# Patient Record
Sex: FEMALE | Race: WHITE | Employment: OTHER | URBAN - METROPOLITAN AREA
[De-identification: names, ages, dates, MRNs, and addresses within clinical notes are randomized per-mention and may not be internally consistent; named-entity substitution may affect disease eponyms.]

---

## 2017-02-01 ENCOUNTER — OFFICE VISIT (OUTPATIENT)
Dept: INTERNAL MEDICINE CLINIC | Age: 67
End: 2017-02-01

## 2017-02-01 VITALS
TEMPERATURE: 97.8 F | SYSTOLIC BLOOD PRESSURE: 128 MMHG | RESPIRATION RATE: 12 BRPM | HEART RATE: 82 BPM | HEIGHT: 63 IN | OXYGEN SATURATION: 98 % | DIASTOLIC BLOOD PRESSURE: 76 MMHG | BODY MASS INDEX: 26.58 KG/M2 | WEIGHT: 150 LBS

## 2017-02-01 DIAGNOSIS — M25.572 CHRONIC PAIN OF LEFT ANKLE: ICD-10-CM

## 2017-02-01 DIAGNOSIS — Z00.00 ROUTINE PHYSICAL EXAMINATION: Primary | ICD-10-CM

## 2017-02-01 DIAGNOSIS — Z11.59 NEED FOR HEPATITIS C SCREENING TEST: ICD-10-CM

## 2017-02-01 DIAGNOSIS — E66.3 OVERWEIGHT (BMI 25.0-29.9): ICD-10-CM

## 2017-02-01 DIAGNOSIS — Z23 ENCOUNTER FOR IMMUNIZATION: ICD-10-CM

## 2017-02-01 DIAGNOSIS — G89.29 CHRONIC PAIN OF LEFT ANKLE: ICD-10-CM

## 2017-02-01 DIAGNOSIS — Z12.11 COLON CANCER SCREENING: ICD-10-CM

## 2017-02-01 DIAGNOSIS — Z12.39 BREAST CANCER SCREENING: ICD-10-CM

## 2017-02-01 DIAGNOSIS — M85.80 OSTEOPENIA: ICD-10-CM

## 2017-02-01 RX ORDER — GABAPENTIN 300 MG/1
300 CAPSULE ORAL 3 TIMES DAILY
COMMUNITY
End: 2018-03-06 | Stop reason: SDUPTHER

## 2017-02-01 RX ORDER — MELOXICAM 15 MG/1
15 TABLET ORAL DAILY
COMMUNITY
End: 2018-03-29 | Stop reason: SDUPTHER

## 2017-02-01 RX ORDER — DULOXETIN HYDROCHLORIDE 20 MG/1
20 CAPSULE, DELAYED RELEASE ORAL
COMMUNITY
End: 2018-06-05 | Stop reason: SDUPTHER

## 2017-02-01 NOTE — MR AVS SNAPSHOT
Visit Information Date & Time Provider Department Dept. Phone Encounter #  
 2/1/2017  1:30 PM Richard Elizabeth, [address] [facility] [phone] [id number] Follow-up Instructions Return in about 1 year (around 2/1/2018) for FULL PHYSICAL - 30 minutes. Upcoming Health Maintenance Date Due Hepatitis C Screening 1950 BREAST CANCER SCRN MAMMOGRAM 12/5/1970 DTaP/Tdap/Td series (1 - Tdap) 12/5/1973 PAP AKA CERVICAL CYTOLOGY 12/5/1973 FOBT Q 1 YEAR AGE 50-75 12/5/2002 ZOSTER VACCINE AGE 60> 12/5/2012 Allergies as of 2/1/2017  Review Complete On: 2/1/2017 By: Richard Elizabeth MD  
 No Known Allergies Current Immunizations  Reviewed on 2/1/2017 Name Date Influenza Vaccine 10/25/2016 Tdap  Incomplete Reviewed by Luciana Garcia RN on 2/1/2017 at  1:41 PM  
You Were Diagnosed With   
  
 Codes Comments Routine physical examination    -  Primary ICD-10-CM: Z00.00 ICD-9-CM: V70.0 Need for hepatitis C screening test     ICD-10-CM: Z11.59 
ICD-9-CM: V73.89 Encounter for immunization     ICD-10-CM: R12 ICD-9-CM: V03.89 Overweight (BMI 25.0-29. 9)     ICD-10-CM: B43.9 ICD-9-CM: 278.02 Breast cancer screening     ICD-10-CM: Z12.39 
ICD-9-CM: V76.10 Colon cancer screening     ICD-10-CM: Z12.11 ICD-9-CM: V76.51 Osteopenia     ICD-10-CM: M85.80 ICD-9-CM: 733.90 Chronic pain of left ankle     ICD-10-CM: M25.572, G89.29 ICD-9-CM: 719.47, 338.29 Vitals BP Pulse Temp Resp Height(growth percentile) Weight(growth percentile) 128/76 82 97.8 °F (36.6 °C) (Oral) 12 5' 3.1\" (1.603 m) 150 lb (68 kg) SpO2 BMI OB Status Smoking Status 98% 26.49 kg/m2 Postmenopausal Former Smoker Vitals History BMI and BSA Data Body Mass Index Body Surface Area  
 26.49 kg/m 2 1.74 m 2 Preferred Pharmacy Pharmacy Name Phone [id number] [place], [address] [address] [phone] Your Updated Medication List  
  
   
This list is accurate as of: 2/1/17  2:24 PM.  Always use your most recent med list.  
  
  
  
  
 DULoxetine 20 mg capsule Commonly known as:  CYMBALTA Take 20 mg by mouth nightly.  
  
 gabapentin 300 mg capsule Commonly known as:  NEURONTIN Take 300 mg by mouth three (3) times daily. meloxicam 15 mg tablet Commonly known as:  MOBIC Take 15 mg by mouth daily. We Performed the Following CBC WITH AUTOMATED DIFF [38842 CPT(R)] HEPATITIS C AB [11507 CPT(R)] LIPID PANEL [18961 CPT(R)] METABOLIC PANEL, COMPREHENSIVE [23795 CPT(R)] IA IMMUNIZ ADMIN,1 SINGLE/COMB VAC/TOXOID Z188102 CPT(R)] REFERRAL TO GASTROENTEROLOGY [MNB97 Custom] Comments:  
 Colonoscopy screening. TETANUS, DIPHTHERIA TOXOIDS AND ACELLULAR PERTUSSIS VACCINE (TDAP), IN INDIVIDS. >=7, IM [id number] CPT(R)] Follow-up Instructions Return in about 1 year (around 2/1/2018) for FULL PHYSICAL - 30 minutes. To-Do List   
 02/01/2017 Imaging:  DEXA BONE DENSITY STUDY AXIAL   
  
 02/01/2017 Imaging:  AUDELIA MAMMO BI SCREENING INCL CAD Referral Information Referral ID Referred By Referred To  
  
 [id number] Ross MENDEZ [facility] [address], [address] Visits Status Start Date End Date 1 New Request 2/1/17 2/1/18 If your referral has a status of pending review or denied, additional information will be sent to support the outcome of this decision. Patient Instructions Well Visit, Women 48 to 72: Care Instructions Your Care Instructions Physical exams can help you stay healthy. Your doctor has checked your overall health and may have suggested ways to take good care of yourself. He or she also may have recommended tests. At home, you can help prevent illness with healthy eating, regular exercise, and other steps. Follow-up care is a key part of your treatment and safety. Be sure to make and go to all appointments, and call your doctor if you are having problems. It's also a good idea to know your test results and keep a list of the medicines you take. How can you care for yourself at home? · Reach and stay at a healthy weight. This will lower your risk for many problems, such as obesity, diabetes, heart disease, and high blood pressure. · Get at least 30 minutes of exercise on most days of the week. Walking is a good choice. You also may want to do other activities, such as running, swimming, cycling, or playing tennis or team sports. · Do not smoke. Smoking can make health problems worse. If you need help quitting, talk to your doctor about stop-smoking programs and medicines. These can increase your chances of quitting for good. · Protect your skin from too much sun. When you're outdoors from 10 a.m. to 4 p.m., stay in the shade or cover up with clothing and a hat with a wide brim. Wear sunglasses that block UV rays. Even when it's cloudy, put broad-spectrum sunscreen (SPF 30 or higher) on any exposed skin. · See a dentist one or two times a year for checkups and to have your teeth cleaned. · Wear a seat belt in the car. · Limit alcohol to 1 drink a day. Too much alcohol can cause health problems. Follow your doctor's advice about when to have certain tests. These tests can spot problems early. · Cholesterol. Your doctor will tell you how often to have this done based on your age, family history, or other things that can increase your risk for heart attack and stroke. · Blood pressure. Have your blood pressure checked during a routine doctor visit.  Your doctor will tell you how often to check your blood pressure based on your age, your blood pressure results, and other factors. · Mammogram. Ask your doctor how often you should have a mammogram, which is an X-ray of your breasts. A mammogram can spot breast cancer before it can be felt and when it is easiest to treat. · Pap test and pelvic exam. Ask your doctor how often you should have a Pap test. You may not need to have a Pap test as often as you used to. · Vision. Have your eyes checked every year or two or as often as your doctor suggests. Some experts recommend that you have yearly exams for glaucoma and other age-related eye problems starting at age 48. · Hearing. Tell your doctor if you notice any change in your hearing. You can have tests to find out how well you hear. · Diabetes. Ask your doctor whether you should have tests for diabetes. · Colon cancer. You should begin tests for colon cancer at age 48. You may have one of several tests. Your doctor will tell you how often to have tests based on your age and risk. Risks include whether you already had a precancerous polyp removed from your colon or whether your parents, sisters and brothers, or children have had colon cancer. · Thyroid disease. Talk to your doctor about whether to have your thyroid checked as part of a regular physical exam. Women have an increased chance of a thyroid problem. · Osteoporosis. You should begin tests for bone density at age 72. If you are younger than 72, ask your doctor whether you have factors that may increase your risk for this disease. You may want to have this test before age 72. · Heart attack and stroke risk. At least every 4 to 6 years, you should have your risk for heart attack and stroke assessed. Your doctor uses factors such as your age, blood pressure, cholesterol, and whether you smoke or have diabetes to show what your risk for a heart attack or stroke is over the next 10 years. When should you call for help? Watch closely for changes in your health, and be sure to contact your doctor if you have any problems or symptoms that concern you. Where can you learn more? Go to http://tiffanie-bessie.info/. Enter O243 in the search box to learn more about \"Well Visit, Women 50 to 72: Care Instructions. \" Current as of: July 19, 2016 Content Version: 11.1 © 5683-4137 [employer]. Care instructions adapted under license by [employer] (which disclaims liability or warranty for this information). If you have questions about a medical condition or this instruction, always ask your healthcare professional. [place] [address] any warranty or liability for your use of this information. Introducing [facility]! Ashwin Mendez introduces Product[employer] patient portal. Now you can access parts of your medical record, email your doctor's office, and request medication refills online. 1. In your internet browser, go to [email or web address] 2. Click on the First Time User? Click Here link in the Sign In box. You will see the New Member Sign Up page. 3. Enter your Product[employer] Access Code exactly as it appears below. You will not need to use this code after youve completed the sign-up process. If you do not sign up before the expiration date, you must request a new code. · Product[employer] Access Code: [email or web address] Expires: 5/2/2017  1:34 PM 
 
4. Enter the last four digits of your Social Security Number (xxxx) and Date of Birth (mm/dd/yyyy) as indicated and click Submit. You will be taken to the next sign-up page. 5. Create a Productify ID. This will be your Productify login ID and cannot be changed, so think of one that is secure and easy to remember. 6. Create a [employer] password. You can change your password at any time. 7. Enter your Password Reset Question and Answer. This can be used at a later time if you forget your password. 8. Enter your e-mail address. You will receive e-mail notification when new information is available in [address] 9. Click Sign Up. You can now view and download portions of your medical record. 10. Click the Download Summary menu link to download a portable copy of your medical information. If you have questions, please visit the Frequently Asked Questions section of the [employer] website. Remember, [employer] is NOT to be used for urgent needs. For medical emergencies, dial 911. Now available from your iPhone and Android! Please provide this summary of care documentation to your next provider. Your primary care clinician is listed as Julian Ureña. If you have any questions after today's visit, please call [phone].

## 2017-02-01 NOTE — PROGRESS NOTES
Sharri Poon is a 61 y.o. female who was seen in clinic today (2/1/2017) for a new patient appointment. Never had a PCP. Patient was seen with Dr Ken Coronado ([facility] at [facility]). Assessment & Plan:   Sharad Zimmer was seen today for establish care. Diagnoses and all orders for this visit:    Routine physical examination  -     HEPATITIS C AB  -     METABOLIC PANEL, COMPREHENSIVE  -     CBC WITH AUTOMATED DIFF  -     LIPID PANEL    Need for hepatitis C screening test    Encounter for immunization  -     TETANUS, DIPHTHERIA TOXOIDS AND ACELLULAR PERTUSSIS VACCINE (TDAP), IN INDIVIDS. >=7, IM  -     ID IMMUNIZ ADMIN,1 SINGLE/COMB VAC/TOXOID    Overweight (BMI 25.0-29. 9)    Breast cancer screening  -     AUDELIA MAMMO BI SCREENING INCL CAD; Future    Colon cancer screening  -     REFERRAL TO GASTROENTEROLOGY    Osteopenia  -     DEXA BONE DENSITY STUDY AXIAL; Future    Chronic pain of left ankle- this is a chronic problem but new to me, defer to specialist.          Follow-up Disposition:  Return in about 1 year (around 2/1/2018) for FULL PHYSICAL - 30 minutes. ------------------------------------------------------------------------------------------    Subjective:   Health Maintenance  Immunizations:    Influenza: up to date. Tetanus: not UTD- will do today. Shingles: unknown, record requested. Pneumonia: n/a. Cancer screening:    Cervical: reviewed guidelines, UTD, done by OBGYN, records requested. Breast: reviewed guidelines, order placed. Colon: referral placed to GI. Patient Care Team:  Richrd Paget, MD as PCP - General (Internal Medicine)  Smith Rowan MD as Physician (Physical Medicine and Rehab)      The following sections were reviewed & updated as appropriate: PMH, PSH, FH, and SH. Patient Active Problem List   Diagnosis Code    Chronic pain of left ankle M25.572, G89.29          Prior to Admission medications    Medication Sig Start Date End Date Taking?  Authorizing Provider DULoxetine (CYMBALTA) 20 mg capsule Take 20 mg by mouth nightly. Yes Historical Provider   meloxicam (MOBIC) 15 mg tablet Take 15 mg by mouth daily. Yes Historical Provider   gabapentin (NEURONTIN) 300 mg capsule Take 300 mg by mouth three (3) times daily. Yes Historical Provider          No Known Allergies         Review of Systems   Constitutional: Negative for chills and fever. Respiratory: Negative for cough and shortness of breath. Cardiovascular: Negative for chest pain and palpitations. Gastrointestinal: Negative for abdominal pain, blood in stool, constipation, diarrhea, heartburn, nausea and vomiting. Musculoskeletal: Positive for joint pain (L ankle, s/p fx in the past). Negative for myalgias. Neurological: Negative for tingling, focal weakness and headaches. Endo/Heme/Allergies: Does not bruise/bleed easily. Psychiatric/Behavioral: Negative for depression. The patient is not nervous/anxious and does not have insomnia. Objective:   Physical Exam   Constitutional: She appears well-developed. No distress. HENT:   Right Ear: Tympanic membrane, external ear and ear canal normal.   Left Ear: Tympanic membrane, external ear and ear canal normal.   Nose: Nose normal.   Mouth/Throat: Uvula is midline, oropharynx is clear and moist and mucous membranes are normal. No posterior oropharyngeal erythema. Eyes: Conjunctivae and lids are normal. No scleral icterus. Neck: Neck supple. Carotid bruit is not present. No thyromegaly present. Cardiovascular: Regular rhythm and normal heart sounds. No murmur heard. Pulses:       Dorsalis pedis pulses are 2+ on the right side, and 2+ on the left side. Posterior tibial pulses are 2+ on the right side, and 2+ on the left side. Pulmonary/Chest: Effort normal and breath sounds normal. She has no wheezes. She has no rales. Abdominal: Soft. Bowel sounds are normal. She exhibits no mass. There is no hepatosplenomegaly.  There is no tenderness. Musculoskeletal: Normal range of motion. She exhibits no edema. Cervical back: Normal.        Thoracic back: She exhibits no bony tenderness. Lumbar back: Normal.   Lymphadenopathy:     She has no cervical adenopathy. Neurological: She has normal strength. No cranial nerve deficit or sensory deficit. Skin: No rash noted. Psychiatric: She has a normal mood and affect. Her behavior is normal.          Visit Vitals    /76    Pulse 82    Temp 97.8 °F (36.6 °C) (Oral)    Resp 12    Ht 5' 3.1\" (1.603 m)    Wt 150 lb (68 kg)    SpO2 98%    BMI 26.49 kg/m2          Advised her to call back or return to office if symptoms worsen/change/persist.  Discussed expected course/resolution/complications of diagnosis in detail with patient. Medication risks/benefits/costs/interactions/alternatives discussed with patient. She was given an after visit summary which includes diagnoses, current medications, & vitals. She expressed understanding with the diagnosis and plan.         Raul Butcher MD

## 2017-02-01 NOTE — PATIENT INSTRUCTIONS
Well Visit, Women 48 to 72: Care Instructions  Your Care Instructions  Physical exams can help you stay healthy. Your doctor has checked your overall health and may have suggested ways to take good care of yourself. He or she also may have recommended tests. At home, you can help prevent illness with healthy eating, regular exercise, and other steps. Follow-up care is a key part of your treatment and safety. Be sure to make and go to all appointments, and call your doctor if you are having problems. It's also a good idea to know your test results and keep a list of the medicines you take. How can you care for yourself at home? · Reach and stay at a healthy weight. This will lower your risk for many problems, such as obesity, diabetes, heart disease, and high blood pressure. · Get at least 30 minutes of exercise on most days of the week. Walking is a good choice. You also may want to do other activities, such as running, swimming, cycling, or playing tennis or team sports. · Do not smoke. Smoking can make health problems worse. If you need help quitting, talk to your doctor about stop-smoking programs and medicines. These can increase your chances of quitting for good. · Protect your skin from too much sun. When you're outdoors from 10 a.m. to 4 p.m., stay in the shade or cover up with clothing and a hat with a wide brim. Wear sunglasses that block UV rays. Even when it's cloudy, put broad-spectrum sunscreen (SPF 30 or higher) on any exposed skin. · See a dentist one or two times a year for checkups and to have your teeth cleaned. · Wear a seat belt in the car. · Limit alcohol to 1 drink a day. Too much alcohol can cause health problems. Follow your doctor's advice about when to have certain tests. These tests can spot problems early. · Cholesterol.  Your doctor will tell you how often to have this done based on your age, family history, or other things that can increase your risk for heart attack and stroke. · Blood pressure. Have your blood pressure checked during a routine doctor visit. Your doctor will tell you how often to check your blood pressure based on your age, your blood pressure results, and other factors. · Mammogram. Ask your doctor how often you should have a mammogram, which is an X-ray of your breasts. A mammogram can spot breast cancer before it can be felt and when it is easiest to treat. · Pap test and pelvic exam. Ask your doctor how often you should have a Pap test. You may not need to have a Pap test as often as you used to. · Vision. Have your eyes checked every year or two or as often as your doctor suggests. Some experts recommend that you have yearly exams for glaucoma and other age-related eye problems starting at age 48. · Hearing. Tell your doctor if you notice any change in your hearing. You can have tests to find out how well you hear. · Diabetes. Ask your doctor whether you should have tests for diabetes. · Colon cancer. You should begin tests for colon cancer at age 48. You may have one of several tests. Your doctor will tell you how often to have tests based on your age and risk. Risks include whether you already had a precancerous polyp removed from your colon or whether your parents, sisters and brothers, or children have had colon cancer. · Thyroid disease. Talk to your doctor about whether to have your thyroid checked as part of a regular physical exam. Women have an increased chance of a thyroid problem. · Osteoporosis. You should begin tests for bone density at age 72. If you are younger than 72, ask your doctor whether you have factors that may increase your risk for this disease. You may want to have this test before age 72. · Heart attack and stroke risk. At least every 4 to 6 years, you should have your risk for heart attack and stroke assessed.  Your doctor uses factors such as your age, blood pressure, cholesterol, and whether you smoke or have diabetes to show what your risk for a heart attack or stroke is over the next 10 years. When should you call for help? Watch closely for changes in your health, and be sure to contact your doctor if you have any problems or symptoms that concern you. Where can you learn more? Go to http://tiffanie-bessie.info/. Enter L082 in the search box to learn more about \"Well Visit, Women 50 to 72: Care Instructions. \"  Current as of: July 19, 2016  Content Version: 11.1  © 6280-5066 [employer]. Care instructions adapted under license by [employer] (which disclaims liability or warranty for this information). If you have questions about a medical condition or this instruction, always ask your healthcare professional. [address] any warranty or liability for your use of this information.

## 2017-02-01 NOTE — PROGRESS NOTES
To establish care. No health issues reported except for left ankle pain from prior injury. Managed by Dr. Gerardo Ramos. Had shingles shot she thinks at [facility], will call. Last used [facility] as PCP if needed around 2015. Has never had colonoscopy. Shira Dominguez is a 61 y.o. female  who present for routine immunizations. she  denies any symptoms , reactions or allergies that would exclude them from being immunized today. Risks and adverse reactions were discussed and the VIS was given to them. All questions were addressed. she was observed for 5 min post injection. There were no reactions observed.     Rayne Salgado RN

## 2017-02-22 LAB
ALBUMIN SERPL-MCNC: 4.5 G/DL (ref 3.6–4.8)
ALBUMIN/GLOB SERPL: 2.5 {RATIO} (ref 1.1–2.5)
ALP SERPL-CCNC: 99 IU/L (ref 39–117)
ALT SERPL-CCNC: 28 IU/L (ref 0–32)
AST SERPL-CCNC: 23 IU/L (ref 0–40)
BASOPHILS # BLD AUTO: 0 X10E3/UL (ref 0–0.2)
BASOPHILS NFR BLD AUTO: 0 %
BILIRUB SERPL-MCNC: 0.6 MG/DL (ref 0–1.2)
BUN SERPL-MCNC: 17 MG/DL (ref 8–27)
BUN/CREAT SERPL: 33 (ref 11–26)
CALCIUM SERPL-MCNC: 10.1 MG/DL (ref 8.7–10.3)
CHLORIDE SERPL-SCNC: 98 MMOL/L (ref 96–106)
CHOLEST SERPL-MCNC: 277 MG/DL (ref 100–199)
CO2 SERPL-SCNC: 26 MMOL/L (ref 18–29)
CREAT SERPL-MCNC: 0.51 MG/DL (ref 0.57–1)
EOSINOPHIL # BLD AUTO: 0 X10E3/UL (ref 0–0.4)
EOSINOPHIL NFR BLD AUTO: 0 %
ERYTHROCYTE [DISTWIDTH] IN BLOOD BY AUTOMATED COUNT: 13.6 % (ref 12.3–15.4)
GLOBULIN SER CALC-MCNC: 1.8 G/DL (ref 1.5–4.5)
GLUCOSE SERPL-MCNC: 72 MG/DL (ref 65–99)
HCT VFR BLD AUTO: 39.7 % (ref 34–46.6)
HCV AB S/CO SERPL IA: <0.1 S/CO RATIO (ref 0–0.9)
HDLC SERPL-MCNC: 112 MG/DL
HGB BLD-MCNC: 13.7 G/DL (ref 11.1–15.9)
IMM GRANULOCYTES # BLD: 0 X10E3/UL (ref 0–0.1)
IMM GRANULOCYTES NFR BLD: 0 %
LDLC SERPL CALC-MCNC: 151 MG/DL (ref 0–99)
LYMPHOCYTES # BLD AUTO: 1.4 X10E3/UL (ref 0.7–3.1)
LYMPHOCYTES NFR BLD AUTO: 29 %
MCH RBC QN AUTO: 33.3 PG (ref 26.6–33)
MCHC RBC AUTO-ENTMCNC: 34.5 G/DL (ref 31.5–35.7)
MCV RBC AUTO: 96 FL (ref 79–97)
MONOCYTES # BLD AUTO: 0.3 X10E3/UL (ref 0.1–0.9)
MONOCYTES NFR BLD AUTO: 5 %
NEUTROPHILS # BLD AUTO: 3.2 X10E3/UL (ref 1.4–7)
NEUTROPHILS NFR BLD AUTO: 66 %
PLATELET # BLD AUTO: 190 X10E3/UL (ref 150–379)
POTASSIUM SERPL-SCNC: 4.6 MMOL/L (ref 3.5–5.2)
PROT SERPL-MCNC: 6.3 G/DL (ref 6–8.5)
RBC # BLD AUTO: 4.12 X10E6/UL (ref 3.77–5.28)
SODIUM SERPL-SCNC: 141 MMOL/L (ref 134–144)
TRIGL SERPL-MCNC: 72 MG/DL (ref 0–149)
VLDLC SERPL CALC-MCNC: 14 MG/DL (ref 5–40)
WBC # BLD AUTO: 4.9 X10E3/UL (ref 3.4–10.8)

## 2017-05-08 ENCOUNTER — HOSPITAL ENCOUNTER (OUTPATIENT)
Dept: BONE DENSITY | Age: 67
Discharge: HOME | End: 2017-05-08
Attending: INTERNAL MEDICINE
Payer: COMMERCIAL

## 2017-05-08 ENCOUNTER — HOSPITAL ENCOUNTER (OUTPATIENT)
Dept: MAMMOGRAPHY | Age: 67
Discharge: HOME | End: 2017-05-08
Attending: INTERNAL MEDICINE
Payer: COMMERCIAL

## 2017-05-08 DIAGNOSIS — Z12.39 BREAST CANCER SCREENING: ICD-10-CM

## 2017-05-08 DIAGNOSIS — M85.80 OSTEOPENIA: ICD-10-CM

## 2017-05-08 PROCEDURE — 77067 SCR MAMMO BI INCL CAD: CPT

## 2017-05-08 PROCEDURE — 77080 DXA BONE DENSITY AXIAL: CPT

## 2017-05-10 ENCOUNTER — TELEPHONE (OUTPATIENT)
Dept: INTERNAL MEDICINE CLINIC | Age: 67
End: 2017-05-10

## 2017-05-10 RX ORDER — ALENDRONATE SODIUM 70 MG/1
70 TABLET ORAL
Qty: 12 TAB | Refills: 1 | Status: SHIPPED | OUTPATIENT
Start: 2017-05-10 | End: 2017-10-27 | Stop reason: SDUPTHER

## 2017-05-10 NOTE — PROGRESS NOTES
Called pt and gave results. Pt was in cardiology class and asked if we would call in results to her home phone and leave message stating the results. Script for Fosamax sent to pt pharmacy.

## 2017-05-10 NOTE — PROGRESS NOTES
Please call patient. DEXA shows osteopenia, but abnormal FRAX. Would recommend Fosamax, 1 tab PO weekly, #12, RF 1.

## 2017-05-12 ENCOUNTER — TELEPHONE (OUTPATIENT)
Dept: INTERNAL MEDICINE CLINIC | Age: 67
End: 2017-05-12

## 2017-05-15 NOTE — TELEPHONE ENCOUNTER
Called pt home phone # at pt request and left message stating pt DEXA results and informed pt that Fosamax has been sent to pt pharmacy.

## 2017-05-17 ENCOUNTER — TELEPHONE (OUTPATIENT)
Dept: INTERNAL MEDICINE CLINIC | Age: 67
End: 2017-05-17

## 2017-05-18 NOTE — TELEPHONE ENCOUNTER
Called pt and gave her alternatives rather than taking Fosamax such as following up with a Rhuematologist, or waiting to discuss with Dr Troy Bloch at next follow up visit in 02/18. Pt stated thank you for the information and will call us back when she can discuss more.

## 2017-05-18 NOTE — TELEPHONE ENCOUNTER
Agreed. Does not sound like this is the right medication. Stop it. This is the only medication I would use to treat osteopenia (abnl FRAX). I would hold off on injectable medications. She can talk to a rheumatologist (Dr Doni Ly or Gallito Dunbar or Candy Hancock) if she wants to start alternative medications.   If not can f/u and discuss at her next visit (2/18)

## 2017-05-18 NOTE — TELEPHONE ENCOUNTER
Pt called stating she took her 1st dose of Fosamax which caused her to break out into a severe sweat all over. She states she then felt very nauseated. She state she was sitting up when she took the medication. She states it did not agree with her at all.

## 2017-07-20 ENCOUNTER — TELEPHONE (OUTPATIENT)
Dept: INTERNAL MEDICINE CLINIC | Age: 67
End: 2017-07-20

## 2017-07-21 NOTE — TELEPHONE ENCOUNTER
Pt called and stated that the fosamax was making her sick when taking it with other medications and she now is taking it  from her other medications and now can tolerate the medication. Pt still has one refill left for her Fosamax and would like to know will she have to take anything else after taking this medication ?

## 2017-07-21 NOTE — TELEPHONE ENCOUNTER
Called pt and informed pt that she should take this medication for 2 yrs and then would have dexa scan to recheck. And to notify us when she needs refills Pt verbalized understanding.

## 2017-07-21 NOTE — TELEPHONE ENCOUNTER
Should continue on this medication for 2 yrs and then we can repeat her DEXA scan to see how it is working. When she needs refills just notify us and will send in a refill. Notify her I normally give 6 months of meds at a time, but will keep refilling for her.

## 2017-10-27 RX ORDER — ALENDRONATE SODIUM 70 MG/1
TABLET ORAL
Qty: 12 TAB | Refills: 1 | Status: SHIPPED | OUTPATIENT
Start: 2017-10-27 | End: 2018-02-13 | Stop reason: SDUPTHER

## 2017-12-11 ENCOUNTER — TELEPHONE (OUTPATIENT)
Dept: INTERNAL MEDICINE CLINIC | Age: 67
End: 2017-12-11

## 2017-12-11 NOTE — TELEPHONE ENCOUNTER
I only do shoulders & knees. Ankles is to small of a joint for me. Needs to see podiatry or orthopedics.

## 2017-12-11 NOTE — TELEPHONE ENCOUNTER
[phone] pt says that the dr she use to see would give her cortisone shots in her ankles and she wonders if dr Moises Gleason would do that for her.

## 2017-12-12 NOTE — TELEPHONE ENCOUNTER
Called pt and left a message informing her that Dr Marlee Eldridge does not do cortisone injections for ankles they are too small for him, pt would have to see podiatry or an orthopedic.

## 2018-02-13 ENCOUNTER — OFFICE VISIT (OUTPATIENT)
Dept: INTERNAL MEDICINE CLINIC | Age: 68
End: 2018-02-13

## 2018-02-13 VITALS
RESPIRATION RATE: 14 BRPM | TEMPERATURE: 97.9 F | SYSTOLIC BLOOD PRESSURE: 124 MMHG | WEIGHT: 142 LBS | HEIGHT: 64 IN | HEART RATE: 72 BPM | DIASTOLIC BLOOD PRESSURE: 76 MMHG | OXYGEN SATURATION: 98 % | BODY MASS INDEX: 24.24 KG/M2

## 2018-02-13 DIAGNOSIS — Z23 ENCOUNTER FOR IMMUNIZATION: ICD-10-CM

## 2018-02-13 DIAGNOSIS — Z00.00 WELCOME TO MEDICARE PREVENTIVE VISIT: Primary | ICD-10-CM

## 2018-02-13 DIAGNOSIS — Z71.89 ACP (ADVANCE CARE PLANNING): ICD-10-CM

## 2018-02-13 DIAGNOSIS — M85.89 OSTEOPENIA OF MULTIPLE SITES: ICD-10-CM

## 2018-02-13 DIAGNOSIS — L30.9 ECZEMA, UNSPECIFIED TYPE: ICD-10-CM

## 2018-02-13 DIAGNOSIS — G89.29 CHRONIC PAIN OF LEFT ANKLE: ICD-10-CM

## 2018-02-13 DIAGNOSIS — Z51.81 MEDICATION MONITORING ENCOUNTER: ICD-10-CM

## 2018-02-13 DIAGNOSIS — Z13.39 SCREENING FOR ALCOHOLISM: ICD-10-CM

## 2018-02-13 DIAGNOSIS — M25.572 CHRONIC PAIN OF LEFT ANKLE: ICD-10-CM

## 2018-02-13 RX ORDER — ALENDRONATE SODIUM 70 MG/1
TABLET ORAL
Qty: 12 TAB | Refills: 1 | Status: SHIPPED | OUTPATIENT
Start: 2018-02-13 | End: 2018-10-04 | Stop reason: SDUPTHER

## 2018-02-13 RX ORDER — CLOBETASOL PROPIONATE 0.5 MG/G
CREAM TOPICAL
COMMUNITY
End: 2018-02-13 | Stop reason: SDUPTHER

## 2018-02-13 RX ORDER — LANOLIN ALCOHOL/MO/W.PET/CERES
40 CREAM (GRAM) TOPICAL DAILY
COMMUNITY
End: 2019-02-07

## 2018-02-13 RX ORDER — CLOBETASOL PROPIONATE 0.5 MG/G
CREAM TOPICAL
Qty: 15 G | Refills: 1 | Status: SHIPPED | OUTPATIENT
Start: 2018-02-13 | End: 2018-02-13

## 2018-02-13 RX ORDER — TRIAMCINOLONE ACETONIDE 1 MG/G
OINTMENT TOPICAL 2 TIMES DAILY
Qty: 30 G | Refills: 0 | Status: SHIPPED | OUTPATIENT
Start: 2018-02-13 | End: 2019-02-07

## 2018-02-13 NOTE — PROGRESS NOTES
Dorinda Granados is a 74 y.o. female who was seen in clinic today (2/13/2018) for a full physical.      Assessment & Plan:   Diagnoses and all orders for this visit:    1. Welcome to Medicare preventive visit  -     AMB POC EKG ROUTINE W/ 12 LEADS, SCREEN ()    2. Encounter for immunization  -     pneumococcal 13 cm conj dip (PREVNAR-13) 0.5 mL syrg injection; 0.5 mL by IntraMUSCular route once for 1 dose. 3. ACP (advance care planning)  -     FULL CODE    4. Screening for alcoholism  -     Annual  Alcohol Screen 15 min ()    5. Chronic pain of left ankle- this is a chronic problem, symptoms are: stable, she wants me to take over from [address]. Will cont w/ current medications. Reviewed using Gabapentin BID vs TID. Reviewed Lyrica as an option. Reviewed decreasing Mobic at some time. If needs injection will need to see ortho or sports medicine. She will notify me when she needs refills     6. Osteopenia of multiple sites- new dx at last visit, will cont on emds below, reviewed duration of treatment and alternative options  -     alendronate (FOSAMAX) 70 mg tablet; TAKE 1 TABLET BY MOUTH EVERY SEVEN (7) DAYS. 7. Medication monitoring encounter  -     METABOLIC PANEL, COMPREHENSIVE  -     CBC W/O DIFF    8. Eczema, unspecified type- this is a chronic problem but new to me, symptoms are: fluctuating, having dry skin on various areas that are not responsive to moisturizer cream, used Clobetasol prn w/ good relief. Sent in but is financially prohibitive so changed to Triamcinolone. -     clobetasol (TEMOVATE) 0.05 % topical cream; Apply  to affected area two (2) times daily as needed for Skin Irritation. Follow-up Disposition:  Return in about 1 year (around 2/13/2019) for FULL PHYSICAL - 30 minutes. ------------------------------------------------------------------------------------------    Subjective: Annual Wellness Visit- IPPE    End of Life Planning:  This was discussed with her today and she has NO advanced directive  - add't info provided. Reviewed DNR/DNI and patient is not interested. Depression Screen:  PHQ over the last two weeks 2/13/2018   Little interest or pleasure in doing things Not at all   Feeling down, depressed or hopeless Not at all   Total Score PHQ 2 0         Fall Risk:   Fall Risk Assessment, last 12 mths 2/13/2018   Able to walk? Yes   Fall in past 12 months? No       Abuse Screen:  Abuse Screening Questionnaire 2/13/2018   Do you ever feel afraid of your partner? N   Are you in a relationship with someone who physically or mentally threatens you? N   Is it safe for you to go home? Y         Alcohol Risk Factor Screening: On any occasion during the past 3 months, have you had more than 3 drinks containing alcohol? No  Do you average more than 7 drinks per week? No    Hearing Loss: Hearing is good. Cognition Screen:  Has your family/caregiver stated any concerns about your memory: no  appropriate for age attention/concentration and appropriate safety awareness    Activities of Daily Living:    Requires assistance with: no ADLs  Home contains: handrails and grab bars  Exercise: moderately active    Adult Nutrition Screen:  No risk factors noted. Weight loss was intentional.       Health Maintenance  Daily Aspirin: n/a  Bone Density: UTD - done in 5/8/17  Glaucoma Screening: No:  She will call to schedule f/u    Immunizations:    Influenza: up to date. Tetanus: up to date. Shingles: up to date - reviewed Shingrix vaccine & will readdress at next visit. Pneumonia: due for Prevnar & script provided. Cancer screening:    Cervical: reviewed guidelines, UTD. Breast: reviewed guidelines, UTD, will space out to every 2 yrs per her request.  Will plan on stopping at age 81. Colon: not UTD,  had several health issues since last year, reviewed pros/cons to screening, guidelines reviewed.          Patient Care Team:  Smith Chaney MD as PCP - General (Internal Medicine)  Jackie Donnelly MD as Physician (Physical Medicine and Rehab)  Bety Soto MD as Physician (Ophthalmology)  Renée Mehta MD as Physician (Obstetrics & Gynecology)         In addition to the above issues we also reviewed the following acute and/or chronic problems:    Endocrine Review  She is seen for osteopenia. Since last visit: had some issues initially w/ GI issues, but has resolved w/ taking meds separate from other pills. She is on the following treatment: fosamax 70 mg weekly. She reports compliance with all meds, and denies any med side effects. She denies any falls or joint pain. She presents do to pain of her left ankle that is chronic. Was followed by Dr Riana Brady but they no longer take her insurance. She hs not had any injections recently. Pain was bad but improved w/ Gabapentin taking it TID. However, some issues w/ taking meds TID. She describes the pain as aching. It is constant but fluctuating in intensity. She denies numbness, denies paresthesias. The following sections were reviewed & updated as appropriate: PMH, PSH, FH, and SH. Patient Active Problem List   Diagnosis Code    Chronic pain of left ankle M25.572, G89.29    Osteopenia M85.80          Prior to Admission medications    Medication Sig Start Date End Date Taking? Authorizing Provider   VITAMIN E ACETATE (VITAMIN E PO) Take 1,200 mg by mouth daily. Yes Historical Provider   thiamine (VITAMIN B-1) 100 mg tablet Take 40 mg by mouth daily. Yes Historical Provider   clobetasol (TEMOVATE) 0.05 % topical cream Apply  to affected area two (2) times daily as needed for Skin Irritation. Yes Historical Provider   alendronate (FOSAMAX) 70 mg tablet TAKE 1 TABLET BY MOUTH EVERY SEVEN (7) DAYS. 10/27/17  Yes Dylon Britton MD   DULoxetine (CYMBALTA) 20 mg capsule Take 20 mg by mouth nightly.    Yes Historical Provider   meloxicam (MOBIC) 15 mg tablet Take 15 mg by mouth daily. Yes Historical Provider   gabapentin (NEURONTIN) 300 mg capsule Take 300 mg by mouth three (3) times daily. Yes Historical Provider          No Known Allergies           Review of Systems   Constitutional: Negative for chills and fever. Respiratory: Negative for cough and shortness of breath. Cardiovascular: Negative for chest pain and palpitations. Gastrointestinal: Negative for abdominal pain, blood in stool, constipation, diarrhea, heartburn, nausea and vomiting. Musculoskeletal: Positive for joint pain (L ankle). Negative for myalgias. Neurological: Negative for tingling, focal weakness and headaches. Endo/Heme/Allergies: Does not bruise/bleed easily. Psychiatric/Behavioral: Negative for depression. The patient is not nervous/anxious and does not have insomnia. Objective:   Physical Exam   Constitutional: She appears well-developed. No distress. HENT:   Right Ear: Tympanic membrane, external ear and ear canal normal.   Left Ear: Tympanic membrane, external ear and ear canal normal.   Nose: Nose normal.   Mouth/Throat: Uvula is midline, oropharynx is clear and moist and mucous membranes are normal. No posterior oropharyngeal erythema. Eyes: Conjunctivae and lids are normal. No scleral icterus. Neck: Neck supple. Carotid bruit is not present. No thyromegaly present. Cardiovascular: Regular rhythm. Murmur (2/6 systolic) heard. Pulses:       Dorsalis pedis pulses are 2+ on the right side, and 2+ on the left side. Posterior tibial pulses are 2+ on the right side, and 2+ on the left side. Pulmonary/Chest: Effort normal and breath sounds normal. She has no wheezes. She has no rales. Abdominal: Soft. Bowel sounds are normal. She exhibits no mass. There is no hepatosplenomegaly. There is no tenderness. Musculoskeletal: Normal range of motion. She exhibits no edema. Left ankle: She exhibits deformity. She exhibits normal range of motion. Tenderness. Cervical back: Normal.        Thoracic back: She exhibits no bony tenderness. Lumbar back: Normal.   Lymphadenopathy:     She has no cervical adenopathy. Neurological: She has normal strength. No cranial nerve deficit or sensory deficit. Skin: No rash noted. Psychiatric: She has a normal mood and affect. Her behavior is normal.          Visit Vitals    /76    Pulse 72    Temp 97.9 °F (36.6 °C) (Oral)    Resp 14    Ht 5' 3.5\" (1.613 m)    Wt 142 lb (64.4 kg)    SpO2 98%    BMI 24.76 kg/m2          Advised her to call back or return to office if symptoms worsen/change/persist.  Discussed expected course/resolution/complications of diagnosis in detail with patient. Medication risks/benefits/costs/interactions/alternatives discussed with patient. She was given an after visit summary which includes diagnoses, current medications, & vitals. She expressed understanding with the diagnosis and plan. Aspects of this note may have been generated using voice recognition software. Despite editing, there may be some syntax errors.        Joce Khan MD

## 2018-02-13 NOTE — ACP (ADVANCE CARE PLANNING)
Advance Care Planning (ACP) Provider Note - Comprehensive     Date of ACP Conversation: 02/13/18  Persons included in Conversation:  patient  Length of ACP Conversation in minutes: <16 minutes (Non-Billable)    Authorized Decision Maker (if patient is incapable of making informed decisions): This person is: Other Legally Authorized Decision Maker (e.g. Next of Kin)          General ACP for ALL Patients with Decision Making Capacity:  Importance of advance care planning, including choosing a healthcare agent to communicate patient's healthcare decisions if patient lost the ability to make decisions, such as after a sudden illness or accident  Understanding of the healthcare agent role was assessed and information provided  Opportunity offered to explore how cultural, Scientologist, spiritual, or personal beliefs would affect decisions for future care  Exploration of values, goals, and preferences if recovery is not expected, even with continued medical treatment in the event of: Imminent death or severe, permanent brain injury    For Serious or Chronic Illness:  Understanding of CPR, goals and expected outcomes, benefits and burdens discussed. Understanding of medical condition  Information on CPR success rates provided (e.g. for CPR in hospital, survival to d/c at two weeks is 22%, for chronically ill or elderly/frail survival is less than 3%)    Interventions Provided:  Recommended communicating the plan and making copies for the healthcare agent, personal physician, and others as appropriate (e.g., health system)  Recommended review of completed ACP document annually or upon change in health status       She  has NO advanced directive  - add't info provided. Reviewed DNR/DNI and patient is not interested.

## 2018-02-13 NOTE — PATIENT INSTRUCTIONS
Medicare Wellness Visit, Female    The best way to live healthy is to have a healthy lifestyle by eating a well-balanced diet, exercising regularly, limiting alcohol and stopping smoking. Regular physical exams and screening tests are another way to keep healthy. Preventive exams provided by your health care provider can find health problems before they become diseases or illnesses. Preventive services including immunizations, screening tests, monitoring and exams can help you take care of your own health. All people over age 72 should have a pneumovax  and and a prevnar shot to prevent pneumonia. These are once in a lifetime unless you and your provider decide differently. All people over 65 should have a yearly flu shot and a tetanus vaccine every 10 years. A bone mass density to screen for osteoporosis or thinning of the bones should be done every 2 years after 65. Screening for diabetes mellitus with a blood sugar test should be done every year. Glaucoma is a disease of the eye due to increased ocular pressure that can lead to blindness and it should be done every year by an eye professional.    Cardiovascular screening tests that check for elevated lipids (fatty part of blood) which can lead to heart disease and strokes should be done every 5 years. Colorectal screening that evaluates for blood or polyps in your colon should be done yearly as a stool test or every five years as a flexible sigmoidoscope or every 10 years as a colonoscopy up to age 76. Breast cancer screening with a mammogram is recommended biennially  for women age 54-69. Screening for cervical cancer with a pap smear and pelvic exam is recommended for women after age 72 years every 2 years up to age 79 or when the provider and patient decide to stop. If there is a history of cervical abnormalities or other increased risk for cancer then the test is recommended yearly.     Hepatitis C screening is also recommended for anyone born between [birth year] through [address]. A shingles vaccine is also recommended once in a lifetime after age 61. Your Medicare Wellness Exam is recommended annually. Here is a list of your current Health Maintenance items with a due date:  Health Maintenance Due   Topic Date Due    Colonoscopy  12/05/1970    Glaucoma Screening   12/05/2017    Pneumococcal Vaccine (1 of 2 - PCV13) 12/05/2017    Annual Well Visit  12/05/2017            Learning About Living [place]  What is a living will? A living will is a legal form you use to write down the kind of care you want at the end of your life. It is used by the health professionals who will treat you if you aren't able to decide for yourself. If you put your wishes in writing, your loved ones and others will know what kind of care you want. They won't need to guess. This can ease your mind and be helpful to others. A living will is not the same as an estate or property will. An estate will explains what you want to happen with your money and property after you die. Is a living will a legal document? A living will is a legal document. Each state has its own laws about [place]. If you move to another state, make sure that your living will is legal in the state where you now live. Or you might use a universal form that has been approved by many states. This kind of form can sometimes be completed and stored online. Your electronic copy will then be available wherever you have a connection to the Internet. In most cases, doctors will respect your wishes even if you have a form from a different state. · You don't need an  to complete a living will. But legal advice can be helpful if your state's laws are unclear, your health history is complicated, or your family can't agree on what should be in your living will. · You can change your living will at any time.  Some people find that their wishes about end-of-life care change as their health changes. · In addition to making a living will, think about completing a medical power of  form. This form lets you name the person you want to make end-of-life treatment decisions for you (your \"health care agent\") if you're not able to. Many hospitals and nursing homes will give you the forms you need to complete a living will and a medical power of . · Your living will is used only if you can't make or communicate decisions for yourself anymore. If you become able to make decisions again, you can accept or refuse any treatment, no matter what you wrote in your living will. · Your state may offer an online registry. This is a place where you can store your living will online so the doctors and nurses who need to treat you can find it right away. What should you think about when creating a living will? Talk about your end-of-life wishes with your family members and your doctor. Let them know what you want. That way the people making decisions for you won't be surprised by your choices. Think about these questions as you make your living will:  · Do you know enough about life support methods that might be used? If not, talk to your doctor so you know what might be done if you can't breathe on your own, your heart stops, or you're unable to swallow. · What things would you still want to be able to do after you receive life-support methods? Would you want to be able to walk? To speak? To eat on your own? To live without the help of machines? · If you have a choice, where do you want to be cared for? In your home? At a hospital or nursing home? · Do you want certain Shinto practices performed if you become very ill? · If you have a choice at the end of your life, where would you prefer to die? At home? In a hospital or nursing home? Somewhere else? · Would you prefer to be buried or cremated? · Do you want your organs to be donated after you die?   What should you do with your living will?  · Make sure that your family members and your health care agent have copies of your living will. · Give your doctor a copy of your living will to keep in your medical record. If you have more than one doctor, make sure that each one has a copy. · You may want to put a copy of your living will where it can be easily found. Where can you learn more? Go to http://tiffanie-bessie.info/. Enter T215 in the search box to learn more about \"Learning About Living [place]. \"  Current as of: August 8, 2016  Content Version: 11.3  © 5776-5726 [employer]. Care instructions adapted under license by [employer] (which disclaims liability or warranty for this information). If you have questions about a medical condition or this instruction, always ask your healthcare professional. [address] any warranty or liability for your use of this information.

## 2018-02-14 LAB
ALBUMIN SERPL-MCNC: 4.7 G/DL (ref 3.6–4.8)
ALBUMIN/GLOB SERPL: 2.2 {RATIO} (ref 1.2–2.2)
ALP SERPL-CCNC: 77 IU/L (ref 39–117)
ALT SERPL-CCNC: 22 IU/L (ref 0–32)
AST SERPL-CCNC: 20 IU/L (ref 0–40)
BILIRUB SERPL-MCNC: 0.5 MG/DL (ref 0–1.2)
BUN SERPL-MCNC: 16 MG/DL (ref 8–27)
BUN/CREAT SERPL: 23 (ref 12–28)
CALCIUM SERPL-MCNC: 9.9 MG/DL (ref 8.7–10.3)
CHLORIDE SERPL-SCNC: 100 MMOL/L (ref 96–106)
CO2 SERPL-SCNC: 25 MMOL/L (ref 18–29)
CREAT SERPL-MCNC: 0.7 MG/DL (ref 0.57–1)
ERYTHROCYTE [DISTWIDTH] IN BLOOD BY AUTOMATED COUNT: 12.9 % (ref 12.3–15.4)
GFR SERPLBLD CREATININE-BSD FMLA CKD-EPI: 105 ML/MIN/1.73
GFR SERPLBLD CREATININE-BSD FMLA CKD-EPI: 91 ML/MIN/1.73
GLOBULIN SER CALC-MCNC: 2.1 G/DL (ref 1.5–4.5)
GLUCOSE SERPL-MCNC: 92 MG/DL (ref 65–99)
HCT VFR BLD AUTO: 41.3 % (ref 34–46.6)
HGB BLD-MCNC: 14.2 G/DL (ref 11.1–15.9)
MCH RBC QN AUTO: 32.3 PG (ref 26.6–33)
MCHC RBC AUTO-ENTMCNC: 34.4 G/DL (ref 31.5–35.7)
MCV RBC AUTO: 94 FL (ref 79–97)
PLATELET # BLD AUTO: 192 X10E3/UL (ref 150–379)
POTASSIUM SERPL-SCNC: 3.8 MMOL/L (ref 3.5–5.2)
PROT SERPL-MCNC: 6.8 G/DL (ref 6–8.5)
RBC # BLD AUTO: 4.4 X10E6/UL (ref 3.77–5.28)
SODIUM SERPL-SCNC: 143 MMOL/L (ref 134–144)
WBC # BLD AUTO: 4.4 X10E3/UL (ref 3.4–10.8)

## 2018-03-07 ENCOUNTER — TELEPHONE (OUTPATIENT)
Dept: INTERNAL MEDICINE CLINIC | Age: 68
End: 2018-03-07

## 2018-03-07 RX ORDER — GABAPENTIN 300 MG/1
300 CAPSULE ORAL 3 TIMES DAILY
Qty: 270 CAP | Refills: 1 | Status: SHIPPED | OUTPATIENT
Start: 2018-03-07 | End: 2018-05-15 | Stop reason: SDUPTHER

## 2018-03-07 NOTE — TELEPHONE ENCOUNTER
Called and spoke with pt  and gave names of orthopedist , Daniel Echevarria or Zi Garcia w/ [facility] or Rubin Armenta w/ [facility] .  Pt  verbalized understanding.                  Documentation

## 2018-03-07 NOTE — TELEPHONE ENCOUNTER
[phone] pt would like for dr Lauren Davis to recommend someone to give her cortizone shots in her ankles.

## 2018-03-07 NOTE — TELEPHONE ENCOUNTER
Layne Morales (Self) [phone]     Pt would like dr Alan Pinto to recommend someone her to see for shots in her ankles asap.

## 2018-03-30 RX ORDER — MELOXICAM 15 MG/1
15 TABLET ORAL DAILY
Qty: 30 TAB | Refills: 1 | Status: SHIPPED | OUTPATIENT
Start: 2018-03-30 | End: 2018-05-14 | Stop reason: SDUPTHER

## 2018-05-14 NOTE — TELEPHONE ENCOUNTER
Pt calling about RX:  Gabapentin 300 mg caps. Directions was to take 3 tablets 3times a day (3 pills 3 times a day or 9 pills a day)     Pt does not feel the same taking pills as Dr Renetta Bonilla wrote it.    Please advise - [phone]

## 2018-05-15 RX ORDER — GABAPENTIN 300 MG/1
900 CAPSULE ORAL 2 TIMES DAILY
Qty: 180 CAP | Refills: 5 | Status: SHIPPED | OUTPATIENT
Start: 2018-05-15 | End: 2018-11-01 | Stop reason: SDUPTHER

## 2018-05-15 NOTE — TELEPHONE ENCOUNTER
Informed the pt per their discussion she was having issues on the higher dose, so the plan was to decrease the dose. And that Dr Jesika Villatoro  can try decreasing from 9 tabs/day to 6 tabs/day (either 2 tabs TID or 3 tabs BID). Does she want to try this? And that Dr Jesika Villatoro did not mean to decrease the medication that much but it was documented in her chart as 1 tab 3 x day since he had first starting seeing her. Pt stated she will be happy to try the 3 tabs BID.

## 2018-05-15 NOTE — TELEPHONE ENCOUNTER
Chart reviewed. Has been listed on her med list as 1 tab TID in her chart since I met her, so that is why it was refilled. I did not mean to decrease her that significantly. I did not realize she was taking 9 tabs a day. PMR notes reviewed from '17, hand written, not very helpful.  reviewed, had been taking 6 tabs/day until November '17, and then increase to 9 tabs/day at that time for a few months. Per our discussion she was having issues on the higher dose, so the plan was to decrease the dose. We can try decreasing from 9 tabs/day to 6 tabs/day (either 2 tabs TID or 3 tabs BID). Does she want to try this?

## 2018-05-15 NOTE — TELEPHONE ENCOUNTER
Called pt and pt states her gabapentin which was previously prescribed by her former doctor Dr Em Gaxiola was sent in incorrectly. She states she takes 3 capsules 3 x day. She states that she was taking it the way Dr Hilario Liter prescribed and can tell the difference in the way she feels that she is hurting.

## 2018-06-05 RX ORDER — DULOXETIN HYDROCHLORIDE 20 MG/1
20 CAPSULE, DELAYED RELEASE ORAL
Qty: 90 CAP | Refills: 1 | Status: SHIPPED | OUTPATIENT
Start: 2018-06-05 | End: 2018-12-27 | Stop reason: SDUPTHER

## 2018-08-20 ENCOUNTER — TELEPHONE (OUTPATIENT)
Dept: INTERNAL MEDICINE CLINIC | Age: 68
End: 2018-08-20

## 2018-08-21 ENCOUNTER — OFFICE VISIT (OUTPATIENT)
Dept: INTERNAL MEDICINE CLINIC | Age: 68
End: 2018-08-21

## 2018-08-21 VITALS
WEIGHT: 142 LBS | SYSTOLIC BLOOD PRESSURE: 124 MMHG | HEIGHT: 64 IN | HEART RATE: 70 BPM | DIASTOLIC BLOOD PRESSURE: 78 MMHG | RESPIRATION RATE: 14 BRPM | OXYGEN SATURATION: 99 % | TEMPERATURE: 97.7 F | BODY MASS INDEX: 24.24 KG/M2

## 2018-08-21 DIAGNOSIS — M25.512 ACUTE PAIN OF LEFT SHOULDER: Primary | ICD-10-CM

## 2018-08-21 DIAGNOSIS — R03.0 ELEVATED BP WITHOUT DIAGNOSIS OF HYPERTENSION: ICD-10-CM

## 2018-08-21 NOTE — PROGRESS NOTES
Evaluation of high blood pressure and shoulder pain, up into neck and back. Had hit shoulder a few weeks ago.  Asking for referrals for colonoscopy and eye exam.

## 2018-08-21 NOTE — PATIENT INSTRUCTIONS
The phone number to [facility] is [phone].    ------------------------------------------    Heat: apply heating pad 10-15 minutes at a time 3-4 times per day    Medications:  Ibuprofen/Advil: 200mg (1-3 tabs every 4-6 hours, take with food)        OR  Naproxen/Aleve: 220mg (1-2 tabs every 12 hours, take with food)    *Can not take these medications together.   *You can take Tylenol 500mg (1 tabs every 6 hours, max dose of acetaminophen in 24 hrs is 3,000mg) with either Advil or Aleve    Stretching:  Limit the amount of lifting to less then 25 lbs for the next week

## 2018-08-21 NOTE — PROGRESS NOTES
Keanu Gary is a 74 y.o. female who was seen in clinic today (8/21/2018). Assessment & Plan:   Diagnoses and all orders for this visit:    1. Acute pain of left shoulder- this is a new problem, symptoms are: fluctuating but sounds like it is improving. I spent 15 minutes with the patient and >50% of the time was spent counseling on differential dx for her various pains, expectations for recovery, and treatment plan. Reviewed I am pretty sure this favor muscular. Will treat with: heat, NSAIDs, rest.  See AVS, Red flags were reviewed with the patient to RTC or notify me, expected time course for resolution reviewed. 2. Elevated BP without diagnosis of hypertension- resolved, reviewed reason why BP would be elevated, reviewed s/s for cardiac and pulmonary disease. Follow-up Disposition:  Return if symptoms worsen or fail to improve. Subjective:   Rito Chery was seen today for Shoulder Pain and Hypertension    Pain Review:  She presents do to pain of her left shoulder that is secondary to trauma- about 2-3 weeks ago she bumped into something hard. She had been doing okay and had improved slightly. Two days ago developed some left sided back/flank pain. Yesterday went to the gym and pain moved to involve the neck, back, flank, and shoulder. Normally does not check her BP but did check it yesterday and was getting SBP in the 170's. In the last day it is slightly better. She describes the pain as aching. It is constant but fluctuating in intensity. The pain radiates: no where currently, did have some tingling in her fingers in the past.  Exacerbating factors identifiable by patient are certain movements. She has tried the following: heat & APAP. These have been: temporarily effective. Previous workup: none.           Brief Labs:     Lab Results   Component Value Date/Time    Sodium 143 02/13/2018 03:55 PM    Potassium 3.8 02/13/2018 03:55 PM    Creatinine 0.70 02/13/2018 03:55 PM Prior to Admission medications    Medication Sig Start Date End Date Taking? Authorizing Provider   DULoxetine (CYMBALTA) 20 mg capsule Take 1 Cap by mouth nightly. 6/5/18  Yes Lorrie Alvarez MD   meloxicam (MOBIC) 15 mg tablet Take 1 Tab by mouth daily. 5/15/18  Yes Lorrie Alvarez MD   gabapentin (NEURONTIN) 300 mg capsule Take 3 Caps by mouth two (2) times a day. 5/15/18  Yes Lorrie Alvarez MD   VITAMIN E ACETATE (VITAMIN E PO) Take 1,200 mg by mouth daily. Yes Historical Provider   thiamine (VITAMIN B-1) 100 mg tablet Take 40 mg by mouth daily. Yes Historical Provider   alendronate (FOSAMAX) 70 mg tablet TAKE 1 TABLET BY MOUTH EVERY SEVEN (7) DAYS. 2/13/18  Yes Lorrie Alvarez MD   triamcinolone acetonide (KENALOG) 0.1 % ointment Apply  to affected area two (2) times a day. use thin layer 2/13/18   Lorrie Alvarez MD          No Known Allergies        Review of Systems   Constitutional: Negative for chills, fever, malaise/fatigue and weight loss. Respiratory: Negative for cough and shortness of breath. Cardiovascular: Positive for chest pain. Negative for palpitations. Gastrointestinal: Negative for abdominal pain, constipation, diarrhea, nausea and vomiting. Objective:   Physical Exam   Cardiovascular: Regular rhythm and normal heart sounds. No murmur heard. Pulmonary/Chest: Effort normal and breath sounds normal. She has no rales. She exhibits no tenderness and no bony tenderness. Musculoskeletal:        Left shoulder: She exhibits decreased range of motion (pain w/ abduction) and tenderness. She exhibits no deformity, no laceration, no spasm and normal strength. Cervical back: She exhibits normal range of motion, no tenderness and no bony tenderness. Left upper arm: She exhibits no tenderness and no bony tenderness.          Visit Vitals    /78    Pulse 70    Temp 97.7 °F (36.5 °C) (Oral)    Resp 14    Ht 5' 3.5\" (1.613 m)    Wt 142 lb (64.4 kg)    SpO2 99%    BMI 24.76 kg/m2         Disclaimer:  Advised her to call back or return to office if symptoms worsen/change/persist.  Discussed expected course/resolution/complications of diagnosis in detail with patient. Medication risks/benefits/costs/interactions/alternatives discussed with patient. She was given an after visit summary which includes diagnoses, current medications, & vitals. She expressed understanding with the diagnosis and plan. Aspects of this note may have been generated using voice recognition software. Despite editing, there may be some syntax errors.        Kristan Messer MD

## 2018-10-04 DIAGNOSIS — M85.89 OSTEOPENIA OF MULTIPLE SITES: ICD-10-CM

## 2018-10-04 RX ORDER — ALENDRONATE SODIUM 70 MG/1
TABLET ORAL
Qty: 12 TAB | Refills: 1 | Status: SHIPPED | OUTPATIENT
Start: 2018-10-04 | End: 2019-01-02 | Stop reason: SDUPTHER

## 2018-11-01 ENCOUNTER — TELEPHONE (OUTPATIENT)
Dept: INTERNAL MEDICINE CLINIC | Age: 68
End: 2018-11-01

## 2018-11-01 DIAGNOSIS — G89.29 CHRONIC PAIN OF LEFT ANKLE: Primary | ICD-10-CM

## 2018-11-01 DIAGNOSIS — M25.572 CHRONIC PAIN OF LEFT ANKLE: Primary | ICD-10-CM

## 2018-11-01 RX ORDER — GABAPENTIN 300 MG/1
900 CAPSULE ORAL 3 TIMES DAILY
Qty: 270 CAP | Refills: 5 | Status: SHIPPED | OUTPATIENT
Start: 2018-11-01 | End: 2019-08-12 | Stop reason: SDUPTHER

## 2018-11-01 NOTE — TELEPHONE ENCOUNTER
Pt - [phone]  Pt taken gabapentin for years and was trying to get off it. The 600mg a day is not making her comfortable. Pt wants to go back to the original dose of 900mg a day. That would be 300mg 3Xs a day ()  Advise and send new script to [facility].

## 2018-11-01 NOTE — TELEPHONE ENCOUNTER
Verified patient identity with two identifiers. Spoke with patient by phone who states that with lower dose of Gabapentin pain came back at L ankle and lower leg. Would like to increase back to 300mg tid. Forward to Dr. Sofi De Leon for approval and will let patient know.

## 2018-12-27 RX ORDER — DULOXETIN HYDROCHLORIDE 20 MG/1
20 CAPSULE, DELAYED RELEASE ORAL
Qty: 90 CAP | Refills: 1 | Status: SHIPPED | OUTPATIENT
Start: 2018-12-27 | End: 2019-01-02 | Stop reason: SDUPTHER

## 2019-01-02 ENCOUNTER — TELEPHONE (OUTPATIENT)
Dept: INTERNAL MEDICINE CLINIC | Age: 69
End: 2019-01-02

## 2019-01-02 DIAGNOSIS — M85.89 OSTEOPENIA OF MULTIPLE SITES: ICD-10-CM

## 2019-01-02 RX ORDER — ALENDRONATE SODIUM 70 MG/1
TABLET ORAL
Qty: 12 TAB | Refills: 1 | Status: SHIPPED | OUTPATIENT
Start: 2019-01-02 | End: 2019-06-24 | Stop reason: SDUPTHER

## 2019-01-02 RX ORDER — DULOXETIN HYDROCHLORIDE 20 MG/1
20 CAPSULE, DELAYED RELEASE ORAL
Qty: 90 CAP | Refills: 1 | Status: SHIPPED | OUTPATIENT
Start: 2019-01-02 | End: 2019-10-01 | Stop reason: SDUPTHER

## 2019-01-02 NOTE — TELEPHONE ENCOUNTER
Pt would like Dul[facility]etine script sent [facility] not [facility]. Script called to [facility]. Refill request sent to [facility] for Fosamax.

## 2019-02-07 RX ORDER — MELOXICAM 15 MG/1
TABLET ORAL
Qty: 30 TAB | Refills: 0 | OUTPATIENT
Start: 2019-02-07

## 2019-02-08 ENCOUNTER — HOSPITAL ENCOUNTER (OUTPATIENT)
Age: 69
Setting detail: OUTPATIENT SURGERY
Discharge: HOME | End: 2019-02-08
Attending: INTERNAL MEDICINE | Admitting: INTERNAL MEDICINE
Payer: MEDICARE

## 2019-02-08 ENCOUNTER — ANESTHESIA EVENT (OUTPATIENT)
Dept: ENDOSCOPY | Age: 69
End: 2019-02-08
Payer: MEDICARE

## 2019-02-08 ENCOUNTER — ANESTHESIA (OUTPATIENT)
Dept: ENDOSCOPY | Age: 69
End: 2019-02-08
Payer: MEDICARE

## 2019-02-08 VITALS
DIASTOLIC BLOOD PRESSURE: 79 MMHG | HEIGHT: 64 IN | OXYGEN SATURATION: 100 % | BODY MASS INDEX: 23.9 KG/M2 | WEIGHT: 140 LBS | RESPIRATION RATE: 10 BRPM | TEMPERATURE: 97.7 F | SYSTOLIC BLOOD PRESSURE: 113 MMHG | HEART RATE: 74 BPM

## 2019-02-08 PROCEDURE — 77030027957 HC TBNG IRR ENDOGTR BUSS -B: Performed by: INTERNAL MEDICINE

## 2019-02-08 PROCEDURE — 88305 TISSUE EXAM BY PATHOLOGIST: CPT

## 2019-02-08 PROCEDURE — 74011250636 HC RX REV CODE- 250/636

## 2019-02-08 PROCEDURE — 76060000031 HC ANESTHESIA FIRST 0.5 HR: Performed by: INTERNAL MEDICINE

## 2019-02-08 PROCEDURE — 77030013992 HC SNR POLYP ENDOSC BSC -B: Performed by: INTERNAL MEDICINE

## 2019-02-08 PROCEDURE — 76040000019: Performed by: INTERNAL MEDICINE

## 2019-02-08 RX ORDER — FENTANYL CITRATE 50 UG/ML
100 INJECTION, SOLUTION INTRAMUSCULAR; INTRAVENOUS
Status: DISCONTINUED | OUTPATIENT
Start: 2019-02-08 | End: 2019-02-08 | Stop reason: HOSPADM

## 2019-02-08 RX ORDER — FLUMAZENIL 0.1 MG/ML
0.2 INJECTION INTRAVENOUS
Status: DISCONTINUED | OUTPATIENT
Start: 2019-02-08 | End: 2019-02-08 | Stop reason: HOSPADM

## 2019-02-08 RX ORDER — SODIUM CHLORIDE 9 MG/ML
50 INJECTION, SOLUTION INTRAVENOUS CONTINUOUS
Status: DISCONTINUED | OUTPATIENT
Start: 2019-02-08 | End: 2019-02-08 | Stop reason: HOSPADM

## 2019-02-08 RX ORDER — SODIUM CHLORIDE 0.9 % (FLUSH) 0.9 %
5-40 SYRINGE (ML) INJECTION EVERY 8 HOURS
Status: DISCONTINUED | OUTPATIENT
Start: 2019-02-08 | End: 2019-02-08 | Stop reason: HOSPADM

## 2019-02-08 RX ORDER — PROPOFOL 10 MG/ML
INJECTION, EMULSION INTRAVENOUS AS NEEDED
Status: DISCONTINUED | OUTPATIENT
Start: 2019-02-08 | End: 2019-02-08 | Stop reason: HOSPADM

## 2019-02-08 RX ORDER — EPINEPHRINE 0.1 MG/ML
1 INJECTION INTRACARDIAC; INTRAVENOUS
Status: DISCONTINUED | OUTPATIENT
Start: 2019-02-08 | End: 2019-02-08 | Stop reason: HOSPADM

## 2019-02-08 RX ORDER — LIDOCAINE HYDROCHLORIDE 20 MG/ML
INJECTION, SOLUTION EPIDURAL; INFILTRATION; INTRACAUDAL; PERINEURAL AS NEEDED
Status: DISCONTINUED | OUTPATIENT
Start: 2019-02-08 | End: 2019-02-08 | Stop reason: HOSPADM

## 2019-02-08 RX ORDER — SODIUM CHLORIDE 0.9 % (FLUSH) 0.9 %
5-40 SYRINGE (ML) INJECTION AS NEEDED
Status: DISCONTINUED | OUTPATIENT
Start: 2019-02-08 | End: 2019-02-08 | Stop reason: HOSPADM

## 2019-02-08 RX ORDER — SODIUM CHLORIDE 9 MG/ML
INJECTION, SOLUTION INTRAVENOUS
Status: DISCONTINUED | OUTPATIENT
Start: 2019-02-08 | End: 2019-02-08 | Stop reason: HOSPADM

## 2019-02-08 RX ORDER — ATROPINE SULFATE 0.1 MG/ML
0.5 INJECTION INTRAVENOUS
Status: DISCONTINUED | OUTPATIENT
Start: 2019-02-08 | End: 2019-02-08 | Stop reason: HOSPADM

## 2019-02-08 RX ORDER — DEXTROMETHORPHAN/PSEUDOEPHED 2.5-7.5/.8
1.2 DROPS ORAL
Status: DISCONTINUED | OUTPATIENT
Start: 2019-02-08 | End: 2019-02-08 | Stop reason: HOSPADM

## 2019-02-08 RX ORDER — NALOXONE HYDROCHLORIDE 0.4 MG/ML
0.4 INJECTION, SOLUTION INTRAMUSCULAR; INTRAVENOUS; SUBCUTANEOUS
Status: DISCONTINUED | OUTPATIENT
Start: 2019-02-08 | End: 2019-02-08 | Stop reason: HOSPADM

## 2019-02-08 RX ORDER — MIDAZOLAM HYDROCHLORIDE 1 MG/ML
.25-1 INJECTION, SOLUTION INTRAMUSCULAR; INTRAVENOUS
Status: DISCONTINUED | OUTPATIENT
Start: 2019-02-08 | End: 2019-02-08 | Stop reason: HOSPADM

## 2019-02-08 RX ORDER — PHENYLEPHRINE HCL IN 0.9% NACL 0.4MG/10ML
SYRINGE (ML) INTRAVENOUS AS NEEDED
Status: DISCONTINUED | OUTPATIENT
Start: 2019-02-08 | End: 2019-02-08 | Stop reason: HOSPADM

## 2019-02-08 RX ADMIN — PROPOFOL 80 MG: 10 INJECTION, EMULSION INTRAVENOUS at 08:55

## 2019-02-08 RX ADMIN — PROPOFOL 50 MG: 10 INJECTION, EMULSION INTRAVENOUS at 09:09

## 2019-02-08 RX ADMIN — PROPOFOL 40 MG: 10 INJECTION, EMULSION INTRAVENOUS at 09:02

## 2019-02-08 RX ADMIN — PROPOFOL 50 MG: 10 INJECTION, EMULSION INTRAVENOUS at 08:58

## 2019-02-08 RX ADMIN — PROPOFOL 50 MG: 10 INJECTION, EMULSION INTRAVENOUS at 09:05

## 2019-02-08 RX ADMIN — PROPOFOL 30 MG: 10 INJECTION, EMULSION INTRAVENOUS at 08:56

## 2019-02-08 RX ADMIN — LIDOCAINE HYDROCHLORIDE 40 MG: 20 INJECTION, SOLUTION EPIDURAL; INFILTRATION; INTRACAUDAL; PERINEURAL at 08:55

## 2019-02-08 RX ADMIN — SODIUM CHLORIDE: 9 INJECTION, SOLUTION INTRAVENOUS at 08:15

## 2019-02-08 RX ADMIN — Medication 80 MCG: at 09:07

## 2019-02-08 RX ADMIN — PROPOFOL 50 MG: 10 INJECTION, EMULSION INTRAVENOUS at 09:13

## 2019-02-08 RX ADMIN — SODIUM CHLORIDE: 9 INJECTION, SOLUTION INTRAVENOUS at 09:17

## 2019-02-08 RX ADMIN — PROPOFOL 50 MG: 10 INJECTION, EMULSION INTRAVENOUS at 09:12

## 2019-02-08 NOTE — PROCEDURES
118 [facility].  217 [facility] [address], [address]  [phone]                              Colonoscopy Procedure Note      Indications:  Screening, average risk. First colonoscopy     :  Lester Farah MD    Referring Provider: Nu Smith MD    Sedation:  MAC anesthesia    Procedure Details:  After informed consent was obtained with all risks and benefits of procedure explained and preoperative exam completed, the patient was taken to the endoscopy suite and placed in the left lateral decubitus position. Upon sequential sedation as per above, a digital rectal exam was performed per below. The Olympus videocolonoscope was inserted in the rectum and carefully advanced to the terminal ileum. The quality of preparation was inadequate. [place] Bowel Prep Score : 1/3/3. The colonoscope was slowly withdrawn with careful evaluation between folds. Retroflexion in the rectum was performed. Findings:   Rectum: normal  Sigmoid: normal  Descending Colon: normal  Transverse Colon: normal  Ascending Colon: normal  Cecum: large amount of adherent, sticky semi-liquid green stool. Aggressively washed and lavaged for a total of 13 minutes, though still with inadequate views. One 8 mm sessile polyp, removed with cold snare  Terminal Ileum: normal    Interventions:  polypctomy    Specimen Removed:    ID Type Source Tests Collected by Time Destination   1 : Cecal Polyp Preservative   Dennis Corley MD 2/8/2019 0912 Pathology       Complications: None. EBL:  Minimal    Impression:    See Postoperative diagnosis above    Recommendations:   - Await pathology. You should receive a letter within 2 weeks. - Resume normal medications.  - Recommend repeat colonoscopy in 1 year with double prep. Discharge Disposition:  Home in the company of a  when able to ambulate.     Lester Farah MD  2/8/2019  9:27 AM

## 2019-02-08 NOTE — H&P
118 [facility].  217 [facility] 140 [facility], [address]  [phone]                                History and Physical     NAME: Mady Shah   :  1950   MRN:  [id number]     HPI:  The patient was seen and examined. Past Surgical History:   Procedure Laterality Date    HX ANKLE FRACTURE TX Left     HX DILATION AND CURETTAGE      HX TONSILLECTOMY       Past Medical History:   Diagnosis Date    Chronic kidney disease     kidney stone    Chronic pain of left ankle 2017    Seeing Dr Jeffrey Welsh    Fracture     Osteopenia 2017     Social History     Tobacco Use    Smoking status: Former Smoker     Last attempt to quit: 1980     Years since quittin.1    Smokeless tobacco: Never Used    Tobacco comment: was a causal smoker when smoker   Substance Use Topics    Alcohol use: Yes     Alcohol/week: 2.4 - 3.6 oz     Types: 4 - 6 Glasses of wine per week    Drug use: No     No Known Allergies  Family History   Problem Relation Age of Onset    Cancer Mother         Brain tumor    Other Father         ? lung problem    Cancer Sister         tumors of spine    Other Daughter         kidney stone     No current facility-administered medications for this encounter. Facility-Administered Medications Ordered in Other Encounters   Medication Dose Route Frequency    0.9% sodium chloride infusion   IntraVENous CONTINUOUS         PHYSICAL EXAM:  General: WD, WN. Alert, cooperative, no acute distress    HEENT: NC, Atraumatic. PERRLA, EOMI. Anicteric sclerae. Lungs:  CTA Bilaterally. No Wheezing/Rhonchi/Rales. Heart:  Regular  rhythm,  No murmur, No Rubs, No Gallops  Abdomen: Soft, Non distended, Non tender.  +Bowel sounds, no HSM  Extremities: No c/c/e  Neurologic:  CN 2-12 gi, Alert and oriented X 3. No acute neurological distress   Psych:   Good insight. Not anxious nor agitated.     The heart, lungs and mental status were satisfactory for the administration of MAC sedation and for the procedure.       Mallampati score: 2       Assessment:   · Screening, average risk    Plan:   · Endoscopic procedure :cscope  · MAC sedation

## 2019-02-08 NOTE — ROUTINE PROCESS
Tashi Randle  1950  [id number]    Situation:  Verbal report received from: RN Dwain Gottron. Procedure: Procedure(s):  COLONOSCOPY  ENDOSCOPIC POLYPECTOMY    Background:    Preoperative diagnosis: SCREENING  Postoperative diagnosis: Cecal Polyp    :  Dr. Aidee Koenig  Assistant(s): Endoscopy Technician-1: Madisyn GALLEGOS  Endoscopy RN-1: Chaim Massey RN    Specimens:   ID Type Source Tests Collected by Time Destination   1 : Cecal Polyp Preservative   Corazon Phillips MD 2/8/2019 0912 Pathology     H. Pylori  no    Assessment:  Intra-procedure medications       Anesthesia gave intra-procedure sedation and medications, see anesthesia flow sheet yes    Intravenous fluids:  400  NS @ KVO     Vital signs stable yes    Abdominal assessment: round and soft yes    Recommendation:  Discharge patient per MD order yes.   Return to floor no  Family or Friend    Permission to share finding with family or friend yes

## 2019-02-08 NOTE — PROGRESS NOTES
Received report from anesthesia staff on vital signs and status of patient.     Scope precleaned at bedside by [facility]

## 2019-02-08 NOTE — DISCHARGE INSTRUCTIONS
118 [facility].  [address] [place] [address], [address]  [address] [facility]  [id number]  1950    It was my pleasure seeing you for your procedure. You will also receive a summary report with the findings from this procedure and any further recommendations. If you had polyps removed or biopsies taken during your procedure, you will receive a separate letter from me within the next 2 weeks. If you don't receive this letter or if you have any questions, please call my office [phone]. Please take note of the post procedure instructions listed below. Tyler Archibald,    Dr. Sarbjit Arriaga    These instructions give you information on caring for yourself after your procedure. Call your doctor if you have any problems or questions after your procedure. HOME CARE  · Walk if you have belly cramping or gas. Walking will help get rid of the air and reduce the bloated feeling in your belly (abdomen). · Your IV site (where you received drugs) may be tender to touch. Place warm towels on the site; keep your arm up on two pillows if you have any swelling or soreness in the area. · You may shower. ACTIVITY:  · Take frequent rest periods and move at a slower pace for the next 24 hours. .  · You may resume your regular activity tomorrow if you are feeling back to normal.  · Do not drive or ride a bicycle for at least 24 hours (because of the medicine (anesthesia) used during the test). · Do not sign any important legal documents or use or operate any machinery for 24 hours  · Do not take sleeping medicines/nerve drugs for 24 hours unless the doctor tells you. · You can return to work/school tomorrow unless otherwise instructed. NUTRITION:  · Drink plenty of fluids to keep your pee (urine) clear or pale yellow  · Begin with a light meal and progress to your normal diet.  Heavy or fried foods are harder to digest and may make you feel sick to your stomach (nauseated). · Once you are feeling back to normal, you may resume your normal diet as instructed by your doctor. · Avoid alcoholic beverages for 24 hours or as instructed. IF YOU HAD BIOPSIES TAKEN OR POLYPS REMOVED DURING THE PROCEDURE:  · For the next 7 days, avoid all non-steroidal antiinflammatory medications such as Ibuprofen, Motrin, Advil, Alleve, Martha-seltzer, Goody's powder, BC powder. · If you do not have an heart condition that requires you to take a daily aspirin, you should avoid taking aspirin for 7 days. · Eat a soft diet for 24 hours. · Monitor your stools for any blood or dark black, tar-like, stools as this may be a sign of bleeding and if you see any blood, notify your doctor immediately. GET HELP RIGHT AWAY AND SEEK IMMEDIATE MEDICAL CARE IF:  · You have more than a spotting of blood in your stool. · You pass clumps of tissue (blood clots) or fill the toilet with blood. · Your belly is painfully swollen or puffy (abdominal distention). · You throw up (vomit). · You have a fever. · You have redness, pain or swelling at the IV site that last greater than two days. · You have abdominal pain or discomfort that is severe or gets worse throughout the day. Post-procedure diagnosis:  Cecal Polyp    Findings:   Rectum: normal  Sigmoid: normal  Descending Colon: normal  Transverse Colon: normal  Ascending Colon: normal  Cecum: large amount of adherent, sticky semi-liquid green stool. Aggressively washed and lavaged for a total of 13 minutes, though still with inadequate views. One 8 mm sessile polyp, removed with cold snare  Terminal Ileum: normal    Recommendations:   - Await pathology. You should receive a letter within 2 weeks. - Resume normal medications.  - Recommend repeat colonoscopy in 1 year with double prep.

## 2019-02-14 NOTE — ANESTHESIA POSTPROCEDURE EVALUATION
Procedure(s): 
COLONOSCOPY 
ENDOSCOPIC POLYPECTOMY. Anesthesia Post Evaluation Multimodal analgesia: multimodal analgesia not used between 6 hours prior to anesthesia start to PACU discharge Patient location during evaluation: PACU Patient participation: complete - patient participated Level of consciousness: awake Pain score: 0 Pain management: adequate Airway patency: patent Anesthetic complications: no 
Cardiovascular status: acceptable Respiratory status: acceptable Hydration status: acceptable Comments: I have evaluated the patient and meets criteria for discharge from PACU. Hamilton Johns MD 
 
 
 
Visit Vitals /79 Pulse 74 Temp 36.5 °C (97.7 °F) Resp 10 Ht 5' 4\" (1.626 m) Wt 63.5 kg (140 lb) SpO2 100% Breastfeeding? No  
BMI 24.03 kg/m²

## 2019-02-15 ENCOUNTER — OFFICE VISIT (OUTPATIENT)
Dept: INTERNAL MEDICINE CLINIC | Age: 69
End: 2019-02-15

## 2019-02-15 VITALS
WEIGHT: 143 LBS | SYSTOLIC BLOOD PRESSURE: 152 MMHG | RESPIRATION RATE: 16 BRPM | BODY MASS INDEX: 25.34 KG/M2 | HEART RATE: 70 BPM | DIASTOLIC BLOOD PRESSURE: 86 MMHG | TEMPERATURE: 97.2 F | HEIGHT: 63 IN | OXYGEN SATURATION: 97 %

## 2019-02-15 DIAGNOSIS — Z71.89 ACP (ADVANCE CARE PLANNING): ICD-10-CM

## 2019-02-15 DIAGNOSIS — Z00.00 MEDICARE ANNUAL WELLNESS VISIT, INITIAL: Primary | ICD-10-CM

## 2019-02-15 DIAGNOSIS — M89.9 DISORDER OF BONE AND CARTILAGE: ICD-10-CM

## 2019-02-15 DIAGNOSIS — M85.89 OSTEOPENIA OF MULTIPLE SITES: ICD-10-CM

## 2019-02-15 DIAGNOSIS — Z13.39 SCREENING FOR ALCOHOLISM: ICD-10-CM

## 2019-02-15 DIAGNOSIS — Z23 ENCOUNTER FOR IMMUNIZATION: ICD-10-CM

## 2019-02-15 DIAGNOSIS — G89.29 CHRONIC PAIN OF LEFT ANKLE: ICD-10-CM

## 2019-02-15 DIAGNOSIS — R03.0 ELEVATED BP WITHOUT DIAGNOSIS OF HYPERTENSION: ICD-10-CM

## 2019-02-15 DIAGNOSIS — Z12.31 ENCOUNTER FOR SCREENING MAMMOGRAM FOR MALIGNANT NEOPLASM OF BREAST: ICD-10-CM

## 2019-02-15 DIAGNOSIS — M25.572 CHRONIC PAIN OF LEFT ANKLE: ICD-10-CM

## 2019-02-15 DIAGNOSIS — M94.9 DISORDER OF BONE AND CARTILAGE: ICD-10-CM

## 2019-02-15 DIAGNOSIS — Z51.81 MEDICATION MONITORING ENCOUNTER: ICD-10-CM

## 2019-02-15 RX ORDER — MELOXICAM 15 MG/1
15 TABLET ORAL DAILY
Qty: 30 TAB | Refills: 1 | Status: SHIPPED | OUTPATIENT
Start: 2019-02-15 | End: 2019-04-15 | Stop reason: SDUPTHER

## 2019-02-15 NOTE — ACP (ADVANCE CARE PLANNING)
Advance Care Planning (ACP) Provider Note - Comprehensive     Date of ACP Conversation: 02/15/19  Persons included in Conversation:  patient  Length of ACP Conversation in minutes: <16 minutes (Non-Billable)    Authorized Decision Maker (if patient is incapable of making informed decisions): Other Legally Authorized Decision Maker (e.g. Next of Kin)      She has NO advanced directive  - add't info provided. Reviewed DNR/DNI and patient is not interested but has some questions. Will have her talk to NN. General ACP for ALL Patients with Decision Making Capacity:  Importance of advance care planning, including choosing a healthcare agent to communicate patient's healthcare decisions if patient lost the ability to make decisions, such as after a sudden illness or accident  Understanding of the healthcare agent role was assessed and information provided  Opportunity offered to explore how cultural, Hoahaoism, spiritual, or personal beliefs would affect decisions for future care  Exploration of values, goals, and preferences if recovery is not expected, even with continued medical treatment in the event of: Imminent death or severe, permanent brain injury    For Serious or Chronic Illness:  Understanding of CPR, goals and expected outcomes, benefits and burdens discussed.   Understanding of medical condition  Information on CPR success rates provided (e.g. for CPR in hospital, survival to d/c at two weeks is 22%, for chronically ill or elderly/frail survival is less than 3%)    Interventions Provided:  Recommended communicating the plan and making copies for the healthcare agent, personal physician, and others as appropriate (e.g., health system)  Recommended review of completed ACP document annually or upon change in health status

## 2019-02-15 NOTE — PROGRESS NOTES
Kvng Haas is a 79 y.o. female who was seen in clinic today (2/15/2019) for a full physical.     
 
Assessment & Plan:  
Diagnoses and all orders for this visit: 
 
1. Medicare annual wellness visit, initial 
 
2. ACP (advance care planning) -     FULL CODE 
 
3. Encounter for immunization -     pneumococcal 13 cm conj dip (PREVNAR-13) 0.5 mL syrg injection; 0.5 mL by IntraMUSCular route once for 1 dose. 4. Screening for alcoholism -     [place] ANNUAL ALCOHOL SCREEN 15 MIN 5. Medication monitoring encounter 6. Encounter for screening mammogram for malignant neoplasm of breast 
-     [facility] MAMMO BI SCREENING INCL CAD; Future 7. Disorder of bone and cartilage -     DEXA BONE DENSITY STUDY AXIAL; Future 8. Osteopenia of multiple sites- due for imaging to reassess benefits for meds, reviewed expectations and options (drug holiday vs changing meds vs continuing them) 9. Elevated BP without diagnosis of hypertension- has been flutcuating, normal last few checks, will start checking amb BP and update me if remains elevated 10. Chronic pain of left ankle- this is a chronic problem, symptoms are: fluctuating but overall stable. Will continue current meds. Reviewed using NSAIDs prn. Reviewed sticking w/ Neurontin TID instead of trying to wean down to BID if this works better for her.    
-     METABOLIC PANEL, COMPREHENSIVE 
-     CBC W/O DIFF 
-     meloxicam (MOBIC) 15 mg tablet; Take 1 Tab by mouth daily as needed. Follow-up Disposition: 
Return in about 1 year (around 2/15/2020), or if symptoms worsen or fail to improve, for FULL PHYSICAL - 30 minutes. ------------------------------------------------------------------------------------------ Subjective: Annual Wellness Visit- Initial VisitEnd of Life Planning: This was discussed with her today and she has NO advanced directive  - add't info provided.   Reviewed DNR/DNI and patient is not interested but has some questions. Will have her talk to NN. Depression Screen: 
3 most recent PHQ Screens 2/15/2019 Little interest or pleasure in doing things Not at all Feeling down, depressed, irritable, or hopeless Not at all Total Score PHQ 2 0 Fall Risk:  
Fall Risk Assessment, last 12 mths 2/15/2019 Able to walk? Yes Fall in past 12 months? Yes Fall with injury? Yes  
Number of falls in past 12 months 2 Fall Risk Score 3 Abuse Screen: 
Abuse Screening Questionnaire 2/15/2019 Do you ever feel afraid of your partner? Gonzella Blank Are you in a relationship with someone who physically or mentally threatens you? Gonzella Blank Is it safe for you to go home? Herve Primrose Alcohol Risk Factor Screening: On any occasion during the past 3 months, have you had more than 3 drinks containing alcohol? No 
Do you average more than 7 drinks per week? No 
 
Hearing Loss: Hearing is good. Cognition Screen: 
Has your family/caregiver stated any concerns about your memory: no 
Cognition: appears appropriate for age attention/concentration and appears appropriate safety awareness Activities of Daily Living:   
Requires assistance with: no ADLs Home contains: grab bars Exercise: very active Adult Nutrition Screen: No risk factors noted. Health Maintenance Daily Aspirin: N/A Bone Density: done on 5/8/17, will reorder for 5/19 Glaucoma Screening: UTD Immunizations:  
 Influenza: up to date. Tetanus: up to date. Shingles: not up to date - she is on waiting. Pneumonia: due for Prevnar & script provided. Cancer screening:  
 Cervical: reviewed guidelines, up to date. Breast: reviewed guidelines, order placed. Colon: reviewed guidelines, UTD, done recently- poor prep & 1 tubular adenoma so repeat next year. Patient Care Team: 
Lori Umana MD as PCP - General (Internal Medicine) Horacio Sahni MD as Physician (Physical Medicine and Rehab) Horacio Yeh MD as Physician (Ophthalmology) Eliud Woodward MD as Physician (Obstetrics & Gynecology) The following sections were reviewed & updated as appropriate: PMH, PSH, FH, and SH. Patient Active Problem List  
Diagnosis Code  Chronic pain of left ankle M25.572, G89.29  
 Osteopenia M85.80 Prior to Admission medications Medication Sig Start Date End Date Taking? Authorizing Provider  
cyanocobalamin, vitamin B-12, (VITAMIN B-12 SL) 3,000 mcg by SubLINGual route daily. Yes Provider, Historical  
alendronate (FOSAMAX) 70 mg tablet TAKE ONE TABLET BY MOUTH EVERY 7 DAYS 1/2/19  Yes Yvan Gomes MD  
DULoxetine (CYMBALTA) 20 mg capsule Take 1 Cap by mouth nightly. 1/2/19  Yes Yvan Gomes MD  
gabapentin (NEURONTIN) 300 mg capsule Take 3 Caps by mouth three (3) times daily. 11/1/18  Yes Yvan Gomes MD  
VITAMIN E ACETATE (VITAMIN E PO) Take 1,200 mg by mouth daily. Yes Provider, Historical  
meloxicam (MOBIC) 15 mg tablet Take 1 Tab by mouth daily. 5/15/18   Yvan Gomes MD  
  
 
 
No Known Allergies Review of Systems Constitutional: Negative for chills and fever. Respiratory: Negative for cough and shortness of breath. Cardiovascular: Negative for chest pain and palpitations. Gastrointestinal: Negative for abdominal pain, blood in stool, constipation, diarrhea, heartburn, nausea and vomiting. Musculoskeletal: Positive for joint pain (L ankle). Negative for myalgias. Neurological: Negative for tingling, focal weakness and headaches. Endo/Heme/Allergies: Does not bruise/bleed easily. Psychiatric/Behavioral: Negative for depression. The patient is not nervous/anxious and does not have insomnia. Objective:  
Physical Exam  
Constitutional: She appears well-developed. No distress. HENT:  
Mouth/Throat: Mucous membranes are normal.  
Eyes: Conjunctivae and lids are normal. No scleral icterus. Neck: Neck supple. No thyromegaly present. Cardiovascular: Regular rhythm and normal heart sounds. No murmur heard. Pulmonary/Chest: Effort normal and breath sounds normal. She has no wheezes. She has no rales. Abdominal: Soft. Bowel sounds are normal. She exhibits no mass. There is no hepatosplenomegaly. There is no tenderness. Musculoskeletal: She exhibits no edema. Lymphadenopathy:  
  She has no cervical adenopathy. Skin: No rash noted. Psychiatric: She has a normal mood and affect. Her behavior is normal.  
  
 
 
Visit Vitals /86 Pulse 70 Temp 97.2 °F (36.2 °C) (Oral) Resp 16 Ht 5' 3\" (1.6 m) Wt 143 lb (64.9 kg) SpO2 97% BMI 25.33 kg/m² Advised her to call back or return to office if symptoms worsen/change/persist. 
Discussed expected course/resolution/complications of diagnosis in detail with patient. Medication risks/benefits/costs/interactions/alternatives discussed with patient. She was given an after visit summary which includes diagnoses, current medications, & vitals. She expressed understanding with the diagnosis and plan. Aspects of this note may have been generated using voice recognition software. Despite editing, there may be some syntax errors.   
 
 
Lottie Bentley MD

## 2019-02-15 NOTE — PATIENT INSTRUCTIONS
Preventing Falls: Care Instructions Your Care Instructions Getting around your home safely can be a challenge if you have injuries or health problems that make it easy for you to fall. Loose rugs and furniture in walkways are among the dangers for many older people who have problems walking or who have poor eyesight. People who have conditions such as arthritis, osteoporosis, or dementia also have to be careful not to fall. You can make your home safer with a few simple measures. Follow-up care is a key part of your treatment and safety. Be sure to make and go to all appointments, and call your doctor if you are having problems. It's also a good idea to know your test results and keep a list of the medicines you take. How can you care for yourself at home? Taking care of yourself · You may get dizzy if you do not drink enough water. To prevent dehydration, drink plenty of fluids, enough so that your urine is light yellow or clear like water. Choose water and other caffeine-free clear liquids. If you have kidney, heart, or liver disease and have to limit fluids, talk with your doctor before you increase the amount of fluids you drink. · Exercise regularly to improve your strength, muscle tone, and balance. Walk if you can. Swimming may be a good choice if you cannot walk easily. · Have your vision and hearing checked each year or any time you notice a change. If you have trouble seeing and hearing, you might not be able to avoid objects and could lose your balance. · Know the side effects of the medicines you take. Ask your doctor or pharmacist whether the medicines you take can affect your balance. Sleeping pills or sedatives can affect your balance. · Limit the amount of alcohol you drink. Alcohol can impair your balance and other senses. · Ask your doctor whether calluses or corns on your feet need to be removed.  If you wear loose-fitting shoes because of calluses or corns, you can lose your balance and fall. · Talk to your doctor if you have numbness in your feet. Preventing falls at home · Remove raised doorway thresholds, throw rugs, and clutter. Repair loose carpet or raised areas in the floor. · Move furniture and electrical cords to keep them out of walking paths. · Use nonskid floor wax, and wipe up spills right away, especially on ceramic tile floors. · If you use a walker or cane, put rubber tips on it. If you use crutches, clean the bottoms of them regularly with an abrasive pad, such as steel wool. · Keep your house well lit, especially Nati Pat, and outside walkways. Use night-lights in areas such as hallways and bathrooms. Add extra light switches or use remote switches (such as switches that go on or off when you clap your hands) to make it easier to turn lights on if you have to get up during the night. · Install sturdy handrails on stairways. · Move items in your cabinets so that the things you use a lot are on the lower shelves (about waist level). · Keep a cordless phone and a flashlight with new batteries by your bed. If possible, put a phone in each of the main rooms of your house, or carry a cell phone in case you fall and cannot reach a phone. Or, you can wear a device around your neck or wrist. You push a button that sends a signal for help. · Wear low-heeled shoes that fit well and give your feet good support. Use footwear with nonskid soles. Check the heels and soles of your shoes for wear. Repair or replace worn heels or soles. · Do not wear socks without shoes on wood floors. · Walk on the grass when the sidewalks are slippery. If you live in an area that gets snow and ice in the winter, sprinkle salt on slippery steps and sidewalks. Preventing falls in the bath · Install grab bars and nonskid mats inside and outside your shower or tub and near the toilet and sinks. · Use shower chairs and bath benches. · Use a hand-held shower head that will allow you to sit while showering. · Get into a tub or shower by putting the weaker leg in first. Get out of a tub or shower with your strong side first. 
· Repair loose toilet seats and consider installing a raised toilet seat to make getting on and off the toilet easier. · Keep your bathroom door unlocked while you are in the shower. Where can you learn more? Go to http://tiffanie-bessie.info/. Enter [id number] in the search box to learn more about \"Preventing Falls: Care Instructions. \" Current as of: March 15, 2018 Content Version: 11.9 © 1975-5143 [employer]. Care instructions adapted under license by [employer] (which disclaims liability or warranty for this information). If you have questions about a medical condition or this instruction, always ask your healthcare professional. [address] any warranty or liability for your use of this information. Medicare Wellness Visit, Female The best way to live healthy is to have a lifestyle where you eat a well-balanced diet, exercise regularly, limit alcohol use, and quit all forms of tobacco/nicotine, if applicable. Regular preventive services are another way to keep healthy. Preventive services (vaccines, screening tests, monitoring & exams) can help personalize your care plan, which helps you manage your own care. Screening tests can find health problems at the earliest stages, when they are easiest to treat. [facility] follows the current, evidence-based guidelines published by the [facility] [place] (USPSTF) when recommending preventive services for our patients. Because we follow these guidelines, sometimes recommendations change over time as research supports it. (For example, mammograms used to be recommended annually.  Even though Medicare will still pay for an annual mammogram, the newer guidelines recommend a mammogram every two years for women of average risk.) Of course, you and your doctor may decide to screen more often for some diseases, based on your risk and your health status. Preventive services for you include: - Medicare offers their members a free annual wellness visit, which is time for you and your primary care provider to discuss and plan for your preventive service needs. Take advantage of this benefit every year! 
-All adults over the age of 72 should receive the recommended pneumonia vaccines. Current USPSTF guidelines recommend a series of two vaccines for the best pneumonia protection.  
-All adults should have a flu vaccine yearly and a tetanus vaccine every 10 years. All adults age 61 and older should receive a shingles vaccine once in their lifetime.   
-A bone mass density test is recommended when a woman turns 65 to screen for osteoporosis. This test is only recommended one time, as a screening. Some providers will use this same test as a disease monitoring tool if you already have osteoporosis. -All adults age 38-68 who are overweight should have a diabetes screening test once every three years.  
-Other screening tests and preventive services for persons with diabetes include: an eye exam to screen for diabetic retinopathy, a kidney function test, a foot exam, and stricter control over your cholesterol.  
-Cardiovascular screening for adults with routine risk involves an electrocardiogram (ECG) at intervals determined by your doctor.  
-Colorectal cancer screenings should be done for adults age 54-65 with no increased risk factors for colorectal cancer. There are a number of acceptable methods of screening for this type of cancer. Each test has its own benefits and drawbacks. Discuss with your doctor what is most appropriate for you during your annual wellness visit.  The different tests include: colonoscopy (considered the best screening method), a fecal occult blood test, a fecal DNA test, and sigmoidoscopy. -Breast cancer screenings are recommended every other year for women of normal risk, age 54-69. 
-Cervical cancer screenings for women over age 72 are only recommended with certain risk factors.  
-All adults born between [facility] should be screened once for Hepatitis C. Here is a list of your current Health Maintenance items (your personalized list of preventive services) with a due date: 
Health Maintenance Due Topic Date Due  Shingles Vaccine (1 of 2) 12/05/2002  Pneumococcal Vaccine (1 of 2 - PCV13) 12/05/2017 [facility] Annual Well Visit  02/14/2019  Breast Cancer Screening  05/08/2019 Tomás Hunt [address] What is a living will? A living will is a legal form you use to write down the kind of care you want at the end of your life. It is used by the health professionals who will treat you if you aren't able to decide for yourself. If you put your wishes in writing, your loved ones and others will know what kind of care you want. They won't need to guess. This can ease your mind and be helpful to others. A living will is not the same as an estate or property will. An estate will explains what you want to happen with your money and property after you die. Is a living will a legal document? A living will is a legal document. Each state has its own laws about living [place]. If you move to another state, make sure that your living will is legal in the state where you now live. Or you might use a universal form that has been approved by many states. This kind of form can sometimes be completed and stored online. Your electronic copy will then be available wherever you have a connection to the Internet. In most cases, doctors will respect your wishes even if you have a form from a different state. · You don't need an  to complete a living will.  But legal advice can be helpful if your state's laws are unclear, your health history is complicated, or your family can't agree on what should be in your living will. · You can change your living will at any time. Some people find that their wishes about end-of-life care change as their health changes. · In addition to making a living will, think about completing a medical power of  form. This form lets you name the person you want to make end-of-life treatment decisions for you (your \"health care agent\") if you're not able to. Many hospitals and nursing homes will give you the forms you need to complete a living will and a medical power of . · Your living will is used only if you can't make or communicate decisions for yourself anymore. If you become able to make decisions again, you can accept or refuse any treatment, no matter what you wrote in your living will. · Your state may offer an online registry. This is a place where you can store your living will online so the doctors and nurses who need to treat you can find it right away. What should you think about when creating a living will? Talk about your end-of-life wishes with your family members and your doctor. Let them know what you want. That way the people making decisions for you won't be surprised by your choices. Think about these questions as you make your living will: · Do you know enough about life support methods that might be used? If not, talk to your doctor so you know what might be done if you can't breathe on your own, your heart stops, or you're unable to swallow. · What things would you still want to be able to do after you receive life-support methods? Would you want to be able to walk? To speak? To eat on your own? To live without the help of machines? · If you have a choice, where do you want to be cared for? In your home? At a hospital or nursing home? · Do you want certain [employer]ist practices performed if you become very ill? · If you have a choice at the end of your life, where would you prefer to die? At home? In a hospital or nursing home? Somewhere else? · Would you prefer to be buried or cremated? · Do you want your organs to be donated after you die? What should you do with your living will? · Make sure that your family members and your health care agent have copies of your living will. · Give your doctor a copy of your living will to keep in your medical record. If you have more than one doctor, make sure that each one has a copy. · You may want to put a copy of your living will where it can be easily found. Where can you learn more? Go to http://tiffanie-bessie.info/. Enter A182 in the search box to learn more about \"Learning About Living Carrie Welsh. \" Current as of: August 8, 2016 Content Version: 11.3 © 9952-1072 [employer]. Care instructions adapted under license by [employer] (which disclaims liability or warranty for this information). If you have questions about a medical condition or this instruction, always ask your healthcare professional. [place] [address] any warranty or liability for your use of this information.

## 2019-02-16 LAB
ALBUMIN SERPL-MCNC: 4.6 G/DL (ref 3.6–4.8)
ALBUMIN/GLOB SERPL: 2.1 {RATIO} (ref 1.2–2.2)
ALP SERPL-CCNC: 78 IU/L (ref 39–117)
ALT SERPL-CCNC: 24 IU/L (ref 0–32)
AST SERPL-CCNC: 18 IU/L (ref 0–40)
BILIRUB SERPL-MCNC: 0.5 MG/DL (ref 0–1.2)
BUN SERPL-MCNC: 15 MG/DL (ref 8–27)
BUN/CREAT SERPL: 27 (ref 12–28)
CALCIUM SERPL-MCNC: 9.7 MG/DL (ref 8.7–10.3)
CHLORIDE SERPL-SCNC: 100 MMOL/L (ref 96–106)
CO2 SERPL-SCNC: 24 MMOL/L (ref 20–29)
CREAT SERPL-MCNC: 0.56 MG/DL (ref 0.57–1)
ERYTHROCYTE [DISTWIDTH] IN BLOOD BY AUTOMATED COUNT: 13.8 % (ref 12.3–15.4)
GLOBULIN SER CALC-MCNC: 2.2 G/DL (ref 1.5–4.5)
GLUCOSE SERPL-MCNC: 98 MG/DL (ref 65–99)
HCT VFR BLD AUTO: 41.3 % (ref 34–46.6)
HGB BLD-MCNC: 13.9 G/DL (ref 11.1–15.9)
MCH RBC QN AUTO: 33.1 PG (ref 26.6–33)
MCHC RBC AUTO-ENTMCNC: 33.7 G/DL (ref 31.5–35.7)
MCV RBC AUTO: 98 FL (ref 79–97)
PLATELET # BLD AUTO: 174 X10E3/UL (ref 150–379)
POTASSIUM SERPL-SCNC: 3.9 MMOL/L (ref 3.5–5.2)
PROT SERPL-MCNC: 6.8 G/DL (ref 6–8.5)
RBC # BLD AUTO: 4.2 X10E6/UL (ref 3.77–5.28)
SODIUM SERPL-SCNC: 142 MMOL/L (ref 134–144)
WBC # BLD AUTO: 4.1 X10E3/UL (ref 3.4–10.8)

## 2019-04-16 RX ORDER — MELOXICAM 15 MG/1
TABLET ORAL
Qty: 30 TAB | Refills: 2 | Status: SHIPPED | OUTPATIENT
Start: 2019-04-16 | End: 2019-07-23 | Stop reason: SDUPTHER

## 2019-05-16 ENCOUNTER — HOSPITAL ENCOUNTER (OUTPATIENT)
Dept: MAMMOGRAPHY | Age: 69
Discharge: HOME | End: 2019-05-16
Attending: INTERNAL MEDICINE
Payer: MEDICARE

## 2019-05-16 ENCOUNTER — HOSPITAL ENCOUNTER (OUTPATIENT)
Dept: BONE DENSITY | Age: 69
Discharge: HOME | End: 2019-05-16
Attending: INTERNAL MEDICINE
Payer: MEDICARE

## 2019-05-16 DIAGNOSIS — M94.9 DISORDER OF BONE AND CARTILAGE: ICD-10-CM

## 2019-05-16 DIAGNOSIS — Z12.31 ENCOUNTER FOR SCREENING MAMMOGRAM FOR MALIGNANT NEOPLASM OF BREAST: ICD-10-CM

## 2019-05-16 DIAGNOSIS — M89.9 DISORDER OF BONE AND CARTILAGE: ICD-10-CM

## 2019-05-16 PROCEDURE — 77080 DXA BONE DENSITY AXIAL: CPT

## 2019-05-16 PROCEDURE — 77067 SCR MAMMO BI INCL CAD: CPT

## 2019-05-17 NOTE — PROGRESS NOTES
Results reviewed. Results released via Like[employer]. DEXA (Bone Density) improved. FRAX improved. Osteopenia. Continue current medications.

## 2019-06-24 ENCOUNTER — TELEPHONE (OUTPATIENT)
Dept: INTERNAL MEDICINE CLINIC | Age: 69
End: 2019-06-24

## 2019-06-24 DIAGNOSIS — M85.89 OSTEOPENIA OF MULTIPLE SITES: ICD-10-CM

## 2019-06-24 RX ORDER — ALENDRONATE SODIUM 70 MG/1
TABLET ORAL
Qty: 12 TAB | Refills: 1 | Status: SHIPPED | OUTPATIENT
Start: 2019-06-24 | End: 2019-12-12 | Stop reason: SDUPTHER

## 2019-06-24 NOTE — TELEPHONE ENCOUNTER
Patient notified needs to continue taking fosamax as recommended, she verbalized understanding, refill sent to pharmacy per verbal order Dr. Ziyad Varela.

## 2019-06-24 NOTE — TELEPHONE ENCOUNTER
Attempted to reach patient via phone, unsucessful. Left message to return call. Need to know reason for request, is she having side effects? Concerns?

## 2019-06-24 NOTE — TELEPHONE ENCOUNTER
Normal recommendations is to be on medication for 5 years and then there is the possibility of coming off the medications for a drug holiday. She has been on medication since 2017 so I would recommend continuing until 2022 and then taking a drug holiday. The improved DEXA means the medication is working.

## 2019-06-24 NOTE — TELEPHONE ENCOUNTER
Patient states she's been taking fosamax for several years now and would like to stop the medication. Not having any side effects but states her recent bone density scan was good and feels taking less medications in better. Please advise.

## 2019-07-23 RX ORDER — MELOXICAM 15 MG/1
TABLET ORAL
Qty: 30 TAB | Refills: 2 | Status: SHIPPED | OUTPATIENT
Start: 2019-07-23 | End: 2019-09-25 | Stop reason: ALTCHOICE

## 2019-08-12 DIAGNOSIS — G89.29 CHRONIC PAIN OF LEFT ANKLE: ICD-10-CM

## 2019-08-12 DIAGNOSIS — M25.572 CHRONIC PAIN OF LEFT ANKLE: ICD-10-CM

## 2019-08-12 NOTE — TELEPHONE ENCOUNTER
Sightlyjacobt message sent to patient to see if she has enough gabapentin until he comes back on 8/19

## 2019-08-16 RX ORDER — GABAPENTIN 300 MG/1
CAPSULE ORAL
Qty: 270 CAP | Refills: 0 | OUTPATIENT
Start: 2019-08-16 | End: 2020-02-18

## 2019-08-16 NOTE — TELEPHONE ENCOUNTER
Per verbal order Dr. Chela Reid, 1 month supply sent to pharmacy for gabapentin, patient notified via [employer]

## 2019-09-25 ENCOUNTER — TELEPHONE (OUTPATIENT)
Dept: INTERNAL MEDICINE CLINIC | Age: 69
End: 2019-09-25

## 2019-09-25 RX ORDER — DICLOFENAC SODIUM 75 MG/1
75 TABLET, DELAYED RELEASE ORAL 2 TIMES DAILY
Qty: 60 TAB | Refills: 1 | Status: SHIPPED | OUTPATIENT
Start: 2019-09-25 | End: 2019-11-27 | Stop reason: SDUPTHER

## 2019-09-25 NOTE — TELEPHONE ENCOUNTER
Patient is currently taking Meloxican and would like to stop taking this, but she would like to start taking Voltaren. Her sister is taking Voltaren and this has helped her and feels so much better. If this can be done please [facility]. , [phone]

## 2019-09-25 NOTE — TELEPHONE ENCOUNTER
Diclofenac sent into pharmacy. 1 month provided. Needs to update me how it is doing and then will consider 3-month refill. Patient due for physical in February, she never set this up, booking up until March/April, needs to schedule appointment now.

## 2019-09-26 NOTE — TELEPHONE ENCOUNTER
Verified patient identity with two identifiers. Spoke with patient by phone. Voltaren sent to pharmacy please update us after a few weeks as to how this is doing for her. CPE set for March. Patient verbalized understanding.   Future Appointments   Date Time Provider Wendie Escobar   3/24/2020  3:30 PM Pippa Beebe MD [id number] [facility]

## 2019-10-01 RX ORDER — DULOXETIN HYDROCHLORIDE 20 MG/1
CAPSULE, DELAYED RELEASE ORAL
Qty: 90 CAP | Refills: 1 | Status: SHIPPED | OUTPATIENT
Start: 2019-10-01 | End: 2020-04-13

## 2019-10-15 ENCOUNTER — TELEPHONE (OUTPATIENT)
Dept: INTERNAL MEDICINE CLINIC | Age: 69
End: 2019-10-15

## 2019-10-15 NOTE — TELEPHONE ENCOUNTER
Patient called to update Dr Vivienne Sesay regarding her ankle pain, states the voltaren is working well for her, pain is 5/10 and that is good for her.  She will need a refill soon, will have the pharmacy send the request.

## 2019-10-15 NOTE — TELEPHONE ENCOUNTER
Sonny Ward (Self) [phone] (H)     Pt says that she was told to call back about how the med diclofenac was working for her.

## 2019-11-27 RX ORDER — DICLOFENAC SODIUM 75 MG/1
TABLET, DELAYED RELEASE ORAL
Qty: 60 TAB | Refills: 0 | Status: SHIPPED | OUTPATIENT
Start: 2019-11-27 | End: 2019-12-27

## 2019-12-12 DIAGNOSIS — M85.89 OSTEOPENIA OF MULTIPLE SITES: ICD-10-CM

## 2019-12-12 RX ORDER — ALENDRONATE SODIUM 70 MG/1
TABLET ORAL
Qty: 12 TAB | Refills: 1 | Status: SHIPPED | OUTPATIENT
Start: 2019-12-12 | End: 2020-06-02

## 2019-12-27 RX ORDER — DICLOFENAC SODIUM 75 MG/1
TABLET, DELAYED RELEASE ORAL
Qty: 60 TAB | Refills: 0 | Status: SHIPPED | OUTPATIENT
Start: 2019-12-27 | End: 2020-01-30

## 2020-01-30 RX ORDER — DICLOFENAC SODIUM 75 MG/1
TABLET, DELAYED RELEASE ORAL
Qty: 60 TAB | Refills: 1 | Status: SHIPPED | OUTPATIENT
Start: 2020-01-30 | End: 2020-02-18

## 2020-02-14 ENCOUNTER — HOSPITAL ENCOUNTER (EMERGENCY)
Age: 70
Discharge: HOME | DRG: 690 | End: 2020-02-14
Attending: EMERGENCY MEDICINE
Payer: MEDICARE

## 2020-02-14 ENCOUNTER — APPOINTMENT (OUTPATIENT)
Dept: CT IMAGING | Age: 70
DRG: 690 | End: 2020-02-14
Attending: EMERGENCY MEDICINE
Payer: MEDICARE

## 2020-02-14 VITALS
WEIGHT: 148.59 LBS | DIASTOLIC BLOOD PRESSURE: 81 MMHG | SYSTOLIC BLOOD PRESSURE: 124 MMHG | TEMPERATURE: 99.8 F | RESPIRATION RATE: 16 BRPM | OXYGEN SATURATION: 100 % | HEART RATE: 98 BPM | BODY MASS INDEX: 26.32 KG/M2

## 2020-02-14 DIAGNOSIS — R51.9 ACUTE NONINTRACTABLE HEADACHE, UNSPECIFIED HEADACHE TYPE: ICD-10-CM

## 2020-02-14 DIAGNOSIS — N12 PYELONEPHRITIS: Primary | ICD-10-CM

## 2020-02-14 LAB
ALBUMIN SERPL-MCNC: 3 G/DL (ref 3.5–5)
ALBUMIN/GLOB SERPL: 0.8 {RATIO} (ref 1.1–2.2)
ALP SERPL-CCNC: 83 U/L (ref 45–117)
ALT SERPL-CCNC: 28 U/L (ref 12–78)
ANION GAP SERPL CALC-SCNC: 11 MMOL/L (ref 5–15)
APPEARANCE UR: ABNORMAL
AST SERPL-CCNC: 14 U/L (ref 15–37)
BACTERIA URNS QL MICRO: ABNORMAL /HPF
BASOPHILS # BLD: 0 K/UL (ref 0–0.1)
BASOPHILS NFR BLD: 0 % (ref 0–1)
BILIRUB SERPL-MCNC: 0.5 MG/DL (ref 0.2–1)
BILIRUB UR QL: NEGATIVE
BUN SERPL-MCNC: 14 MG/DL (ref 6–20)
BUN/CREAT SERPL: 19 (ref 12–20)
CALCIUM SERPL-MCNC: 9.5 MG/DL (ref 8.5–10.1)
CHLORIDE SERPL-SCNC: 96 MMOL/L (ref 97–108)
CO2 SERPL-SCNC: 25 MMOL/L (ref 21–32)
COLOR UR: ABNORMAL
COMMENT, HOLDF: NORMAL
CREAT SERPL-MCNC: 0.74 MG/DL (ref 0.55–1.02)
DIFFERENTIAL METHOD BLD: ABNORMAL
EOSINOPHIL # BLD: 0 K/UL (ref 0–0.4)
EOSINOPHIL NFR BLD: 0 % (ref 0–7)
EPITH CASTS URNS QL MICRO: ABNORMAL /LPF
ERYTHROCYTE [DISTWIDTH] IN BLOOD BY AUTOMATED COUNT: 11.8 % (ref 11.5–14.5)
GLOBULIN SER CALC-MCNC: 3.8 G/DL (ref 2–4)
GLUCOSE SERPL-MCNC: 103 MG/DL (ref 65–100)
GLUCOSE UR STRIP.AUTO-MCNC: NEGATIVE MG/DL
HCT VFR BLD AUTO: 35.6 % (ref 35–47)
HGB BLD-MCNC: 12 G/DL (ref 11.5–16)
HGB UR QL STRIP: ABNORMAL
IMM GRANULOCYTES # BLD AUTO: 0 K/UL
IMM GRANULOCYTES NFR BLD AUTO: 0 %
KETONES UR QL STRIP.AUTO: 15 MG/DL
LACTATE SERPL-SCNC: 1 MMOL/L (ref 0.4–2)
LEUKOCYTE ESTERASE UR QL STRIP.AUTO: ABNORMAL
LYMPHOCYTES # BLD: 0.4 K/UL (ref 0.8–3.5)
LYMPHOCYTES NFR BLD: 8 % (ref 12–49)
MCH RBC QN AUTO: 33.5 PG (ref 26–34)
MCHC RBC AUTO-ENTMCNC: 33.7 G/DL (ref 30–36.5)
MCV RBC AUTO: 99.4 FL (ref 80–99)
MONOCYTES # BLD: 0.7 K/UL (ref 0–1)
MONOCYTES NFR BLD: 12 % (ref 5–13)
NEUTS BAND NFR BLD MANUAL: 11 % (ref 0–6)
NEUTS SEG # BLD: 4.4 K/UL (ref 1.8–8)
NEUTS SEG NFR BLD: 69 % (ref 32–75)
NITRITE UR QL STRIP.AUTO: NEGATIVE
NRBC # BLD: 0 K/UL (ref 0–0.01)
NRBC BLD-RTO: 0 PER 100 WBC
PH UR STRIP: 6 [PH] (ref 5–8)
PLATELET # BLD AUTO: 114 K/UL (ref 150–400)
PMV BLD AUTO: 10.6 FL (ref 8.9–12.9)
POTASSIUM SERPL-SCNC: 3.4 MMOL/L (ref 3.5–5.1)
PROT SERPL-MCNC: 6.8 G/DL (ref 6.4–8.2)
PROT UR STRIP-MCNC: ABNORMAL MG/DL
RBC # BLD AUTO: 3.58 M/UL (ref 3.8–5.2)
RBC #/AREA URNS HPF: ABNORMAL /HPF (ref 0–5)
RBC MORPH BLD: ABNORMAL
SAMPLES BEING HELD,HOLD: NORMAL
SODIUM SERPL-SCNC: 132 MMOL/L (ref 136–145)
SP GR UR REFRACTOMETRY: 1.01 (ref 1–1.03)
UA: UC IF INDICATED,UAUC: ABNORMAL
UROBILINOGEN UR QL STRIP.AUTO: 0.2 EU/DL (ref 0.2–1)
WBC # BLD AUTO: 5.5 K/UL (ref 3.6–11)
WBC MORPH BLD: ABNORMAL
WBC URNS QL MICRO: ABNORMAL /HPF (ref 0–4)

## 2020-02-14 PROCEDURE — 96365 THER/PROPH/DIAG IV INF INIT: CPT

## 2020-02-14 PROCEDURE — 96375 TX/PRO/DX INJ NEW DRUG ADDON: CPT

## 2020-02-14 PROCEDURE — 74011000258 HC RX REV CODE- 258: Performed by: EMERGENCY MEDICINE

## 2020-02-14 PROCEDURE — 74011000250 HC RX REV CODE- 250: Performed by: EMERGENCY MEDICINE

## 2020-02-14 PROCEDURE — 85025 COMPLETE CBC W/AUTO DIFF WBC: CPT

## 2020-02-14 PROCEDURE — 87186 SC STD MICRODIL/AGAR DIL: CPT

## 2020-02-14 PROCEDURE — 80053 COMPREHEN METABOLIC PANEL: CPT

## 2020-02-14 PROCEDURE — 87077 CULTURE AEROBIC IDENTIFY: CPT

## 2020-02-14 PROCEDURE — 99285 EMERGENCY DEPT VISIT HI MDM: CPT

## 2020-02-14 PROCEDURE — 81001 URINALYSIS AUTO W/SCOPE: CPT

## 2020-02-14 PROCEDURE — 83605 ASSAY OF LACTIC ACID: CPT

## 2020-02-14 PROCEDURE — 74011250636 HC RX REV CODE- 250/636: Performed by: EMERGENCY MEDICINE

## 2020-02-14 PROCEDURE — 70450 CT HEAD/BRAIN W/O DYE: CPT

## 2020-02-14 PROCEDURE — 87086 URINE CULTURE/COLONY COUNT: CPT

## 2020-02-14 PROCEDURE — 36415 COLL VENOUS BLD VENIPUNCTURE: CPT

## 2020-02-14 PROCEDURE — 87040 BLOOD CULTURE FOR BACTERIA: CPT

## 2020-02-14 RX ORDER — KETOROLAC TROMETHAMINE 30 MG/ML
30 INJECTION, SOLUTION INTRAMUSCULAR; INTRAVENOUS
Status: COMPLETED | OUTPATIENT
Start: 2020-02-14 | End: 2020-02-14

## 2020-02-14 RX ORDER — ACETAMINOPHEN 500 MG
500 TABLET ORAL
Qty: 30 TAB | Refills: 0 | Status: SHIPPED | OUTPATIENT
Start: 2020-02-14 | End: 2020-05-13

## 2020-02-14 RX ORDER — METOCLOPRAMIDE HYDROCHLORIDE 5 MG/ML
10 INJECTION INTRAMUSCULAR; INTRAVENOUS
Status: COMPLETED | OUTPATIENT
Start: 2020-02-14 | End: 2020-02-14

## 2020-02-14 RX ORDER — LIDOCAINE 4 G/100G
1 PATCH TOPICAL
Status: DISCONTINUED | OUTPATIENT
Start: 2020-02-14 | End: 2020-02-14 | Stop reason: HOSPADM

## 2020-02-14 RX ORDER — CEFDINIR 300 MG/1
300 CAPSULE ORAL 2 TIMES DAILY
Qty: 10 CAP | Refills: 0 | Status: SHIPPED | OUTPATIENT
Start: 2020-02-14 | End: 2020-02-18

## 2020-02-14 RX ORDER — DIPHENHYDRAMINE HYDROCHLORIDE 50 MG/ML
25 INJECTION, SOLUTION INTRAMUSCULAR; INTRAVENOUS
Status: COMPLETED | OUTPATIENT
Start: 2020-02-14 | End: 2020-02-14

## 2020-02-14 RX ADMIN — SODIUM CHLORIDE 1000 ML: 900 INJECTION, SOLUTION INTRAVENOUS at 17:04

## 2020-02-14 RX ADMIN — DIPHENHYDRAMINE HYDROCHLORIDE 25 MG: 50 INJECTION, SOLUTION INTRAMUSCULAR; INTRAVENOUS at 17:06

## 2020-02-14 RX ADMIN — CEFTRIAXONE 1 G: 1 INJECTION, POWDER, FOR SOLUTION INTRAMUSCULAR; INTRAVENOUS at 17:51

## 2020-02-14 RX ADMIN — METOCLOPRAMIDE 10 MG: 5 INJECTION, SOLUTION INTRAMUSCULAR; INTRAVENOUS at 17:10

## 2020-02-14 RX ADMIN — KETOROLAC TROMETHAMINE 30 MG: 30 INJECTION, SOLUTION INTRAMUSCULAR at 17:09

## 2020-02-14 NOTE — DISCHARGE INSTRUCTIONS
You were found to have a urinary tract infection today. You were given your first dose of antibiotics in the emergency department. Please take antibiotics for the next 10 days. Take Tylenol as needed for body aches and fevers. Return immediately if you begin having nausea, vomiting, worsening fevers, chills. See your primary care doctor next week for symptom recheck. Thank you.

## 2020-02-14 NOTE — ED TRIAGE NOTES
Triage Note: Patient arrives to ER complaining of headache and neck pain that started 2 days ago. Seen at [facility] today and referred here. +fever at [facility], max temp of 100.2. Took ibuprofen this afternoon.

## 2020-02-15 ENCOUNTER — HOSPITAL ENCOUNTER (INPATIENT)
Age: 70
LOS: 3 days | Discharge: HOME HEALTH | DRG: 690 | End: 2020-02-18
Attending: EMERGENCY MEDICINE | Admitting: FAMILY MEDICINE
Payer: MEDICARE

## 2020-02-15 DIAGNOSIS — R78.81 POSITIVE BLOOD CULTURE: ICD-10-CM

## 2020-02-15 DIAGNOSIS — A41.9 SEPSIS, DUE TO UNSPECIFIED ORGANISM, UNSPECIFIED WHETHER ACUTE ORGAN DYSFUNCTION PRESENT (HCC): Primary | ICD-10-CM

## 2020-02-15 DIAGNOSIS — N12 PYELONEPHRITIS: ICD-10-CM

## 2020-02-15 PROBLEM — N39.0 UTI (URINARY TRACT INFECTION): Status: ACTIVE | Noted: 2020-02-15

## 2020-02-15 LAB
ALBUMIN SERPL-MCNC: 2.9 G/DL (ref 3.5–5)
ALBUMIN/GLOB SERPL: 0.7 {RATIO} (ref 1.1–2.2)
ALP SERPL-CCNC: 152 U/L (ref 45–117)
ALT SERPL-CCNC: 59 U/L (ref 12–78)
ANION GAP SERPL CALC-SCNC: 15 MMOL/L (ref 5–15)
APPEARANCE UR: CLEAR
AST SERPL-CCNC: 41 U/L (ref 15–37)
BACTERIA URNS QL MICRO: ABNORMAL /HPF
BASOPHILS # BLD: 0 K/UL (ref 0–0.1)
BASOPHILS NFR BLD: 0 % (ref 0–1)
BILIRUB SERPL-MCNC: 0.6 MG/DL (ref 0.2–1)
BILIRUB UR QL CFM: NEGATIVE
BUN SERPL-MCNC: 11 MG/DL (ref 6–20)
BUN/CREAT SERPL: 15 (ref 12–20)
CALCIUM SERPL-MCNC: 9 MG/DL (ref 8.5–10.1)
CHLORIDE SERPL-SCNC: 98 MMOL/L (ref 97–108)
CO2 SERPL-SCNC: 22 MMOL/L (ref 21–32)
COLOR UR: ABNORMAL
CREAT SERPL-MCNC: 0.72 MG/DL (ref 0.55–1.02)
DIFFERENTIAL METHOD BLD: ABNORMAL
EOSINOPHIL # BLD: 0 K/UL (ref 0–0.4)
EOSINOPHIL NFR BLD: 0 % (ref 0–7)
EPITH CASTS URNS QL MICRO: ABNORMAL /LPF
ERYTHROCYTE [DISTWIDTH] IN BLOOD BY AUTOMATED COUNT: 11.9 % (ref 11.5–14.5)
GLOBULIN SER CALC-MCNC: 4.3 G/DL (ref 2–4)
GLUCOSE SERPL-MCNC: 77 MG/DL (ref 65–100)
GLUCOSE UR STRIP.AUTO-MCNC: NEGATIVE MG/DL
HCT VFR BLD AUTO: 39.1 % (ref 35–47)
HGB BLD-MCNC: 12.8 G/DL (ref 11.5–16)
HGB UR QL STRIP: ABNORMAL
IMM GRANULOCYTES # BLD AUTO: 0 K/UL
IMM GRANULOCYTES NFR BLD AUTO: 0 %
KETONES UR QL STRIP.AUTO: 15 MG/DL
LACTATE SERPL-SCNC: 1.5 MMOL/L (ref 0.4–2)
LACTATE SERPL-SCNC: 2.6 MMOL/L (ref 0.4–2)
LEUKOCYTE ESTERASE UR QL STRIP.AUTO: ABNORMAL
LYMPHOCYTES # BLD: 0.7 K/UL (ref 0.8–3.5)
LYMPHOCYTES NFR BLD: 11 % (ref 12–49)
MCH RBC QN AUTO: 33.2 PG (ref 26–34)
MCHC RBC AUTO-ENTMCNC: 32.7 G/DL (ref 30–36.5)
MCV RBC AUTO: 101.6 FL (ref 80–99)
MONOCYTES # BLD: 0.8 K/UL (ref 0–1)
MONOCYTES NFR BLD: 12 % (ref 5–13)
MYELOCYTES NFR BLD MANUAL: 1 %
NEUTS BAND NFR BLD MANUAL: 6 % (ref 0–6)
NEUTS SEG # BLD: 5.1 K/UL (ref 1.8–8)
NEUTS SEG NFR BLD: 70 % (ref 32–75)
NITRITE UR QL STRIP.AUTO: NEGATIVE
NRBC # BLD: 0 K/UL (ref 0–0.01)
NRBC BLD-RTO: 0 PER 100 WBC
PH UR STRIP: 6 [PH] (ref 5–8)
PLATELET # BLD AUTO: 116 K/UL (ref 150–400)
PMV BLD AUTO: 11.4 FL (ref 8.9–12.9)
POTASSIUM SERPL-SCNC: 4.6 MMOL/L (ref 3.5–5.1)
PROT SERPL-MCNC: 7.2 G/DL (ref 6.4–8.2)
PROT UR STRIP-MCNC: 100 MG/DL
RBC # BLD AUTO: 3.85 M/UL (ref 3.8–5.2)
RBC #/AREA URNS HPF: ABNORMAL /HPF (ref 0–5)
RBC MORPH BLD: ABNORMAL
SODIUM SERPL-SCNC: 135 MMOL/L (ref 136–145)
SP GR UR REFRACTOMETRY: 1.01 (ref 1–1.03)
UROBILINOGEN UR QL STRIP.AUTO: 0.2 EU/DL (ref 0.2–1)
WBC # BLD AUTO: 6.7 K/UL (ref 3.6–11)
WBC URNS QL MICRO: ABNORMAL /HPF (ref 0–4)

## 2020-02-15 PROCEDURE — 36415 COLL VENOUS BLD VENIPUNCTURE: CPT

## 2020-02-15 PROCEDURE — 81001 URINALYSIS AUTO W/SCOPE: CPT

## 2020-02-15 PROCEDURE — 87040 BLOOD CULTURE FOR BACTERIA: CPT

## 2020-02-15 PROCEDURE — 96365 THER/PROPH/DIAG IV INF INIT: CPT

## 2020-02-15 PROCEDURE — 99285 EMERGENCY DEPT VISIT HI MDM: CPT

## 2020-02-15 PROCEDURE — 80053 COMPREHEN METABOLIC PANEL: CPT

## 2020-02-15 PROCEDURE — 83605 ASSAY OF LACTIC ACID: CPT

## 2020-02-15 PROCEDURE — 74011000258 HC RX REV CODE- 258: Performed by: EMERGENCY MEDICINE

## 2020-02-15 PROCEDURE — 74011250636 HC RX REV CODE- 250/636: Performed by: INTERNAL MEDICINE

## 2020-02-15 PROCEDURE — 93005 ELECTROCARDIOGRAM TRACING: CPT

## 2020-02-15 PROCEDURE — 85025 COMPLETE CBC W/AUTO DIFF WBC: CPT

## 2020-02-15 PROCEDURE — 65660000000 HC RM CCU STEPDOWN

## 2020-02-15 PROCEDURE — 74011250637 HC RX REV CODE- 250/637: Performed by: INTERNAL MEDICINE

## 2020-02-15 PROCEDURE — 74011250636 HC RX REV CODE- 250/636: Performed by: EMERGENCY MEDICINE

## 2020-02-15 RX ORDER — ACETAMINOPHEN 325 MG/1
650 TABLET ORAL
Status: DISCONTINUED | OUTPATIENT
Start: 2020-02-15 | End: 2020-02-18 | Stop reason: HOSPADM

## 2020-02-15 RX ORDER — SODIUM CHLORIDE 9 MG/ML
125 INJECTION, SOLUTION INTRAVENOUS CONTINUOUS
Status: DISPENSED | OUTPATIENT
Start: 2020-02-15 | End: 2020-02-15

## 2020-02-15 RX ORDER — MELATONIN
1000 DAILY
Status: DISCONTINUED | OUTPATIENT
Start: 2020-02-16 | End: 2020-02-18 | Stop reason: HOSPADM

## 2020-02-15 RX ORDER — HYDROCODONE BITARTRATE AND ACETAMINOPHEN 5; 325 MG/1; MG/1
1 TABLET ORAL
Status: DISCONTINUED | OUTPATIENT
Start: 2020-02-15 | End: 2020-02-18 | Stop reason: HOSPADM

## 2020-02-15 RX ORDER — SODIUM CHLORIDE 0.9 % (FLUSH) 0.9 %
5-40 SYRINGE (ML) INJECTION EVERY 8 HOURS
Status: DISCONTINUED | OUTPATIENT
Start: 2020-02-15 | End: 2020-02-18 | Stop reason: HOSPADM

## 2020-02-15 RX ORDER — MORPHINE SULFATE 2 MG/ML
2 INJECTION, SOLUTION INTRAMUSCULAR; INTRAVENOUS
Status: DISCONTINUED | OUTPATIENT
Start: 2020-02-15 | End: 2020-02-18 | Stop reason: HOSPADM

## 2020-02-15 RX ORDER — SODIUM CHLORIDE 0.9 % (FLUSH) 0.9 %
5-10 SYRINGE (ML) INJECTION AS NEEDED
Status: DISCONTINUED | OUTPATIENT
Start: 2020-02-15 | End: 2020-02-18 | Stop reason: HOSPADM

## 2020-02-15 RX ORDER — SODIUM CHLORIDE 0.9 % (FLUSH) 0.9 %
5-40 SYRINGE (ML) INJECTION AS NEEDED
Status: DISCONTINUED | OUTPATIENT
Start: 2020-02-15 | End: 2020-02-18 | Stop reason: HOSPADM

## 2020-02-15 RX ORDER — GLUCOSAMINE SULFATE 1500 MG
1000 POWDER IN PACKET (EA) ORAL DAILY
COMMUNITY
End: 2021-07-22

## 2020-02-15 RX ORDER — DULOXETIN HYDROCHLORIDE 20 MG/1
20 CAPSULE, DELAYED RELEASE ORAL
Status: DISCONTINUED | OUTPATIENT
Start: 2020-02-15 | End: 2020-02-18 | Stop reason: HOSPADM

## 2020-02-15 RX ORDER — KETOROLAC TROMETHAMINE 30 MG/ML
15 INJECTION, SOLUTION INTRAMUSCULAR; INTRAVENOUS ONCE
Status: COMPLETED | OUTPATIENT
Start: 2020-02-15 | End: 2020-02-15

## 2020-02-15 RX ADMIN — Medication 10 ML: at 16:00

## 2020-02-15 RX ADMIN — SODIUM CHLORIDE 1000 ML: 900 INJECTION, SOLUTION INTRAVENOUS at 11:36

## 2020-02-15 RX ADMIN — SODIUM CHLORIDE 125 ML/HR: 900 INJECTION, SOLUTION INTRAVENOUS at 16:19

## 2020-02-15 RX ADMIN — KETOROLAC TROMETHAMINE 15 MG: 30 INJECTION, SOLUTION INTRAMUSCULAR; INTRAVENOUS at 13:30

## 2020-02-15 RX ADMIN — SODIUM CHLORIDE 1000 ML: 900 INJECTION, SOLUTION INTRAVENOUS at 10:39

## 2020-02-15 RX ADMIN — ACETAMINOPHEN 650 MG: 325 TABLET ORAL at 17:58

## 2020-02-15 RX ADMIN — CEFTRIAXONE 1 G: 1 INJECTION, POWDER, FOR SOLUTION INTRAMUSCULAR; INTRAVENOUS at 11:15

## 2020-02-15 RX ADMIN — Medication 10 ML: at 14:00

## 2020-02-15 RX ADMIN — DULOXETINE HYDROCHLORIDE 20 MG: 20 CAPSULE, DELAYED RELEASE ORAL at 21:00

## 2020-02-15 NOTE — PROGRESS NOTES
Bedside and Verbal shift change report given to Rekha (oncoming nurse) by Ivy Bowman (offgoing nurse). Report included the following information SBAR, Kardex, MAR, Recent Results and Cardiac Rhythm ST. Patient Vitals for the past 8 hrs:   Temp Pulse Resp BP SpO2   02/15/20 1440 99 °F (37.2 °C) 96 18 136/86 96 %   02/15/20 1335  97 19 (!) 158/96    02/15/20 1230 100 °F (37.8 °C) 99 20 139/82 95 %   02/15/20 1215  93 20 147/89 99 %   02/15/20 1145  93 22 141/88 99 %   02/15/20 1130    141/83 94 %   02/15/20 1100    (!) 138/98 99 %   02/15/20 1030    131/88 100 %   02/15/20 1020 98.4 °F (36.9 °C) (!) 104 16 135/89 96 %       Problem: Patient Education: Go to Patient Education Activity  Goal: Patient/Family Education  Outcome: Progressing Towards Goal     Problem: Falls - Risk of  Goal: *Absence of Falls  Description  Document Allan Fall Risk and appropriate interventions in the flowsheet.   Outcome: Progressing Towards Goal  Note: Fall Risk Interventions: gripper shoes when walking around room

## 2020-02-15 NOTE — ED NOTES
At 8:06 AM the patient then called back after reconsidering and agreed to return to the emergency department for further evaluation.

## 2020-02-15 NOTE — PROGRESS NOTES
Bedside and Verbal shift change report given to Adrian Stahl RN (oncoming nurse) by Vance Panchal RN (offgoing nurse). Report included the following information SBAR, Kardex, MAR, Recent Results and Cardiac Rhythm NSR.      Patient Vitals for the past 12 hrs:   Temp Pulse Resp BP SpO2   02/15/20 1640     99 %   02/15/20 1440 99 °F (37.2 °C) 96 18 136/86 96 %   02/15/20 1335  97 19 (!) 158/96    02/15/20 1230 100 °F (37.8 °C) 99 20 139/82 95 %   02/15/20 1215  93 20 147/89 99 %   02/15/20 1145  93 22 141/88 99 %   02/15/20 1130    141/83 94 %   02/15/20 1100    (!) 138/98 99 %   02/15/20 1030    131/88 100 %   02/15/20 1020 98.4 °F (36.9 °C) (!) 104 16 135/89 96 %

## 2020-02-15 NOTE — ED PROVIDER NOTES
Patient is a 82-year-old female who presents with 3-day history of myalgias, subjective fevers, frontal headache, neck pain. Patient also reports urinary symptoms of frequency that she has been experiencing. Denies any prior history of UTIs. Reports she has been taking NSAIDs intermittently for her symptoms with minimal relief. No recent travel, no recent antibiotics. Reports history of infrequent headaches, that are also frontal.  This episode similar just more intense and longer lasting. No associated nausea, vomiting, vision changes, lightheadedness. Patient seen at    Urgent care prior to arrival to emergency department. Reports she was noted to be 100.2 F. Sent to the emergency department to rule out meningitis. Patient reports that her headaches and neck pains have improved since symptom onset 3 days ago and she feels better than she did this morning. Reports a family member has a history of brain tumor and given recent headaches she is concerned about that.            Past Medical History:   Diagnosis Date    Chronic kidney disease     kidney stone    Chronic pain of left ankle 2/1/2017    Seeing Dr Cara Morales    Fracture     Osteopenia 2/1/2017       Past Surgical History:   Procedure Laterality Date    COLONOSCOPY N/A 2/8/2019    tubular adenoma, repeat 1 yr d/t poor prep - performed by Liban Agosto MD at [facility] ENDOSCOPY;     HX ANKLE FRACTURE TX Left 2005    HX DILATION AND CURETTAGE  2015    HX TONSILLECTOMY           Family History:   Problem Relation Age of Onset   [facility] Cancer Mother         Brain tumor    Other Father         ? lung problem    Cancer Sister         tumors of spine    Other Daughter         kidney stone       Social History     Socioeconomic History    Marital status:      Spouse name: Not on file    Number of children: Not on file    Years of education: Not on file    Highest education level: Not on file   Occupational History    Not on file   Social Needs    Financial resource strain: Not on file    Food insecurity:     Worry: Not on file     Inability: Not on file    Transportation needs:     Medical: Not on file     Non-medical: Not on file   Tobacco Use    Smoking status: Former Smoker     Last attempt to quit: 1980     Years since quittin.1    Smokeless tobacco: Never Used    Tobacco comment: was a causal smoker when smoker   Substance and Sexual Activity    Alcohol use: Yes     Alcohol/week: 5.0 standard drinks     Types: 5 Glasses of wine per week     Frequency: 4 or more times a week     Drinks per session: 1 or 2     Binge frequency: Never    Drug use: No    Sexual activity: Never     Partners: Male   Lifestyle    Physical activity:     Days per week: Not on file     Minutes per session: Not on file    Stress: Not on file   Relationships    Social connections:     Talks on phone: Not on file     Gets together: Not on file     Attends Shinto service: Not on file     Active member of club or organization: Not on file     Attends meetings of clubs or organizations: Not on file     Relationship status: Not on file    Intimate partner violence:     Fear of current or ex partner: Not on file     Emotionally abused: Not on file     Physically abused: Not on file     Forced sexual activity: Not on file   Other Topics Concern    Not on file   Social History Narrative    Not on file         ALLERGIES: Patient has no known allergies. Review of Systems   Constitutional: Positive for chills and fatigue. Negative for fever. HENT: Negative for drooling and nosebleeds. Eyes: Negative for photophobia, pain, itching and visual disturbance. Respiratory: Negative for cough, choking, shortness of breath and stridor. Cardiovascular: Negative for chest pain, palpitations and leg swelling. Gastrointestinal: Negative for abdominal distention and rectal pain. Endocrine: Negative for heat intolerance and polyphagia.    Genitourinary: Positive for flank pain, frequency and urgency. Negative for enuresis and genital sores. Musculoskeletal: Negative for arthralgias and joint swelling. Skin: Negative for color change. Allergic/Immunologic: Negative for immunocompromised state. Neurological: Positive for headaches. Negative for tremors and speech difficulty. Hematological: Negative for adenopathy. Psychiatric/Behavioral: Negative for behavioral problems, confusion, dysphoric mood and sleep disturbance. Vitals:    02/14/20 1730 02/14/20 1800 02/14/20 1830 02/14/20 1900   BP: 130/73 110/74 111/74 124/81   Pulse:       Resp:       Temp:       SpO2:  97% 96% 100%   Weight:                Physical Exam  Vitals signs and nursing note reviewed. Constitutional:       Appearance: Normal appearance. She is well-developed. She is not ill-appearing or toxic-appearing. HENT:      Head: Normocephalic and atraumatic. Nose: Nose normal. No congestion or rhinorrhea. Mouth/Throat:      Mouth: Mucous membranes are moist.      Pharynx: No oropharyngeal exudate or posterior oropharyngeal erythema. Eyes:      Extraocular Movements: Extraocular movements intact. Conjunctiva/sclera: Conjunctivae normal.   Neck:      Musculoskeletal: Normal range of motion and neck supple. No neck rigidity. Comments: Paraspinal tenderness to palpation along upper thoracic spine. No meningismus, no nuchal rigidity. Cardiovascular:      Rate and Rhythm: Normal rate and regular rhythm. Heart sounds: Normal heart sounds. Pulmonary:      Effort: Pulmonary effort is normal. No respiratory distress. Breath sounds: Normal breath sounds. Abdominal:      General: There is no distension. Palpations: Abdomen is soft. Tenderness: There is left CVA tenderness. There is no right CVA tenderness. Musculoskeletal: Normal range of motion. General: No swelling or deformity. Lymphadenopathy:      Cervical: No cervical adenopathy. Skin:     General: Skin is warm and dry. Coloration: Skin is not jaundiced. Neurological:      Mental Status: She is alert and oriented to person, place, and time. Cranial Nerves: No cranial nerve deficit. Coordination: Coordination normal.      Gait: Gait normal.   Psychiatric:         Mood and Affect: Mood normal.         Behavior: Behavior normal.         Thought Content: Thought content normal.          MDM  Number of Diagnoses or Management Options  Acute nonintractable headache, unspecified headache type:   Pyelonephritis:   Diagnosis management comments: Patient is nontoxic-appearing on arrival.  Seems fatigued, uncomfortable given headache, myalgias. Also reporting urinary symptoms and concern for pyelonephritis on my initial evaluation. Discussed in detail and LP, meningitis and at this time patient does not warrant invasive procedure is very low suspicion for meningitis. No focal neurological deficits on exam, and exam consistent with likely tension headache. On reevaluation, after headache cocktail, patient reports resolution of headache symptoms. Ceftriaxone for UTI noted today, likely pyelonephritis given left-sided flank pain. Patient prefers to go home, outpatient antibiotics prescribed.  at bedside, strict return to ER precautions discussed with both and they verbalized understanding. Stable for discharge at this time. Ambulating with steady gait at time of discharge. ED Course as of Feb 15 0855   Fri Feb 14, 2020   1906 Pt presenting with chills, body aches, headache with urinary symptoms. On exam low suspicion for meningitis, does not warrant LP at this time. On reeval, headache free, look improved. Abx for pyelonephritis. Strict RTER precautions provided. [AL]      ED Course User Index  [AL] Hali Winslow MD       Procedures    Patient's results have been reviewed with them.   Patient and/or family have verbally conveyed their understanding and agreement of the patient's signs, symptoms, diagnosis, treatment and prognosis and additionally agree to follow up as recommended or return to the Emergency Room should their condition change prior to follow-up. Discharge instructions have also been provided to the patient with some educational information regarding their diagnosis as well a list of reasons why they would want to return to the ER prior to their follow-up appointment should their condition change.

## 2020-02-15 NOTE — ED PROVIDER NOTES
72-year-old female returns to the emergency department after she was called by myself to notify her of the return of positive blood culture with gram-negative rods which was taken yesterday during her ED visit. During that visit she was given a dose of Rocephin and Rx'd outpatient antibiotics. She was advised to return to the emergency department to be admitted for further evaluation of her infection given concern for possibly developing sepsis from her urinary tract infection. States that last night her symptoms seem to slightly improve, \"although I am definitely not 100%. \"  She denied any measured fever but she did note night sweats last night. She notes some mild nausea but no vomiting, diarrhea, abdominal pain, dysuria, hematuria. She does note some urinary frequency and urgency but denies any flank pain.            Past Medical History:   Diagnosis Date    Chronic kidney disease     kidney stone    Chronic pain of left ankle 2/1/2017    Seeing Dr Collins Links    Fracture     Osteopenia 2/1/2017       Past Surgical History:   Procedure Laterality Date    COLONOSCOPY N/A 2/8/2019    tubular adenoma, repeat 1 yr d/t poor prep - performed by Murray Barron MD at [facility] ENDOSCOPY;     HX ANKLE FRACTURE TX Left 2005    HX DILATION AND CURETTAGE  2015    HX TONSILLECTOMY           Family History:   Problem Relation Age of Onset   [facility] Cancer Mother         Brain tumor    Other Father         ? lung problem    Cancer Sister         tumors of spine    Other Daughter         kidney stone       Social History     Socioeconomic History    Marital status:      Spouse name: Not on file    Number of children: Not on file    Years of education: Not on file    Highest education level: Not on file   Occupational History    Not on file   Social Needs    Financial resource strain: Not on file    Food insecurity:     Worry: Not on file     Inability: Not on file    Transportation needs:     Medical: Not on file     Non-medical: Not on file   Tobacco Use    Smoking status: Former Smoker     Last attempt to quit: 1980     Years since quittin.1    Smokeless tobacco: Never Used    Tobacco comment: was a causal smoker when smoker   Substance and Sexual Activity    Alcohol use: Yes     Alcohol/week: 5.0 standard drinks     Types: 5 Glasses of wine per week     Frequency: 4 or more times a week     Drinks per session: 1 or 2     Binge frequency: Never    Drug use: No    Sexual activity: Never     Partners: Male   Lifestyle    Physical activity:     Days per week: Not on file     Minutes per session: Not on file    Stress: Not on file   Relationships    Social connections:     Talks on phone: Not on file     Gets together: Not on file     Attends Episcopalian service: Not on file     Active member of club or organization: Not on file     Attends meetings of clubs or organizations: Not on file     Relationship status: Not on file    Intimate partner violence:     Fear of current or ex partner: Not on file     Emotionally abused: Not on file     Physically abused: Not on file     Forced sexual activity: Not on file   Other Topics Concern    Not on file   Social History Narrative    Not on file         ALLERGIES: Patient has no known allergies. Review of Systems   Constitutional: Positive for activity change, diaphoresis, fatigue and fever. Negative for appetite change and chills. HENT: Negative for congestion, rhinorrhea, sinus pressure, sneezing and sore throat. Eyes: Negative for photophobia and visual disturbance. Respiratory: Negative for cough and shortness of breath. Cardiovascular: Negative for chest pain. Gastrointestinal: Negative for abdominal pain, blood in stool, constipation, diarrhea, nausea and vomiting. Genitourinary: Positive for frequency and urgency. Negative for difficulty urinating, dysuria, flank pain, hematuria, menstrual problem, vaginal bleeding and vaginal discharge. Musculoskeletal: Negative for arthralgias, back pain, myalgias and neck pain. Skin: Negative for rash and wound. Neurological: Negative for syncope, weakness, numbness and headaches. Psychiatric/Behavioral: Negative for self-injury and suicidal ideas. All other systems reviewed and are negative. Vitals:    02/15/20 1020   BP: 135/89   Pulse: (!) 104   Resp: 16   Temp: 98.4 °F (36.9 °C)   SpO2: 96%   Weight: 68.3 kg (150 lb 9.2 oz)   Height: 5' 5\" (1.651 m)            Physical Exam  Vitals signs and nursing note reviewed. Constitutional:       General: She is not in acute distress. Appearance: Normal appearance. She is well-developed. She is not diaphoretic. HENT:      Head: Normocephalic and atraumatic. Nose: Nose normal.   Eyes:      Extraocular Movements: Extraocular movements intact. Conjunctiva/sclera: Conjunctivae normal.      Pupils: Pupils are equal, round, and reactive to light. Neck:      Musculoskeletal: Neck supple. Cardiovascular:      Rate and Rhythm: Regular rhythm. Tachycardia present. Heart sounds: Normal heart sounds. Pulmonary:      Effort: Pulmonary effort is normal.      Breath sounds: Normal breath sounds. Abdominal:      General: There is no distension. Palpations: Abdomen is soft. Tenderness: There is no abdominal tenderness. Musculoskeletal:         General: No tenderness. Skin:     General: Skin is warm and dry. Neurological:      General: No focal deficit present. Mental Status: She is alert and oriented to person, place, and time. Cranial Nerves: No cranial nerve deficit. Sensory: No sensory deficit. Motor: No weakness. Coordination: Coordination normal.          MDM   46-year-old female with positive blood cultures from yesterday. Patient is mildly tachycardic but otherwise hemodynamically stable. Reassuring exam, as above. Benign abdomen.     Labs returned showing no leukocytosis nor anemia, but elevated lactic acid at 2.6. IV fluids and IV antibiotics were given will admit to the hospital service for further evaluation. Procedures  Total critical care time spent exclusive of procedures:  40 minutes    Hospitalist Ada Serve for Admission  11:26 AM    ED Room Number: BEQ92/91  Patient Name and age:  Lisa Mendez 81 y.o.  female  Working Diagnosis:   1. Sepsis, due to unspecified organism, unspecified whether acute organ dysfunction present ([facility] [address]    2. Pyelonephritis    3. Positive blood culture      Readmission: no  Isolation Requirements:  no  Recommended Level of Care:  telemetry  Code Status:  Full Code  Department:[facility] - [phone]  Other: Seen here yesterday, diagnosed with pyelonephritis, blood culture returned positive this morning and patient was called back. Now has slightly elevated lactic, and getting second dose of Rocephin and IV fluids. Mildly tachycardic but otherwise hemodynamically stable, afebrile. 1130 EKG shows normal sinus rhythm with a rate of 88 bpm with no acute ST or T wave abnormalities suggestive of ischemia.

## 2020-02-15 NOTE — ED TRIAGE NOTES
Pt. Was seen here last night for headache and neckpain and dx with UTI and was treated.   Requested to come in b/c of infection

## 2020-02-15 NOTE — ED NOTES
Sepsis bundle initiated (2049ml of NS)  Per Dr. Jacey Lepe, the initial 1000 ml bolus started prior to sepsis called is part of total amount needed. He discontinued 1049 ml of NS of sepsis bundle.

## 2020-02-15 NOTE — ED NOTES
I was notified from the lab of positive blood culture growing gram-negative rods. Patient was diagnosed with pyelonephritis and concern for developing urosepsis. The patient was called and we discussed her concerning results and she was recommended to return to the emergency department for admission and further treatment. The patient states that she actually felt better last night and is uncertain if she wants to return to the hospital because she states that she wants to be with her . She was recommended at least to take her prescribed antibiotics and follow-up closely with her primary care provider if she chooses not to return to the emergency department and be admitted to the hospital today, but again she was strongly recommended to return and be admitted with concern for developing sepsis. This was discussed with the patient on the phone and she stated understanding of my recommendation, and expressed gratitude for the concern. Candance Huger

## 2020-02-16 ENCOUNTER — APPOINTMENT (OUTPATIENT)
Dept: ULTRASOUND IMAGING | Age: 70
DRG: 690 | End: 2020-02-16
Attending: INTERNAL MEDICINE
Payer: MEDICARE

## 2020-02-16 LAB
ALBUMIN SERPL-MCNC: 2.3 G/DL (ref 3.5–5)
ALBUMIN/GLOB SERPL: 0.6 {RATIO} (ref 1.1–2.2)
ALP SERPL-CCNC: 185 U/L (ref 45–117)
ALT SERPL-CCNC: 68 U/L (ref 12–78)
ANION GAP SERPL CALC-SCNC: 7 MMOL/L (ref 5–15)
AST SERPL-CCNC: 68 U/L (ref 15–37)
ATRIAL RATE: 88 BPM
BACTERIA SPEC CULT: ABNORMAL
BASOPHILS # BLD: 0 K/UL (ref 0–0.1)
BASOPHILS NFR BLD: 0 % (ref 0–1)
BILIRUB DIRECT SERPL-MCNC: 0.1 MG/DL (ref 0–0.2)
BILIRUB SERPL-MCNC: 0.3 MG/DL (ref 0.2–1)
BUN SERPL-MCNC: 7 MG/DL (ref 6–20)
BUN/CREAT SERPL: 14 (ref 12–20)
CALCIUM SERPL-MCNC: 8.5 MG/DL (ref 8.5–10.1)
CALCULATED P AXIS, ECG09: 44 DEGREES
CALCULATED R AXIS, ECG10: 28 DEGREES
CALCULATED T AXIS, ECG11: 36 DEGREES
CC UR VC: ABNORMAL
CHLORIDE SERPL-SCNC: 113 MMOL/L (ref 97–108)
CO2 SERPL-SCNC: 20 MMOL/L (ref 21–32)
CREAT SERPL-MCNC: 0.49 MG/DL (ref 0.55–1.02)
DIAGNOSIS, 93000: NORMAL
DIFFERENTIAL METHOD BLD: ABNORMAL
EOSINOPHIL # BLD: 0.1 K/UL (ref 0–0.4)
EOSINOPHIL NFR BLD: 1 % (ref 0–7)
ERYTHROCYTE [DISTWIDTH] IN BLOOD BY AUTOMATED COUNT: 11.8 % (ref 11.5–14.5)
GLOBULIN SER CALC-MCNC: 3.6 G/DL (ref 2–4)
GLUCOSE SERPL-MCNC: 87 MG/DL (ref 65–100)
HCT VFR BLD AUTO: 31.6 % (ref 35–47)
HGB BLD-MCNC: 10.2 G/DL (ref 11.5–16)
IMM GRANULOCYTES # BLD AUTO: 0 K/UL (ref 0–0.04)
IMM GRANULOCYTES NFR BLD AUTO: 0 % (ref 0–0.5)
LYMPHOCYTES # BLD: 0.5 K/UL (ref 0.8–3.5)
LYMPHOCYTES NFR BLD: 10 % (ref 12–49)
MCH RBC QN AUTO: 33.2 PG (ref 26–34)
MCHC RBC AUTO-ENTMCNC: 32.3 G/DL (ref 30–36.5)
MCV RBC AUTO: 102.9 FL (ref 80–99)
MONOCYTES # BLD: 0.7 K/UL (ref 0–1)
MONOCYTES NFR BLD: 14 % (ref 5–13)
NEUTS BAND NFR BLD MANUAL: 5 %
NEUTS SEG # BLD: 3.7 K/UL (ref 1.8–8)
NEUTS SEG NFR BLD: 70 % (ref 32–75)
NRBC # BLD: 0 K/UL (ref 0–0.01)
NRBC BLD-RTO: 0 PER 100 WBC
P-R INTERVAL, ECG05: 138 MS
PLATELET # BLD AUTO: 101 K/UL (ref 150–400)
PMV BLD AUTO: 10.8 FL (ref 8.9–12.9)
POTASSIUM SERPL-SCNC: 3.8 MMOL/L (ref 3.5–5.1)
PROT SERPL-MCNC: 5.9 G/DL (ref 6.4–8.2)
Q-T INTERVAL, ECG07: 366 MS
QRS DURATION, ECG06: 70 MS
QTC CALCULATION (BEZET), ECG08: 442 MS
RBC # BLD AUTO: 3.07 M/UL (ref 3.8–5.2)
RBC MORPH BLD: ABNORMAL
SERVICE CMNT-IMP: ABNORMAL
SODIUM SERPL-SCNC: 140 MMOL/L (ref 136–145)
VENTRICULAR RATE, ECG03: 88 BPM
WBC # BLD AUTO: 5 K/UL (ref 3.6–11)

## 2020-02-16 PROCEDURE — 36415 COLL VENOUS BLD VENIPUNCTURE: CPT

## 2020-02-16 PROCEDURE — 85025 COMPLETE CBC W/AUTO DIFF WBC: CPT

## 2020-02-16 PROCEDURE — 76705 ECHO EXAM OF ABDOMEN: CPT

## 2020-02-16 PROCEDURE — 80076 HEPATIC FUNCTION PANEL: CPT

## 2020-02-16 PROCEDURE — 80048 BASIC METABOLIC PNL TOTAL CA: CPT

## 2020-02-16 PROCEDURE — 74011250636 HC RX REV CODE- 250/636: Performed by: INTERNAL MEDICINE

## 2020-02-16 PROCEDURE — 74011000258 HC RX REV CODE- 258: Performed by: INTERNAL MEDICINE

## 2020-02-16 PROCEDURE — 94760 N-INVAS EAR/PLS OXIMETRY 1: CPT

## 2020-02-16 PROCEDURE — 76770 US EXAM ABDO BACK WALL COMP: CPT

## 2020-02-16 PROCEDURE — 74011250637 HC RX REV CODE- 250/637: Performed by: INTERNAL MEDICINE

## 2020-02-16 PROCEDURE — 65660000000 HC RM CCU STEPDOWN

## 2020-02-16 RX ADMIN — MELATONIN 1 TABLET: at 10:09

## 2020-02-16 RX ADMIN — Medication 10 ML: at 14:00

## 2020-02-16 RX ADMIN — Medication 10 ML: at 21:46

## 2020-02-16 RX ADMIN — CEFTRIAXONE 1 G: 1 INJECTION, POWDER, FOR SOLUTION INTRAMUSCULAR; INTRAVENOUS at 10:09

## 2020-02-16 RX ADMIN — DULOXETINE HYDROCHLORIDE 20 MG: 20 CAPSULE, DELAYED RELEASE ORAL at 20:12

## 2020-02-16 NOTE — H&P
[address]   HISTORY AND PHYSICAL    Name:  Naomi Vega  MR#:  [id number]  :  1950  ACCOUNT #:  [de-identified]  ADMIT DATE:  02/15/2020      PRIMARY CARE PHYSICIAN:  Nilda Smalls MD    CHIEF COMPLAINT:  Urinary infection and bacteremia. HISTORY OF PRESENT ILLNESS:  The patient is a 61-year-old lady who returned to the emergency department for further management of suspected UTI with positive blood cultures. She was seen in the emergency department yesterday and was called back to the ER today due to positive blood culture with gram-negative rods. Her symptoms started about 3 days ago with body aches, headache, chills, and sweats. She also noted some urinary frequency but no flank pain or abdominal pain. She also had mild vomiting 2 days ago. Yesterday, her headache was much worse and actually required CT scan of the head in the emergency department for further evaluation. Of note, head CT scan was unremarkable. She still has a mild-to-moderate frontal headache today but no neck pain. No vision changes. Denies chest pain, shortness of breath, abdominal pain, or vomiting. No blood in the urine or stool. She actually feels a little better than yesterday overall. PAST MEDICAL HISTORY:  Includes osteoporosis. left ankle fracture requiring surgery about 13 years ago, for which she still takes analgesics. She has no known diabetes, heart disease, kidney problems, or urologic issues. MEDICATIONS:  Medication list includes,  1. Fosamax 70 mg weekly. 2.  Vitamin D 1000 units daily. 3.  Vitamin B12 3000 mcg daily. 4.  Voltaren 75 mg daily. 5.  Cymbalta 20 mg daily. 6.  Potassium supplement. 7.  Vitamin E supplement. 8.  She states she does not take Neurontin anymore. ALLERGIES:  NONE. SOCIAL HISTORY:  The patient lives at home with her . FAMILY HISTORY:  Reviewed and noncontributory.     REVIEW OF SYSTEMS:  The patient denies weight changes, leg pains, edema, difficulty breathing, dizziness, or focal weakness. All other systems reviewed and unremarkable. PHYSICAL EXAMINATION:  GENERAL:  The patient is a late middle-aged lady in no acute distress. VITAL SIGNS:  Temperature 100, blood pressure 139/82, oxygen saturation 95% on room air, respirations 20. HEENT:  Head is atraumatic. Pupils reactive to light. Oral mucosa is moist.  NECK:  Supple. There is no JVD. LUNGS:  Clear to auscultation bilaterally. HEART:  Rhythm is regular without murmurs. ABDOMEN:  Soft and nontender without flank tenderness. EXTREMITIES:  Lower extremity examination shows no edema. No signs of DVT. NEUROLOGIC:  Nonfocal.  She is alert and oriented x3. LABORATORY DATA:  White blood cell count 6.7, hemoglobin 12.8, platelet count 982. Urinalysis shows clear urine with small amount of leukocyte esterase and 10-20 wbc's. This was overall improved from the urinalysis obtained yesterday. Sodium 135, potassium 4.6, chloride 98, CO2 22, glucose 77, BUN 11, creatinine 0.7, AST 41, lactic acid 2.6. Blood cultures obtained yesterday grew gram-negative rods in 1 out of 2 bottles. No kidney imaging is available. ASSESSMENT:  1.  Bacteremia, likely due to urinary tract infection. 2.  Urinary tract infection with lactic acidosis and bacteremia, possible sepsis. 3.  Headaches of unclear etiology, suspect due to the acute infection. PLAN:  The patient is admitted to the hospital for further management. We will monitor her hemodynamic status closely. Sepsis reassessment completed. Continue her on IV Rocephin, to which she seemed to be responding so far. Repeat blood cultures have been obtained already. We will continue to hydrate with IV fluids. We will also obtain an ultrasound for urinary tract. Per her report, this is her first urinary tract infection ever. DVT prophylaxis with SCDs. Code status is full code.   She is expected to return to home upon discharge.         MD ROBINSON [id number]  D:  02/15/2020 15:51  T:  02/15/2020 19:38  JOB #:  [id number]  CC:  Merlyn Edward MD

## 2020-02-16 NOTE — PROGRESS NOTES
Spiritual Care Partner Volunteer visited patient in [facility] on 2/16/20. Documented by:   Chaplain Carrillo MDiv, MACE  [phone] ([phone])

## 2020-02-16 NOTE — PROGRESS NOTES
Bedside shift change report given to ANGEL Da Silva (oncoming nurse) by Ty Palomo RN (offgoing nurse). Report included the following information SBAR, Kardex, Intake/Output, MAR, Med Rec Status and Cardiac Rhythm NSR.      Problem: Patient Education: Go to Patient Education Activity  Goal: Patient/Family Education  Outcome: Progressing Towards Goal     Problem: Sepsis: Day of Diagnosis  Goal: *Fluid resuscitation  Outcome: Progressing Towards Goal  Goal: *Lactic acid with first set of blood cultures  Outcome: Progressing Towards Goal  Goal: Consults, if ordered  Outcome: Progressing Towards Goal  Goal: Nutrition/Diet  Outcome: Progressing Towards Goal  Goal: Medications  Outcome: Progressing Towards Goal  Goal: Respiratory  Outcome: Progressing Towards Goal  Goal: Psychosocial  Outcome: Progressing Towards Goal

## 2020-02-16 NOTE — PROGRESS NOTES
Bedside and Verbal shift change report given to [facility] (oncoming nurse) by Mary Jane Sigala (offgoing nurse). Report included the following information SBAR, Kardex, MAR, Recent Results and Cardiac Rhythm NSR. Patient Vitals for the past 8 hrs:   Temp Pulse Resp BP SpO2   02/16/20 1205 98.1 °F (36.7 °C) 80 16 (!) 159/92 97 %   02/16/20 1200     98 %   02/16/20 1013 98 °F (36.7 °C) 99 17 (!) 158/99 98 %   02/16/20 0800     97 %         Problem: Sepsis: Day 2  Goal: *Tolerating diet  Outcome: Progressing Towards Goal  Goal: Activity/Safety  Outcome: Progressing Towards Goal  Goal: Nutrition/Diet  Outcome: Progressing Towards Goal     Problem: Falls - Risk of  Goal: *Absence of Falls  Description  Document Allan Fall Risk and appropriate interventions in the flowsheet.   Outcome: Progressing Towards Goal  Note: Fall Risk Interventions:     Medication Interventions: Assess postural VS orthostatic hypotension, Teach patient to arise slowly

## 2020-02-17 ENCOUNTER — APPOINTMENT (OUTPATIENT)
Dept: NON INVASIVE DIAGNOSTICS | Age: 70
DRG: 690 | End: 2020-02-17
Attending: INTERNAL MEDICINE
Payer: MEDICARE

## 2020-02-17 LAB
AV PEAK GRADIENT: 52.48 MMHG
AV VELOCITY RATIO: 0.55
ECHO AO ROOT DIAM: 2.82 CM
ECHO AV AREA PEAK VELOCITY: 1.6 CM2
ECHO AV PEAK GRADIENT: 11.1 MMHG
ECHO AV PEAK VELOCITY: 166.46 CM/S
ECHO AV REGURGITANT PHT: 344.4 CM
ECHO LA AREA 4C: 14.3 CM2
ECHO LA MAJOR AXIS: 3.18 CM
ECHO LA TO AORTIC ROOT RATIO: 1.13
ECHO LA VOL 2C: 49.01 ML (ref 22–52)
ECHO LA VOL 4C: 30.04 ML (ref 22–52)
ECHO LA VOL BP: 45.43 ML (ref 22–52)
ECHO LA VOL/BSA BIPLANE: 26.16 ML/M2 (ref 16–28)
ECHO LA VOLUME INDEX A2C: 28.22 ML/M2 (ref 16–28)
ECHO LA VOLUME INDEX A4C: 17.3 ML/M2 (ref 16–28)
ECHO LV E' LATERAL VELOCITY: 8.33 CM/S
ECHO LV E' SEPTAL VELOCITY: 7.98 CM/S
ECHO LV INTERNAL DIMENSION DIASTOLIC: 3.59 CM (ref 3.9–5.3)
ECHO LV INTERNAL DIMENSION SYSTOLIC: 2.74 CM
ECHO LV IVSD: 0.99 CM (ref 0.6–0.9)
ECHO LV MASS 2D: 134.4 G (ref 67–162)
ECHO LV MASS INDEX 2D: 77.4 G/M2 (ref 43–95)
ECHO LV POSTERIOR WALL DIASTOLIC: 1.15 CM (ref 0.6–0.9)
ECHO LVOT DIAM: 1.93 CM
ECHO LVOT PEAK GRADIENT: 3.3 MMHG
ECHO LVOT PEAK VELOCITY: 90.97 CM/S
ECHO MV A VELOCITY: 97.53 CM/S
ECHO MV AREA PHT: 3.5 CM2
ECHO MV E DECELERATION TIME (DT): 216.5 MS
ECHO MV E VELOCITY: 73.8 CM/S
ECHO MV E/A RATIO: 0.76
ECHO MV E/E' LATERAL: 8.86
ECHO MV E/E' RATIO (AVERAGED): 9.05
ECHO MV E/E' SEPTAL: 9.25
ECHO MV PRESSURE HALF TIME (PHT): 62.8 MS
ECHO MV REGURGITANT PEAK GRADIENT: 130.7 MMHG
ECHO MV REGURGITANT PEAK VELOCITY: 571.63 CM/S
ECHO PV MAX VELOCITY: 108.66 CM/S
ECHO PV PEAK GRADIENT: 4.7 MMHG
ECHO RV INTERNAL DIMENSION: 3.97 CM
ECHO RV TAPSE: 2.34 CM (ref 1.5–2)
ECHO TV REGURGITANT MAX VELOCITY: 295.66 CM/S
ECHO TV REGURGITANT PEAK GRADIENT: 35 MMHG
HAV IGM SER QL: NONREACTIVE
HBV CORE IGM SER QL: NONREACTIVE
HBV SURFACE AG SER QL: <0.1 INDEX
HBV SURFACE AG SER QL: NEGATIVE
HCV AB SERPL QL IA: NONREACTIVE
HCV COMMENT,HCGAC: NORMAL
LVFS 2D: 23.86 %
MV DEC SLOPE: 3.41
PISA AR MAX VEL: 362.23 CM/S
SP1: NORMAL
SP2: NORMAL
SP3: NORMAL

## 2020-02-17 PROCEDURE — 93306 TTE W/DOPPLER COMPLETE: CPT

## 2020-02-17 PROCEDURE — 74011250636 HC RX REV CODE- 250/636: Performed by: INTERNAL MEDICINE

## 2020-02-17 PROCEDURE — 80074 ACUTE HEPATITIS PANEL: CPT

## 2020-02-17 PROCEDURE — 74011250637 HC RX REV CODE- 250/637: Performed by: INTERNAL MEDICINE

## 2020-02-17 PROCEDURE — 74011000258 HC RX REV CODE- 258: Performed by: INTERNAL MEDICINE

## 2020-02-17 PROCEDURE — 36415 COLL VENOUS BLD VENIPUNCTURE: CPT

## 2020-02-17 PROCEDURE — 65270000032 HC RM SEMIPRIVATE

## 2020-02-17 RX ADMIN — MELATONIN 1 TABLET: at 09:58

## 2020-02-17 RX ADMIN — Medication 10 ML: at 21:44

## 2020-02-17 RX ADMIN — ACETAMINOPHEN 650 MG: 325 TABLET ORAL at 17:07

## 2020-02-17 RX ADMIN — DULOXETINE HYDROCHLORIDE 20 MG: 20 CAPSULE, DELAYED RELEASE ORAL at 21:44

## 2020-02-17 RX ADMIN — Medication 10 ML: at 06:33

## 2020-02-17 RX ADMIN — Medication 10 ML: at 14:00

## 2020-02-17 RX ADMIN — CEFEPIME HYDROCHLORIDE 2 G: 2 INJECTION, POWDER, FOR SOLUTION INTRAVENOUS at 15:04

## 2020-02-17 RX ADMIN — CEFTRIAXONE 1 G: 1 INJECTION, POWDER, FOR SOLUTION INTRAMUSCULAR; INTRAVENOUS at 10:00

## 2020-02-17 RX ADMIN — ACETAMINOPHEN 650 MG: 325 TABLET ORAL at 04:18

## 2020-02-17 NOTE — PROGRESS NOTES
TRANSFER - IN REPORT:    Verbal report received from [facility] RN(name) on Kasandra Stauffer  being received from [facility]) for routine progression of care      Report consisted of patients Situation, Background, Assessment and   Recommendations(SBAR). Information from the following report(s) SBAR, Kardex and MAR was reviewed with the receiving nurse. Opportunity for questions and clarification was provided. Assessment completed upon patients arrival to unit and care assumed.

## 2020-02-17 NOTE — CONSULTS
Infectious Disease Consult Note    Reason for Consult: Bacteremia   Date of Consultation: February 17, 2020  Date of Admission: 2/15/2020  Referring Physician: Dr Indu Aponte       HPI:      Ms Lizette Tinsley is a 81year old lady wt hx of ankle fracture/ surgery with hardware who reports urinary frequency and urgency last week. She says she has chronic back pain and did not think much of her back pain recently. Denied any fevers, chills, nausea, denies any urological procedures recently and her Paiz catheters. Denies any vaginal discharge or lesions. She says she feels a lot better now. Reports going to a patient [facility] last week and was told to go to the emergency room at that time. She had headache and neck pain at that time and there was concern for possible meningitis per her. She went to the [address] [place] [address] emergency room on [facility] and was discharged. The next day she received a phone call indicating her blood cultures were positive and told to come to the hospital.  She reports being tested for the flu at the outside facility and was told results were negative    She is currently on intravenous antibiotics with ceftriaxone. She says urinary urgency and frequency have resolved she.  Denied any headache neck pain at this time as well          Past Medical History:  Past Medical History:   Diagnosis Date    Chronic kidney disease     kidney stone    Chronic pain of left ankle 2/1/2017    Seeing Dr Adithya Bermudez    Fracture     Osteopenia 2/1/2017         Surgical History:  Past Surgical History:   Procedure Laterality Date    COLONOSCOPY N/A 2/8/2019    tubular adenoma, repeat 1 yr d/t poor prep - performed by Melissa Welsh MD at [facility];     HX ANKLE FRACTURE TX Left 2005    HX DILATION AND CURETTAGE  2015    HX TONSILLECTOMY           Family History:   Family History   Problem Relation Age of Onset    Cancer Mother         Brain tumor    Other Father         ? lung problem    Cancer Sister tumors of spine    Other Daughter         kidney stone         Social History:     · Living Situation: At home with her    · Tobacco: Denied  · Alcohol: Denied  · Illicit Drugs: Denied  · Sexual History:   · Travel History: denied   · Exposures:   · Outdoor/Hiking: denied  · Animal/Pet: cat at home   · Construction: denied  · Hot Tub: denied   · Brackish/stagnant exposure: denied  · TB exposure: denied  · Sick Contacts: denied     Allergies:  No Known Allergies      Review of Systems:    10 point review of systems obtained  Pertinent positives per HPI   all others negative at this time      Medications:  No current facility-administered medications on file prior to encounter. Current Outpatient Medications on File Prior to Encounter   Medication Sig Dispense Refill    cholecalciferol (VITAMIN D3) 25 mcg (1,000 unit) cap Take 1,000 Units by mouth daily.  acetaminophen (TYLENOL EXTRA STRENGTH) 500 mg tablet Take 1 Tab by mouth every six (6) hours as needed for Pain or Fever. 30 Tab 0    cefdinir (OMNICEF) 300 mg capsule Take 1 Cap by mouth two (2) times a day. 10 Cap 0    diclofenac EC (VOLTAREN) 75 mg EC tablet TAKE ONE TABLET BY MOUTH TWICE A DAY 60 Tab 1    alendronate (FOSAMAX) 70 mg tablet TAKE ONE TABLET BY MOUTH ONCE EVERY 7 DAYS 12 Tab 1    DULoxetine (CYMBALTA) 20 mg capsule TAKE ONE CAPSULE BY MOUTH NIGHTLY 90 Cap 1    cyanocobalamin, vitamin B-12, (VITAMIN B-12 SL) 3,000 mcg by SubLINGual route daily.  VITAMIN E ACETATE (VITAMIN E PO) Take 1,200 mg by mouth daily.       gabapentin (NEURONTIN) 300 mg capsule TAKE THREE CAPSULES BY MOUTH THREE TIMES A  Cap 0           Current Facility-Administered Medications:     sodium chloride (NS) flush 5-10 mL, 5-10 mL, IntraVENous, PRN, Elnoriaraceli Tao S, DO    sodium chloride (NS) flush 5-40 mL, 5-40 mL, IntraVENous, Q8H, Andree Abbasi MD, 10 mL at 02/17/20 6627    sodium chloride (NS) flush 5-40 mL, 5-40 mL, IntraVENous, PRN, Faustino Mcgraw MD    sodium chloride (NS) flush 5-40 mL, 5-40 mL, IntraVENous, Q8H, Dhruv Gonzalez MD, 10 mL at 02/17/20 6324    sodium chloride (NS) flush 5-40 mL, 5-40 mL, IntraVENous, PRN, Brendan Garnett MD    HYDROcodone-acetaminophen (NORCO) 5-325 mg per tablet 1 Tab, 1 Tab, Oral, Q4H PRN, Brendan Garnett MD    acetaminophen (TYLENOL) tablet 650 mg, 650 mg, Oral, Q4H PRN, Brendan Garnett MD, 650 mg at 02/17/20 0418    morphine injection 2 mg, 2 mg, IntraVENous, Q4H PRN, Brendan Garnett MD    cefTRIAXone (ROCEPHIN) 1 g in 0.9% sodium chloride (MBP/ADV) 50 mL, 1 g, IntraVENous, Q24H, Brendan Garnett MD, Last Rate: 100 mL/hr at 02/17/20 1000, 1 g at 02/17/20 1000    cholecalciferol (VITAMIN D3) (1000 Units /25 mcg) tablet 1 Tab, 1,000 Units, Oral, DAILY, Brendan Garnett MD, 1 Tab at 02/17/20 0958    DULoxetine (CYMBALTA) capsule 20 mg, 20 mg, Oral, QHS, Brendan Garnett MD, 20 mg at 02/16/20 2012    Physical Exam:    Vitals:   Patient Vitals for the past 24 hrs:   Temp Pulse Resp BP SpO2   02/17/20 0809 98 °F (36.7 °C)  16 149/89 97 %   02/17/20 0414 97.9 °F (36.6 °C) 94 16 152/90 98 %   02/17/20 0003 98.4 °F (36.9 °C) (!) 106 16 (!) 159/97 98 %   02/16/20 2006 98.4 °F (36.9 °C) 89 16 (!) 157/95 97 %   02/16/20 1523 98 °F (36.7 °C) 80 16 145/88 98 %   02/16/20 1205 98.1 °F (36.7 °C) 80 16 (!) 159/92 97 %   02/16/20 1200     98 %   ·   · GEN: NAD  · HEENT:  no scleral icterus,  no cervical lymphadenopathy, no sinus tenderness, no thrush  · CV: S1, S2 heard regularly,  · Lungs: Clear to auscultation bilaterally  · Abdomen: soft, non distended, non tender,  no CVA tenderness   · Genitourinary no Paiz   · Extremities: no edema  · Neuro: Alert, oriented to time, place and situation, moves all extremities to commands, verbal   · Skin: no rash  · Psych: good affect, good eye contact,  Lines: Peripheral    Labs:   Recent Results (from the past 24 hour(s))   HEPATITIS PANEL, ACUTE Collection Time: 02/17/20  4:24 AM   Result Value Ref Range    Hepatitis A, IgM NONREACTIVE NR      __          Hepatitis B surface Ag <0.10 Index    Hep B surface Ag Interp. NEGATIVE  NEG      __          Hepatitis B core, IgM NONREACTIVE NR      __          Hep C  virus Ab Interp. NONREACTIVE NR      Hep C  virus Ab comment Method used is [facility]         Microbiology Data:       Blood:    2/14/20  Component Value Ref Range & Units Status   Special Requests: NO SPECIAL REQUESTS    Preliminary   Culture result: Abnormal     Preliminary   ENTEROBACTER AEROGENES GROWING IN 1 OF 2 BOTTLES DRAWN (SITE NOT INDICATED)    Culture result: Abnormal     Preliminary   PRELIMINARY REPORT OF APPARENT GRAM NEGATIVE RODS GROWING IN 1 OF 2 BOTTLES DRAWN CALLED TO AND READ BACK BY Win Jauregui ON 2/15/20. RC   Culture result:    Preliminary   REMAINING BOTTLE(S) HAS/HAVE NO GROWTH SO FAR    Susceptibility      Enterobacter aerogenes     KELSEY (Preliminary)    Amikacin ($) <=16 ug/mL S    Aztreonam ($$$$) <=4 ug/mL S    Cefepime ($$) <=4 ug/mL S    Cefotaxime <=2 ug/mL S    Ceftazidime ($) <=1 ug/mL S    Ceftriaxone ($) <=1 ug/mL S    Cefuroxime ($) <=4 ug/mL S    Ciprofloxacin ($) <=1 ug/mL S    Gentamicin ($) <=4 ug/mL S    Imipenem <=1 ug/mL S    Levofloxacin ($) <=2 ug/mL S    Meropenem ($$) <=1 ug/mL S    Piperacillin/Tazobac ($) <=16 ug/mL S    Tobramycin ($) <=4 ug/mL S    Trimeth/Sulfa <=2/38 ug/mL S            Blood 2/15/20  Component Value Ref Range & Units Status   Special Requests: NO SPECIAL REQUESTS    Preliminary   Culture result: NO GROWTH 2 DAYS    Preliminary   Result History               Urine: 2/14/20  Specimen Information: Urine        Component Value Ref Range & Units Status   Special Requests: NO SPECIAL REQUESTS   Reflexed from [id number]    Final   [place] Count >100,000   COLONIES/mL    Final   Culture result: ENTEROBACTER AEROGENESAbnormal     Final   Susceptibility      Enterobacter aerogenes KELSEY    Amikacin ($) <=16 ug/mL S    Aztreonam ($$$$) <=4 ug/mL S    Cefepime ($$) <=4 ug/mL S    Cefotaxime <=2 ug/mL S    Ceftazidime ($) <=1 ug/mL S    Ceftriaxone ($) <=1 ug/mL S    Cefuroxime ($) <=4 ug/mL S    Ciprofloxacin ($) <=1 ug/mL S    Gentamicin ($) <=4 ug/mL S    Imipenem <=1 ug/mL S    Levofloxacin ($) <=2 ug/mL S    Meropenem ($$) <=1 ug/mL S    Nitrofurantoin <=32 ug/mL S    Piperacillin/Tazobac ($) <=16 ug/mL S    Tobramycin ($) <=4 ug/mL S    Trimeth/Sulfa <=2/38 ug/mL S            Imaging:   CT head   FINDINGS:  Generalized prominence of cerebral sulci and ventricles is increased but upper  normal for age. Periventricular white matter low-density is mild and diffuse,  extending into the basal ganglia regions bilaterally, and may be the central  reuben. . There is no intracranial hemorrhage, extra-axial collection, mass, mass  effect or midline shift. The basilar cisterns are open. No acute infarct is  identified. The bone windows demonstrate no abnormalities. The visualized  portions of the paranasal sinuses and mastoid air cells are clear.     IMPRESSION  IMPRESSION:   1. No acute intracranial abnormality. 2. Mild microvascular ischemic and age-related change. US abd   LIVER: Hepatic echotexture is increased, consistent with moderate hepatic  steatosis. The deep portions of the liver are not well visualized as a result. No intrahepatic biliary ductal dilation or focal mass is shown in the  superficial aspects of the liver. MAIN PORTAL VEIN: Patent. Appropriate hepatopetal flow. GALLBLADDER: No dilation, wall thickening, or pericholecystic fluid. No  gallstones. COMMON DUCT: 0.3 cm in diameter. The duct is normal caliber. PANCREAS: The visualized portions of the pancreas are normal.   SPLEEN: Normal size and homogenous echogenicity. RIGHT KIDNEY: 12.3 cm in length. No hydronephrosis, shadowing calculus, or  contour-deforming renal mass. LEFT KIDNEY: 11.6 cm in length.  Mild to moderate hydronephrosis. AORTA: Normal caliber in its visualized portions. INFERIOR VENA CAVA: Normal caliber in its visualized portions.        IMPRESSION  IMPRESSION:   1. Mild to moderate left hydronephrosis. 2. Moderate hepatic steatosis. US retroperitoneal      LIVER: Hepatic echotexture is increased, consistent with moderate hepatic  steatosis. The deep portions of the liver are not well visualized as a result. No intrahepatic biliary ductal dilation or focal mass is shown in the  superficial aspects of the liver. MAIN PORTAL VEIN: Patent. Appropriate hepatopetal flow. GALLBLADDER: No dilation, wall thickening, or pericholecystic fluid. No  gallstones. COMMON DUCT: 0.3 cm in diameter. The duct is normal caliber. PANCREAS: The visualized portions of the pancreas are normal.   SPLEEN: Normal size and homogenous echogenicity. RIGHT KIDNEY: 12.3 cm in length. No hydronephrosis, shadowing calculus, or  contour-deforming renal mass. LEFT KIDNEY: 11.6 cm in length. Mild to moderate hydronephrosis. AORTA: Normal caliber in its visualized portions. INFERIOR VENA CAVA: Normal caliber in its visualized portions.        IMPRESSION  IMPRESSION:   1. Mild to moderate left hydronephrosis. 2. Moderate hepatic steatosis. Assessment / Plan:     Ms Melodie El is a 81year old lady wt hx of ankle fracture/ surgery with hardware with Enterobacter UTI and bacteremia    1) Enterobacter aerogenes bacteremia and UTI   Blood cx 2/15/20  PICC line per primary team   IV Ceftriaxone 2gm Q 24 hours   Prolonged Ceftriaxone use can induce resistance and antibiotics switched to Cefepime instead with plan for 2 week course if TTE negative   Check weekly CBC wt diff, CMP while on IV therapy .   Fax results to Dr Sharif West  [phone]  Remove picc once IV antibiotics completed   Probiotics /yogurt   Antibiotic side effects including CDI risk , GI disturbances, and other issues discussed     2) ankle fracture post surgery and hardware per patient   At risk for seeding,  Currently asymptomatic     3) Anemia per primary team     4) DVT Px           Thank for the opportunity to participate in the care of this patient. Please contact with questions or concerns.            Gabrielle Brito,   2:11 PM

## 2020-02-17 NOTE — PROGRESS NOTES
Problem: Patient Education: Go to Patient Education Activity  Goal: Patient/Family Education  Outcome: Progressing Towards Goal     Problem: Sepsis: Day of Diagnosis  Goal: Off Pathway (Use only if patient is Off Pathway)  Outcome: Progressing Towards Goal  Goal: *Fluid resuscitation  Outcome: Progressing Towards Goal  Goal: *Paired blood cultures prior to first dose of antibiotic  Outcome: Progressing Towards Goal  Goal: *First dose of  appropriate antibiotic within 3 hours of arrival to ED, within 1 hour of arrival to ICU  Outcome: Progressing Towards Goal  Goal: *Lactic acid with first set of blood cultures  Outcome: Progressing Towards Goal  Goal: *Pneumococcal immunization (if eligible)  Outcome: Progressing Towards Goal  Goal: *Influenza immunization (if eligible)  Outcome: Progressing Towards Goal  Goal: Activity/Safety  Outcome: Progressing Towards Goal  Goal: Consults, if ordered  Outcome: Progressing Towards Goal  Goal: Diagnostic Test/Procedures  Outcome: Progressing Towards Goal  Goal: Nutrition/Diet  Outcome: Progressing Towards Goal  Goal: Discharge Planning  Outcome: Progressing Towards Goal  Goal: Medications  Outcome: Progressing Towards Goal  Goal: Respiratory  Outcome: Progressing Towards Goal  Goal: Treatments/Interventions/Procedures  Outcome: Progressing Towards Goal  Goal: Psychosocial  Outcome: Progressing Towards Goal     Problem: Sepsis: Day 2  Goal: Off Pathway (Use only if patient is Off Pathway)  Outcome: Progressing Towards Goal  Goal: *Central Venous Pressure maintained at 8-12 mm Hg  Outcome: Progressing Towards Goal  Goal: *Hemodynamically stable  Outcome: Progressing Towards Goal  Goal: *Tolerating diet  Outcome: Progressing Towards Goal  Goal: Activity/Safety  Outcome: Progressing Towards Goal  Goal: Consults, if ordered  Outcome: Progressing Towards Goal  Goal: Diagnostic Test/Procedures  Outcome: Progressing Towards Goal  Goal: Nutrition/Diet  Outcome: Progressing Towards Goal  Goal: Discharge Planning  Outcome: Progressing Towards Goal  Goal: Medications  Outcome: Progressing Towards Goal  Goal: Respiratory  Outcome: Progressing Towards Goal  Goal: Treatments/Interventions/Procedures  Outcome: Progressing Towards Goal  Goal: Psychosocial  Outcome: Progressing Towards Goal     Problem: Sepsis: Day 3  Goal: Off Pathway (Use only if patient is Off Pathway)  Outcome: Progressing Towards Goal  Goal: *Central Venous Pressure maintained at 8-12 mm Hg  Outcome: Progressing Towards Goal  Goal: *Oxygen saturation within defined limits  Outcome: Progressing Towards Goal  Goal: *Vital sign stability  Outcome: Progressing Towards Goal  Goal: *Tolerating diet  Outcome: Progressing Towards Goal  Goal: *Demonstrates progressive activity  Outcome: Progressing Towards Goal  Goal: Activity/Safety  Outcome: Progressing Towards Goal  Goal: Consults, if ordered  Outcome: Progressing Towards Goal  Goal: Diagnostic Test/Procedures  Outcome: Progressing Towards Goal  Goal: Nutrition/Diet  Outcome: Progressing Towards Goal  Goal: Discharge Planning  Outcome: Progressing Towards Goal  Goal: Medications  Outcome: Progressing Towards Goal  Goal: Respiratory  Outcome: Progressing Towards Goal  Goal: Treatments/Interventions/Procedures  Outcome: Progressing Towards Goal  Goal: Psychosocial  Outcome: Progressing Towards Goal     Problem: Sepsis: Day 4  Goal: Off Pathway (Use only if patient is Off Pathway)  Outcome: Progressing Towards Goal  Goal: Activity/Safety  Outcome: Progressing Towards Goal  Goal: Consults, if ordered  Outcome: Progressing Towards Goal  Goal: Diagnostic Test/Procedures  Outcome: Progressing Towards Goal  Goal: Nutrition/Diet  Outcome: Progressing Towards Goal  Goal: Discharge Planning  Outcome: Progressing Towards Goal  Goal: Medications  Outcome: Progressing Towards Goal  Goal: Respiratory  Outcome: Progressing Towards Goal  Goal: Treatments/Interventions/Procedures  Outcome: Progressing Towards Goal  Goal: Psychosocial  Outcome: Progressing Towards Goal  Goal: *Oxygen saturation within defined limits  Outcome: Progressing Towards Goal  Goal: *Hemodynamically stable  Outcome: Progressing Towards Goal  Goal: *Vital signs within defined limits  Outcome: Progressing Towards Goal  Goal: *Tolerating diet  Outcome: Progressing Towards Goal  Goal: *Demonstrates progressive activity  Outcome: Progressing Towards Goal  Goal: *Fluid volume maintenance  Outcome: Progressing Towards Goal     Problem: Sepsis: Day 5  Goal: Off Pathway (Use only if patient is Off Pathway)  Outcome: Progressing Towards Goal  Goal: *Oxygen saturation within defined limits  Outcome: Progressing Towards Goal  Goal: *Vital signs within defined limits  Outcome: Progressing Towards Goal  Goal: *Tolerating diet  Outcome: Progressing Towards Goal  Goal: *Demonstrates progressive activity  Outcome: Progressing Towards Goal  Goal: *Discharge plan identified  Outcome: Progressing Towards Goal  Goal: Activity/Safety  Outcome: Progressing Towards Goal  Goal: Consults, if ordered  Outcome: Progressing Towards Goal  Goal: Diagnostic Test/Procedures  Outcome: Progressing Towards Goal  Goal: Nutrition/Diet  Outcome: Progressing Towards Goal  Goal: Discharge Planning  Outcome: Progressing Towards Goal  Goal: Medications  Outcome: Progressing Towards Goal  Goal: Respiratory  Outcome: Progressing Towards Goal  Goal: Treatments/Interventions/Procedures  Outcome: Progressing Towards Goal  Goal: Psychosocial  Outcome: Progressing Towards Goal     Problem: Sepsis: Day 6  Goal: Off Pathway (Use only if patient is Off Pathway)  Outcome: Progressing Towards Goal  Goal: *Oxygen saturation within defined limits  Outcome: Progressing Towards Goal  Goal: *Vital signs within defined limits  Outcome: Progressing Towards Goal  Goal: *Tolerating diet  Outcome: Progressing Towards Goal  Goal: *Demonstrates progressive activity  Outcome: Progressing Towards Goal  Goal: Influenza immunization  Outcome: Progressing Towards Goal  Goal: *Pneumococcal immunization  Outcome: Progressing Towards Goal  Goal: Activity/Safety  Outcome: Progressing Towards Goal  Goal: Diagnostic Test/Procedures  Outcome: Progressing Towards Goal  Goal: Nutrition/Diet  Outcome: Progressing Towards Goal  Goal: Discharge Planning  Outcome: Progressing Towards Goal  Goal: Medications  Outcome: Progressing Towards Goal  Goal: Respiratory  Outcome: Progressing Towards Goal  Goal: Treatments/Interventions/Procedures  Outcome: Progressing Towards Goal  Goal: Psychosocial  Outcome: Progressing Towards Goal     Problem: Sepsis: Discharge Outcomes  Goal: *Vital signs within defined limits  Outcome: Progressing Towards Goal  Goal: *Tolerating diet  Outcome: Progressing Towards Goal  Goal: *Verbalizes understanding and describes prescribed diet  Outcome: Progressing Towards Goal  Goal: *Demonstrates progressive activity  Outcome: Progressing Towards Goal  Goal: *Describes follow-up/return visits to physicians  Outcome: Progressing Towards Goal  Goal: *Verbalizes name, dosage, time, side effects, and number of days to continue medications  Outcome: Progressing Towards Goal  Goal: *Influenza immunization (Oct-Mar only)  Outcome: Progressing Towards Goal  Goal: *Pneumococcal immunization  Outcome: Progressing Towards Goal  Goal: *Lungs clear or at baseline  Outcome: Progressing Towards Goal  Goal: *Oxygen saturation returns to baseline or 90% or better on room air  Outcome: Progressing Towards Goal  Goal: *Glycemic control  Outcome: Progressing Towards Goal  Goal: *Absence of deep venous thrombosis signs and symptoms(Stroke Metric)  Outcome: Progressing Towards Goal  Goal: *Describes available resources and support systems  Outcome: Progressing Towards Goal  Goal: *Optimal pain control at patient's stated goal  Outcome: Progressing Towards Goal

## 2020-02-17 NOTE — PROGRESS NOTES
Bedside shift change report given to ANGEL Da Silva (oncoming nurse) by Shahriar Crews RN (offgoing nurse). Report included the following information SBAR, Kardex, MAR and Cardiac Rhythm NSR.

## 2020-02-17 NOTE — PROGRESS NOTES
Hospitalist Progress Note  Bert Kasper MD  Answering service: [phone] from in house phone      Date of Service:  2020  NAME:  Murray Hutton  :  1950  MRN:  [id number]      Admission Summary:   The patient is a 81-year-old lady who returned to the emergency department for further management of suspected UTI with positive blood cultures. Interval history / Subjective:      Patient seen and examined this morning. Patient is feeling better for the first time after a week. She said she slept better. She was able to control her urine. Pain less today     Assessment & Plan:     # Bacteremia, likely source urinary tract infection  Sepsis was ruled out   - blood culture and urine culture from  grew enterobacter aerogenes   - repeat blood culture NGTD   - ct with rocephin 1/15---present   - ct with hydrate and IV fluid  - ID consult     # UTI due to enterobacter aerogenes with left hydronephrosis   - urine culture - enterobacter aerogenes   - ct with ceftriaxone   - IV fluid hydration   - renal US showing left hydronephrosis     # Mild transaminitis  likely from the bacteremia   - liver US showing Moderate hepatic steatosis  - hepatitis panel sent     # Headaches of unclear etiology, suspect due to the acute infection   # Anemia     DVT prop: SCDs  Code: Full     Dispo: [address] Problems  Date Reviewed: 2/15/2019          Codes Class Noted POA    UTI (urinary tract infection) ICD-10-CM: N39.0  ICD-9-CM: 599.0  2/15/2020 Unknown        Sepsis ([facility] [address] ICD-10-CM: A41.9  ICD-9-CM: 038.9, 995.91  2/15/2020 Unknown                Review of Systems:   Pertinent positive mentioned in interval hx/HPI. Other systems reviewed and negative. Vital Signs:    Last 24hrs VS reviewed since prior progress note.  Most recent are:  Visit Vitals  /89 (BP 1 Location: Left arm, BP Patient Position: At rest)   Pulse 94   Temp 98 °F (36.7 °C)   Resp 16   Ht 5' 5\" (1.651 m)   Wt 66.8 kg (147 lb 4.3 oz)   SpO2 97%   BMI 24.51 kg/m²       No intake or output data in the 24 hours ending 02/17/20 0959     Physical Examination:             Constitutional:  No acute distress, cooperative, pleasant    ENT:  Oral mucous moist, oropharynx benign. Neck supple,    Resp:  CTA bilaterally. No wheezing/rhonchi/rales. No accessory muscle use   CV:  Regular rhythm, normal rate, no murmurs, gallops, rubs    GI:  Soft, non distended, non tender. normoactive bowel sounds, no hepatosplenomegaly     Musculoskeletal:  No edema, warm, 2+ pulses throughout    Neurologic:  Moves all extremities. AAOx3, CN II-XII reviewed            Data Review:   I personally reviewed labs and imaging     Labs:     Recent Labs     02/16/20  0418 02/15/20  1030   WBC 5.0 6.7   HGB 10.2* 12.8   HCT 31.6* 39.1   * 116*     Recent Labs     02/16/20  0418 02/15/20  1030 02/14/20  1558    135* 132*   K 3.8 4.6 3.4*   * 98 96*   CO2 20* 22 25   BUN 7 11 14   CREA 0.49* 0.72 0.74   GLU 87 77 103*   CA 8.5 9.0 9.5     Recent Labs     02/16/20  0418 02/15/20  1030 02/14/20  1558   SGOT 68* 41* 14*   ALT 68 59 28   * 152* 83   TBILI 0.3 0.6 0.5   TP 5.9* 7.2 6.8   ALB 2.3* 2.9* 3.0*   GLOB 3.6 4.3* 3.8     No results for input(s): INR, PTP, APTT, INREXT, INREXT in the last 72 hours. No results for input(s): FE, TIBC, PSAT, FERR in the last 72 hours. No results found for: FOL, RBCF   No results for input(s): PH, PCO2, PO2 in the last 72 hours. No results for input(s): CPK, CKNDX, TROIQ in the last 72 hours.     No lab exists for component: CPKMB  Lab Results   Component Value Date/Time    Cholesterol, total 277 (H) 02/21/2017 03:07 PM    HDL Cholesterol 112 02/21/2017 03:07 PM    LDL, calculated 151 (H) 02/21/2017 03:07 PM    Triglyceride 72 02/21/2017 03:07 PM     No results found for: [facility]  Lab Results   Component Value Date/Time    Color YELLOW/STRAW 02/15/2020 10:30 AM    Appearance CLEAR 02/15/2020 10:30 AM    Specific gravity 1.010 02/15/2020 10:30 AM    Specific gravity 1.006 02/14/2020 03:58 PM    pH (UA) 6.0 02/15/2020 10:30 AM    Protein 100 (A) 02/15/2020 10:30 AM    Glucose NEGATIVE  02/15/2020 10:30 AM    Ketone 15 (A) 02/15/2020 10:30 AM    Bilirubin NEGATIVE  02/14/2020 03:58 PM    Urobilinogen 0.2 02/15/2020 10:30 AM    Nitrites NEGATIVE  02/15/2020 10:30 AM    Leukocyte Esterase SMALL (A) 02/15/2020 10:30 AM    Epithelial cells FEW 02/15/2020 10:30 AM    Bacteria 1+ (A) 02/15/2020 10:30 AM    WBC 10-20 02/15/2020 10:30 AM    RBC 20-50 02/15/2020 10:30 AM         Medications Reviewed:     Current Facility-Administered Medications   Medication Dose Route Frequency    sodium chloride (NS) flush 5-10 mL  5-10 mL IntraVENous PRN    sodium chloride (NS) flush 5-40 mL  5-40 mL IntraVENous Q8H    sodium chloride (NS) flush 5-40 mL  5-40 mL IntraVENous PRN    sodium chloride (NS) flush 5-40 mL  5-40 mL IntraVENous Q8H    sodium chloride (NS) flush 5-40 mL  5-40 mL IntraVENous PRN    HYDROcodone-acetaminophen (NORCO) 5-325 mg per tablet 1 Tab  1 Tab Oral Q4H PRN    acetaminophen (TYLENOL) tablet 650 mg  650 mg Oral Q4H PRN    morphine injection 2 mg  2 mg IntraVENous Q4H PRN    cefTRIAXone (ROCEPHIN) 1 g in 0.9% sodium chloride (MBP/ADV) 50 mL  1 g IntraVENous Q24H    cholecalciferol (VITAMIN D3) (1000 Units /25 mcg) tablet 1 Tab  1,000 Units Oral DAILY    DULoxetine (CYMBALTA) capsule 20 mg  20 mg Oral QHS     ______________________________________________________________________  EXPECTED LENGTH OF STAY: - - -  ACTUAL LENGTH OF STAY:          2                 Samantha Thurman MD   Patient has given Verbal permission to discuss medical care with   persons present in the room and and also with contact as listed on face sheet.

## 2020-02-17 NOTE — PROGRESS NOTES
TRANSFER - OUT REPORT:    Verbal report given to Juana Munguia RN (name) on Tess Snyder  being transferred to [address] [place] [address] (unit) for routine progression of care       Report consisted of patients Situation, Background, Assessment and   Recommendations(SBAR). Information from the following report(s) SBAR, Kardex, MAR, Recent Results and Cardiac Rhythm NSR was reviewed with the receiving nurse. Lines:   Peripheral IV 02/15/20 Right Antecubital (Active)   Site Assessment Clean, dry, & intact 2/17/2020 12:00 PM   Phlebitis Assessment 0 2/17/2020 12:00 PM   Infiltration Assessment 0 2/17/2020 12:00 PM   Dressing Status Clean, dry, & intact 2/17/2020 12:00 PM   Dressing Type Tape;Transparent 2/17/2020 12:00 PM   Hub Color/Line Status Pink;Capped 2/17/2020 12:00 PM   Action Taken Open ports on tubing capped 2/17/2020 12:00 PM   Alcohol Cap Used Yes 2/17/2020 12:00 PM        Opportunity for questions and clarification was provided. Patient transported with:   Registered Nurse         Patient Vitals for the past 8 hrs:   Temp Pulse Resp BP SpO2   02/17/20 1200     98 %   02/17/20 1151 97.5 °F (36.4 °C) 82 16 139/87 97 %   02/17/20 0809 98 °F (36.7 °C)  16 149/89 97 %   02/17/20 0800     97 %         Problem: Sepsis: Day 3  Goal: *Oxygen saturation within defined limits  Outcome: Progressing Towards Goal  Goal: *Vital sign stability  Outcome: Progressing Towards Goal  Goal: *Tolerating diet  Outcome: Progressing Towards Goal  Goal: Activity/Safety  Outcome: Progressing Towards Goal     Problem: Falls - Risk of  Goal: *Absence of Falls  Description  Document Allan Fall Risk and appropriate interventions in the flowsheet.   Outcome: Progressing Towards Goal  Note: Fall Risk Interventions:     Medication Interventions: Assess postural VS orthostatic hypotension, Evaluate medications/consider consulting pharmacy, Patient to call before getting OOB

## 2020-02-17 NOTE — PROGRESS NOTES
Hospitalist Progress Note  Suman Agosto MD  Answering service: [phone] from in house phone      Date of Service:  2020  NAME:  Refugio Mera  :  1950  MRN:  [id number]      Admission Summary:   The patient is a 68-year-old lady who returned to the emergency department for further management of suspected UTI with positive blood cultures. Interval history / Subjective:      Patient seen and examined this afternoon. She is very tearful that she is staying in the hospital today. She has no complaints but feels some pressure on the left flank side. Assessment & Plan:     # Bacteremia, likely due to urinary tract infection  Sepsis was ruled out   - blood culture- gram negative az[place]  - repeat blood culture negative   - ct with rocephin  - ct with hydrate and IV fluid  - ID consult     # UTI   - urine culture - enterobacter aerogenes   - ct with ceftriaxone   - IV fluid hydration   - renal US showing left hydronephrosis     # Headaches of unclear etiology, suspect due to the acute infection     DVT prop: SCDs  Code: Full     Dispo: Home      Hospital Problems  Date Reviewed: 2/15/2019          Codes Class Noted POA    UTI (urinary tract infection) ICD-10-CM: N39.0  ICD-9-CM: 599.0  2/15/2020 Unknown        Sepsis ([address] ICD-10-CM: A41.9  ICD-9-CM: 038.9, 995.91  2/15/2020 Unknown                Review of Systems:   Pertinent positive mentioned in interval hx/HPI. Other systems reviewed and negative. Vital Signs:    Last 24hrs VS reviewed since prior progress note.  Most recent are:  Visit Vitals  /88 (BP 1 Location: Left arm, BP Patient Position: At rest)   Pulse 80   Temp 98 °F (36.7 °C)   Resp 16   Ht 5' 5\" (1.651 m)   Wt 69.2 kg (152 lb 8.9 oz)   SpO2 98%   BMI 25.39 kg/m²         Intake/Output Summary (Last 24 hours) at 2020  Last data filed at 2/15/2020 2306  Gross per 24 hour   Intake 720 ml   Output  Net 720 ml        Physical Examination:             Constitutional:  No acute distress, cooperative, pleasant    ENT:  Oral mucous moist, oropharynx benign. Neck supple,    Resp:  CTA bilaterally. No wheezing/rhonchi/rales. No accessory muscle use   CV:  Regular rhythm, normal rate, no murmurs, gallops, rubs    GI:  Soft, non distended, non tender. normoactive bowel sounds, no hepatosplenomegaly     Musculoskeletal:  No edema, warm, 2+ pulses throughout    Neurologic:  Moves all extremities. AAOx3, CN II-XII reviewed            Data Review:   I personally reviewed labs and imaging     Labs:     Recent Labs     02/16/20  0418 02/15/20  1030   WBC 5.0 6.7   HGB 10.2* 12.8   HCT 31.6* 39.1   * 116*     Recent Labs     02/16/20  0418 02/15/20  1030 02/14/20  1558    135* 132*   K 3.8 4.6 3.4*   * 98 96*   CO2 20* 22 25   BUN 7 11 14   CREA 0.49* 0.72 0.74   GLU 87 77 103*   CA 8.5 9.0 9.5     Recent Labs     02/16/20  0418 02/15/20  1030 02/14/20  1558   SGOT 68* 41* 14*   ALT 68 59 28   * 152* 83   TBILI 0.3 0.6 0.5   TP 5.9* 7.2 6.8   ALB 2.3* 2.9* 3.0*   GLOB 3.6 4.3* 3.8     No results for input(s): INR, PTP, APTT, INREXT in the last 72 hours. No results for input(s): FE, TIBC, PSAT, FERR in the last 72 hours. No results found for: FOL, RBCF   No results for input(s): PH, PCO2, PO2 in the last 72 hours. No results for input(s): CPK, CKNDX, TROIQ in the last 72 hours.     No lab exists for component: CPKMB  Lab Results   Component Value Date/Time    Cholesterol, total 277 (H) 02/21/2017 03:07 PM    HDL Cholesterol 112 02/21/2017 03:07 PM    LDL, calculated 151 (H) 02/21/2017 03:07 PM    Triglyceride 72 02/21/2017 03:07 PM     No results found for: GLUCPOC  Lab Results   Component Value Date/Time    Color YELLOW/STRAW 02/15/2020 10:30 AM    Appearance CLEAR 02/15/2020 10:30 AM    Specific gravity 1.010 02/15/2020 10:30 AM    Specific gravity 1.006 02/14/2020 03:58 PM    pH (UA) 6.0 02/15/2020 10:30 AM    Protein 100 (A) 02/15/2020 10:30 AM    Glucose NEGATIVE  02/15/2020 10:30 AM    Ketone 15 (A) 02/15/2020 10:30 AM    Bilirubin NEGATIVE  02/14/2020 03:58 PM    Urobilinogen 0.2 02/15/2020 10:30 AM    Nitrites NEGATIVE  02/15/2020 10:30 AM    Leukocyte Esterase SMALL (A) 02/15/2020 10:30 AM    Epithelial cells FEW 02/15/2020 10:30 AM    Bacteria 1+ (A) 02/15/2020 10:30 AM    WBC 10-20 02/15/2020 10:30 AM    RBC 20-50 02/15/2020 10:30 AM         Medications Reviewed:     Current Facility-Administered Medications   Medication Dose Route Frequency    sodium chloride (NS) flush 5-10 mL  5-10 mL IntraVENous PRN    sodium chloride (NS) flush 5-40 mL  5-40 mL IntraVENous Q8H    sodium chloride (NS) flush 5-40 mL  5-40 mL IntraVENous PRN    sodium chloride (NS) flush 5-40 mL  5-40 mL IntraVENous Q8H    sodium chloride (NS) flush 5-40 mL  5-40 mL IntraVENous PRN    HYDROcodone-acetaminophen (NORCO) 5-325 mg per tablet 1 Tab  1 Tab Oral Q4H PRN    acetaminophen (TYLENOL) tablet 650 mg  650 mg Oral Q4H PRN    morphine injection 2 mg  2 mg IntraVENous Q4H PRN    cefTRIAXone (ROCEPHIN) 1 g in 0.9% sodium chloride (MBP/ADV) 50 mL  1 g IntraVENous Q24H    cholecalciferol (VITAMIN D3) (1000 Units /25 mcg) tablet 1 Tab  1,000 Units Oral DAILY    DULoxetine (CYMBALTA) capsule 20 mg  20 mg Oral QHS     ______________________________________________________________________  EXPECTED LENGTH OF STAY: - - -  ACTUAL LENGTH OF STAY:          1                 Samantha Thurman MD   Patient has given Verbal permission to discuss medical care with   persons present in the room and and also with contact as listed on face sheet.

## 2020-02-17 NOTE — PROGRESS NOTES
Bedside shift change report given to Florian Queen RN by Vidal Dean and Millie Diaz RN. Report included the following information SBAR, Kardex, ED Summary, Intake/Output, MAR, Recent Results and Cardiac Rhythm NSR/ST.

## 2020-02-18 VITALS
OXYGEN SATURATION: 98 % | SYSTOLIC BLOOD PRESSURE: 137 MMHG | HEART RATE: 93 BPM | TEMPERATURE: 97.7 F | RESPIRATION RATE: 16 BRPM | WEIGHT: 148.6 LBS | HEIGHT: 65 IN | BODY MASS INDEX: 24.76 KG/M2 | DIASTOLIC BLOOD PRESSURE: 84 MMHG

## 2020-02-18 PROCEDURE — 74011000258 HC RX REV CODE- 258: Performed by: INTERNAL MEDICINE

## 2020-02-18 PROCEDURE — 74011250636 HC RX REV CODE- 250/636: Performed by: INTERNAL MEDICINE

## 2020-02-18 PROCEDURE — 02HV33Z INSERTION OF INFUSION DEVICE INTO SUPERIOR VENA CAVA, PERCUTANEOUS APPROACH: ICD-10-PCS | Performed by: INTERNAL MEDICINE

## 2020-02-18 PROCEDURE — 76937 US GUIDE VASCULAR ACCESS: CPT

## 2020-02-18 PROCEDURE — C1751 CATH, INF, PER/CENT/MIDLINE: HCPCS

## 2020-02-18 PROCEDURE — 36573 INSJ PICC RS&I 5 YR+: CPT | Performed by: INTERNAL MEDICINE

## 2020-02-18 PROCEDURE — 74011250637 HC RX REV CODE- 250/637: Performed by: INTERNAL MEDICINE

## 2020-02-18 PROCEDURE — 77030020365 HC SOL INJ SOD CL 0.9% 50ML

## 2020-02-18 RX ADMIN — CEFEPIME HYDROCHLORIDE 2 G: 2 INJECTION, POWDER, FOR SOLUTION INTRAVENOUS at 14:13

## 2020-02-18 RX ADMIN — Medication 10 ML: at 14:14

## 2020-02-18 RX ADMIN — CEFEPIME HYDROCHLORIDE 2 G: 2 INJECTION, POWDER, FOR SOLUTION INTRAVENOUS at 02:28

## 2020-02-18 RX ADMIN — ACETAMINOPHEN 650 MG: 325 TABLET ORAL at 08:41

## 2020-02-18 RX ADMIN — ACETAMINOPHEN 650 MG: 325 TABLET ORAL at 02:26

## 2020-02-18 RX ADMIN — Medication 10 ML: at 06:03

## 2020-02-18 RX ADMIN — MELATONIN 1 TABLET: at 08:31

## 2020-02-18 NOTE — PROGRESS NOTES
Bedside shift change report given to Maryam Martinez 694 Student Nurse (oncoming nurse) by Mundo Diaz RN (offgoing nurse). Report included the following information SBAR, Kardex and MAR.

## 2020-02-18 NOTE — PROGRESS NOTES
Problem: Patient Education: Go to Patient Education Activity  Goal: Patient/Family Education  Outcome: Resolved/Met     Problem: Sepsis: Day of Diagnosis  Goal: Off Pathway (Use only if patient is Off Pathway)  Outcome: Resolved/Met  Goal: *Fluid resuscitation  Outcome: Resolved/Met  Goal: *Paired blood cultures prior to first dose of antibiotic  Outcome: Resolved/Met  Goal: *First dose of  appropriate antibiotic within 3 hours of arrival to ED, within 1 hour of arrival to ICU  Outcome: Resolved/Met  Goal: *Lactic acid with first set of blood cultures  Outcome: Resolved/Met  Goal: *Pneumococcal immunization (if eligible)  Outcome: Resolved/Met  Goal: *Influenza immunization (if eligible)  Outcome: Resolved/Met  Goal: Activity/Safety  Outcome: Resolved/Met  Goal: Consults, if ordered  Outcome: Resolved/Met  Goal: Diagnostic Test/Procedures  Outcome: Resolved/Met  Goal: Nutrition/Diet  Outcome: Resolved/Met  Goal: Discharge Planning  Outcome: Resolved/Met  Goal: Medications  Outcome: Resolved/Met  Goal: Respiratory  Outcome: Resolved/Met  Goal: Treatments/Interventions/Procedures  Outcome: Resolved/Met  Goal: Psychosocial  Outcome: Resolved/Met     Problem: Sepsis: Day 2  Goal: Off Pathway (Use only if patient is Off Pathway)  Outcome: Resolved/Met  Goal: *Central Venous Pressure maintained at 8-12 mm Hg  Outcome: Resolved/Met  Goal: *Hemodynamically stable  Outcome: Resolved/Met  Goal: *Tolerating diet  Outcome: Resolved/Met  Goal: Activity/Safety  Outcome: Resolved/Met  Goal: Consults, if ordered  Outcome: Resolved/Met  Goal: Diagnostic Test/Procedures  Outcome: Resolved/Met  Goal: Nutrition/Diet  Outcome: Resolved/Met  Goal: Discharge Planning  Outcome: Resolved/Met  Goal: Medications  Outcome: Resolved/Met  Goal: Respiratory  Outcome: Resolved/Met  Goal: Treatments/Interventions/Procedures  Outcome: Resolved/Met  Goal: Psychosocial  Outcome: Resolved/Met     Problem: Sepsis: Day 3  Goal: Off Pathway (Use only if patient is Off Pathway)  Outcome: Resolved/Met  Goal: *Central Venous Pressure maintained at 8-12 mm Hg  Outcome: Resolved/Met  Goal: *Oxygen saturation within defined limits  Outcome: Resolved/Met  Goal: *Vital sign stability  Outcome: Resolved/Met  Goal: *Tolerating diet  Outcome: Resolved/Met  Goal: *Demonstrates progressive activity  Outcome: Resolved/Met  Goal: Activity/Safety  Outcome: Resolved/Met  Goal: Consults, if ordered  Outcome: Resolved/Met  Goal: Diagnostic Test/Procedures  Outcome: Resolved/Met  Goal: Nutrition/Diet  Outcome: Resolved/Met  Goal: Discharge Planning  Outcome: Resolved/Met  Goal: Medications  Outcome: Resolved/Met  Goal: Respiratory  Outcome: Resolved/Met  Goal: Treatments/Interventions/Procedures  Outcome: Resolved/Met  Goal: Psychosocial  Outcome: Resolved/Met     Problem: Sepsis: Day 4  Goal: Off Pathway (Use only if patient is Off Pathway)  Outcome: Resolved/Met  Goal: Activity/Safety  Outcome: Resolved/Met  Goal: Consults, if ordered  Outcome: Resolved/Met  Goal: Diagnostic Test/Procedures  Outcome: Resolved/Met  Goal: Nutrition/Diet  Outcome: Resolved/Met  Goal: Discharge Planning  Outcome: Resolved/Met  Goal: Medications  Outcome: Resolved/Met  Goal: Respiratory  Outcome: Resolved/Met  Goal: Treatments/Interventions/Procedures  Outcome: Resolved/Met  Goal: Psychosocial  Outcome: Resolved/Met  Goal: *Oxygen saturation within defined limits  Outcome: Resolved/Met  Goal: *Hemodynamically stable  Outcome: Resolved/Met  Goal: *Vital signs within defined limits  Outcome: Resolved/Met  Goal: *Tolerating diet  Outcome: Resolved/Met  Goal: *Demonstrates progressive activity  Outcome: Resolved/Met  Goal: *Fluid volume maintenance  Outcome: Resolved/Met     Problem: Sepsis: Day 5  Goal: Off Pathway (Use only if patient is Off Pathway)  Outcome: Resolved/Met  Goal: *Oxygen saturation within defined limits  Outcome: Resolved/Met  Goal: *Vital signs within defined limits  Outcome: Resolved/Met  Goal: *Tolerating diet  Outcome: Resolved/Met  Goal: *Demonstrates progressive activity  Outcome: Resolved/Met  Goal: *Discharge plan identified  Outcome: Resolved/Met  Goal: Activity/Safety  Outcome: Resolved/Met  Goal: Consults, if ordered  Outcome: Resolved/Met  Goal: Diagnostic Test/Procedures  Outcome: Resolved/Met  Goal: Nutrition/Diet  Outcome: Resolved/Met  Goal: Discharge Planning  Outcome: Resolved/Met  Goal: Medications  Outcome: Resolved/Met  Goal: Respiratory  Outcome: Resolved/Met  Goal: Treatments/Interventions/Procedures  Outcome: Resolved/Met  Goal: Psychosocial  Outcome: Resolved/Met     Problem: Sepsis: Day 6  Goal: Off Pathway (Use only if patient is Off Pathway)  Outcome: Resolved/Met  Goal: *Oxygen saturation within defined limits  Outcome: Resolved/Met  Goal: *Vital signs within defined limits  Outcome: Resolved/Met  Goal: *Tolerating diet  Outcome: Resolved/Met  Goal: *Demonstrates progressive activity  Outcome: Resolved/Met  Goal: Influenza immunization  Outcome: Resolved/Met  Goal: *Pneumococcal immunization  Outcome: Resolved/Met  Goal: Activity/Safety  Outcome: Resolved/Met  Goal: Diagnostic Test/Procedures  Outcome: Resolved/Met  Goal: Nutrition/Diet  Outcome: Resolved/Met  Goal: Discharge Planning  Outcome: Resolved/Met  Goal: Medications  Outcome: Resolved/Met  Goal: Respiratory  Outcome: Resolved/Met  Goal: Treatments/Interventions/Procedures  Outcome: Resolved/Met  Goal: Psychosocial  Outcome: Resolved/Met     Problem: Sepsis: Discharge Outcomes  Goal: *Vital signs within defined limits  Outcome: Resolved/Met  Goal: *Tolerating diet  Outcome: Resolved/Met  Goal: *Verbalizes understanding and describes prescribed diet  Outcome: Resolved/Met  Goal: *Demonstrates progressive activity  Outcome: Resolved/Met  Goal: *Describes follow-up/return visits to physicians  Outcome: Resolved/Met  Goal: *Verbalizes name, dosage, time, side effects, and number of days to continue medications  Outcome: Resolved/Met  Goal: *Influenza immunization (Oct-Mar only)  Outcome: Resolved/Met  Goal: *Pneumococcal immunization  Outcome: Resolved/Met  Goal: *Lungs clear or at baseline  Outcome: Resolved/Met  Goal: *Oxygen saturation returns to baseline or 90% or better on room air  Outcome: Resolved/Met  Goal: *Glycemic control  Outcome: Resolved/Met  Goal: *Absence of deep venous thrombosis signs and symptoms(Stroke Metric)  Outcome: Resolved/Met  Goal: *Describes available resources and support systems  Outcome: Resolved/Met  Goal: *Optimal pain control at patient's stated goal  Outcome: Resolved/Met

## 2020-02-18 NOTE — PROGRESS NOTES
Day #2 of Cefepime  Indication:  Enterobacter aerogenes bacteremia and UTI   Current regimen:  2g Q12h    Recent Labs     20  0418   WBC 5.0   CREA 0.49*   BUN 7     Est CrCl: 70.2 ml/min  Temp (24hrs), Av.9 °F (36.6 °C), Min:97.3 °F (36.3 °C), Max:98.7 °F (37.1 °C)    Cultures:    blood - Enterobacter   blood -NGTD   urine - Enterobacter  2/15 blood - NGTD    Plan: Change to 2g Q8h for CrCl >60ml/min.

## 2020-02-18 NOTE — PROGRESS NOTES
ANTOINETTE:    -RUR 7%    - IV ABX for home      CM noted consult for IV ABX and per MD patient is medically ready for discharge today - CM attempted to visit patient and she is currently getting a piccline placed. Will attempt later as able. Also per nursing patient was told that IV ABX is only once a day and orders state that regimen is twice a day and nursing has reached out to ID to clarify. LATOSHA Hernandez CM met with  while patient is getting a piccline - per nursing IV ABX is twice a day - Obtained choice for [address] and [address] [facility] with [employer] to come meet with patient/ to start teaching and check on co-pays  - Awaiting to see if [facility] care can accept patient for SN services at home. LATOSHA Hernandez CM spoke with Fabienne with [facility] and they can not provide a SOC for over 2 days - CM sent referrals to over 45 agencies trying to secure a home care agency - awaiting an accepting agency at this time. LATOSHA Hernandez        [facility]. Can discharge patient today.  LATOSHA Hernandez

## 2020-02-18 NOTE — PROCEDURES
PICC Placement Note    PRE-PROCEDURE VERIFICATION  Correct Procedure: yes  Correct Site:  yes  Temperature: Temp: 97.3 °F (36.3 °C), Temperature Source: Temp Source: Oral  Recent Labs     02/16/20  0418   BUN 7   CREA 0.49*   *   WBC 5.0     Allergies: Patient has no known allergies. Education materials, including PICC Booklet, for PICC Care given to patient: yes. See Patient Education activity for further details. PROCEDURE DETAIL  A single lumen PICC line was started for Home IV Therapy. The following documentation is in addition to the PICC properties in the lines/airways flowsheet :  Lot #: [id number]  Was xylocaine 1% used intradermally:  yes  Catheter Length: 37 (cm)  Vein Selection for PICC:right basilic  Central Line Bundle followed yes  Complication Related to Insertion: none    The placement was verified by ECG/Sapiens technology: The  tip location is on the right side and the tip is in the  superior vena cava. See ECG results for PICC tip placement. Report given to nurse Megha Staff. Line is okay to use.     Stacy Mera RN

## 2020-02-18 NOTE — DISCHARGE INSTRUCTIONS
Dear Ruben Pearson,    Thank you for choosing [address] [place] [address] for your healthcare needs. We strive to provide EXCELLENT care to you and your family. In an effort to explain clearly why you were here in the hospital, I've also written a very brief summary below. Other details including formal diagnosis, medication changes, and follow up appointment recommendations can also be found in this packet. You were admitted for Bacteremia and urinary tract infection. For this you were put on intravenous antibiotic. You also received care from specialist physicians in the following specialties:  Darryl TO INFECTIOUS DISEASES      Remember that it is important for you to take your medications exactly as they are prescribed. It is helpful to keep a list of your medication with the names, dosages, and times to be taken in your wallet. Additionally,   - Diet: Healthy diet   - Please take yogurt or over the counter probiotic while on antibiotic   - Please drink enough water    Make sure to also see your primary care doctor for follow-up. Bring these papers with you and be sure to review your medication list with your doctor. I cannot stress the importance of follow up enough. I've included the information for your follow-up appointments below: Follow-up Information     Follow up With Specialties Details Why Contact Info    Mira Mayfield MD Internal Medicine Schedule an appointment as soon as possible for a visit in 1 week Follow up  [address] [facility] [address] [facility]  [phone]      Ilya Zhang DO Infectious Diseases In 2 weeks Follow up  [address] [facility]  [phone]            At this time, the following test results are still pending: None   Again, please follow-up these results with your primary care provider.     Should you have any fever over 101 degrees for 24 hours, chest pain, shortness of breath, fever, chills, nausea, vomiting, diarrhea, change in mentation, falling, weakness, bleeding, or worsening pain, please seek medical attention immediately. Finally, as your discharging physician, you may be receiving a survey regarding my care. I would greatly value and appreciate your input in the survey as we strive for excellence. If you have any questions, I can be reached at [phone]. Thank you so much again for allowing me to care for you at [place] [address].    Respectfully yours,  April Tillman MD       Patient Education        Urinary Tract Infection in Women: Care Instructions  Your Care Instructions    A urinary tract infection, or UTI, is a general term for an infection anywhere between the kidneys and the urethra (where urine comes out). Most UTIs are bladder infections. They often cause pain or burning when you urinate. UTIs are caused by bacteria and can be cured with antibiotics. Be sure to complete your treatment so that the infection goes away. Follow-up care is a key part of your treatment and safety. Be sure to make and go to all appointments, and call your doctor if you are having problems. It's also a good idea to know your test results and keep a list of the medicines you take. How can you care for yourself at home? · Take your antibiotics as directed. Do not stop taking them just because you feel better. You need to take the full course of antibiotics. · Drink extra water and other fluids for the next day or two. This may help wash out the bacteria that are causing the infection. (If you have kidney, heart, or liver disease and have to limit fluids, talk with your doctor before you increase your fluid intake.)  · Avoid drinks that are carbonated or have caffeine. They can irritate the bladder. · Urinate often. Try to empty your bladder each time. · To relieve pain, take a hot bath or lay a heating pad set on low over your lower belly or genital area. Never go to sleep with a heating pad in place. To prevent UTIs  · Drink plenty of water each day. This helps you urinate often, which clears bacteria from your system. (If you have kidney, heart, or liver disease and have to limit fluids, talk with your doctor before you increase your fluid intake.)  · Urinate when you need to. · Urinate right after you have sex. · Change sanitary pads often. · Avoid douches, bubble baths, feminine hygiene sprays, and other feminine hygiene products that have deodorants. · After going to the bathroom, wipe from front to back. When should you call for help? Call your doctor now or seek immediate medical care if:    · Symptoms such as fever, chills, nausea, or vomiting get worse or appear for the first time.     · You have new pain in your back just below your rib cage. This is called flank pain.     · There is new blood or pus in your urine.     · You have any problems with your antibiotic medicine.    Watch closely for changes in your health, and be sure to contact your doctor if:    · You are not getting better after taking an antibiotic for 2 days.     · Your symptoms go away but then come back. Where can you learn more? Go to http://tiffanie-bessie.info/. Enter E805 in the search box to learn more about \"Urinary Tract Infection in Women: Care Instructions. \"  Current as of: December 19, 2018  Content Version: 12.2  © 6181-4430 [email or web address]. Care instructions adapted under license by [employer] (which disclaims liability or warranty for this information). If you have questions about a medical condition or this instruction, always ask your healthcare professional. [address] any warranty or liability for your use of this information.

## 2020-02-18 NOTE — PROGRESS NOTES
Infectious Disease progress note        HPI:      Ms Henri Oden seen earlier today  Tolerating antibiotics well  Says she feels a lot better  Denies fevers chills cough dysuria back pain nausea vomiting diarrhea  She is received a PICC line to her right upper extremity        Current Facility-Administered Medications:     cefepime (MAXIPIME) 2 g in 0.9% sodium chloride (MBP/ADV) 100 mL, 2 g, IntraVENous, Q12H, Samantha Thurman MD, Last Rate: 200 mL/hr at 02/18/20 1413, 2 g at 02/18/20 1413    sodium chloride (NS) flush 5-10 mL, 5-10 mL, IntraVENous, PRN, Karie Griggss S, DO    sodium chloride (NS) flush 5-40 mL, 5-40 mL, IntraVENous, Q8H, Sharon Faustin MD, 10 mL at 02/18/20 1414    sodium chloride (NS) flush 5-40 mL, 5-40 mL, IntraVENous, PRN, Sharon Faustin MD    sodium chloride (NS) flush 5-40 mL, 5-40 mL, IntraVENous, Q8H, Dhruv Gonzalez MD, 10 mL at 02/18/20 1414    sodium chloride (NS) flush 5-40 mL, 5-40 mL, IntraVENous, PRN, Laurence Webber MD    HYDROcodone-acetaminophen (NORCO) 5-325 mg per tablet 1 Tab, 1 Tab, Oral, Q4H PRN, Laurence Webber MD    acetaminophen (TYLENOL) tablet 650 mg, 650 mg, Oral, Q4H PRN, Laurence Webber MD, 650 mg at 02/18/20 0841    morphine injection 2 mg, 2 mg, IntraVENous, Q4H PRN, Laurence Webber MD    cholecalciferol (VITAMIN D3) (1000 Units /25 mcg) tablet 1 Tab, 1,000 Units, Oral, DAILY, Laurence Webber MD, 1 Tab at 02/18/20 0831    DULoxetine (CYMBALTA) capsule 20 mg, 20 mg, Oral, QHS, Laurence Webber MD, 20 mg at 02/17/20 2144    Physical Exam:    Vitals:   Patient Vitals for the past 24 hrs:   Temp Pulse Resp BP SpO2   02/18/20 1439 97.7 °F (36.5 °C) 93 16 137/84 98 %   02/18/20 0820 97.3 °F (36.3 °C) 89 18 (!) 161/96 96 %   02/18/20 0500 98.7 °F (37.1 °C) 80 18 140/79 97 %   02/17/20 2000 98.2 °F (36.8 °C) 84 16 144/90 97 %     · GEN: NAD  · HEENT:  no scleral icterus,  No thrush   · CV: S1, S2 heard regularly,  · Lungs: Clear to auscultation bilaterally  · Abdomen: soft, non distended, non tender,  no CVA tenderness   · Genitourinary no Paiz   Skin no rash  Labs:   No results found for this or any previous visit (from the past 24 hour(s)). Microbiology Data:       Blood:    2/14/20  Component Value Ref Range & Units Status   Special Requests: NO SPECIAL REQUESTS    Preliminary   Culture result: Abnormal     Preliminary   ENTEROBACTER AEROGENES GROWING IN 1 OF 2 BOTTLES DRAWN (SITE NOT INDICATED)    Culture result: Abnormal     Preliminary   PRELIMINARY REPORT OF APPARENT GRAM NEGATIVE RODS GROWING IN 1 OF 2 BOTTLES DRAWN CALLED TO AND READ BACK BY Tammy Jauregui ON 2/15/20. RC   Culture result:    Preliminary   REMAINING BOTTLE(S) HAS/HAVE NO GROWTH SO FAR    Susceptibility      Enterobacter aerogenes     KELSEY (Preliminary)    Amikacin ($) <=16 ug/mL S    Aztreonam ($$$$) <=4 ug/mL S    Cefepime ($$) <=4 ug/mL S    Cefotaxime <=2 ug/mL S    Ceftazidime ($) <=1 ug/mL S    Ceftriaxone ($) <=1 ug/mL S    Cefuroxime ($) <=4 ug/mL S    Ciprofloxacin ($) <=1 ug/mL S    Gentamicin ($) <=4 ug/mL S    Imipenem <=1 ug/mL S    Levofloxacin ($) <=2 ug/mL S    Meropenem ($$) <=1 ug/mL S    Piperacillin/Tazobac ($) <=16 ug/mL S    Tobramycin ($) <=4 ug/mL S    Trimeth/Sulfa <=2/38 ug/mL S            Blood 2/15/20  Component Value Ref Range & Units Status   Special Requests: NO SPECIAL REQUESTS    Preliminary   Culture result: NO GROWTH 2 DAYS    Preliminary   Result History               Urine: 2/14/20  Specimen Information: Urine        Component Value Ref Range & Units Status   Special Requests: NO SPECIAL REQUESTS   Reflexed from [id number]    Final   Buckley Count >100,000   COLONIES/mL    Final   Culture result: ENTEROBACTER AEROGENESAbnormal     Final   Susceptibility      Enterobacter aerogenes     KELSEY    Amikacin ($) <=16 ug/mL S    Aztreonam ($$$$) <=4 ug/mL S    Cefepime ($$) <=4 ug/mL S    Cefotaxime <=2 ug/mL S    Ceftazidime ($) <=1 ug/mL S Ceftriaxone ($) <=1 ug/mL S    Cefuroxime ($) <=4 ug/mL S    Ciprofloxacin ($) <=1 ug/mL S    Gentamicin ($) <=4 ug/mL S    Imipenem <=1 ug/mL S    Levofloxacin ($) <=2 ug/mL S    Meropenem ($$) <=1 ug/mL S    Nitrofurantoin <=32 ug/mL S    Piperacillin/Tazobac ($) <=16 ug/mL S    Tobramycin ($) <=4 ug/mL S    Trimeth/Sulfa <=2/38 ug/mL S            Imaging:   CT head   FINDINGS:  Generalized prominence of cerebral sulci and ventricles is increased but upper  normal for age. Periventricular white matter low-density is mild and diffuse,  extending into the basal ganglia regions bilaterally, and may be the central  reuben. . There is no intracranial hemorrhage, extra-axial collection, mass, mass  effect or midline shift. The basilar cisterns are open. No acute infarct is  identified. The bone windows demonstrate no abnormalities. The visualized  portions of the paranasal sinuses and mastoid air cells are clear.     IMPRESSION  IMPRESSION:   1. No acute intracranial abnormality. 2. Mild microvascular ischemic and age-related change. US abd   LIVER: Hepatic echotexture is increased, consistent with moderate hepatic  steatosis. The deep portions of the liver are not well visualized as a result. No intrahepatic biliary ductal dilation or focal mass is shown in the  superficial aspects of the liver. MAIN PORTAL VEIN: Patent. Appropriate hepatopetal flow. GALLBLADDER: No dilation, wall thickening, or pericholecystic fluid. No  gallstones. COMMON DUCT: 0.3 cm in diameter. The duct is normal caliber. PANCREAS: The visualized portions of the pancreas are normal.   SPLEEN: Normal size and homogenous echogenicity. RIGHT KIDNEY: 12.3 cm in length. No hydronephrosis, shadowing calculus, or  contour-deforming renal mass. LEFT KIDNEY: 11.6 cm in length. Mild to moderate hydronephrosis. AORTA: Normal caliber in its visualized portions.   INFERIOR VENA CAVA: Normal caliber in its visualized portions.        IMPRESSION  IMPRESSION:   1. Mild to moderate left hydronephrosis. 2. Moderate hepatic steatosis. US retroperitoneal      LIVER: Hepatic echotexture is increased, consistent with moderate hepatic  steatosis. The deep portions of the liver are not well visualized as a result. No intrahepatic biliary ductal dilation or focal mass is shown in the  superficial aspects of the liver. MAIN PORTAL VEIN: Patent. Appropriate hepatopetal flow. GALLBLADDER: No dilation, wall thickening, or pericholecystic fluid. No  gallstones. COMMON DUCT: 0.3 cm in diameter. The duct is normal caliber. PANCREAS: The visualized portions of the pancreas are normal.   SPLEEN: Normal size and homogenous echogenicity. RIGHT KIDNEY: 12.3 cm in length. No hydronephrosis, shadowing calculus, or  contour-deforming renal mass. LEFT KIDNEY: 11.6 cm in length. Mild to moderate hydronephrosis. AORTA: Normal caliber in its visualized portions. INFERIOR VENA CAVA: Normal caliber in its visualized portions.        IMPRESSION  IMPRESSION:   1. Mild to moderate left hydronephrosis. 2. Moderate hepatic steatosis. TTE  Left Ventricle Normal cavity size, wall thickness, systolic function (ejection fraction normal) and diastolic function. The estimated ejection fraction is 55 - 60%. Visually measured ejection fraction. Left Atrium Mildly dilated left atrium. Right Ventricle Normal cavity size and global systolic function. Right Atrium Normal cavity size. Interatrial Septum Interatrial septum not well visualized   Aortic Valve No stenosis and no regurgitation. Probably trileaflet aortic valve. Mitral Valve Normal valve structure and no stenosis. Trace regurgitation. Tricuspid Valve Tricuspid valve not well visualized. No stenosis. Trace regurgitation. There is no evidence of pulmonary hypertension. Pulmonic Valve Pulmonic valve not well visualized. Aorta Normal aortic root.    Pericardium No evidence of pericardial effusion. Assessment / Plan:     Ms Mya Oro is a 81year old lady wt hx of ankle fracture/ surgery with hardware with Enterobacter UTI and bacteremia    1) Enterobacter aerogenes bacteremia and UTI   Blood cx 2/15/20  PICC line per primary team   TTE noted   Cefepime renally dosed for 2 weeks till 2/29/20  Check weekly CBC wt diff, CMP while on IV therapy . Fax results to Dr Levi Santana  [phone]  Remove picc once IV antibiotics completed   Probiotics /yogurt   Antibiotic side effects including CDI risk , GI disturbances, and other issues discussed     2) ankle fracture post surgery and hardware per patient   At risk for seeding,  Currently asymptomatic     3) Anemia per primary team     4) DVT Px           Thank for the opportunity to participate in the care of this patient. Please contact with questions or concerns.            Demi Damico DO  5:45 PM

## 2020-02-18 NOTE — PROGRESS NOTES
I have reviewed discharge instructions with the patient. The patient verbalized understanding. Discharge medications reviewed with patient and appropriate educational materials and side effects teaching were provided. Current Discharge Medication List      START taking these medications    Details   cefepime 2 gram 2 g, ADDaptor 1 Device IVPB 2 g by IntraVENous route every twelve (12) hours every twelve (12) hours. For two weeks. Home antibiotic order placed by ID  Qty: 1 Dose, Refills: 0         CONTINUE these medications which have NOT CHANGED    Details   cholecalciferol (VITAMIN D3) 25 mcg (1,000 unit) cap Take 1,000 Units by mouth daily. acetaminophen (TYLENOL EXTRA STRENGTH) 500 mg tablet Take 1 Tab by mouth every six (6) hours as needed for Pain or Fever. Qty: 30 Tab, Refills: 0      alendronate (FOSAMAX) 70 mg tablet TAKE ONE TABLET BY MOUTH ONCE EVERY 7 DAYS  Qty: 12 Tab, Refills: 1    Associated Diagnoses: Osteopenia of multiple sites      DULoxetine (CYMBALTA) 20 mg capsule TAKE ONE CAPSULE BY MOUTH NIGHTLY  Qty: 90 Cap, Refills: 1      cyanocobalamin, vitamin B-12, (VITAMIN B-12 SL) 3,000 mcg by SubLINGual route daily. VITAMIN E ACETATE (VITAMIN E PO) Take 1,200 mg by mouth daily.          STOP taking these medications       cefdinir (OMNICEF) 300 mg capsule Comments:   Reason for Stopping:         diclofenac EC (VOLTAREN) 75 mg EC tablet Comments:   Reason for Stopping:         gabapentin (NEURONTIN) 300 mg capsule Comments:   Reason for Stopping:

## 2020-02-18 NOTE — PROGRESS NOTES
Problem: Sepsis: Discharge Outcomes  Goal: *Demonstrates progressive activity  Outcome: Progressing Towards Goal     Problem: Sepsis: Discharge Outcomes  Goal: *Oxygen saturation returns to baseline or 90% or better on room air  Outcome: Progressing Towards Goal     Problem: Sepsis: Discharge Outcomes  Goal: *Optimal pain control at patient's stated goal  Outcome: Progressing Towards Goal

## 2020-02-18 NOTE — PROGRESS NOTES
Hospital follow-up PCP transitional care appointment has been scheduled with JAZMYNE Christensen for Monday, 2/24/20 at 2:00 p.m. Pending patient discharge.   Bryan Kothari, Care Management Specialist.

## 2020-02-18 NOTE — DISCHARGE SUMMARY
Discharge Summary       PATIENT ID: Laura Orozco  MRN: [id number]   YOB: 1950    DATE OF ADMISSION: 2/15/2020 10:15 AM    DATE OF DISCHARGE: 02/18/20  PRIMARY CARE PROVIDER: Quinton Calvert MD       DISCHARGING PROVIDER: Steve Lomas MD    To contact this individual call [phone] and ask the  to page. If unavailable ask to be transferred the Adult Hospitalist Department. CONSULTATIONS: IP CONSULT TO HOSPITALIST  IP CONSULT TO INFECTIOUS DISEASES      PROCEDURES/SURGERIES: * No surgery found *    IMAGING  [employer]    Result Date: 2/16/2020  IMPRESSION: 1. Mild to moderate left hydronephrosis. 2. Moderate hepatic steatosis. Us Retroperitoneum Comp    Result Date: 2/16/2020  IMPRESSION: 1. Mild to moderate left hydronephrosis. 2. Moderate hepatic steatosis.           [id number] [facility] COURSE:   # Bacteremia, likely source urinary tract infection  Sepsis was ruled out   - blood culture and urine culture from 2/14 grew enterobacter aerogenes   - repeat blood culture NGTD   - Echo no vegetation   - on rocephin 1/15---1/17, which was changed to Cefepime due to Prolonged Ceftriaxone use can induce resistance in enterobacter aerogenes   - s/p hydration   - ID input appt   - patient will be discharged with PICC line to complete two weeks IV cefepime      # UTI due to enterobacter aerogenes with left hydronephrosis- improved   - urine culture - enterobacter aerogenes   - ct with ceftriaxone   - IV fluid hydration   - renal US showing left hydronephrosis      # Mild transaminitis  likely from the bacteremia   - liver US showing Moderate hepatic steatosis  - hepatitis A/B/C negative      # Headaches of unclear etiology, suspect due to the acute infection   # Anemia        DISCHARGE DIAGNOSES / PLAN:    # Enterobacter aerogenes Bacteremia, likely source urinary tract infection  Sepsis was ruled out   # UTI due to enterobacter aerogenes with left hydronephrosis- improved   # Mild transaminitis likely from the bacteremia   # Headaches of unclear etiology, suspect due to the acute infection   # Anemia    Patient discharged home with home IV antibiotics. discharged with PICC line to complete two weeks IV cefepime  (  Prolonged Ceftriaxone use can induce resistance in enterobacter aerogenes )      Patient Active Problem List   Diagnosis Code    Chronic pain of left ankle M25.572, G89.29    Osteopenia M85.80    UTI (urinary tract infection) N39.0    Sepsis ([address] A41.9               PENDING TEST RESULTS:   At the time of discharge the following test results are still pending: None     FOLLOW UP APPOINTMENTS:    Follow-up Information     Follow up With Specialties Details Why Contact Info    Evaristo Holguin MD Internal Medicine Schedule an appointment as soon as possible for a visit in 1 week Follow up  [place]   [phone]      Ingrid Bryson, DO Infectious Diseases In 2 weeks Follow up  [address]:   · It is important that you take the medication exactly as they are prescribed. · Keep your medication in the bottles provided by the pharmacist and keep a list of the medication names, dosages, and times to be taken in your wallet. · Do not take other medications without consulting your doctor. · No drinking alcohol or driving car or operating machinery if you are on narcotic pain medications. Donot take sedating mediations if you are sleepy or confused. · Fall Precautions  · Keep Well Hydrated  · Report to your medical provider if you feel you have  developed allergies to medications  · Follow up with your PCP or Consultant for medication adjustments and refills  · Monitor for signs of fevers,chills,bleeding,chest pain and seek medical attention if you do so. DIET: Healthy heart diet     ACTIVITY: None     WOUND CARE: None     EQUIPMENT needed: None       DISCHARGE MEDICATIONS:  Current Discharge Medication List      START taking these medications    Details   cefepime 2 gram 2 g, ADDaptor 1 Device IVPB 2 g by IntraVENous route every twelve (12) hours every twelve (12) hours. For two weeks. Home antibiotic order placed by ID  Qty: 1 Dose, Refills: 0         CONTINUE these medications which have NOT CHANGED    Details   cholecalciferol (VITAMIN D3) 25 mcg (1,000 unit) cap Take 1,000 Units by mouth daily. acetaminophen (TYLENOL EXTRA STRENGTH) 500 mg tablet Take 1 Tab by mouth every six (6) hours as needed for Pain or Fever. Qty: 30 Tab, Refills: 0      alendronate (FOSAMAX) 70 mg tablet TAKE ONE TABLET BY MOUTH ONCE EVERY 7 DAYS  Qty: 12 Tab, Refills: 1    Associated Diagnoses: Osteopenia of multiple sites      DULoxetine (CYMBALTA) 20 mg capsule TAKE ONE CAPSULE BY MOUTH NIGHTLY  Qty: 90 Cap, Refills: 1      cyanocobalamin, vitamin B-12, (VITAMIN B-12 SL) 3,000 mcg by SubLINGual route daily. VITAMIN E ACETATE (VITAMIN E PO) Take 1,200 mg by mouth daily.          STOP taking these medications       cefdinir (OMNICEF) 300 mg capsule Comments:   Reason for Stopping:         diclofenac EC (VOLTAREN) 75 mg EC tablet Comments:   Reason for Stopping:         gabapentin (NEURONTIN) 300 mg capsule Comments:   Reason for Stopping:               All Micro Results     Procedure Component Value Units Date/Time    CULTURE, BLOOD, PAIRED [[id number]] Collected:  02/15/20 1041    Order Status:  Completed Specimen:  Blood Updated:  02/18/20 0724     Special Requests: NO SPECIAL REQUESTS        Culture result: NO GROWTH 3 DAYS             Recent Results (from the past 24 hour(s))   ECHO ADULT COMPLETE    Collection Time: 02/17/20  4:20 PM   Result Value Ref Range    LA Volume 45.43 22 - 52 mL    LV E' Lateral Velocity 8.33 cm/s    LV E' Septal Velocity 7.98 cm/s    Tapse 2.34 (A) 1.5 - 2.0 cm    Ao Root D 2.82 cm    Aortic Valve Systolic Peak Velocity 029.57 cm/s    Aortic Valve Area by Continuity of Peak Velocity 1.6 cm2    AoV PG 11.1 mmHg    LVIDd 3.59 (A) 3.9 - 5.3 cm    LVPWd 1.15 (A) 0.6 - 0.9 cm    LVIDs 2.74 cm    IVSd 0.99 (A) 0.6 - 0.9 cm    LVOT d 1.93 cm    LVOT Peak Velocity 90.97 cm/s    LVOT Peak Gradient 3.3 mmHg    MVA (PHT) 3.5 cm2    MV A Praneeth 97.53 cm/s    MV E Praneeth 73.80 cm/s    MV E/A 0.76     Left Atrium to Aortic Root Ratio 1.13     RVIDd 3.97 cm    LA Vol 4C 30.04 22 - 52 mL    LA Vol 2C 49.01 22 - 52 mL    LA Area 4C 14.3 cm2    LV Mass .4 67 - 162 g    LV Mass AL Index 77.4 43 - 95 g/m2    E/E' lateral 8.86     E/E' septal 9.25     E/E' ratio (averaged) 9.05     Mitral Regurgitant Peak Velocity 571.63 cm/s    Mitral Valve E Wave Deceleration Time 216.5 ms    Mitral Valve Pressure Half-time 62.8 ms    Left Atrium Major Axis 3.18 cm    Triscuspid Valve Regurgitation Peak Gradient 35.0 mmHg    Aortic Regurgitant Pressure Half-time 344.4 cm    Pulmonic Valve Max Velocity 108.66 cm/s    TR Max Velocity 295.66 cm/s    LA Vol Index 26.16 16 - 28 ml/m2    LA Vol Index 28.22 16 - 28 ml/m2    LA Vol Index 17.30 16 - 28 ml/m2    MR Peak Gradient 130.7 mmHg    AR Max Praneeth 362.23 cm/s    Left Ventricular Fractional Shortening by 2D 41.132126722 %    Mitral Valve Deceleration Erath 5.5299324681685     AV Velocity Ratio 0.55     AV peak gradient 52.962155253 mmHg    PV peak gradient 4.7 mmHg           NOTIFY YOUR PHYSICIAN FOR ANY OF THE FOLLOWING:   Fever over 101 degrees for 24 hours. Chest pain, shortness of breath, fever, chills, nausea, vomiting, diarrhea, change in mentation, falling, weakness, bleeding. Severe pain or pain not relieved by medications. Or, any other signs or symptoms that you may have questions about.     DISPOSITION:  x  Home With:   OT  PT  JOLEEN  RN       Long term SNF/Inpatient Rehab    Independent/assisted living    Hospice    Other: PATIENT CONDITION AT DISCHARGE:     Functional status    Poor     Deconditioned    x Independent      Cognition     Lucid     Forgetful     Dementia      Catheters/lines (plus indication)    Paiz    x PICC     PEG     None      Code status   x  Full code     DNR      PHYSICAL EXAMINATION AT DISCHARGE:  Gen:  No distress, alert  HEENT:  Normal cephalic atraumatic, extra-occular movements are intact. Neck:  Supple, No JVD  Lungs:  Clear bilaterally, no wheeze, no rales, normal effort  Heart:  Regular Rate and Rhythm, normal S1 and S2, no edema  Abdomen:  Soft, non tender, normal bowel sounds, no guarding. Extremities:  Well perfused, no cyanosis or edema  Neurological:  Awake and alert, CN's are intact, normal strength throughout extremities  Skin:  No rashes or moles  Mental Status:  Normal thought process, does not appear anxious      CHRONIC MEDICAL DIAGNOSES:  Problem List as of 2/18/2020 Date Reviewed: 2/15/2019          Codes Class Noted - Resolved    UTI (urinary tract infection) ICD-10-CM: N39.0  ICD-9-CM: 599.0  2/15/2020 - Present        Sepsis ([address] ICD-10-CM: A41.9  ICD-9-CM: 038.9, 995.91  2/15/2020 - Present        Chronic pain of left ankle ICD-10-CM: M25.572, G89.29  ICD-9-CM: 719.47, 338.29  2/1/2017 - Present    Overview Addendum 2/13/2017  8:02 AM by Shannon Medrano MD     Osteoarthritis and ? CPS  Seeing Dr Carla Munoz             Osteopenia ICD-10-CM: M85.80  ICD-9-CM: 733.90  2/1/2017 - Present    Overview Signed 5/9/2017  9:06 PM by Shannon Medrano MD     2017- abnormal FRAX                     Discussed with patient and family. Explained the importance of following up, Compliance with medications and recommendations on discharge,Side effect profile of medications were explained. Safety precautions at home and while taking pain medications also explained. All questions answered to the satisfaction of the patient/family.  Discussed with consultant(s) who are agreeable to the discharge. Verbal and written instructions on discharge given. Explained about Discharge medications and side effect profile. Advised patient/family to followup with their pcp for medication refills and preauthorization of medications, Home health orders. checkups,screenign programs as appropriate for age. Thank you Ramon Lao MD for taking care of your patient, Please call with any questions.       Greater than 35 minutes were spent with the patient on counseling and coordination of care    Signed:   Saida Marin MD  2/18/2020  3:31 PM

## 2020-02-18 NOTE — PROGRESS NOTES
Bedside shift change report given to Joycelyn Brito (oncoming nurse) by Mitul Cameron (offgoing nurse). Report included the following information SBAR, Kardex and MAR.

## 2020-02-19 ENCOUNTER — TELEPHONE (OUTPATIENT)
Dept: FAMILY MEDICINE CLINIC | Age: 70
End: 2020-02-19

## 2020-02-19 ENCOUNTER — PATIENT OUTREACH (OUTPATIENT)
Dept: FAMILY MEDICINE CLINIC | Age: 70
End: 2020-02-19

## 2020-02-19 LAB
BACTERIA SPEC CULT: NORMAL
SERVICE CMNT-IMP: NORMAL

## 2020-02-19 NOTE — TELEPHONE ENCOUNTER
calling for patient     States she was seen in hospital and needs to set up a follow up visit         Best number to reach him [phone]

## 2020-02-19 NOTE — PROGRESS NOTES
Hospital Discharge Follow-Up      Date/Time:  2020 9:08 AM  Jeannette Zelaya4 Ambulatory Care Coordination Team  Phone [phone]    Patient was admitted to [facility] on 2/15 and discharged on  for UTI and bacteremia. The physician discharge summary was available at the time of outreach. Patient was contacted within 1 business days of discharge. Top Challenges reviewed with the provider   [facility] 2/15- UTI/bacteremia. Blood culture pos for gram neg rods. IV Cefepime 2 g q 12 hours x 2 weeks. Advance Care Planning:   Does patient have an Advance Directive:  reviewed and current        Method of communication with provider :chart routing    Was this a readmission? no  Patient stated reason for the readmission:     Care Transition Nurse (CTN) contacted the family by telephone to perform post hospital discharge assessment. Verified name and  with family as identifiers. Provided introduction to self, and explanation of the CTN role.  reports his wife is doing well. Up eating breakfast at this time. Said he gave the first IV dose of Cefepime and went well. Says she does not have any fever and in good spirits. Has not heard from [facility], suggested he call their office by noon if no call from them about starting service. Family received hospital discharge instructions. CTN reviewed discharge instructions and red flags with family who verbalized understanding. Family given an opportunity to ask questions and does not have any further questions or concerns at this time. The family agrees to contact the PCP office for questions related to their healthcare. CTN provided contact information for future reference. Disease Specific:   N/A    Patients top risk factors for readmission:  None noted at this time.     Home Health orders at discharge: [address]: [facility]  Date of initial visit: TBD-advised  to call them today by noon if no word. Durable Medical Equipment ordered at discharge: IV med and supplies  [address]: [facility]  Durable Medical Equipment received: 2/18/20    Medication(s):   New Medications at Discharge: Cefepime 2 g q 12 hours IV  Changed Medications at Discharge: none  Discontinued Medications at Discharge: Kraig Blankenship    Medication reconciliation was performed with family, who verbalizes understanding of administration of home medications. There were no barriers to obtaining medications identified at this time. Referral to Pharm D needed: no     Current Outpatient Medications   Medication Sig    cefepime 2 gram 2 g, ADDaptor 1 Device IVPB 2 g by IntraVENous route every twelve (12) hours every twelve (12) hours. For two weeks. Home antibiotic order placed by ID    cholecalciferol (VITAMIN D3) 25 mcg (1,000 unit) cap Take 1,000 Units by mouth daily.  acetaminophen (TYLENOL EXTRA STRENGTH) 500 mg tablet Take 1 Tab by mouth every six (6) hours as needed for Pain or Fever.  DULoxetine (CYMBALTA) 20 mg capsule TAKE ONE CAPSULE BY MOUTH NIGHTLY    cyanocobalamin, vitamin B-12, (VITAMIN B-12 SL) 3,000 mcg by SubLINGual route daily.  VITAMIN E ACETATE (VITAMIN E PO) Take 1,200 mg by mouth daily.  alendronate (FOSAMAX) 70 mg tablet TAKE ONE TABLET BY MOUTH ONCE EVERY 7 DAYS     No current facility-administered medications for this visit. [facility] follow up appointment(s):   Future Appointments   Date Time Provider Wendie Escobar   2/24/2020  2:00 PM Daisy Galicia NP [id number] [facility]   3/24/2020  3:30 PM Ramon Lao MD [id number] [facility]      Non-[facility] follow up appointment(s): (ID)-reminded  to call and schedule 2 week f/u appt. [facility]:  information provided as a resource       Goals      Prevent complications post hospitalization.       2/19/20 [facility] 2/15-2/18 UTI/bacteremia  · REviewed discharge instructions with   · Reviewed meds with -education on new med, IV Cefepime. He gave first dose this am and went well. · Reviewed red flags: fever,nausea,vomiting,diarrhea,sob,chest pain,weakness,change in mentation. · [facility]Christ[facility] Jamie Carr[facility] at 2pm  · Reminded to call ID, , to schedule 2 week f/u  · Advised to call [facility] if does not get call by noon today to check on start of service. · Given info on  [facility] as resource. · Given CTN contact info if any questions/concerns.   · Advised CTN to check back in about a week for update,sooner prn.stevie

## 2020-02-20 ENCOUNTER — TELEPHONE (OUTPATIENT)
Dept: INTERNAL MEDICINE CLINIC | Age: 70
End: 2020-02-20

## 2020-02-20 ENCOUNTER — TELEPHONE (OUTPATIENT)
Dept: FAMILY MEDICINE CLINIC | Age: 70
End: 2020-02-20

## 2020-02-20 LAB
BACTERIA SPEC CULT: ABNORMAL
BACTERIA SPEC CULT: NORMAL
SERVICE CMNT-IMP: ABNORMAL
SERVICE CMNT-IMP: NORMAL

## 2020-02-20 NOTE — TELEPHONE ENCOUNTER
----- Message from Marc Salmons sent at 2/20/2020  2:41 PM EST -----  Regarding: Dr. Yoel Branham: [phone]  Caller's first and last name: Nurse [facility])  Reason for call: Andreina Mayo needs an order for the pt so that she can fax in pt's labs for the pt's np appt  Callback required yes/no and why: Yes  Best contact number(s): [phone]  Details to clarify the request: n/a

## 2020-02-20 NOTE — TELEPHONE ENCOUNTER
Unable to reach [facility] numbers left aren't working. Pt's orders are from Dr Ramakrishna Dubois ID not from Dr Erin Collazo. I contacted Karson Hines Rn NN working with Pt. For assistance, she spoke with Jazmine Reinoso [phone] and notified them to get orders from ID. Jarett Carbajal

## 2020-02-20 NOTE — TELEPHONE ENCOUNTER
ana with [facility] [phone]     Requesting a call back regarding pick line and home health orders.

## 2020-02-20 NOTE — TELEPHONE ENCOUNTER
----- Message from Carlos Grissom sent at 2/20/2020  9:36 AM EST -----  Regarding: Dr. Armaan Jasso telephone  General Message/Vendor Calls    Caller's first and last name:  Lewis Goodrich from [facility]     Reason for call:    Needs instructions on pt pick line pt was released from hospital. Needs orders   Callback required yes/no and why:      Best contact number(s):  [phone]    Details to clarify the request:      Carlos Grissom

## 2020-02-21 ENCOUNTER — TELEPHONE (OUTPATIENT)
Dept: FAMILY MEDICINE CLINIC | Age: 70
End: 2020-02-21

## 2020-02-21 NOTE — TELEPHONE ENCOUNTER
Verified patient with two types of identifiers. Wanted to check to see if ok to draw blood from PICC line as she has had some ID MD's not want this in the past.  She also needs d/c date of antibiotics.   Will check with MD.

## 2020-02-24 ENCOUNTER — OFFICE VISIT (OUTPATIENT)
Dept: INTERNAL MEDICINE CLINIC | Age: 70
End: 2020-02-24

## 2020-02-24 VITALS
HEART RATE: 79 BPM | HEIGHT: 65 IN | OXYGEN SATURATION: 96 % | SYSTOLIC BLOOD PRESSURE: 116 MMHG | BODY MASS INDEX: 23.49 KG/M2 | DIASTOLIC BLOOD PRESSURE: 80 MMHG | RESPIRATION RATE: 16 BRPM | TEMPERATURE: 97.8 F | WEIGHT: 141 LBS

## 2020-02-24 DIAGNOSIS — N13.30 HYDRONEPHROSIS, LEFT: ICD-10-CM

## 2020-02-24 DIAGNOSIS — R78.81 BACTEREMIA: ICD-10-CM

## 2020-02-24 DIAGNOSIS — N12 PYELONEPHRITIS: Primary | ICD-10-CM

## 2020-02-24 DIAGNOSIS — M85.89 OSTEOPENIA OF MULTIPLE SITES: ICD-10-CM

## 2020-02-24 NOTE — PROGRESS NOTES
Adam Salas is a 81 y.o. female who was seen in clinic today (2/24/2020) for a Transitional Care Appointment. She RTC with her . Assessment & Plan:     Diagnoses and all orders for this visit:    1. Pyelonephritis- new dx, improving, continue abx per ID, will defer to specialist, reviewed s/s to monitor for future reference and how UTI symptoms may not be classic    2. Bacteremia- new dx, improving, cultures have cleared, continue medications per specialist    3. Hydronephrosis, left- new dx, not sure if due to above or baseline for her, will consider repeat imaging at f/u in 1 month    4. Osteopenia of multiple sites- chronic issue, well controlled, recommended to continue Fosamax x 3 more months, will repeat DEXA in May and will likely be okay to stop. Reviewed Prolia vs Fosamax. Due to side effects will plan on stopping regardless      Follow-up and Dispositions    · Return in about 1 month (around 3/24/2020) for FULL PHYSICAL - 30 minutes. Subjective:   Magdalena Lee was seen today for Hospital Follow Up      Transition Care Management:    Admission: 2/15  Discharge: 2/18  [facility]  Diagnosis: pyelonephritis & bacterial   Nurse Navigator note reviewed: Yes    We reviewed the records. She presented with headache and neck pain. She was diagnosed with pyelonephritis and started and an antibiotic. Cultures grew out Enterobacter. TTE negative for vegetation. She is taking her antibiotics via PICC line BID.  is giving it to her. She is taking  her medications as directed & without side effects. These symptoms have resolved. No redness around the bandage. She had an episode of swelling and redness in both feet and elbows. She developed hives and welts on the chest.  Stopped cranberry pills & probiotics and improved in 24 hrs. L ankle pain and is seeing podiatry tomorrow. She would like to stop Fosamax.   She reports does not feel good for the whole day. Family member was on it for a long time and is worried it can make her bones worse. Brief Labs:     Lab Results   Component Value Date/Time    Sodium 140 02/16/2020 04:18 AM    Potassium 3.8 02/16/2020 04:18 AM    Creatinine 0.49 02/16/2020 04:18 AM          Prior to Admission medications    Medication Sig Start Date End Date Taking? Authorizing Provider   cefepime 2 gram 2 g, ADDaptor 1 Device IVPB 2 g by IntraVENous route every twelve (12) hours every twelve (12) hours. For two weeks. Home antibiotic order placed by ID 2/19/20  Yes OlmanVamshi franco MD   cholecalciferol (VITAMIN D3) 25 mcg (1,000 unit) cap Take 1,000 Units by mouth daily. Yes Karin Kennedy MD   acetaminophen (TYLENOL EXTRA STRENGTH) 500 mg tablet Take 1 Tab by mouth every six (6) hours as needed for Pain or Fever. 2/14/20  Yes Daniel Ceron MD   alendronate (FOSAMAX) 70 mg tablet TAKE ONE TABLET BY MOUTH ONCE EVERY 7 DAYS 12/12/19  Yes Jerry Dotson MD   DULoxetine (CYMBALTA) 20 mg capsule TAKE ONE CAPSULE BY MOUTH NIGHTLY 10/1/19  Yes Jerry Dotson MD   cyanocobalamin, vitamin B-12, (VITAMIN B-12 SL) 3,000 mcg by SubLINGual route daily. Yes Provider, Historical   VITAMIN E ACETATE (VITAMIN E PO) Take 1,200 mg by mouth daily. Yes Provider, Historical          Allergies   Allergen Reactions    Other Medication Hives     Cranberry or Probiotic, had severe itching and hives, not sure to which           Review of Systems   Constitutional: Negative for malaise/fatigue and weight loss. Respiratory: Negative for cough and shortness of breath. Cardiovascular: Negative for chest pain, palpitations and leg swelling. Gastrointestinal: Negative for abdominal pain, constipation, diarrhea, heartburn, nausea and vomiting. Musculoskeletal: Positive for joint pain (L ankle). Negative for myalgias. Skin: Negative for rash. Neurological: Negative for dizziness and headaches.    Psychiatric/Behavioral: Negative for depression. The patient is not nervous/anxious and does not have insomnia. Objective:   Physical Exam  Constitutional:       General: She is not in acute distress. Appearance: Normal appearance. Eyes:      Conjunctiva/sclera: Conjunctivae normal.   Cardiovascular:      Rate and Rhythm: Regular rhythm. Heart sounds: No murmur. Pulmonary:      Effort: Pulmonary effort is normal.      Breath sounds: Normal breath sounds. No decreased breath sounds or wheezing. Abdominal:      General: Bowel sounds are normal.      Palpations: Abdomen is soft. Tenderness: There is no abdominal tenderness. Musculoskeletal:      Right lower leg: No edema. Left lower leg: No edema. Comments: PICC line in R arm, bandage is c/d/i   Psychiatric:         Mood and Affect: Mood and affect normal.           Visit Vitals  /80   Pulse 79   Temp 97.8 °F (36.6 °C) (Oral)   Resp 16   Ht 5' 5\" (1.651 m)   Wt 141 lb (64 kg)   SpO2 96%   BMI 23.46 kg/m²         Disclaimer:  Advised her to call back or return to office if symptoms worsen/change/persist.  Discussed expected course/resolution/complications of diagnosis in detail with patient. Medication risks/benefits/costs/interactions/alternatives discussed with patient. She was given an after visit summary which includes diagnoses, current medications, & vitals. She expressed understanding with the diagnosis and plan. Aspects of this note may have been generated using voice recognition software. Despite editing, there may be some syntax errors.        Orville Bosworth, MD

## 2020-02-24 NOTE — PROGRESS NOTES
Hospital follow up. FTF for home health. Had itching reaction to either cranberry or probiotic. Scheduled for colonoscopy 03/30/20.

## 2020-02-24 NOTE — PATIENT INSTRUCTIONS
Pneumococcal Polysaccharide Vaccine: What You Need to Know  Why get vaccinated? Vaccination can protect older adults (and some children and younger adults) from pneumococcal disease. Pneumococcal disease is caused by bacteria that can spread from person to person through close contact. It can cause ear infections, and it can also lead to more serious infections of the:  · Lungs (pneumonia),  · Blood (bacteremia), and  · Covering of the brain and spinal cord (meningitis). Meningitis can cause deafness and brain damage, and it can be fatal.  Anyone can get pneumococcal disease, but children under 3years of age, people with certain medical conditions, adults over 72years of age, and cigarette smokers are at the highest risk. About 18,000 older adults die each year from pneumococcal disease in the [place]. Treatment of pneumococcal infections with penicillin and other drugs used to be more effective. But some strains of the disease have become resistant to these drugs. This makes prevention of the disease, through vaccination, even more important. Pneumococcal polysaccharide vaccine (PPSV23)  Pneumococcal polysaccharide vaccine (PPSV23) protects against 23 types of pneumococcal bacteria. It will not prevent all pneumococcal disease. PPSV23 is recommended for:  · All adults 72years of age and older,  · Anyone 2 through 59years of age with certain long-term health problems,  · Anyone 2 through 59years of age with a weakened immune system,  · Adults 23 through 59years of age who smoke cigarettes or have asthma. Most people need only one dose of PPSV. A second dose is recommended for certain high-risk groups. People 72 and older should get a dose even if they have gotten one or more doses of the vaccine before they turned 65. Your healthcare provider can give you more information about these recommendations. Most healthy adults develop protection within 2 to 3 weeks of getting the shot.   Some people should not get this vaccine  · Anyone who has had a life-threatening allergic reaction to PPSV should not get another dose. · Anyone who has a severe allergy to any component of PPSV should not receive it. Tell your provider if you have any severe allergies. · Anyone who is moderately or severely ill when the shot is scheduled may be asked to wait until they recover before getting the vaccine. Someone with a mild illness can usually be vaccinated. · Children less than 3years of age should not receive this vaccine. · There is no evidence that PPSV is harmful to either a pregnant woman or to her fetus. However, as a precaution, women who need the vaccine should be vaccinated before becoming pregnant, if possible. Risks of a vaccine reaction  With any medicine, including vaccines, there is a chance of side effects. These are usually mild and go away on their own, but serious reactions are also possible. About half of people who get PPSV have mild side effects, such as redness or pain where the shot is given, which go away within about two days. Less than 1 out of 100 people develop a fever, muscle aches, or more severe local reactions. Problems that could happen after any vaccine:  · People sometimes faint after a medical procedure, including vaccination. Sitting or lying down for about 15 minutes can help prevent fainting, and injuries caused by a fall. Tell your doctor if you feel dizzy, or have vision changes or ringing in the ears. · Some people get severe pain in the shoulder and have difficulty moving the arm where a shot was given. This happens very rarely. · Any medication can cause a severe allergic reaction. Such reactions from a vaccine are very rare, estimated at about 1 in a million doses, and would happen within a few minutes to a few hours after the vaccination. As with any medicine, there is a very remote chance of a vaccine causing a serious injury or death.   The safety of vaccines is always being monitored. For more information, visit: www.cdc.gov/vaccinesafety/  What if there is a serious reaction? What should I look for? Look for anything that concerns you, such as signs of a severe allergic reaction, very high fever, or unusual behavior. Signs of a severe allergic reaction can include hives, swelling of the face and throat, difficulty breathing, a fast heartbeat, dizziness, and weakness. These would usually start a few minutes to a few hours after the vaccination. What should I do? If you think it is a severe allergic reaction or other emergency that can't wait, call 9-1-1 or get to the nearest hospital. Otherwise, call your doctor. Afterward, the reaction should be reported to the Vaccine Adverse Event Reporting System (VAERS). Your doctor might file this report, or you can do it yourself through the VAERS web site at www.vaers. Needl.gov, or by calling 7-884.128.2114. [employer] does not give medical advice. How can I learn more? · Ask your doctor. He or she can give you the vaccine package insert or suggest other sources of information. · Call your local or state health department. · Contact the Centers for Disease Control and Prevention (CDC):  ? Call 8-534.471.7796 (1-800-CDC-INFO) or  ? Visit CDC's website at www.cdc.gov/vaccines  Vaccine Information Statement  PPSV Vaccine  (04/24/2015)  Department of Health and Human Services  Centers for Disease Control and Prevention  Many Vaccine Information Statements are available in [place] and other languages. See www.immunize.org/vis. Hojas de información Sobre Vacunas están disponibles en español y en muchos otros idiomas. Visite Jami.si. Care instructions adapted under license by [employer] (which disclaims liability or warranty for this information).  If you have questions about a medical condition or this instruction, always ask your healthcare professional. Feliz Gurrola any warranty or liability for your use of this information.

## 2020-03-02 ENCOUNTER — TELEPHONE (OUTPATIENT)
Dept: FAMILY MEDICINE CLINIC | Age: 70
End: 2020-03-02

## 2020-03-02 NOTE — TELEPHONE ENCOUNTER
----- Message from Jason Benjamin sent at 3/2/2020  3:12 PM EST -----  Regarding: Dr. Melita Loaiza first and last name: Amaya acevedo [facility]     Reason for call: removal of picc line     Callback required yes/no and why: n/a    Best contact number(s): [phone]    Details to clarify the request: Amaya acevedo [facility] received  orders from the hospital tp remove picc line due to pt is done with antibiotics       Jason Benjamin

## 2020-03-05 NOTE — TELEPHONE ENCOUNTER
LVM  Waiting on a return call to give the message that there is no need for I&D follow up. As plan 2 weeks antibiotics and removal of line. Call if any issues arise to se up appt.

## 2020-03-06 ENCOUNTER — TELEPHONE (OUTPATIENT)
Dept: FAMILY MEDICINE CLINIC | Age: 70
End: 2020-03-06

## 2020-03-06 NOTE — TELEPHONE ENCOUNTER
----- Message from Jake Hong sent at 3/6/2020  9:13 AM EST -----  Regarding: Dr. Corinne Revel: [phone]  Caller's first and last name: n/a  Reason for call: Pt calling to say she is doing well and greatly appreciates the doctor taking care of her health needs very well. Whose call is being returned: Tala Szymanski and someone else  Callback required yes/no and why: yes,   Best contact number(s): [phone]  Details to clarify the request: Pt says she very much appreciate the staff calling to see how she's doing.

## 2020-03-25 ENCOUNTER — TELEPHONE (OUTPATIENT)
Dept: INTERNAL MEDICINE CLINIC | Age: 70
End: 2020-03-25

## 2020-03-25 NOTE — TELEPHONE ENCOUNTER
What is HH for? She was seen in Feb for hospital f/u, pyelonephritis. If it is related to this then no F2F needed. If this is something new will need video visit.

## 2020-03-25 NOTE — TELEPHONE ENCOUNTER
Bob Gilmore from [facility] wants to know if dr Emily Peres will follow pt [address] if so can you fax  Office notes and   Face to face encounter  To [phone] you can contact solitario at [phone]

## 2020-03-26 ENCOUNTER — TELEPHONE (OUTPATIENT)
Dept: INTERNAL MEDICINE CLINIC | Age: 70
End: 2020-03-26

## 2020-03-26 NOTE — TELEPHONE ENCOUNTER
Socorro Bosch with Erick Perez [phone]     Pt was referred by [facility], diagnosis was septic

## 2020-03-26 NOTE — TELEPHONE ENCOUNTER
Per Eulalio Meng pt has Skilled nursing referred by [facility] for sepsis. They need F2F faxed for billing.

## 2020-04-13 RX ORDER — DULOXETIN HYDROCHLORIDE 20 MG/1
CAPSULE, DELAYED RELEASE ORAL
Qty: 90 CAP | Refills: 0 | Status: SHIPPED | OUTPATIENT
Start: 2020-04-13 | End: 2020-06-29

## 2020-05-13 RX ORDER — DICLOFENAC SODIUM 75 MG/1
75 TABLET, DELAYED RELEASE ORAL 2 TIMES DAILY
COMMUNITY
End: 2020-09-29

## 2020-05-14 ENCOUNTER — HOSPITAL ENCOUNTER (OUTPATIENT)
Age: 70
Setting detail: OUTPATIENT SURGERY
Discharge: HOME | End: 2020-05-14
Attending: INTERNAL MEDICINE | Admitting: INTERNAL MEDICINE
Payer: MEDICARE

## 2020-05-14 ENCOUNTER — ANESTHESIA EVENT (OUTPATIENT)
Dept: ENDOSCOPY | Age: 70
End: 2020-05-14
Payer: MEDICARE

## 2020-05-14 ENCOUNTER — ANESTHESIA (OUTPATIENT)
Dept: ENDOSCOPY | Age: 70
End: 2020-05-14
Payer: MEDICARE

## 2020-05-14 VITALS
WEIGHT: 140.1 LBS | DIASTOLIC BLOOD PRESSURE: 81 MMHG | TEMPERATURE: 97.7 F | SYSTOLIC BLOOD PRESSURE: 133 MMHG | BODY MASS INDEX: 23.31 KG/M2 | HEART RATE: 78 BPM | RESPIRATION RATE: 20 BRPM | OXYGEN SATURATION: 100 %

## 2020-05-14 LAB — COLONOSCOPY, EXTERNAL: NORMAL

## 2020-05-14 PROCEDURE — 74011250636 HC RX REV CODE- 250/636: Performed by: NURSE ANESTHETIST, CERTIFIED REGISTERED

## 2020-05-14 PROCEDURE — 76040000019: Performed by: INTERNAL MEDICINE

## 2020-05-14 PROCEDURE — 74011250637 HC RX REV CODE- 250/637: Performed by: INTERNAL MEDICINE

## 2020-05-14 PROCEDURE — 74011000250 HC RX REV CODE- 250: Performed by: NURSE ANESTHETIST, CERTIFIED REGISTERED

## 2020-05-14 PROCEDURE — 76060000031 HC ANESTHESIA FIRST 0.5 HR: Performed by: INTERNAL MEDICINE

## 2020-05-14 RX ORDER — FENTANYL CITRATE 50 UG/ML
100 INJECTION, SOLUTION INTRAMUSCULAR; INTRAVENOUS
Status: DISCONTINUED | OUTPATIENT
Start: 2020-05-14 | End: 2020-05-14 | Stop reason: HOSPADM

## 2020-05-14 RX ORDER — ATROPINE SULFATE 0.1 MG/ML
0.5 INJECTION INTRAVENOUS
Status: DISCONTINUED | OUTPATIENT
Start: 2020-05-14 | End: 2020-05-14 | Stop reason: HOSPADM

## 2020-05-14 RX ORDER — EPINEPHRINE 0.1 MG/ML
1 INJECTION INTRACARDIAC; INTRAVENOUS
Status: DISCONTINUED | OUTPATIENT
Start: 2020-05-14 | End: 2020-05-14 | Stop reason: HOSPADM

## 2020-05-14 RX ORDER — FLUMAZENIL 0.1 MG/ML
0.2 INJECTION INTRAVENOUS
Status: DISCONTINUED | OUTPATIENT
Start: 2020-05-14 | End: 2020-05-14 | Stop reason: HOSPADM

## 2020-05-14 RX ORDER — SODIUM CHLORIDE 9 MG/ML
50 INJECTION, SOLUTION INTRAVENOUS CONTINUOUS
Status: DISCONTINUED | OUTPATIENT
Start: 2020-05-14 | End: 2020-05-14 | Stop reason: HOSPADM

## 2020-05-14 RX ORDER — MIDAZOLAM HYDROCHLORIDE 1 MG/ML
.25-1 INJECTION, SOLUTION INTRAMUSCULAR; INTRAVENOUS
Status: DISCONTINUED | OUTPATIENT
Start: 2020-05-14 | End: 2020-05-14 | Stop reason: HOSPADM

## 2020-05-14 RX ORDER — SODIUM CHLORIDE 9 MG/ML
INJECTION, SOLUTION INTRAVENOUS
Status: DISCONTINUED | OUTPATIENT
Start: 2020-05-14 | End: 2020-05-14 | Stop reason: HOSPADM

## 2020-05-14 RX ORDER — PROPOFOL 10 MG/ML
INJECTION, EMULSION INTRAVENOUS AS NEEDED
Status: DISCONTINUED | OUTPATIENT
Start: 2020-05-14 | End: 2020-05-14 | Stop reason: HOSPADM

## 2020-05-14 RX ORDER — LIDOCAINE HYDROCHLORIDE 20 MG/ML
INJECTION, SOLUTION EPIDURAL; INFILTRATION; INTRACAUDAL; PERINEURAL AS NEEDED
Status: DISCONTINUED | OUTPATIENT
Start: 2020-05-14 | End: 2020-05-14 | Stop reason: HOSPADM

## 2020-05-14 RX ORDER — DEXTROMETHORPHAN/PSEUDOEPHED 2.5-7.5/.8
1.2 DROPS ORAL
Status: DISCONTINUED | OUTPATIENT
Start: 2020-05-14 | End: 2020-05-14 | Stop reason: HOSPADM

## 2020-05-14 RX ORDER — SODIUM CHLORIDE 0.9 % (FLUSH) 0.9 %
5-40 SYRINGE (ML) INJECTION EVERY 8 HOURS
Status: DISCONTINUED | OUTPATIENT
Start: 2020-05-14 | End: 2020-05-14 | Stop reason: HOSPADM

## 2020-05-14 RX ORDER — NALOXONE HYDROCHLORIDE 0.4 MG/ML
0.4 INJECTION, SOLUTION INTRAMUSCULAR; INTRAVENOUS; SUBCUTANEOUS
Status: DISCONTINUED | OUTPATIENT
Start: 2020-05-14 | End: 2020-05-14 | Stop reason: HOSPADM

## 2020-05-14 RX ORDER — SODIUM CHLORIDE 0.9 % (FLUSH) 0.9 %
5-40 SYRINGE (ML) INJECTION AS NEEDED
Status: DISCONTINUED | OUTPATIENT
Start: 2020-05-14 | End: 2020-05-14 | Stop reason: HOSPADM

## 2020-05-14 RX ADMIN — PROPOFOL 120 MG: 10 INJECTION, EMULSION INTRAVENOUS at 09:37

## 2020-05-14 RX ADMIN — PROPOFOL 50 MG: 10 INJECTION, EMULSION INTRAVENOUS at 09:40

## 2020-05-14 RX ADMIN — PROPOFOL 30 MG: 10 INJECTION, EMULSION INTRAVENOUS at 09:42

## 2020-05-14 RX ADMIN — LIDOCAINE HYDROCHLORIDE 40 MG: 20 INJECTION, SOLUTION EPIDURAL; INFILTRATION; INTRACAUDAL; PERINEURAL at 09:37

## 2020-05-14 RX ADMIN — PROPOFOL 50 MG: 10 INJECTION, EMULSION INTRAVENOUS at 09:46

## 2020-05-14 RX ADMIN — Medication 80 MG: at 09:43

## 2020-05-14 RX ADMIN — PROPOFOL 50 MG: 10 INJECTION, EMULSION INTRAVENOUS at 09:49

## 2020-05-14 RX ADMIN — SODIUM CHLORIDE: 900 INJECTION, SOLUTION INTRAVENOUS at 09:30

## 2020-05-14 NOTE — H&P
[address]  [address]  [place], [address]  [phone]                                History and Physical     NAME: Zoe Hagan   :  1950   MRN:  [id number]     HPI:  The patient was seen and examined. Past Surgical History:   Procedure Laterality Date    COLONOSCOPY N/A 2019    tubular adenoma, repeat 1 yr d/t poor prep - performed by Melanie Chambers MD at [facility] ENDOSCOPY;     HX ANKLE FRACTURE TX Left     surgery - has a plate a screws    HX DILATION AND CURETTAGE      HX OTHER SURGICAL Right 2020    growth removed from right eyelid    HX OTHER SURGICAL Right 2020    pic line insertion.  HX TONSILLECTOMY       Past Medical History:   Diagnosis Date    Arthritis     left ankle    Chronic kidney disease     kidney stone    Chronic pain of left ankle 2017    Seeing Dr Rachel Soto    Fracture     left ankle    Ill-defined condition     PICC line for UTI - borderline sepsis    Osteopenia 2017     Social History     Tobacco Use    Smoking status: Former Smoker    Smokeless tobacco: Never Used    Tobacco comment: was a causal smoker when smoker in college   Substance Use Topics    Alcohol use: Yes     Alcohol/week: 5.0 standard drinks     Types: 5 Glasses of wine per week     Frequency: 4 or more times a week     Drinks per session: 1 or 2     Binge frequency: Never    Drug use: No     Allergies   Allergen Reactions    Other Medication Hives     Cranberry or Probiotic, had severe itching and hives, not sure to which. Has taken a probiotic recently without a reaction.      Family History   Problem Relation Age of Onset   [address] Cancer Mother         Brain tumor    Other Father         ? lung problem    Cancer Sister         tumors of spine    Other Sister         kidney stones    Other Daughter         kidney stone    Other Maternal Aunt         kidney stones     No current facility-administered medications for this encounter. PHYSICAL EXAM:  General: WD, WN. Alert, cooperative, no acute distress    HEENT: NC, Atraumatic. PERRLA, EOMI. Anicteric sclerae. Lungs:  CTA Bilaterally. No Wheezing/Rhonchi/Rales. Heart:  Regular  rhythm,  No murmur, No Rubs, No Gallops  Abdomen: Soft, Non distended, Non tender. +Bowel sounds, no HSM  Extremities: No c/c/e  Neurologic:  CN 2-12 gi, Alert and oriented X 3. No acute neurological distress   Psych:   Good insight. Not anxious nor agitated. The heart, lungs and mental status were satisfactory for the administration of MAC sedation and for the procedure. Mallampati score: 2     The patient was counseled at length about the risks of araseli Covid-19 in the mabel-operative and post-operative states including the recovery window of their procedure. The patient was made aware that araseli Covid-19 after a surgical procedure may worsen their prognosis for recovering from the virus and lend to a higher morbidity and or mortality risk. The patient was given the options of postponing their procedure. All of the risks, benefits, and alternatives were discussed. The patient does wish to proceed with the procedure.       Assessment:   · Personal history polyps, poor prep colonoscopy 2019    Plan:   · Endoscopic procedure :colonoscopy  · MAC sedation

## 2020-05-14 NOTE — PROGRESS NOTES

## 2020-05-14 NOTE — PROCEDURES
[address] [facility].  [address] [facility] [address], [address]   [phone]                              Colonoscopy Procedure Note      Indications:  Personal history of colon polyps, poor bowel prep colonoscopy Feb, 2019     :  Kathy Merino MD    Referring Provider: Kiara Moore MD    Sedation:  MAC anesthesia    Procedure Details:  After informed consent was obtained with all risks and benefits of procedure explained and preoperative exam completed, the patient was taken to the endoscopy suite and placed in the left lateral decubitus position. Upon sequential sedation as per above, a digital rectal exam was performed per below. The Olympus videocolonoscope was inserted in the rectum and carefully advanced to the terminal ileum. The quality of preparation was good. Paris Bowel Prep Score :2/3/3 . The colonoscope was slowly withdrawn with careful evaluation between folds. Retroflexion in the rectum was performed. Findings:   Rectum: small internal hemorrhoids  Sigmoid: normal  Descending Colon: normal  Transverse Colon: normal  Ascending Colon: normal  Cecum: normal  Terminal Ileum: normal    Interventions:  none    Specimen Removed:  * No specimens in log *    Complications: None. EBL:  none    Impression:    See Postoperative diagnosis above    Recommendations:   - Resume normal medications.  - Recommend repeat colonoscopy in 5 years with double prep. Discharge Disposition:  Home in the company of a  when able to ambulate.     Kathy Merino MD  5/14/2020  9:57 AM

## 2020-05-14 NOTE — ANESTHESIA PREPROCEDURE EVALUATION
Relevant Problems   No relevant active problems       Anesthetic History   No history of anesthetic complications            Review of Systems / Medical History  Patient summary reviewed, nursing notes reviewed and pertinent labs reviewed    Pulmonary  Within defined limits                 Neuro/Psych   Within defined limits           Cardiovascular                  Exercise tolerance: >4 METS     GI/Hepatic/Renal                Endo/Other        Arthritis     Other Findings              Physical Exam    Airway  Mallampati: I  TM Distance: > 6 cm  Neck ROM: normal range of motion   Mouth opening: Normal     Cardiovascular    Rhythm: regular  Rate: normal         Dental  No notable dental hx       Pulmonary  Breath sounds clear to auscultation               Abdominal         Other Findings            Anesthetic Plan    ASA: 2  Anesthesia type: MAC          Induction: Intravenous  Anesthetic plan and risks discussed with: Patient

## 2020-05-14 NOTE — DISCHARGE INSTRUCTIONS
[address] [facility].  [address] [place] [address] [address], [address]  [address] [facility]  [id number]  1950    It was my pleasure seeing you for your procedure. You will also receive a summary report with the findings from this procedure and any further recommendations. If you had polyps removed or biopsies taken during your procedure, you will receive a separate letter from me within the next 2 weeks. If you don't receive this letter or if you have any questions, please call my office [phone]. Please take note of the post procedure instructions listed below. Tyler Archibald,    Dr. Dustin Cardoso    These instructions give you information on caring for yourself after your procedure. Call your doctor if you have any problems or questions after your procedure. HOME CARE  · Walk if you have belly cramping or gas. Walking will help get rid of the air and reduce the bloated feeling in your belly (abdomen). · Your IV site (where you received drugs) may be tender to touch. Place warm towels on the site; keep your arm up on two pillows if you have any swelling or soreness in the area. · You may shower. ACTIVITY:  · Take frequent rest periods and move at a slower pace for the next 24 hours. .  · You may resume your regular activity tomorrow if you are feeling back to normal.  · Do not drive or ride a bicycle for at least 24 hours (because of the medicine (anesthesia) used during the test). · Do not sign any important legal documents or use or operate any machinery for 24 hours  · Do not take sleeping medicines/nerve drugs for 24 hours unless the doctor tells you. · You can return to work/school tomorrow unless otherwise instructed. NUTRITION:  · Drink plenty of fluids to keep your pee (urine) clear or pale yellow  · Begin with a light meal and progress to your normal diet.  Heavy or fried foods are harder to digest and may make you feel sick to your stomach (nauseated). · Once you are feeling back to normal, you may resume your normal diet as instructed by your doctor. · Avoid alcoholic beverages for 24 hours or as instructed. IF YOU HAD BIOPSIES TAKEN OR POLYPS REMOVED DURING THE PROCEDURE:  · For the next 7 days, avoid all non-steroidal antiinflammatory medications such as Ibuprofen, Motrin, Advil, Alleve, Martha-seltzer, Goody's powder, BC powder. · If you do not have an heart condition that requires you to take a daily aspirin, you should avoid taking aspirin for 7 days. · Eat a soft diet for 24 hours. · Monitor your stools for any blood or dark black, tar-like, stools as this may be a sign of bleeding and if you see any blood, notify your doctor immediately. GET HELP RIGHT AWAY AND SEEK IMMEDIATE MEDICAL CARE IF:  · You have more than a spotting of blood in your stool. · You pass clumps of tissue (blood clots) or fill the toilet with blood. · Your belly is painfully swollen or puffy (abdominal distention). · You throw up (vomit). · You have a fever. · You have redness, pain or swelling at the IV site that last greater than two days. · You have abdominal pain or discomfort that is severe or gets worse throughout the day. Post-procedure diagnosis:  1. - Hemorrhoids    Post-procedure recommendations:  Findings:   Rectum: small internal hemorrhoids  Sigmoid: normal  Descending Colon: normal  Transverse Colon: normal  Ascending Colon: normal  Cecum: normal  Terminal Ileum: normal    Recommendations:   - Resume normal medications.  - Recommend repeat colonoscopy in 5 years with double prep. Learning About Coronavirus [phone])  Coronavirus [phone]): Overview  What is coronavirus (COVID-19)? The coronavirus disease (COVID-19) is caused by a virus. It is an illness that was first found in [place], [place], in December 2019. It has since spread worldwide.   The virus can cause fever, cough, and trouble breathing. In severe cases, it can cause pneumonia and make it hard to breathe without help. It can cause death. Coronaviruses are a large group of viruses. They cause the common cold. They also cause more serious illnesses like Middle East respiratory syndrome (MERS) and severe acute respiratory syndrome (SARS). COVID-19 is caused by a novel coronavirus. That means it's a new type that has not been seen in people before. This virus spreads person-to-person through droplets from coughing and sneezing. It can also spread when you are close to someone who is infected. And it can spread when you touch something that has the virus on it, such as a doorknob or a tabletop. What can you do to protect yourself from coronavirus (COVID-19)? The best way to protect yourself from getting sick is to:  · Avoid areas where there is an outbreak. · Avoid contact with people who may be infected. · Wash your hands often with soap or alcohol-based hand sanitizers. · Avoid crowds and try to stay at least 6 feet away from other people. · Wash your hands often, especially after you cough or sneeze. Use soap and water, and scrub for at least 20 seconds. If soap and water aren't available, use an alcohol-based hand . · Avoid touching your mouth, nose, and eyes. What can you do to avoid spreading the virus to others? To help avoid spreading the virus to others:  · Cover your mouth with a tissue when you cough or sneeze. Then throw the tissue in the trash. · Use a disinfectant to clean things that you touch often. · Stay home if you are sick or have been exposed to the virus. Don't go to school, work, or public areas. And don't use public transportation. · If you are sick:  ? Leave your home only if you need to get medical care. But call the doctor's office first so they know you're coming. And wear a face mask, if you have one.  ? If you have a face mask, wear it whenever you're around other people.  It can help stop the spread of the virus when you cough or sneeze. ? Clean and disinfect your home every day. Use household  and disinfectant wipes or sprays. Take special care to clean things that you grab with your hands. These include doorknobs, remote controls, phones, and handles on your refrigerator and microwave. And don't forget countertops, tabletops, bathrooms, and computer keyboards. When to call for help  Call 911 anytime you think you may need emergency care. For example, call if:  · You have severe trouble breathing. (You can't talk at all.)  · You have constant chest pain or pressure. · You are severely dizzy or lightheaded. · You are confused or can't think clearly. · Your face and lips have a blue color. · You pass out (lose consciousness) or are very hard to wake up. Call your doctor now if you develop symptoms such as:  · Shortness of breath. · Fever. · Cough. If you need to get care, call ahead to the doctor's office for instructions before you go. Make sure you wear a face mask, if you have one, to prevent exposing other people to the virus. Where can you get the latest information? The following health organizations are tracking and studying this virus. Their websites contain the most up-to-date information. [place] also learn what to do if you think you may have been exposed to the virus. · U.S. Centers for Disease Control and Prevention (CDC): The CDC provides updated news about the disease and travel advice. The website also tells you how to prevent the spread of infection. www.cdc.gov  · World Health Organization [facility]): WHO offers information about the virus outbreaks. WHO also has travel advice. www.who.int  Current as of: April 1, 2020               Content Version: 12.4  © 5526-8684 [employer].    Care instructions adapted under license by your healthcare professional. If you have questions about a medical condition or this instruction, always ask your healthcare professional. [address] any warranty or liability for your use of this information.

## 2020-05-14 NOTE — ANESTHESIA POSTPROCEDURE EVALUATION
Post-Anesthesia Evaluation and Assessment    Patient: Zoe Hagan MRN: [id number]  SSN: [id number]    YOB: 1950  Age: 81 y.o. Sex: female      I have evaluated the patient and they are stable and ready for discharge from the PACU. Cardiovascular Function/Vital Signs  Visit Vitals  /81   Pulse 78   Temp 36.5 °C (97.7 °F)   Resp 20   Wt 63.5 kg (140 lb 1.6 oz)   SpO2 100%   Breastfeeding No   BMI 23.31 kg/m²       Patient is status post MAC anesthesia for Procedure(s):  COLONOSCOPY. Nausea/Vomiting: None    Postoperative hydration reviewed and adequate. Pain:  Pain Scale 1: Numeric (0 - 10) (05/14/20 1040)  Pain Intensity 1: 0 (05/14/20 1020)   Managed    Neurological Status: At baseline    Mental Status, Level of Consciousness: Alert and  oriented to person, place, and time    Pulmonary Status:   O2 Device: Room air (05/14/20 1040)   Adequate oxygenation and airway patent    Complications related to anesthesia: None    Post-anesthesia assessment completed. No concerns    Signed By: Sam Mars MD     May 14, 2020              Procedure(s):  COLONOSCOPY.     MAC    <BSHSIANPOST>    Vitals Value Taken Time   /81 5/14/2020 10:40 AM   Temp 36.5 °C (97.7 °F) 5/14/2020 10:07 AM   Pulse 78 5/14/2020 10:40 AM   Resp 20 5/14/2020 10:40 AM   SpO2 100 % 5/14/2020 10:40 AM

## 2020-05-14 NOTE — ROUTINE PROCESS
Kavin Polanco 
1950 [id number] Situation: 
Verbal report received from: Hugh Caballero Procedure: Procedure(s): 
COLONOSCOPY Background: 
 
Preoperative diagnosis: personal hx polyp Postoperative diagnosis: 1. - Hemorrhoids :  Dr. Anand Gordon Assistant(s): Endoscopy Technician-1: Rony Horner 
Endoscopy RN-1: Nick Canela RN Specimens: * No specimens in log * H. Pylori  no Assessment: 
Intra-procedure medications Anesthesia gave intra-procedure sedation and medications, see anesthesia flow sheet Intravenous fluids: NS@ [place] Vital signs stable Abdominal assessment: round and soft Recommendation: 
Discharge patient per MD order Family or Friend Permission to share finding with family or friend

## 2020-05-26 ENCOUNTER — VIRTUAL VISIT (OUTPATIENT)
Dept: INTERNAL MEDICINE CLINIC | Age: 70
End: 2020-05-26

## 2020-05-26 VITALS — WEIGHT: 145 LBS | BODY MASS INDEX: 24.13 KG/M2

## 2020-05-26 DIAGNOSIS — Z87.448 HISTORY OF PYELONEPHRITIS: ICD-10-CM

## 2020-05-26 DIAGNOSIS — Z13.31 SCREENING FOR DEPRESSION: ICD-10-CM

## 2020-05-26 DIAGNOSIS — M85.89 OSTEOPENIA OF MULTIPLE SITES: ICD-10-CM

## 2020-05-26 DIAGNOSIS — M25.572 CHRONIC PAIN OF LEFT ANKLE: ICD-10-CM

## 2020-05-26 DIAGNOSIS — Z71.89 ADVANCED CARE PLANNING/COUNSELING DISCUSSION: ICD-10-CM

## 2020-05-26 DIAGNOSIS — G89.29 CHRONIC PAIN OF LEFT ANKLE: ICD-10-CM

## 2020-05-26 DIAGNOSIS — Z00.00 MEDICARE ANNUAL WELLNESS VISIT, SUBSEQUENT: Primary | ICD-10-CM

## 2020-05-26 DIAGNOSIS — Z23 ENCOUNTER FOR IMMUNIZATION: ICD-10-CM

## 2020-05-26 PROBLEM — A41.9 SEPSIS (HCC): Status: RESOLVED | Noted: 2020-02-15 | Resolved: 2020-05-26

## 2020-05-26 RX ORDER — ZOSTER VACCINE RECOMBINANT, ADJUVANTED 50 MCG/0.5
KIT INTRAMUSCULAR
Qty: 0.5 ML | Refills: 1 | Status: SHIPPED
Start: 2020-05-26 | End: 2021-05-03

## 2020-05-26 NOTE — ACP (ADVANCE CARE PLANNING)
Advance Care Planning     Advance Care Planning (ACP) Physician/NP/PA (Provider) Conversation    Date of ACP Conversation: 05/26/20  Persons included in Conversation:  patient and family  Length of ACP Conversation in minutes: <16 minutes (Non-Billable)    Authorized Decision Maker (if patient is incapable of making informed decisions):   Named in Advance Directive or Healthcare Power of         She has an advanced directive - a copy has been provided & still reflects her wishes. Reviewed DNR/DNI and patient is not interested. Care Preferences:    Hospitalization: \"If your health worsens and it becomes clear that your chance of recovery is unlikely, what would your preference be regarding hospitalization? \"  Yes, patient would want hospitalization      Resuscitation: \"In the event your heart stopped as a result of an underlying serious health condition, would you want attempts to be made to restart your heart? \"   Yes, patient would want to attempt CPR      Ventilation: \"If you were in your present state of health and suddenly became very ill and were unable to breathe on your own, what would your preference be about the use of a ventilator (breathing machine) if it was available to you? \"    Yes, patient would desire the use of a ventilator      Jenn Randolph MD

## 2020-05-26 NOTE — PROGRESS NOTES
Malcolm Kruse is a 81 y.o. female who was seen by synchronous (real-time) audio-video technology on 5/26/2020. Consent: Malcolm Kruse who was seen by synchronous (real-time) audio-video technology, and/or her healthcare decision maker, is aware that this patient-initiated, Telehealth encounter on 5/26/2020 is a billable service, with coverage as determined by her insurance carrier. She is aware that she may receive a bill and has provided verbal consent to proceed: Yes. Patient identification was verified prior to start of the visit. A caregiver was present when appropriate. Due to this being a TeleHealth encounter (during [address] emergency), evaluation of the following organ systems was limited: VS/Constituional/EENT/Resp/CV/GI//MS/Neuro/Skin/Heme-Lymph-Imm. Pursuant to the emergency declaration under the [address] [place] [address], [facility] waiver authority and the [employer] Act, this Virtual Visit was conducted, with patient's (and/or legal guardian's) consent, to reduce the patient's risk of exposure to COVID-19 and provide necessary medical care. Services were provided through a synchronous discussion virtually to substitute for in-person clinic visit. I was at home. The patient was at home. Assessment & Plan:     Diagnoses and all orders for this visit:    1. Medicare annual wellness visit, subsequent    2. Advanced care planning/counseling discussion    3. Screening for depression  -     DEPRESSION SCREEN ANNUAL    4. Encounter for immunization  -     pneumococcal 23-valent (PNEUMOVAX 23) 25 mcg/0.5 mL injection; 0.5 mL by IntraMUSCular route once for 1 dose.  -     varicella-zoster recombinant, PF, (Shingrix, PF,) 50 mcg/0.5 mL susr injection; 0.5 ml IM once and then repeat in 2-6 months.     5. Chronic pain of left ankle- well controll, continue current medications, reviewed side effects of concerns to monitor for, recent labs looked good    6. Osteopenia of multiple sites- tolerating medications well, will continue current medications at this time, will need DEXA next year, patient interested in getting off medications but recommend continuing until next year and reviewed we can discuss stopping at that time (will have been on meds x 4 yrs)    7. History of pyelonephritis- resolved, PICC line out. Follow-up and Dispositions    · Return in about 1 year (around 5/26/2021), or if symptoms worsen or fail to improve. Subjective:   Martha Hager was seen for No chief complaint on file. Subjective: Annual Wellness Visit- Subsequent Visit    End of Life Planning: This was discussed with her today and she has an advanced directive - a copy has been provided. Reviewed DNR/DNI and patient is not interested. Depression Screen:  3 most recent PHQ Screens 2/15/2019   Little interest or pleasure in doing things Not at all   Feeling down, depressed, irritable, or hopeless Not at all   Total Score PHQ 2 0         Fall Risk:   Fall Risk Assessment, last 12 mths 2/19/2020   Able to walk? Yes   Fall in past 12 months? No   Fall with injury? -   Number of falls in past 12 months -   Fall Risk Score -       Abuse Screen:  Abuse Screening Questionnaire 2/15/2019   Do you ever feel afraid of your partner? N   Are you in a relationship with someone who physically or mentally threatens you? N   Is it safe for you to go home? Y       Alcohol Risk Factor Screening:  Alcohol Risk Factor Screening:   Do you average 1 drink per night or more than 7 drinks a week:  No    On any one occasion in the past three months have you have had more than 3 drinks containing alcohol:  No      Functional Ability and Level of Safety:   Hearing Screen: Hearing is good.     Cognition Screen:  Has your family/caregiver stated any concerns about your memory: no    Ambulation: with no difficulty    Activities of Daily Living: Exercise: previously was active, not as active in quarantine   The home contains: grab bars  Patient does total self care    Adult Nutrition Screen:  No risk factors noted. Health Maintenance:   Daily Aspirin: no  Bone Density: done 5/16/19 - osteopenia  Glaucoma Screening: UTD    Immunizations:   Influenza: up to date  Tetanus: up to date  Shingles: due for Shingrix - script provided  Pneumonia: due for Pneumovax, script provided  Cancer screening:   Cervical: reviewed guidelines, up to date, she will defer further screening. OBGYN retired. Breast: reviewed guidelines, UTD - scheduled for tomorrow  Colon: reviewed guidelines, up to date       Patient Care Team:  Johnathan Lazo MD as PCP - General (Internal Medicine)  Johnathan Lazo MD as PCP - [facility]  Sherry Kinney MD as Physician (Physical Medicine and Rehab)  Zamzam Hunter MD as Physician (Ophthalmology)  Johny Laurent MD as Physician (Obstetrics & Gynecology)       In addition to the above issues we also reviewed the following acute and/or chronic problems:    Endocrine Review  She is seen for osteopenia. Since last visit: no issues. She is on the following treatment: fosamax 70 mg weekly. She reports compliance with all meds, and denies any med side effects. She denies any falls or joint pain. The following sections were reviewed & updated as appropriate: PMH, PSH, FH, and SH. Prior to Admission medications    Medication Sig Start Date End Date Taking? Authorizing Provider   Lactobacillus acidophilus (PROBIOTIC PO) Take  by mouth. Takes one po once daily. Provider, Historical   diclofenac EC (VOLTAREN) 75 mg EC tablet Take 75 mg by mouth two (2) times a day.     Provider, Historical   DULoxetine (CYMBALTA) 20 mg capsule TAKE ONE CAPSULE BY MOUTH EVERY EVENING 4/13/20   Johnathan Lazo MD   cefepime 2 gram 2 g, ADDaptor 1 Device IVPB 2 g by IntraVENous route every twelve (12) hours every twelve (12) hours. For two weeks. Home antibiotic order placed by ID 2/19/20   Last Thurman MD   cholecalciferol (VITAMIN D3) 25 mcg (1,000 unit) cap Take 1,000 Units by mouth daily. Other, MD Karin   alendronate (FOSAMAX) 70 mg tablet TAKE ONE TABLET BY MOUTH ONCE EVERY 7 DAYS 12/12/19   Gen Márquez MD   cyanocobalamin, vitamin B-12, (VITAMIN B-12 SL) 3,000 mcg by SubLINGual route daily. Provider, Historical   VITAMIN E ACETATE (VITAMIN E PO) Take 1,200 mg by mouth daily. Provider, Historical       Allergies   Allergen Reactions    Other Medication Hives     Cranberry or Probiotic, had severe itching and hives, not sure to which. Has taken a probiotic recently without a reaction. Review of Systems   Constitutional: Negative for malaise/fatigue and weight loss. Respiratory: Negative for cough and shortness of breath. Cardiovascular: Negative for chest pain, palpitations and leg swelling. Gastrointestinal: Negative for abdominal pain, constipation, diarrhea, heartburn, nausea and vomiting. Musculoskeletal: Positive for joint pain (Ankle pain, improved off Gabapentin. Taking Diclofenac BID, w/o side effects, and improvement in function). Negative for myalgias. Skin: Negative for rash. Neurological: Negative for dizziness and headaches. Psychiatric/Behavioral: Negative for depression. The patient is not nervous/anxious and does not have insomnia. Objective:     General: alert, cooperative, no distress   Mental  status: normal mood, behavior, speech, dress, motor activity, and thought processes   Eyes: normal sclera   Mouth:    Neck: no visualized mass   Resp: normal effort and no respiratory distress   Neuro: no gross deficits   Musculoskeletal:    Skin: no discoloration or lesions of concern on visible areas   Psychiatric: normal affect         We discussed the expected course, resolution and complications of the diagnosis(es) in detail.   Medication risks, benefits, costs, interactions, and alternatives were discussed as indicated. I advised her to contact the office if her condition worsens, changes or fails to improve as anticipated. She expressed understanding with the diagnosis(es) and plan.      Eden House MD

## 2020-05-26 NOTE — PATIENT INSTRUCTIONS
Medicare Wellness Visit, Female The best way to live healthy is to have a lifestyle where you eat a well-balanced diet, exercise regularly, limit alcohol use, and quit all forms of tobacco/nicotine, if applicable. Regular preventive services are another way to keep healthy. Preventive services (vaccines, screening tests, monitoring & exams) can help personalize your care plan, which helps you manage your own care. Screening tests can find health problems at the earliest stages, when they are easiest to treat. [facility] follows the current, evidence-based guidelines published by the [facility] Jeffy Canales ([facility]) when recommending preventive services for our patients. Because we follow these guidelines, sometimes recommendations change over time as research supports it. (For example, mammograms used to be recommended annually. Even though Medicare will still pay for an annual mammogram, the newer guidelines recommend a mammogram every two years for women of average risk). Of course, you and your doctor may decide to screen more often for some diseases, based on your risk and your co-morbidities (chronic disease you are already diagnosed with). Preventive services for you include: - Medicare offers their members a free annual wellness visit, which is time for you and your primary care provider to discuss and plan for your preventive service needs. Take advantage of this benefit every year! 
-All adults over the age of 72 should receive the recommended pneumonia vaccines. Current USPSTF guidelines recommend a series of two vaccines for the best pneumonia protection.  
-All adults should have a flu vaccine yearly and a tetanus vaccine every 10 years.  
-All adults age 48 and older should receive the shingles vaccines (series of two vaccines). -All adults age 38-68 who are overweight should have a diabetes screening test once every three years. -All adults born between [birth year] and 1963 should be screened once for Hepatitis C. 
-Other screening tests and preventive services for persons with diabetes include: an eye exam to screen for diabetic retinopathy, a kidney function test, a foot exam, and stricter control over your cholesterol.  
-Cardiovascular screening for adults with routine risk involves an electrocardiogram (ECG) at intervals determined by your doctor.  
-Colorectal cancer screenings should be done for adults age 54-65 with no increased risk factors for colorectal cancer. There are a number of acceptable methods of screening for this type of cancer. Each test has its own benefits and drawbacks. Discuss with your doctor what is most appropriate for you during your annual wellness visit. The different tests include: colonoscopy (considered the best screening method), a fecal occult blood test, a fecal DNA test, and sigmoidoscopy. 
 
-A bone mass density test is recommended when a woman turns 65 to screen for osteoporosis. This test is only recommended one time, as a screening. Some providers will use this same test as a disease monitoring tool if you already have osteoporosis. -Breast cancer screenings are recommended every other year for women of normal risk, age 54-69. 
-Cervical cancer screenings for women over age 72 are only recommended with certain risk factors. Here is a list of your current Health Maintenance items (your personalized list of preventive services) with a due date: 
Health Maintenance Due Topic Date Due  Shingles Vaccine (1 of 2) 12/05/2002  Pneumococcal Vaccine (1 of 1 - PPSV23) 12/05/2017 [address] [place] [address] Annual Well Visit  02/16/2020 Tomás Hunt 3568 What is a living will? A living will is a legal form you use to write down the kind of care you want at the end of your life. It is used by the health professionals who will treat you if you aren't able to decide for yourself. If you put your wishes in writing, your loved ones and others will know what kind of care you want. They won't need to guess. This can ease your mind and be helpful to others. A living will is not the same as an estate or property will. An estate will explains what you want to happen with your money and property after you die. Is a living will a legal document? A living will is a legal document. Each state has its own laws about living [place]. If you move to another state, make sure that your living will is legal in the state where you now live. Or you might use a universal form that has been approved by many states. This kind of form can sometimes be completed and stored online. Your electronic copy will then be available wherever you have a connection to the Internet. In most cases, doctors will respect your wishes even if you have a form from a different state. · You don't need an  to complete a living will. But legal advice can be helpful if your state's laws are unclear, your health history is complicated, or your family can't agree on what should be in your living will. · You can change your living will at any time. Some people find that their wishes about end-of-life care change as their health changes. · In addition to making a living will, think about completing a medical power of  form. This form lets you name the person you want to make end-of-life treatment decisions for you (your \"health care agent\") if you're not able to. Many hospitals and nursing homes will give you the forms you need to complete a living will and a medical power of . · Your living will is used only if you can't make or communicate decisions for yourself anymore. If you become able to make decisions again, you can accept or refuse any treatment, no matter what you wrote in your living will. · Your state may offer an online registry.  This is a place where you can store your living will online so the doctors and nurses who need to treat you can find it right away. What should you think about when creating a living will? Talk about your end-of-life wishes with your family members and your doctor. Let them know what you want. That way the people making decisions for you won't be surprised by your choices. Think about these questions as you make your living will: · Do you know enough about life support methods that might be used? If not, talk to your doctor so you know what might be done if you can't breathe on your own, your heart stops, or you're unable to swallow. · What things would you still want to be able to do after you receive life-support methods? Would you want to be able to walk? To speak? To eat on your own? To live without the help of machines? · If you have a choice, where do you want to be cared for? In your home? At a hospital or nursing home? · Do you want certain Jehovah's witness practices performed if you become very ill? · If you have a choice at the end of your life, where would you prefer to die? At home? In a hospital or nursing home? Somewhere else? · Would you prefer to be buried or cremated? · Do you want your organs to be donated after you die? What should you do with your living will? · Make sure that your family members and your health care agent have copies of your living will. · Give your doctor a copy of your living will to keep in your medical record. If you have more than one doctor, make sure that each one has a copy. · You may want to put a copy of your living will where it can be easily found. Where can you learn more? Go to http://tiffanie-bessie.info/. Enter E906 in the search box to learn more about \"Learning About Living Tim Upton. \" Current as of: August 8, 2016 Content Version: 11.3 © 3211-1907 [employer].  Care instructions adapted under license by [address] (which disclaims liability or warranty for this information). If you have questions about a medical condition or this instruction, always ask your healthcare professional. [address] any warranty or liability for your use of this information.

## 2020-06-02 ENCOUNTER — HOSPITAL ENCOUNTER (OUTPATIENT)
Dept: MAMMOGRAPHY | Age: 70
Discharge: HOME | End: 2020-06-02
Attending: PEDIATRICS
Payer: MEDICARE

## 2020-06-02 DIAGNOSIS — Z12.31 VISIT FOR SCREENING MAMMOGRAM: ICD-10-CM

## 2020-06-02 DIAGNOSIS — M85.89 OSTEOPENIA OF MULTIPLE SITES: ICD-10-CM

## 2020-06-02 PROCEDURE — 77067 SCR MAMMO BI INCL CAD: CPT

## 2020-06-02 RX ORDER — ALENDRONATE SODIUM 70 MG/1
TABLET ORAL
Qty: 12 TAB | Refills: 1 | Status: SHIPPED | OUTPATIENT
Start: 2020-06-02 | End: 2020-11-17

## 2020-06-29 RX ORDER — DULOXETIN HYDROCHLORIDE 20 MG/1
CAPSULE, DELAYED RELEASE ORAL
Qty: 90 CAP | Refills: 1 | Status: SHIPPED | OUTPATIENT
Start: 2020-06-29 | End: 2021-01-02

## 2020-07-08 NOTE — PROGRESS NOTES
PHYSICAL THERAPY Treatment:    Date: 7/8/2020  Recorded diagnosis/complaint at time of admission:  There are no active hospital problems to display for this patient.    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   • SCFE (slipped capital femoral epiphysis)   • Alcohol use   • Segmental and somatic dysfunction of pelvic region   • Segmental and somatic dysfunction of sacral region   • Pain of right sacroiliac joint   • Cerebellar stroke (CMS/HCC)     S/p CONVERSION OF PRIOR RIGHT HIP SURGERY TO RIGHT TOTAL HIP ARTHROPLASTY - RIGHT     Clinical Presentation: stable and/or uncomplicated characteristics  Treatment Diagnosis: Pain, Impaired Range of Motion, Impaired Gait/Locomotion Deficits, Impaired Mobility and Impaired Balance    Therapy Precautions:  Weight Bearing Status WBAT RLE  Hip Posterior - no flexion past 90 degrees, no adduction past neutral, no internal rotation    Activity: As tolerated and up with assist    Cognition: Alert and [place] x3      SUBJECTIVE:   Pt. Reported agreeable to therapy and agreeable to having significant other present for session     Pt's personal goal for therapy: return home    Pain:  Intervention: premedicated, nursing aware, repositioned, ice applied and activity  Location: right, hip   Did not rate     OBSERVATION:   Pt is utilizing Capped IV  Skin Integrity: Impaired, right hip incision under bandage, nurse managing  Edema: post-surgical right, lower extremity  Collaboration with: Nurse Davide    Vital Signs: VSS    ROM (degrees):  UE: within functional limits  LE: limited right hip secpndary to precautions, discomfort  Lumbar: within functional limits  Cervical: within functional limits    Strength (out of 5 in available AROM):  UE: within functional limits  LE: limited right hip able to weightbear with 2ww and stephanie UE support  Lumbar: within functional limits  Cervical: within functional limits  Abdominal: within functional  Results released to patient via [employer]. All labs are stable or at goal for her. limits, able to come to sitting without assist    Neurological:  Coordination: intact for gross mobility    Balance:   Static Sitting: impaired requires SBA   Static Standing: impaired requires CGA and 2ww    Outcome Measure:   not completed this date secondary to focus on other areas of concern/limitation       MOBILITY:    Bed:   Supine to Sit: Stand By Assistance (SBA)  Sit to Supine: Stand By Assistance (SBA)  Reason for Assistance: requires increased time to complete, weakness, verbal cues for  sequencing, fatigue    Transfers:   Device Used: gait belt, 2 wheeled walker  Sit to Stand: Contact Guard Assistance (CGA)  Stand to Sit: Contact Guard Assistance (CGA)  Reason for Assistance: requires increased time to complete, weakness, verbal cues for hand placement, sequencing, precautions, weightbearing, fatigue    Ambulation:   Assistance Level: Contact Guard Assistance (CGA)  Distance: 100 feet  Device Used: gait belt and 2 wheeled walker  Gait Mechanics: step to pattern, step through pattern, decreased step length bilateral, decreased stance time right, decreased wendy  Reason for Assistance: requires increased time to complete, weakness, verbal cues for sequencing, weightbearing, manual cues for first time walking, fatigue      Stairs:   Not addressed this session    Exercises:  Supine: Ankle Pumps, Heel Slides, writer instructed patient on exercises, patient verbalizes understanding     Patient was educated in posterior hip precautions and patient verbalized understanding of these precautions. Handout issued.    Activity Tolerance: limited by fatigue     GOALS:     Rehab potential is good due to positive factors family support, recent onset, post - surgical  and negative factors co-morbidities    Review Date: 7/10/2020  1. Patient will complete HEP with [place].  Goal not met  2. Patient will complete bed mobility with Modified [place] (mod I).  Progressing toward  3. Patient will complete sit  to/from stand with Modified [place] (mod I) and 2 wheeled walker.   Progressing toward  4. Patient will ambulate 150 feet with Modified [place] (mod I) and 2 wheeled walker.   Goal not met  5. Patient will negotiate 1 stairs with Modified [place] (mod I) and 2 wheeled walker.   Goal not met  6. Patient will demonstrate and/or verbalize understanding of car transfer completion.   Goal not met     PLAN OF CARE:    PT Frequency: Twice a day  Duration: 2-3 days  Treatment/Interventions: Functional transfer training, Strengthening, ROM, Endurance training, Patient/Family training, Equipment eval/education, Bed mobility, Gait training, Stairs retraining, Safety Education     RECOMMENDATIONS/EDUCATION:    Learner: patient  Readiness to Learn: acceptance  Learning Method: Verbal and Written  Barriers to Learning: no barriers apparent at this time  Learning Evaluation: Verbalizes understanding and Needs reinforcement  Teaching Content: Precautions, Weight Bearing Status, Equipment, Safety Awareness, Functional Mobility and Role of physical therapy in the hospital setting  Please reference the patient education activity for further information regarding the patient's learning assessment.  Equipment for discharge: 2 wheeled walker       Order Status: no order placed        Delivery Status: will issue at d/c     See doc flowsheet (PT assess/treat/goals/charges) for specific information regarding time spent with patient.     Treatment Plan for Next Session: bed mobility, transfers, ambulation, stairs    The plan of care and goals were established with the patient and he is in agreement.     ASSESSMENT:       ambulation tolerance is 100 feet  patient Verbalizes understanding and Needs reinforcement of precautions during completion of all functional mobility  Patient presents to physical therapy on DOS 2nd session status post CONVERSION OF PRIOR RIGHT HIP SURGERY TO RIGHT TOTAL HIP ARTHROPLASTY - RIGHT.  Patient is  demonstrating decreased range of motion, decreased strength, balance deficits, pain, decreased activity tolerance, swelling, decreased endurance which is limiting the completion of all functional mobility.  Further skilled physical therapy is required to address these limitations in attempt to maximize the patient's independence.    Recommendation for Discharge: PT WI: Home, OP therapy                    After today's session this therapist is recommending the above location for optimal discharge.  This recommendation is supported by patient is moving well, was independent, has assist at home, and will benefit from continued therapy to address mobility deficits.

## 2020-07-29 ENCOUNTER — TELEPHONE (OUTPATIENT)
Dept: INTERNAL MEDICINE CLINIC | Age: 70
End: 2020-07-29

## 2020-07-29 ENCOUNTER — E-VISIT (OUTPATIENT)
Dept: INTERNAL MEDICINE CLINIC | Age: 70
End: 2020-07-29
Payer: MEDICARE

## 2020-07-29 DIAGNOSIS — R21 RASH: Primary | ICD-10-CM

## 2020-07-29 PROCEDURE — 99421 OL DIG E/M SVC 5-10 MIN: CPT | Performed by: INTERNAL MEDICINE

## 2020-07-29 NOTE — TELEPHONE ENCOUNTER
E-Visit Note: HPI: per patient questionnaire, this has been reviewed EXAM: n/a 
 
---------------------------------- 
Diagnoses and all orders for this visit: 
 
1. Rash PLAN:  
Need more info. Favor contact dermatitis vs reaction to antibiotic.  
 
---------------------------------- The patient was advised to call if these symptoms worsen or fail to improve as anticipated. 5-10 minutes were spent on the digital evaluation and management of this patient.   
 
 
Caden Li MD

## 2020-07-30 ENCOUNTER — OFFICE VISIT (OUTPATIENT)
Dept: INTERNAL MEDICINE CLINIC | Age: 70
End: 2020-07-30

## 2020-07-30 ENCOUNTER — TELEPHONE (OUTPATIENT)
Dept: INTERNAL MEDICINE CLINIC | Age: 70
End: 2020-07-30

## 2020-07-30 VITALS
HEART RATE: 91 BPM | OXYGEN SATURATION: 97 % | DIASTOLIC BLOOD PRESSURE: 84 MMHG | TEMPERATURE: 97.8 F | RESPIRATION RATE: 16 BRPM | BODY MASS INDEX: 24.43 KG/M2 | HEIGHT: 65 IN | SYSTOLIC BLOOD PRESSURE: 128 MMHG | WEIGHT: 146.6 LBS

## 2020-07-30 DIAGNOSIS — L50.9 HIVES OF UNKNOWN ORIGIN: Primary | ICD-10-CM

## 2020-07-30 RX ORDER — HYDROXYZINE 25 MG/1
25 TABLET, FILM COATED ORAL
Qty: 30 TAB | Refills: 0 | Status: SHIPPED | OUTPATIENT
Start: 2020-07-30 | End: 2020-11-10 | Stop reason: ALTCHOICE

## 2020-07-30 RX ORDER — HYDROXYZINE PAMOATE 25 MG/1
25 CAPSULE ORAL
Qty: 30 CAP | Refills: 0 | Status: CANCELLED | OUTPATIENT
Start: 2020-07-30 | End: 2020-08-06

## 2020-07-30 RX ORDER — MOMETASONE FUROATE 1 MG/G
CREAM TOPICAL DAILY
Qty: 15 G | Refills: 0 | Status: SHIPPED | OUTPATIENT
Start: 2020-07-30 | End: 2020-11-10

## 2020-07-30 NOTE — PROGRESS NOTES
Chief Complaint   Patient presents with    Rash     1. Have you been to the ER, urgent care clinic since your last visit? Hospitalized since your last visit? Yes Where: [facility] Reason for visit: Headaches    2. Have you seen or consulted any other health care providers outside of the [address] [place] [address] since your last visit? Include any pap smears or colon screening. No    complains of rash, noticed x 1 week.  Itching, muciron and benadryl

## 2020-07-30 NOTE — PATIENT INSTRUCTIONS
Hives: Care Instructions Your Care Instructions Hives are raised, red, itchy patches of skin. They are also called wheals or welts. They usually have red borders and pale centers. Hives range in size from ¼ inch to 3 inches or more across. They may seem to move from place to place on the skin. Several hives may form a large area of raised, red skin. You can get hives after an insect sting, after taking medicine or eating certain foods, or because of infection or stress. Other causes include plants, things you breathe in, makeup, heat, cold, sunlight, and latex. You cannot spread hives to other people. Hives may last a few minutes or a few days, but a single spot may last less than 36 hours. Follow-up care is a key part of your treatment and safety. Be sure to make and go to all appointments, and call your doctor if you are having problems. It's also a good idea to know your test results and keep a list of the medicines you take. How can you care for yourself at home? · Avoid whatever you think may have caused your hives, such as a certain food or medicine. However, you may not know the cause. · Put a cool, wet towel on the area to relieve itching. · Take an over-the-counter antihistamine, such as diphenhydramine (Benadryl), cetirizine (Zyrtec), or loratadine (Claritin), to help stop the hives and calm the itching. Read and follow directions on the label. These medicines can make you feel sleepy. Do not drive while using them. · Stay away from strong soaps, detergents, and chemicals. These can make itching worse. When should you call for help? [id number] anytime you think you may need emergency care. For example, call if: 
· You have symptoms of a severe allergic reaction. These may include: 
? Sudden raised, red areas (hives) all over your body. ? Swelling of the throat, mouth, lips, or tongue. ? Trouble breathing. ? Passing out (losing consciousness).  Or you may feel very lightheaded or suddenly feel weak, confused, or restless. Call your doctor now or seek immediate medical care if: 
· You have symptoms of an allergic reaction, such as: ? A rash or hives (raised, red areas on the skin). ? Itching. ? Swelling. ? Belly pain, nausea, or vomiting. · You get hives after you start a new medicine. · Hives have not gone away after 24 hours. Watch closely for changes in your health, and be sure to contact your doctor if: 
· You do not get better as expected. Where can you learn more? Go to http://tiffanie-bessie.info/ Enter X951 in the search box to learn more about \"Hives: Care Instructions. \" Current as of: June 26, 2019               Content Version: 12.5 © 6627-7143 [employer]. Care instructions adapted under license by [employer] (which disclaims liability or warranty for this information). If you have questions about a medical condition or this instruction, always ask your healthcare professional. [address] any warranty or liability for your use of this information.

## 2020-07-30 NOTE — PROGRESS NOTES
Acute Care Note    Chente Salas is 81 y.o. female. she presents for evaluation of Rash (neck/back)      Rash  Patient complains of rash involving the back, neck. Rash started 4 days ago. Appearance of rash at onset: reddish macules. Rash has changed over time as it has spread in the areas where it began. Discomfort associated with rash: pruritic. Associated symptoms: no associated symptoms. Denies: fever, headache, abdominal pain. She has tried Benadryl with good results  Patient has not had contacts with similar rash. Patient has not identified precipitant. Patient has not had new exposures (soaps, lotions, laundry detergents, foods, medications, plants, insects or animals.)      Prior to Admission medications    Medication Sig Start Date End Date Taking? Authorizing Provider   DULoxetine (CYMBALTA) 20 mg capsule TAKE ONE CAPSULE BY MOUTH EVERY EVENING 6/29/20  Yes Yifan Giles MD   alendronate (FOSAMAX) 70 mg tablet TAKE ONE TABLET BY MOUTH ONCE EVERY 7 DAYS 6/2/20  Yes Yifan Giles MD   varicella-zoster recombinant, PF, (Shingrix, PF,) 50 mcg/0.5 mL susr injection 0.5 ml IM once and then repeat in 2-6 months. 5/26/20  Yes Yifan Giles MD   diclofenac EC (VOLTAREN) 75 mg EC tablet Take 75 mg by mouth two (2) times a day. Yes Provider, Historical   cholecalciferol (VITAMIN D3) 25 mcg (1,000 unit) cap Take 1,000 Units by mouth daily. Yes Other, MD Karin   cyanocobalamin, vitamin B-12, (VITAMIN B-12 SL) 3,000 mcg by SubLINGual route daily. Yes Provider, Historical   VITAMIN E ACETATE (VITAMIN E PO) Take 1,200 mg by mouth daily. Yes Provider, Historical         Patient Active Problem List   Diagnosis Code    Chronic pain of left ankle M25.572, G89.29    Osteopenia M85.80    History of pyelonephritis Z87.448         Review of Systems   Constitutional: Negative. Respiratory: Negative. Cardiovascular: Negative. Skin: Positive for itching and rash.          Visit Vitals  /84 (BP 1 Location: Right arm, BP Patient Position: Sitting)   Pulse 91   Temp 97.8 °F (36.6 °C) (Temporal)   Resp 16   Ht 5' 5\" (1.651 m)   Wt 146 lb 9.6 oz (66.5 kg)   SpO2 97%   BMI 24.40 kg/m²       Physical Exam  Skin:     Comments: Reddish macular rash present at the posterior neck and lower back. ASSESSMENT/PLAN  Diagnoses and all orders for this visit:    1. Hives of unknown origin - This appears to be hives. For now, will treat with steroid and antihistamine. If no improvement, we will consider steroid taper.    -     mometasone (ELOCON) 0.1 % topical cream; Apply  to affected area daily. -     hydrOXYzine HCL (ATARAX) 25 mg tablet; Take 1 Tab by mouth three (3) times daily as needed for Itching. Advised the patient to call back or return to office if symptoms worsen/change/persist.   Discussed expected course/resolution/complications of diagnosis in detail with patient. Medication risks/benefits/costs/interactions/alternatives discussed with patient. The patient was given an after visit summary which includes diagnoses, current medications, & vitals. They expressed understanding with the diagnosis and plan.

## 2020-09-29 RX ORDER — DICLOFENAC SODIUM 75 MG/1
TABLET, DELAYED RELEASE ORAL
Qty: 60 TAB | Refills: 0 | Status: SHIPPED | OUTPATIENT
Start: 2020-09-29 | End: 2021-05-03 | Stop reason: SDUPTHER

## 2020-11-10 ENCOUNTER — OFFICE VISIT (OUTPATIENT)
Dept: INTERNAL MEDICINE CLINIC | Age: 70
End: 2020-11-10
Payer: MEDICARE

## 2020-11-10 VITALS
SYSTOLIC BLOOD PRESSURE: 140 MMHG | BODY MASS INDEX: 24.3 KG/M2 | RESPIRATION RATE: 16 BRPM | HEART RATE: 68 BPM | DIASTOLIC BLOOD PRESSURE: 90 MMHG | OXYGEN SATURATION: 97 % | TEMPERATURE: 97.5 F | WEIGHT: 146 LBS

## 2020-11-10 DIAGNOSIS — N30.00 ACUTE CYSTITIS WITHOUT HEMATURIA: Primary | ICD-10-CM

## 2020-11-10 DIAGNOSIS — R35.0 URINARY FREQUENCY: ICD-10-CM

## 2020-11-10 DIAGNOSIS — R51.9 ACUTE NONINTRACTABLE HEADACHE, UNSPECIFIED HEADACHE TYPE: ICD-10-CM

## 2020-11-10 LAB
BILIRUB UR QL STRIP: NEGATIVE
GLUCOSE UR-MCNC: NEGATIVE MG/DL
KETONES P FAST UR STRIP-MCNC: NEGATIVE MG/DL
PH UR STRIP: 5 [PH] (ref 4.6–8)
PROT UR QL STRIP: NEGATIVE
SP GR UR STRIP: 1.1 (ref 1–1.03)
UA UROBILINOGEN AMB POC: ABNORMAL (ref 0.2–1)
URINALYSIS CLARITY POC: CLEAR
URINALYSIS COLOR POC: YELLOW
URINE BLOOD POC: NEGATIVE
URINE LEUKOCYTES POC: ABNORMAL
URINE NITRITES POC: POSITIVE

## 2020-11-10 PROCEDURE — 81003 URINALYSIS AUTO W/O SCOPE: CPT | Performed by: NURSE PRACTITIONER

## 2020-11-10 PROCEDURE — 1101F PT FALLS ASSESS-DOCD LE1/YR: CPT | Performed by: NURSE PRACTITIONER

## 2020-11-10 PROCEDURE — G8427 DOCREV CUR MEDS BY ELIG CLIN: HCPCS | Performed by: NURSE PRACTITIONER

## 2020-11-10 PROCEDURE — G8420 CALC BMI NORM PARAMETERS: HCPCS | Performed by: NURSE PRACTITIONER

## 2020-11-10 PROCEDURE — G8399 PT W/DXA RESULTS DOCUMENT: HCPCS | Performed by: NURSE PRACTITIONER

## 2020-11-10 PROCEDURE — 99213 OFFICE O/P EST LOW 20 MIN: CPT | Performed by: NURSE PRACTITIONER

## 2020-11-10 PROCEDURE — G8536 NO DOC ELDER MAL SCRN: HCPCS | Performed by: NURSE PRACTITIONER

## 2020-11-10 PROCEDURE — G8432 DEP SCR NOT DOC, RNG: HCPCS | Performed by: NURSE PRACTITIONER

## 2020-11-10 PROCEDURE — 3017F COLORECTAL CA SCREEN DOC REV: CPT | Performed by: NURSE PRACTITIONER

## 2020-11-10 PROCEDURE — G9899 SCRN MAM PERF RSLTS DOC: HCPCS | Performed by: NURSE PRACTITIONER

## 2020-11-10 PROCEDURE — 1090F PRES/ABSN URINE INCON ASSESS: CPT | Performed by: NURSE PRACTITIONER

## 2020-11-10 RX ORDER — NITROFURANTOIN 25; 75 MG/1; MG/1
100 CAPSULE ORAL 2 TIMES DAILY
Qty: 14 CAP | Refills: 0 | Status: SHIPPED | OUTPATIENT
Start: 2020-11-10 | End: 2020-11-17

## 2020-11-10 RX ORDER — ACETAMINOPHEN 500 MG
TABLET ORAL
Qty: 10 TAB | Refills: 0 | Status: SHIPPED | OUTPATIENT
Start: 2020-11-10 | End: 2022-03-29

## 2020-11-10 NOTE — PROGRESS NOTES
Verified name and birth date for privacy precautions. Chart reviewed in preparation for today's visit. Chief Complaint   Patient presents with    Urinary Frequency     at night assicaited with headaches x 5 days           Health Maintenance Due   Topic    Shingrix Vaccine Age 50> (1 of 2)    GLAUCOMA SCREENING Q2Y          Wt Readings from Last 3 Encounters:   11/10/20 146 lb (66.2 kg)   07/30/20 146 lb 9.6 oz (66.5 kg)   05/26/20 145 lb (65.8 kg)     Temp Readings from Last 3 Encounters:   11/10/20 97.5 °F (36.4 °C) (Temporal)   07/30/20 97.8 °F (36.6 °C) (Temporal)   05/14/20 97.7 °F (36.5 °C)     BP Readings from Last 3 Encounters:   11/10/20 (!) 140/90   07/30/20 128/84   05/14/20 133/81     Pulse Readings from Last 3 Encounters:   11/10/20 68   07/30/20 91   05/14/20 78         Learning Assessment:  :     Learning Assessment 2/1/2017   PRIMARY LEARNER Patient   HIGHEST LEVEL OF EDUCATION - PRIMARY LEARNER  SOME COLLEGE   BARRIERS PRIMARY LEARNER NONE   PRIMARY LANGUAGE ENGLISH   LEARNER PREFERENCE PRIMARY DEMONSTRATION   ANSWERED BY patient   RELATIONSHIP SELF       Depression Screening:  :     3 most recent PHQ Screens 5/26/2020   Little interest or pleasure in doing things Not at all   Feeling down, depressed, irritable, or hopeless Not at all   Total Score PHQ 2 0       Fall Risk Assessment:  :     Fall Risk Assessment, last 12 mths 5/26/2020   Able to walk? Yes   Fall in past 12 months? No   Fall with injury? -   Number of falls in past 12 months -   Fall Risk Score -       Abuse Screening:  :     Abuse Screening Questionnaire 5/26/2020 2/15/2019 2/13/2018   Do you ever feel afraid of your partner? N N N   Are you in a relationship with someone who physically or mentally threatens you? N N N   Is it safe for you to go home?  Y Y Y       Results for orders placed or performed in visit on 11/10/20   AMB POC URINALYSIS DIP STICK AUTO W/O MICRO   Result Value Ref Range    Color (UA POC) Yellow Clarity (UA POC) Clear     Glucose (UA POC) Negative Negative    Bilirubin (UA POC) Negative Negative    Ketones (UA POC) Negative Negative    Specific gravity (UA POC) 1.100 (A) 1.001 - 1.035    Blood (UA POC) Negative Negative    pH (UA POC) 5 4.6 - 8.0    Protein (UA POC) Negative Negative    Urobilinogen (UA POC) 0.2 mg/dL 0.2 - 1    Nitrites (UA POC) Positive Negative    Leukocyte esterase (UA POC) 1+ Negative

## 2020-11-10 NOTE — PROGRESS NOTES
80 yo female with Urinary frequency and a headache for 4 days. No dysruia or nocturia. She is good about water drinking. She had a UTI in Feb w/ same sxs. She was hospitalized with Enterobactor infection with resultant PIC line and IV antibiotics for a number of weeks. She has no medication allergies. No previous hx of UTI. She does have hx kidney stones within the past 10 years. She passed them, no surgery needed. Her daughter also has them. Her daughter, son-in-law and 7 yo and 7 yo grandchildren have moved in with patient and her  while building their house. PE: WNWD WF    T = 97.5   BP = 140/90 (traffic a mess coming here, and she is upset about all the continued Presidential election turmoil  Urinalysis: 1+leuks; Nitrite +; no blood    Imp: Dysuria and urinary leuks   Headache    Plan: Macrobid   Urine sent for culture  _____________________________  Expected course of current diagnosed problem(s) as well as expected progression and possible complications, and desired follow up with provider are discussed with patient. Patient is encouraged to be back in touch with any questions or concerns. Patient expresses understanding of plan of care. Patient is given AVS which includes diagnoses, current medications, vitals.

## 2020-11-13 LAB
BACTERIA SPEC CULT: ABNORMAL
CC UR VC: ABNORMAL
SERVICE CMNT-IMP: ABNORMAL

## 2020-11-14 ENCOUNTER — PATIENT MESSAGE (OUTPATIENT)
Dept: INTERNAL MEDICINE CLINIC | Age: 70
End: 2020-11-14

## 2020-11-14 DIAGNOSIS — N30.00 ACUTE CYSTITIS WITHOUT HEMATURIA: Primary | ICD-10-CM

## 2020-11-17 DIAGNOSIS — M85.89 OSTEOPENIA OF MULTIPLE SITES: ICD-10-CM

## 2020-11-17 RX ORDER — ALENDRONATE SODIUM 70 MG/1
TABLET ORAL
Qty: 12 TAB | Refills: 1 | Status: SHIPPED | OUTPATIENT
Start: 2020-11-17 | End: 2021-05-03 | Stop reason: SDUPTHER

## 2020-11-18 RX ORDER — SULFAMETHOXAZOLE AND TRIMETHOPRIM 800; 160 MG/1; MG/1
1 TABLET ORAL 2 TIMES DAILY
Qty: 10 TAB | Refills: 0 | Status: SHIPPED | OUTPATIENT
Start: 2020-11-18 | End: 2020-11-23

## 2020-11-18 NOTE — TELEPHONE ENCOUNTER
From: Flor Chadwick NP  To: Natalia Eh  Sent: 11/14/2020 10:06 AM EST  Subject: urine culture    Lisa Benz,   Final culture report is in, and the antibiotic prescribed shows \"intermediate\" response to the Macrobid I prescribed for you. If your symptoms have resolved, then we can feel confident this antibiotic DID work. If it didn't seem to help, send me a message or call (I'm on call this weekend), and I'll prescribe a different antibiotic (probably Sulfa).   Mary Jo Reyes NP

## 2020-11-18 NOTE — TELEPHONE ENCOUNTER
She reports still being somewhat symptomatic with recent UTI. Culture showed intermediate response to Macrobid which she completed.   Plan: Bactrim DS for 5 days

## 2021-01-02 ENCOUNTER — PATIENT MESSAGE (OUTPATIENT)
Dept: INTERNAL MEDICINE CLINIC | Age: 71
End: 2021-01-02

## 2021-01-02 RX ORDER — DULOXETIN HYDROCHLORIDE 20 MG/1
CAPSULE, DELAYED RELEASE ORAL
Qty: 90 CAP | Refills: 0 | Status: SHIPPED | OUTPATIENT
Start: 2021-01-02 | End: 2021-03-29

## 2021-03-29 RX ORDER — DULOXETIN HYDROCHLORIDE 20 MG/1
CAPSULE, DELAYED RELEASE ORAL
Qty: 90 CAP | Refills: 0 | Status: SHIPPED | OUTPATIENT
Start: 2021-03-29 | End: 2021-07-05

## 2021-03-29 NOTE — TELEPHONE ENCOUNTER
Future Appointments   Date Time Provider Wendie Escobar   5/3/2021 12:30 PM Letty Newby MD [facility]

## 2021-05-03 ENCOUNTER — VIRTUAL VISIT (OUTPATIENT)
Dept: INTERNAL MEDICINE CLINIC | Age: 71
End: 2021-05-03
Payer: MEDICARE

## 2021-05-03 DIAGNOSIS — Z12.31 ENCOUNTER FOR SCREENING MAMMOGRAM FOR MALIGNANT NEOPLASM OF BREAST: ICD-10-CM

## 2021-05-03 DIAGNOSIS — M85.89 OSTEOPENIA OF MULTIPLE SITES: ICD-10-CM

## 2021-05-03 DIAGNOSIS — Z00.00 MEDICARE ANNUAL WELLNESS VISIT, SUBSEQUENT: Primary | ICD-10-CM

## 2021-05-03 DIAGNOSIS — M25.572 CHRONIC PAIN OF LEFT ANKLE: ICD-10-CM

## 2021-05-03 DIAGNOSIS — Z23 ENCOUNTER FOR IMMUNIZATION: ICD-10-CM

## 2021-05-03 DIAGNOSIS — G44.52 NEW DAILY PERSISTENT HEADACHE: ICD-10-CM

## 2021-05-03 DIAGNOSIS — Z71.89 ADVANCED CARE PLANNING/COUNSELING DISCUSSION: ICD-10-CM

## 2021-05-03 DIAGNOSIS — G89.29 CHRONIC PAIN OF LEFT ANKLE: ICD-10-CM

## 2021-05-03 PROCEDURE — G8399 PT W/DXA RESULTS DOCUMENT: HCPCS | Performed by: INTERNAL MEDICINE

## 2021-05-03 PROCEDURE — 1101F PT FALLS ASSESS-DOCD LE1/YR: CPT | Performed by: INTERNAL MEDICINE

## 2021-05-03 PROCEDURE — 3017F COLORECTAL CA SCREEN DOC REV: CPT | Performed by: INTERNAL MEDICINE

## 2021-05-03 PROCEDURE — G9899 SCRN MAM PERF RSLTS DOC: HCPCS | Performed by: INTERNAL MEDICINE

## 2021-05-03 PROCEDURE — G0439 PPPS, SUBSEQ VISIT: HCPCS | Performed by: INTERNAL MEDICINE

## 2021-05-03 PROCEDURE — G8427 DOCREV CUR MEDS BY ELIG CLIN: HCPCS | Performed by: INTERNAL MEDICINE

## 2021-05-03 PROCEDURE — G8510 SCR DEP NEG, NO PLAN REQD: HCPCS | Performed by: INTERNAL MEDICINE

## 2021-05-03 RX ORDER — DICLOFENAC SODIUM 75 MG/1
TABLET, DELAYED RELEASE ORAL
Qty: 60 TAB | Refills: 2 | Status: SHIPPED
Start: 2021-05-03 | End: 2021-08-03

## 2021-05-03 RX ORDER — ZOSTER VACCINE RECOMBINANT, ADJUVANTED 50 MCG/0.5
KIT INTRAMUSCULAR
Qty: 0.5 ML | Refills: 1 | Status: CANCELLED | OUTPATIENT
Start: 2021-05-03

## 2021-05-03 RX ORDER — ALENDRONATE SODIUM 70 MG/1
TABLET ORAL
Qty: 12 TAB | Refills: 1 | Status: SHIPPED | OUTPATIENT
Start: 2021-05-03 | End: 2021-10-26

## 2021-05-03 NOTE — ASSESSMENT & PLAN NOTE
Asymptomatic, overdue for DEXA, reviewed next year will be 5 yr shay on Fosamax. Will plan on repeating DEXA at that time and then stopping and going on drug holiday.   No med changes at this time

## 2021-05-03 NOTE — PATIENT INSTRUCTIONS
Preventing Falls: Care Instructions Your Care Instructions Getting around your home safely can be a challenge if you have injuries or health problems that make it easy for you to fall. Loose rugs and furniture in walkways are among the dangers for many older people who have problems walking or who have poor eyesight. People who have conditions such as arthritis, osteoporosis, or dementia also have to be careful not to fall. You can make your home safer with a few simple measures. Follow-up care is a key part of your treatment and safety. Be sure to make and go to all appointments, and call your doctor if you are having problems. It's also a good idea to know your test results and keep a list of the medicines you take. How can you care for yourself at home? Taking care of yourself · You may get dizzy if you do not drink enough water. To prevent dehydration, drink plenty of fluids. Choose water and other caffeine-free clear liquids. If you have kidney, heart, or liver disease and have to limit fluids, talk with your doctor before you increase the amount of fluids you drink. · Exercise regularly to improve your strength, muscle tone, and balance. Walk if you can. Swimming may be a good choice if you cannot walk easily. · Have your vision and hearing checked each year or any time you notice a change. If you have trouble seeing and hearing, you might not be able to avoid objects and could lose your balance. · Know the side effects of the medicines you take. Ask your doctor or pharmacist whether the medicines you take can affect your balance. Sleeping pills or sedatives can affect your balance. · Limit the amount of alcohol you drink. Alcohol can impair your balance and other senses. · Ask your doctor whether calluses or corns on your feet need to be removed. If you wear loose-fitting shoes because of calluses or corns, you can lose your balance and fall.  
· Talk to your doctor if you have numbness in your feet. Preventing falls at home · Remove raised doorway thresholds, throw rugs, and clutter. Repair loose carpet or raised areas in the floor. · Move furniture and electrical cords to keep them out of walking paths. · Use nonskid floor wax, and wipe up spills right away, especially on ceramic tile floors. · If you use a walker or cane, put rubber tips on it. If you use crutches, clean the bottoms of them regularly with an abrasive pad, such as steel wool. · Keep your house well lit, especially José Luis Kroner, and outside walkways. Use night-lights in areas such as hallways and bathrooms. Add extra light switches or use remote switches (such as switches that go on or off when you clap your hands) to make it easier to turn lights on if you have to get up during the night. · Install sturdy handrails on stairways. · Move items in your cabinets so that the things you use a lot are on the lower shelves (about waist level). · Keep a cordless phone and a flashlight with new batteries by your bed. If possible, put a phone in each of the main rooms of your house, or carry a cell phone in case you fall and cannot reach a phone. Or, you can wear a device around your neck or wrist. You push a button that sends a signal for help. · Wear low-heeled shoes that fit well and give your feet good support. Use footwear with nonskid soles. Check the heels and soles of your shoes for wear. Repair or replace worn heels or soles. · Do not wear socks without shoes on wood floors. · Walk on the grass when the sidewalks are slippery. If you live in an area that gets snow and ice in the winter, sprinkle salt on slippery steps and sidewalks. Preventing falls in the bath · Install grab bars and nonskid mats inside and outside your shower or tub and near the toilet and sinks. · Use shower chairs and bath benches. · Use a hand-held shower head that will allow you to sit while showering.  
· Get into a tub or shower by putting the weaker leg in first. Get out of a tub or shower with your strong side first. 
· Repair loose toilet seats and consider installing a raised toilet seat to make getting on and off the toilet easier. · Keep your bathroom door unlocked while you are in the shower. Where can you learn more? Go to http://www.garcia.com/ Enter [phone] [id number] [phone] in the search box to learn more about \"Preventing Falls: Care Instructions. \" Current as of: December 7, 2020               Content Version: 12.8 © 8090-9380 [employer]. Care instructions adapted under license by [employer] (which disclaims liability or warranty for this information). If you have questions about a medical condition or this instruction, always ask your healthcare professional. [address] any warranty or liability for your use of this information. Medicare Wellness Visit, Female The best way to live healthy is to have a lifestyle where you eat a well-balanced diet, exercise regularly, limit alcohol use, and quit all forms of tobacco/nicotine, if applicable. Regular preventive services are another way to keep healthy. Preventive services (vaccines, screening tests, monitoring & exams) can help personalize your care plan, which helps you manage your own care. Screening tests can find health problems at the earliest stages, when they are easiest to treat. [facility] follows the current, evidence-based guidelines published by the [place] (USPSTF) when recommending preventive services for our patients. Because we follow these guidelines, sometimes recommendations change over time as research supports it. (For example, mammograms used to be recommended annually. Even though Medicare will still pay for an annual mammogram, the newer guidelines recommend a mammogram every two years for women of average risk).   Of course, you and your doctor may decide to screen more often for some diseases, based on your risk and your co-morbidities (chronic disease you are already diagnosed with). Preventive services for you include: - Medicare offers their members a free annual wellness visit, which is time for you and your primary care provider to discuss and plan for your preventive service needs. Take advantage of this benefit every year! 
-All adults over the age of 72 should receive the recommended pneumonia vaccines. Current USPSTF guidelines recommend a series of two vaccines for the best pneumonia protection.  
-All adults should have a flu vaccine yearly and a tetanus vaccine every 10 years.  
-All adults age 48 and older should receive the shingles vaccines (series of two vaccines). -All adults age 38-68 who are overweight should have a diabetes screening test once every three years.  
-All adults born between [birth year] and 1963 should be screened once for Hepatitis C. 
-Other screening tests and preventive services for persons with diabetes include: an eye exam to screen for diabetic retinopathy, a kidney function test, a foot exam, and stricter control over your cholesterol.  
-Cardiovascular screening for adults with routine risk involves an electrocardiogram (ECG) at intervals determined by your doctor.  
-Colorectal cancer screenings should be done for adults age 54-65 with no increased risk factors for colorectal cancer. There are a number of acceptable methods of screening for this type of cancer. Each test has its own benefits and drawbacks. Discuss with your doctor what is most appropriate for you during your annual wellness visit. The different tests include: colonoscopy (considered the best screening method), a fecal occult blood test, a fecal DNA test, and sigmoidoscopy. 
 
-A bone mass density test is recommended when a woman turns 65 to screen for osteoporosis. This test is only recommended one time, as a screening.  Some providers will use this same test as a disease monitoring tool if you already have osteoporosis. -Breast cancer screenings are recommended every other year for women of normal risk, age 54-69. 
-Cervical cancer screenings for women over age 72 are only recommended with certain risk factors. Here is a list of your current Health Maintenance items (your personalized list of preventive services) with a due date: 
Health Maintenance Due Topic Date Due  Shingles Vaccine (1 of 2) Never done

## 2021-05-03 NOTE — PROGRESS NOTES
Kandice Murrieta is a 78 y.o. female who was seen by synchronous (real-time) audio-video technology on 5/3/2021. Assessment & Plan:     1. Medicare annual wellness visit, subsequent  2. Advanced care planning/counseling discussion  3. Encounter for screening mammogram for malignant neoplasm of breast  -     [facility] BI SCREENING INCL CAD; Future  4. Encounter for immunization  Comments:  she will look into Shingles vaccine, no Rx sent in  5. Osteopenia of multiple sites  Assessment & Plan:  Asymptomatic, overdue for DEXA, reviewed next year will be 5 yr shay on Fosamax. Will plan on repeating DEXA at that time and then stopping and going on drug holiday. No med changes at this time   Orders:  -     alendronate (FOSAMAX) 70 mg tablet; TAKE 1 TABLET BY MOUTH ONCE WEEKLY ON AN EMPTY STOMACH BEFORE BREAKFAST. REMAIN UPRIGHT FOR 30 MINUTES & TAKE WITH 8 OUNCES OF WATER, Normal, Disp-12 Tab, R-1  6. Chronic pain of left ankle  Assessment & Plan:  stable, continue current treatment   7. New daily persistent headache  Comments:  new dx, differential reviewed, unclear etiology, based on FH, new daily h/a, and falls will get imaging set up and check labs  Orders:  -     MRI BRAIN W WO CONT; Future    Follow-up and Dispositions    · Return in about 1 year (around 5/3/2022), or if symptoms worsen or fail to improve, for FULL PHYSICAL - 30 minutes. Subjective:   Kandice Murrieta was seen for Annual Wellness Visit    Annual Wellness Visit- Subsequent Visit    Since last visit: has had new h/ and increase in falls       End of Life Planning: This was discussed with her today and she has an advanced directive - a copy has been provided. Reviewed DNR/DNI and patient is not interested.       Depression Screen:  3 most recent PHQ Screens 5/3/2021   Little interest or pleasure in doing things Not at all   Feeling down, depressed, irritable, or hopeless Not at all   Total Score PHQ 2 0         Fall Risk:   Fall Risk Assessment, last 12 mths 5/3/2021   Able to walk? Yes   Fall in past 12 months? 1   Do you feel unsteady? 1   Are you worried about falling 1   Is TUG test greater than 12 seconds? (No Data)   Is the gait abnormal? (No Data)   Number of falls in past 12 months 2   Fall with injury? 1       Abuse Screen:  Abuse Screening Questionnaire 5/3/2021   Do you ever feel afraid of your partner? N   Are you in a relationship with someone who physically or mentally threatens you? N   Is it safe for you to go home? Y       Do you average more than 1 drink per night or more than 7 drinks a week:  No    On any one occasion in the past three months have you have had more than 3 drinks containing alcohol:  No    Health Maintenance:   Daily Aspirin: no  Bone Density: done 5/16/19 - osteopenia    Immunizations:   Influenza: up to date  Tetanus: up to date  Shingles: not up to date - she will consider  Pneumonia: up to date  Cancer screening:   Cervical: reviewed guidelines, she elected to defer further screening. Breast: reviewed guidelines, up to date, due next month , order placed  Colon: reviewed guidelines, up to date       Functional Ability and Level of Safety:    Hearing: Hearing is good. Cognition Screen:   Has your family/caregiver stated any concerns about your memory: no     Ambulation: with no difficulty     Activities of Daily Living: The home contains: grab bars  Patient does total self care  Exercise: moderately active    Adult Nutrition Screen:  No risk factors noted. Patient Care Team:  Pierce Becerril MD as PCP - General (Internal Medicine)  Pierce Becerril MD as PCP - [address] [place] [address] [facility] Provider  Adrienne Villanueva MD as Physician (Physical Medicine and Rehabilitation)  Dean Talbert MD as Physician (Ophthalmology)  Susan Garza MD (Gastroenterology)       The following sections were reviewed & updated as appropriate: Allergies, PMH, PSH, FH, and SH.        Prior to Admission medications    Medication Sig Start Date End Date Taking? Authorizing Provider   DULoxetine (CYMBALTA) 20 mg capsule TAKE ONE CAPSULE BY MOUTH EVERY EVENING 3/29/21  Yes Tiffanie Braswell MD   alendronate (FOSAMAX) 70 mg tablet TAKE 1 TABLET BY MOUTH ONCE WEEKLY ON AN EMPTY STOMACH BEFORE BREAKFAST. REMAIN UPRIGHT FOR 30 MINUTES & TAKE WITH 8 OUNCES OF WATER 11/17/20  Yes Tiffanie Braswell MD   acetaminophen (TYLENOL) 500 mg tablet 1 or 2 every 6 hrs prn for headache 11/10/20  Yes Laura Mendoza NP   diclofenac EC (VOLTAREN) 75 mg EC tablet TAKE ONE TABLET BY MOUTH TWICE A DAY 9/29/20  Yes Tiffanie Braswell MD   cholecalciferol (VITAMIN D3) 25 mcg (1,000 unit) cap Take 1,000 Units by mouth daily. Yes Other, MD Karin   cyanocobalamin, vitamin B-12, (VITAMIN B-12 SL) 3,000 mcg by SubLINGual route daily. Yes Provider, Historical   varicella-zoster recombinant, PF, (Shingrix, PF,) 50 mcg/0.5 mL susr injection 0.5 ml IM once and then repeat in 2-6 months. 5/26/20 5/3/21  Tiffanie Braswell MD   VITAMIN E ACETATE (VITAMIN E PO) Take 1,200 mg by mouth daily. 5/3/21  Provider, Historical       Review of Systems   Constitutional: Negative for chills and fever. Respiratory: Negative for cough and shortness of breath. Cardiovascular: Negative for chest pain and palpitations. Gastrointestinal: Negative for abdominal pain, blood in stool, constipation, diarrhea, heartburn, nausea and vomiting. Musculoskeletal: Positive for joint pain (L ankle). Negative for myalgias and neck pain. Neurological: Negative for focal weakness and headaches. Tingling: fingers on/off. On/off since her hospitalization in summer '20. Never had h/a in the past.  She reports daily now (due to pollen) and in the past once/month, lasting for a few hours, frontal location, not bad enough to make an appointment. She will use Tylenol or Aspirin intermittently. Has had some falls. Some intermittent dizziness.   Mother has a h/o brain tumor. Endo/Heme/Allergies: Does not bruise/bleed easily. Psychiatric/Behavioral: Negative for depression. The patient is not nervous/anxious and does not have insomnia. Objective:     Patient-Reported Vitals 5/3/2021   Patient-Reported Weight 145 lbs     General: alert, cooperative, no distress   Mental  status: normal mood, behavior, speech, dress, motor activity, and thought processes, able to follow commands   Eyes: EOMI   Mouth: Symmetrical smile & tongue midline, speech fluent   Neck:    Resp: normal effort and no respiratory distress   Neuro: no gross deficits   Musculoskeletal:    Skin:    Psychiatric: normal affect     [employer], was evaluated through a synchronous (real-time) audio-video encounter. The patient (or guardian if applicable) is aware that this is a billable service. Verbal consent to proceed has been obtained within the past 12 months. The visit was conducted pursuant to the emergency declaration under the [facility], [address] [place] [address] authority and the [employer] and [employer] General Act. Patient identification was verified, and a caregiver was present when appropriate. The patient was located in a state where the provider was credentialed to provide care.      Judy Ackerman MD

## 2021-05-03 NOTE — ACP (ADVANCE CARE PLANNING)
Advance Care Planning   Advance Care Planning (ACP) Physician/NP/PA (Provider) Conversation    Date of ACP Conversation: 05/03/21  Persons included in Conversation:  patient  Length of ACP Conversation in minutes: <16 minutes (Non-Billable)    Authorized Decision Maker (if patient is incapable of making informed decisions):   Named in Advance Directive or Healthcare Power of       Primary Decision Maker: Huber Becerra - Lpskjt - [phone]    Secondary Decision Maker: Russ Romero - Daughter     She has an advanced directive - a copy has been provided & still reflects her wishes. Reviewed DNR/DNI and patient is not interested.        Esdras Galloway MD

## 2021-05-18 DIAGNOSIS — Z23 ENCOUNTER FOR IMMUNIZATION: Primary | ICD-10-CM

## 2021-05-18 DIAGNOSIS — Z51.81 MEDICATION MONITORING ENCOUNTER: Primary | ICD-10-CM

## 2021-05-18 RX ORDER — ZOSTER VACCINE RECOMBINANT, ADJUVANTED 50 MCG/0.5
KIT INTRAMUSCULAR
Qty: 0.5 ML | Refills: 1 | Status: SHIPPED | OUTPATIENT
Start: 2021-05-18 | End: 2021-07-22 | Stop reason: ALTCHOICE

## 2021-05-19 LAB
ALBUMIN SERPL-MCNC: 4.4 G/DL (ref 3.8–4.8)
ALBUMIN/GLOB SERPL: 2 {RATIO} (ref 1.2–2.2)
ALP SERPL-CCNC: 212 IU/L (ref 48–121)
ALT SERPL-CCNC: 19 IU/L (ref 0–32)
AST SERPL-CCNC: 19 IU/L (ref 0–40)
BILIRUB SERPL-MCNC: 0.3 MG/DL (ref 0–1.2)
BUN SERPL-MCNC: 21 MG/DL (ref 8–27)
BUN/CREAT SERPL: 28 (ref 12–28)
CALCIUM SERPL-MCNC: 9.8 MG/DL (ref 8.7–10.3)
CHLORIDE SERPL-SCNC: 101 MMOL/L (ref 96–106)
CO2 SERPL-SCNC: 26 MMOL/L (ref 20–29)
CREAT SERPL-MCNC: 0.75 MG/DL (ref 0.57–1)
ERYTHROCYTE [DISTWIDTH] IN BLOOD BY AUTOMATED COUNT: 12.6 % (ref 11.7–15.4)
GLOBULIN SER CALC-MCNC: 2.2 G/DL (ref 1.5–4.5)
GLUCOSE SERPL-MCNC: 107 MG/DL (ref 65–99)
HCT VFR BLD AUTO: 39 % (ref 34–46.6)
HGB BLD-MCNC: 13.4 G/DL (ref 11.1–15.9)
MCH RBC QN AUTO: 33.9 PG (ref 26.6–33)
MCHC RBC AUTO-ENTMCNC: 34.4 G/DL (ref 31.5–35.7)
MCV RBC AUTO: 99 FL (ref 79–97)
PLATELET # BLD AUTO: 211 X10E3/UL (ref 150–450)
POTASSIUM SERPL-SCNC: 3.9 MMOL/L (ref 3.5–5.2)
PROT SERPL-MCNC: 6.6 G/DL (ref 6–8.5)
RBC # BLD AUTO: 3.95 X10E6/UL (ref 3.77–5.28)
SODIUM SERPL-SCNC: 142 MMOL/L (ref 134–144)
WBC # BLD AUTO: 5.6 X10E3/UL (ref 3.4–10.8)

## 2021-05-19 NOTE — PROGRESS NOTES
Results released to patient via [facility]. All labs are stable or at goal for her except for Alk Phos has been increasing slowly since last year. Please add on alk phos isoezymes (dx- elevated alk phos).

## 2021-05-19 NOTE — PROGRESS NOTES
Spoke with Favian Dorsey @ [facility] added alk phos isoenzymes to recent blood work, per Dr. Niesha Singh' order

## 2021-05-24 LAB
ALP BONE CFR SERPL: 69 % (ref 14–68)
ALP INTEST CFR SERPL: 4 % (ref 0–18)
ALP LIVER CFR SERPL: 27 % (ref 18–85)
ALP SERPL-CCNC: 212 IU/L (ref 48–121)
SPECIMEN STATUS REPORT, ROLRST: NORMAL

## 2021-06-03 ENCOUNTER — HOSPITAL ENCOUNTER (OUTPATIENT)
Dept: MRI IMAGING | Age: 71
Discharge: HOME | End: 2021-06-03
Attending: INTERNAL MEDICINE
Payer: MEDICARE

## 2021-06-03 ENCOUNTER — TRANSCRIBE ORDER (OUTPATIENT)
Dept: GENERAL RADIOLOGY | Age: 71
End: 2021-06-03

## 2021-06-03 ENCOUNTER — HOSPITAL ENCOUNTER (OUTPATIENT)
Dept: MAMMOGRAPHY | Age: 71
Discharge: HOME | End: 2021-06-03
Attending: INTERNAL MEDICINE
Payer: MEDICARE

## 2021-06-03 VITALS — WEIGHT: 145 LBS | BODY MASS INDEX: 24.13 KG/M2

## 2021-06-03 DIAGNOSIS — R92.8 ABNORMAL MAMMOGRAM OF RIGHT BREAST: Primary | ICD-10-CM

## 2021-06-03 DIAGNOSIS — G44.52 NEW DAILY PERSISTENT HEADACHE: ICD-10-CM

## 2021-06-03 DIAGNOSIS — Z12.31 ENCOUNTER FOR SCREENING MAMMOGRAM FOR MALIGNANT NEOPLASM OF BREAST: ICD-10-CM

## 2021-06-03 PROCEDURE — 77067 SCR MAMMO BI INCL CAD: CPT

## 2021-06-03 PROCEDURE — 70553 MRI BRAIN STEM W/O & W/DYE: CPT

## 2021-06-03 PROCEDURE — A9575 INJ GADOTERATE MEGLUMI 0.1ML: HCPCS | Performed by: INTERNAL MEDICINE

## 2021-06-03 PROCEDURE — 74011250636 HC RX REV CODE- 250/636: Performed by: INTERNAL MEDICINE

## 2021-06-03 RX ORDER — GADOTERATE MEGLUMINE 376.9 MG/ML
12 INJECTION INTRAVENOUS
Status: COMPLETED | OUTPATIENT
Start: 2021-06-03 | End: 2021-06-03

## 2021-06-03 RX ADMIN — GADOTERATE MEGLUMINE 12 ML: 376.9 INJECTION INTRAVENOUS at 11:59

## 2021-06-04 NOTE — PROGRESS NOTES
----- Message from Patricio Pathak MD sent at 5/26/2020  9:08 PM CDT -----  Regarding: RE: would you like to see this patient on Friday?  Yes, I'd love to see her this Friday morning.  My schedule is pretty wide open.  I'll ask Meredith to call the patient to confirm a time.    Thank you for checking with me.    Patricio  ----- Message -----  From: Dena Apodaca MD  Sent: 5/26/2020   3:37 PM CDT  To: Patricio Pathak MD  Subject: would you like to see this patient on Friday?    Mazin Curry,  Ms. Rickie Jackman is scheduled to see me this Friday afternoon to follow up on her retinal vasculitis. I realize that her follow up with you likely is not truly time dependent, but since she will be seeing me, would you want to see her in the morning? If you would like to see her later or recommend that she see you as needed I am happy to relay the message as well.  Thanks, Dena       Alk Phos up from last year, up significantly from last year (was 80 and then the next day 152), previously in the 70's. Fraction is majority bone. Will need further w/u once abnormal mammogram & eye issues are sorted out.

## 2021-06-07 ENCOUNTER — HOSPITAL ENCOUNTER (OUTPATIENT)
Dept: MAMMOGRAPHY | Age: 71
Discharge: HOME | End: 2021-06-07
Attending: INTERNAL MEDICINE
Payer: MEDICARE

## 2021-06-07 DIAGNOSIS — R92.8 ABNORMAL MAMMOGRAM OF RIGHT BREAST: ICD-10-CM

## 2021-06-07 PROBLEM — N63.10 BREAST MASS, RIGHT: Status: ACTIVE | Noted: 2021-06-07

## 2021-06-07 PROCEDURE — 77065 DX MAMMO INCL CAD UNI: CPT

## 2021-06-07 PROCEDURE — 76642 ULTRASOUND BREAST LIMITED: CPT

## 2021-06-18 ENCOUNTER — HOSPITAL ENCOUNTER (OUTPATIENT)
Dept: MAMMOGRAPHY | Age: 71
Discharge: HOME | End: 2021-06-18
Attending: INTERNAL MEDICINE
Payer: MEDICARE

## 2021-06-18 DIAGNOSIS — R92.8 ABNORMAL MAMMOGRAM: ICD-10-CM

## 2021-06-18 PROCEDURE — 74011000250 HC RX REV CODE- 250: Performed by: RADIOLOGY

## 2021-06-18 PROCEDURE — 88374 M/PHMTRC ALYS ISHQUANT/SEMIQ: CPT

## 2021-06-18 PROCEDURE — A4648 IMPLANTABLE TISSUE MARKER: HCPCS

## 2021-06-18 PROCEDURE — 77065 DX MAMMO INCL CAD UNI: CPT

## 2021-06-18 PROCEDURE — 88360 TUMOR IMMUNOHISTOCHEM/MANUAL: CPT

## 2021-06-18 PROCEDURE — 88305 TISSUE EXAM BY PATHOLOGIST: CPT

## 2021-06-18 RX ORDER — LIDOCAINE HYDROCHLORIDE AND EPINEPHRINE 10; 10 MG/ML; UG/ML
10 INJECTION, SOLUTION INFILTRATION; PERINEURAL ONCE
Status: COMPLETED | OUTPATIENT
Start: 2021-06-18 | End: 2021-06-18

## 2021-06-18 RX ORDER — LIDOCAINE HYDROCHLORIDE 10 MG/ML
5 INJECTION INFILTRATION; PERINEURAL
Status: COMPLETED | OUTPATIENT
Start: 2021-06-18 | End: 2021-06-18

## 2021-06-18 RX ADMIN — LIDOCAINE HYDROCHLORIDE,EPINEPHRINE BITARTRATE 100 MG: 10; .01 INJECTION, SOLUTION INFILTRATION; PERINEURAL at 09:00

## 2021-06-18 RX ADMIN — LIDOCAINE HYDROCHLORIDE 5 ML: 10 INJECTION, SOLUTION INFILTRATION; PERINEURAL at 09:00

## 2021-06-18 NOTE — PROGRESS NOTES
Patient tolerated right breast biopsy well with scant bleeding. Biopsy site was bandaged in the usual fashion and discharge instructions were reviewed with the patient. She was provided with a written copy as well. Advised patient that results should be available by Tuesday and that she will receive a phone call in the afternoon. Encouraged her to call with any questions or concerns.

## 2021-06-21 PROBLEM — C50.911 INFILTRATING DUCTAL CARCINOMA OF RIGHT BREAST (HCC): Status: ACTIVE | Noted: 2021-06-21

## 2021-06-21 NOTE — PROGRESS NOTES
Dr. Angela Donovan called patient with results. The patient wishes to follow up with Dr. Chris Mejia. Appointment made 7/7 at [address] [place] [address] patient with appointment information. She reports that the biopsy site is healing well. Encouraged her to call with any questions or concerns.

## 2021-07-01 ENCOUNTER — NURSE NAVIGATOR (OUTPATIENT)
Dept: SURGERY | Age: 71
End: 2021-07-01

## 2021-07-01 DIAGNOSIS — Z17.1 MALIGNANT NEOPLASM OF RIGHT BREAST IN FEMALE, ESTROGEN RECEPTOR NEGATIVE, UNSPECIFIED SITE OF BREAST (HCC): Primary | ICD-10-CM

## 2021-07-01 DIAGNOSIS — C50.911 MALIGNANT NEOPLASM OF RIGHT BREAST IN FEMALE, ESTROGEN RECEPTOR NEGATIVE, UNSPECIFIED SITE OF BREAST (HCC): Primary | ICD-10-CM

## 2021-07-01 NOTE — PROGRESS NOTES
[employer]  Breast Navigator Encounter    Name:    Ora Lesch  Age:    78 y.o. Diagnosis:    RIGHT breast cancer, ER/MN-/HER-2 FISH pending    Interdisciplinary Team:  Med-Onc:    Surg-Onc:    Dr. Karie Faulkner:    Plastics:    :     Nurse Navigator:  Aaliyah Mtz RN, BSN, [facility]      Encounter type:  []Patient Initiated  []Navigator Follow-up []Pre-op  []Post-op  []Check-in Prior to First Treatment []Treatment Modality Change  []Survivorship Transition [x]Other:  Initial Navigator Encounter    Narrative:    Called patient prior to her appointment. I went over her appt next week with Dr. Oswaldo Giles. Asked if surgery would be part of her treatment, and I told her it would be. I told her there are no estrogen blocking pills that she can take because her breast cancer is ER-. Would very much like to get her MRI scheduled, and I was able to get her an appt for next Tuesday 7/6/2021 at [facility]. She had a very good experience at the [address] [place] [address], is happy to have advocates that will be helping her through the process. She had no further questions at this time. Provided the patient with my contact information and discussed my role in her care. Will continue to follow patient throughout  the care continuum.             Referrals/Handouts:            Aaliyah tMz RN, BSN, [facility]  Oncology Breast Navigator     [employer] [employer]  [address] [place] [address], [address]  W: [phone]  F: [phone]  [email or web address]  Good Help to Those in [facility]

## 2021-07-05 RX ORDER — DULOXETIN HYDROCHLORIDE 20 MG/1
CAPSULE, DELAYED RELEASE ORAL
Qty: 90 CAPSULE | Refills: 3 | Status: SHIPPED | OUTPATIENT
Start: 2021-07-05 | End: 2022-07-04

## 2021-07-06 ENCOUNTER — HOSPITAL ENCOUNTER (OUTPATIENT)
Dept: MRI IMAGING | Age: 71
Discharge: HOME | End: 2021-07-06
Attending: SURGERY
Payer: MEDICARE

## 2021-07-06 VITALS — BODY MASS INDEX: 24.13 KG/M2 | WEIGHT: 145 LBS

## 2021-07-06 DIAGNOSIS — C50.911 MALIGNANT NEOPLASM OF RIGHT BREAST IN FEMALE, ESTROGEN RECEPTOR NEGATIVE, UNSPECIFIED SITE OF BREAST (HCC): ICD-10-CM

## 2021-07-06 DIAGNOSIS — Z17.1 MALIGNANT NEOPLASM OF RIGHT BREAST IN FEMALE, ESTROGEN RECEPTOR NEGATIVE, UNSPECIFIED SITE OF BREAST (HCC): ICD-10-CM

## 2021-07-06 PROCEDURE — 77049 MRI BREAST C-+ W/CAD BI: CPT

## 2021-07-06 PROCEDURE — 74011250636 HC RX REV CODE- 250/636: Performed by: SURGERY

## 2021-07-06 PROCEDURE — A9585 GADOBUTROL INJECTION: HCPCS | Performed by: SURGERY

## 2021-07-06 RX ADMIN — GADOBUTROL 7 ML: 604.72 INJECTION INTRAVENOUS at 16:13

## 2021-07-06 NOTE — PATIENT INSTRUCTIONS
Breast Cancer: Care Instructions  Your Care Instructions     Breast cancer occurs when abnormal cells grow out of control in the breast. These cancer cells can spread within the breast, to nearby lymph nodes and other tissues, and to other parts of the body. Being treated for cancer can weaken your body, and you may feel very tired. Get the rest your body needs so you can feel better. Finding out that you have cancer is scary. You may feel many emotions and may need some help coping. Seek out family, friends, and counselors for support. You also can do things at home to make yourself feel better while you go through treatment. Call the [employer] (9-164.317.1074) or visit its website at [employer] for more information. Follow-up care is a key part of your treatment and safety. Be sure to make and go to all appointments, and call your doctor if you are having problems. It's also a good idea to know your test results and keep a list of the medicines you take. How can you care for yourself at home? · Take your medicines exactly as prescribed. Call your doctor if you think you are having a problem with your medicine. You may get medicine for nausea and vomiting if you have these side effects. · Follow your doctor's instructions to relieve pain. Pain from cancer and surgery can almost always be controlled. Use pain medicine when you first notice pain, before it becomes severe. · Eat healthy food. If you do not feel like eating, try to eat food that has protein and extra calories to keep up your strength and prevent weight loss. Drink liquid meal replacements for extra calories and protein. Try to eat your main meal early. · Get some physical activity every day, but do not get too tired. Keep doing the hobbies you enjoy as your energy allows. · Do not smoke. Smoking can make your cancer worse. If you need help quitting, talk to your doctor about stop-smoking programs and medicines.  These can increase your chances of quitting for good. · Take steps to control your stress and workload. Learn relaxation techniques. ? Share your feelings. Stress and tension affect our emotions. By expressing your feelings to others, you may be able to understand and cope with them. ? Consider joining a support group. Talking about a problem with your spouse, a good friend, or other people with similar problems is a good way to reduce tension and stress. ? Express yourself through art. Try writing, crafts, dance, or art to relieve stress. Some dance, writing, or art groups may be available just for people who have cancer. ? Be kind to your body and mind. Getting enough sleep, eating a healthy diet, and taking time to do things you enjoy can contribute to an overall feeling of balance in your life and can help reduce stress. ? Get help if you need it. Discuss your concerns with your doctor or counselor. · If you are vomiting or have diarrhea:  ? Drink plenty of fluids to prevent dehydration. Choose water and other caffeine-free clear liquids. If you have kidney, heart, or liver disease and have to limit fluids, talk with your doctor before you increase the amount of fluids you drink. ? When you are able to eat, try clear soups, mild foods, and liquids until all symptoms are gone for 12 to 48 hours. Other good choices include dry toast, crackers, cooked cereal, and gelatin dessert, such as Jell-O.  · If you have not already done so, prepare a list of advance directives. Advance directives are instructions to your doctor and family members about what kind of care you want if you become unable to speak or express yourself. When should you call for help? Call 911 anytime you think you may need emergency care. For example, call if:    · You passed out (lost consciousness).    Call your doctor now or seek immediate medical care if:    · You have a fever.     · You have abnormal bleeding.     · You think you have an infection.     · You have new or worse pain.     · You have new symptoms, such as a cough, belly pain, vomiting, diarrhea, or a rash. Watch closely for changes in your health, and be sure to contact your doctor if:    · You are much more tired than usual.     · You have swollen glands in your armpits, groin, or neck.     · You do not get better as expected. Where can you learn more? Go to http://www.[employer].com/  Enter V321 in the search box to learn more about \"Breast Cancer: Care Instructions. \"  Current as of: December 17, 2020               Content Version: 12.8  © 1819-4335 [employer]. Care instructions adapted under license by [employer] (which disclaims liability or warranty for this information). If you have questions about a medical condition or this instruction, always ask your healthcare professional. [address] any warranty or liability for your use of this information.

## 2021-07-06 NOTE — H&P (VIEW-ONLY)
HISTORY OF PRESENT ILLNESS  Home Escamilla is a 78 y.o. female. HPI  NEW patient consult referred by  Dr. Pablo Patterson for RIGHT breast cancer. Having pain to RIGHT side. Can feel the lump to the RIGHT breast since she knows where it is.       21 - RIGHT IDC, ER 0, TN 0, HER2 equiv, FISH positive, ki-67 90%    Family History: Mother, brain cancer  Sister - spinal cancer    Denies FH of breast or ovarian cancer. Unsure what type of cancer her maternal grandmother had. She  in her 42's. Mammogram/us,  21, BIRADS 4, RIGHT breast @10:00, 1.6 x 0.9 x 1.0 cm    MRI Results (most recent):  Results from [place] encounter on 21    MRI BREAST BI W WO CONT    Narrative  EXAM:  MRI BREAST BI W WO CONT    INDICATION: New diagnosis of right breast carcinoma, evaluate extent of disease. COMPARISON: Breast imaging dating back to 2019. No comparison MRI. EXAM: MRI of right and left breast without and with IV contrast Gadavist .    TECHNIQUE: MRI breast without and with IV contrast. Bilateral breast MRI was  performed using a dedicated breast coil without compression with the patient in  the prone position. Precontrast T1-weighted images with fat suppression were  obtained followed by bolus injection of 7 mL of Gadavist . Postcontrast dynamic  and high-resolution images were acquired. Axial inversion recovery imaging was  also performed. The images were analyzed using CAD analysis, enhancement curves,  digital subtraction, and 2 and 3 dimensional reconstructions. FINDINGS:    Background parenchymal enhancement: Mild. Lymph nodes: No lymphadenopathy. Right breast: Irregular enhancing mass with washout kinetics is in the posterior  third at 9:00 and measures 1.8 x 1.5 x 1.1 cm. Biopsy clip is at the medial  margin of the mass. No extension to skin or chest wall. Normal skin and nipple.   There are foci in the remainder of the right breast. No other suspicious  morphology or enhancement to suggest multicentric disease. Left breast: There is no suspicious focus of morphology or temporal enhancement. There are foci. Normal skin and nipple. Miscellaneous: None. Impression  1. 1.8 cm biopsy-proven carcinoma in the right breast at 9:00. No multicentric  disease. 2. No lymphadenopathy. 3. No MRI evidence of malignancy in the left breast.    Assessment: ACR BI-RADS category  Right breast: BI-RADS Assessment Category 6: Known biopsy proven malignancy-  Appropriate action should be taken. Left breast: BI-RADS Assessment Category 2: Benign finding. Recommendation: Surgical and oncologic management. Based on imaging, patient is  a candidate for breast conservation therapy. A negative breast MRI examination speaks strongly against invasive cancer down  to a detection threshold of 3 to 5 mm but may not detect some lower grade or in  situ carcinomas. Therefore, routine clinical and mammographic followup are  recommended. A summary portfolio has been created in PACS. Review of Systems   All other systems reviewed and are negative. Physical Exam  Vitals and nursing note reviewed. Constitutional:       Appearance: She is well-developed. HENT:      Head: Normocephalic. Neck:      Thyroid: No thyromegaly. Cardiovascular:      Rate and Rhythm: Normal rate and regular rhythm. Heart sounds: Normal heart sounds. Pulmonary:      Effort: Pulmonary effort is normal.      Breath sounds: Normal breath sounds. Chest:      Breasts: Breasts are symmetrical.         Right: No inverted nipple, mass, nipple discharge, skin change or tenderness. Left: No inverted nipple, mass, nipple discharge, skin change or tenderness. Abdominal:      Palpations: Abdomen is soft. Musculoskeletal:         General: Normal range of motion. Cervical back: Neck supple. Lymphadenopathy:      Cervical: No cervical adenopathy.    Skin:     General: Skin is warm and dry. Neurological:      Mental Status: She is alert and oriented to person, place, and time. BREAST ULTRASOUND, Preop planning  Indication:preop planning  right Breast 9:00   Technique: The area was scanned using a high-frequency linear-array near-field transducer  Findings: 1.6 cm  irregular mass with dropout  Impression: Biopsy site visible with ultrasound  Disposition:  Will refer to medical oncology since her 2 positive. ASSESSMENT and PLAN    ICD-10-CM ICD-9-CM    1. Malignant neoplasm of upper-outer quadrant of right breast in female, estrogen receptor negative (HCC)  C50.411 174.4     Z17.1 V86.1      79 yo female with right breast T1 (1.8 cm) N0 IDC Er neg Pr neg Her 2 fish +  She is here with her . I have reviewed the imaging and pathology with her and she was given copies of these reports. 90 minutes were spent face-to-face with the patient during this encounter and 90% of that time was spent on counseling and coordination of care. 1. Discussed lumpectomy and radiation vs mastectomy. Discussed reconstruction. 2. Discussed sentinel lymph node biopsy. 3. Discussed external beam radiation. 4. Discussed hormone therapy. 5. Discussed the possibility of chemotherapy and her 2 therapy    The patient is very anxious about receiving any type of standard chemotherapy  Given her family and their gaytan with cancer  She is open to hearing about her 2 therapy. Will refer to medical oncology. Surgical plan is right us guided lumpectomy, sln biopsy, poss port  Will schedule and refer to med onc.

## 2021-07-06 NOTE — PROGRESS NOTES
HISTORY OF PRESENT ILLNESS  Lacey Burkitt is a 78 y.o. female. HPI  NEW patient consult referred by  Dr. Pat Frey for RIGHT breast cancer. Having pain to RIGHT side. Can feel the lump to the RIGHT breast since she knows where it is.       21 - RIGHT IDC, ER 0, OH 0, HER2 equiv, FISH positive, ki-67 90%    Family History: Mother, brain cancer  Sister - spinal cancer    Denies FH of breast or ovarian cancer. Unsure what type of cancer her maternal grandmother had. She  in her 42's. Mammogram/us,  21, BIRADS 4, RIGHT breast @10:00, 1.6 x 0.9 x 1.0 cm    MRI Results (most recent):  Results from [place] encounter on 21    MRI BREAST BI W WO CONT    Narrative  EXAM:  MRI BREAST BI W WO CONT    INDICATION: New diagnosis of right breast carcinoma, evaluate extent of disease. COMPARISON: Breast imaging dating back to 2019. No comparison MRI. EXAM: MRI of right and left breast without and with IV contrast Gadavist .    TECHNIQUE: MRI breast without and with IV contrast. Bilateral breast MRI was  performed using a dedicated breast coil without compression with the patient in  the prone position. Precontrast T1-weighted images with fat suppression were  obtained followed by bolus injection of 7 mL of Gadavist . Postcontrast dynamic  and high-resolution images were acquired. Axial inversion recovery imaging was  also performed. The images were analyzed using CAD analysis, enhancement curves,  digital subtraction, and 2 and 3 dimensional reconstructions. FINDINGS:    Background parenchymal enhancement: Mild. Lymph nodes: No lymphadenopathy. Right breast: Irregular enhancing mass with washout kinetics is in the posterior  third at 9:00 and measures 1.8 x 1.5 x 1.1 cm. Biopsy clip is at the medial  margin of the mass. No extension to skin or chest wall. Normal skin and nipple.   There are foci in the remainder of the right breast. No other suspicious  morphology or enhancement to suggest multicentric disease. Left breast: There is no suspicious focus of morphology or temporal enhancement. There are foci. Normal skin and nipple. Miscellaneous: None. Impression  1. 1.8 cm biopsy-proven carcinoma in the right breast at 9:00. No multicentric  disease. 2. No lymphadenopathy. 3. No MRI evidence of malignancy in the left breast.    Assessment: ACR BI-RADS category  Right breast: BI-RADS Assessment Category 6: Known biopsy proven malignancy-  Appropriate action should be taken. Left breast: BI-RADS Assessment Category 2: Benign finding. Recommendation: Surgical and oncologic management. Based on imaging, patient is  a candidate for breast conservation therapy. A negative breast MRI examination speaks strongly against invasive cancer down  to a detection threshold of 3 to 5 mm but may not detect some lower grade or in  situ carcinomas. Therefore, routine clinical and mammographic followup are  recommended. A summary portfolio has been created in PACS. Review of Systems   All other systems reviewed and are negative. Physical Exam  Vitals and nursing note reviewed. Constitutional:       Appearance: She is well-developed. HENT:      Head: Normocephalic. Neck:      Thyroid: No thyromegaly. Cardiovascular:      Rate and Rhythm: Normal rate and regular rhythm. Heart sounds: Normal heart sounds. Pulmonary:      Effort: Pulmonary effort is normal.      Breath sounds: Normal breath sounds. Chest:      Breasts: Breasts are symmetrical.         Right: No inverted nipple, mass, nipple discharge, skin change or tenderness. Left: No inverted nipple, mass, nipple discharge, skin change or tenderness. Abdominal:      Palpations: Abdomen is soft. Musculoskeletal:         General: Normal range of motion. Cervical back: Neck supple. Lymphadenopathy:      Cervical: No cervical adenopathy.    Skin:     General: Skin is warm and dry. Neurological:      Mental Status: She is alert and oriented to person, place, and time. BREAST ULTRASOUND, Preop planning  Indication:preop planning  right Breast 9:00   Technique: The area was scanned using a high-frequency linear-array near-field transducer  Findings: 1.6 cm  irregular mass with dropout  Impression: Biopsy site visible with ultrasound  Disposition:  Will refer to medical oncology since her 2 positive. ASSESSMENT and PLAN    ICD-10-CM ICD-9-CM    1. Malignant neoplasm of upper-outer quadrant of right breast in female, estrogen receptor negative (HCC)  C50.411 174.4     Z17.1 V86.1      77 yo female with right breast T1 (1.8 cm) N0 IDC Er neg Pr neg Her 2 fish +  She is here with her . I have reviewed the imaging and pathology with her and she was given copies of these reports. 90 minutes were spent face-to-face with the patient during this encounter and 90% of that time was spent on counseling and coordination of care. 1. Discussed lumpectomy and radiation vs mastectomy. Discussed reconstruction. 2. Discussed sentinel lymph node biopsy. 3. Discussed external beam radiation. 4. Discussed hormone therapy. 5. Discussed the possibility of chemotherapy and her 2 therapy    The patient is very anxious about receiving any type of standard chemotherapy  Given her family and their gaytan with cancer  She is open to hearing about her 2 therapy. Will refer to medical oncology. Surgical plan is right us guided lumpectomy, sln biopsy, poss port  Will schedule and refer to med onc.

## 2021-07-07 ENCOUNTER — OFFICE VISIT (OUTPATIENT)
Dept: SURGERY | Age: 71
End: 2021-07-07
Payer: MEDICARE

## 2021-07-07 VITALS
SYSTOLIC BLOOD PRESSURE: 181 MMHG | HEART RATE: 63 BPM | BODY MASS INDEX: 24.16 KG/M2 | DIASTOLIC BLOOD PRESSURE: 97 MMHG | WEIGHT: 145 LBS | HEIGHT: 65 IN

## 2021-07-07 DIAGNOSIS — Z17.1 MALIGNANT NEOPLASM OF UPPER-OUTER QUADRANT OF RIGHT BREAST IN FEMALE, ESTROGEN RECEPTOR NEGATIVE (HCC): Primary | ICD-10-CM

## 2021-07-07 DIAGNOSIS — C50.411 MALIGNANT NEOPLASM OF UPPER-OUTER QUADRANT OF RIGHT BREAST IN FEMALE, ESTROGEN RECEPTOR NEGATIVE (HCC): Primary | ICD-10-CM

## 2021-07-07 PROCEDURE — 1090F PRES/ABSN URINE INCON ASSESS: CPT | Performed by: SURGERY

## 2021-07-07 PROCEDURE — 76642 ULTRASOUND BREAST LIMITED: CPT | Performed by: SURGERY

## 2021-07-07 PROCEDURE — 99205 OFFICE O/P NEW HI 60 MIN: CPT | Performed by: SURGERY

## 2021-07-07 NOTE — LETTER
7/8/2021    Patient: Anita Light   YOB: 1950   Date of Visit: 7/7/2021     Carlos Sahu, [address] [facility]  [address] [address] [facility] [address] [address]  [address] [facility] [address]    Dear Carlos Sahu MD,      Thank you for referring Ms. Eugene Born to [address] [place] [address] for evaluation. My notes for this consultation are attached. If you have questions, please do not hesitate to call me. I look forward to following your patient along with you.       Sincerely,    Chris Cody MD

## 2021-07-08 DIAGNOSIS — C50.411 MALIGNANT NEOPLASM OF UPPER-OUTER QUADRANT OF RIGHT BREAST IN FEMALE, ESTROGEN RECEPTOR NEGATIVE (HCC): Primary | ICD-10-CM

## 2021-07-08 DIAGNOSIS — Z17.1 MALIGNANT NEOPLASM OF UPPER-OUTER QUADRANT OF RIGHT BREAST IN FEMALE, ESTROGEN RECEPTOR NEGATIVE (HCC): Primary | ICD-10-CM

## 2021-07-15 ENCOUNTER — NURSE NAVIGATOR (OUTPATIENT)
Dept: SURGERY | Age: 71
End: 2021-07-15

## 2021-07-16 NOTE — PROGRESS NOTES
[employer]  Breast Navigator Encounter    Name:    Mackenzie Lopez  Age:    78 y.o. Diagnosis:    RIGHT breast cancer, ER-    Interdisciplinary Team:  Med-Onc:    Dr. Fredy Shrestha  Surg-Onc:    Dr. Sahil Tracy:    Plastics:    :     Nurse Navigator:  Margarita Shin RN, BSN, [facility]      Encounter type:  [x]Patient Initiated  []Navigator Follow-up []Pre-op  []Post-op  []Check-in Prior to First Treatment []Treatment Modality Change  []Survivorship Transition []Other:       Narrative:    Called today to ask for suggestions for how to talk to her two grandchildren, ages 8 and 6, about her breast cancer. I provided some suggestions on the phone and e-mailed three articles to her home e-mail listed in the chart. Also asked about wigs. I e-mailed her a list of places locally to get wigs as well as information on the [employer] catalog. Hair loss is a big concern to her. I told her depending on the chemo regimen selected that she may be able to use cold caps during chemo. If she is able to and wants to, she will let me know so I can have her reach out to the local rep, who used them during her chemotherapy a couple of yrs ago. Feels like her lump has gotten larger and harder. I suggested that she make another appt if she has concerns, but reassured her that lumps usually seem larger and firmer after a biopsy due to hematoma. Referrals/Handouts:    · [employer] catalog.   · List for wig stores  · Articles on telling children about breast cancer        Margarita Shin RN, BSN, [address] [place] [address] 66: [phone]  F: [phone]  [email or web address]  Good Help to Those in [facility]

## 2021-07-19 ENCOUNTER — OFFICE VISIT (OUTPATIENT)
Dept: ONCOLOGY | Age: 71
End: 2021-07-19
Payer: MEDICARE

## 2021-07-19 ENCOUNTER — DOCUMENTATION ONLY (OUTPATIENT)
Dept: ONCOLOGY | Age: 71
End: 2021-07-19

## 2021-07-19 VITALS
HEIGHT: 65 IN | HEART RATE: 89 BPM | DIASTOLIC BLOOD PRESSURE: 80 MMHG | OXYGEN SATURATION: 98 % | SYSTOLIC BLOOD PRESSURE: 135 MMHG | WEIGHT: 148.8 LBS | TEMPERATURE: 97.8 F | BODY MASS INDEX: 24.79 KG/M2

## 2021-07-19 DIAGNOSIS — M85.89 OSTEOPENIA OF MULTIPLE SITES: ICD-10-CM

## 2021-07-19 DIAGNOSIS — C50.911 INFILTRATING DUCTAL CARCINOMA OF RIGHT BREAST (HCC): Primary | ICD-10-CM

## 2021-07-19 DIAGNOSIS — M19.90 ARTHRITIS: ICD-10-CM

## 2021-07-19 PROCEDURE — G8510 SCR DEP NEG, NO PLAN REQD: HCPCS | Performed by: INTERNAL MEDICINE

## 2021-07-19 PROCEDURE — 99205 OFFICE O/P NEW HI 60 MIN: CPT | Performed by: INTERNAL MEDICINE

## 2021-07-19 PROCEDURE — G8536 NO DOC ELDER MAL SCRN: HCPCS | Performed by: INTERNAL MEDICINE

## 2021-07-19 PROCEDURE — 1090F PRES/ABSN URINE INCON ASSESS: CPT | Performed by: INTERNAL MEDICINE

## 2021-07-19 PROCEDURE — 3017F COLORECTAL CA SCREEN DOC REV: CPT | Performed by: INTERNAL MEDICINE

## 2021-07-19 PROCEDURE — G8427 DOCREV CUR MEDS BY ELIG CLIN: HCPCS | Performed by: INTERNAL MEDICINE

## 2021-07-19 PROCEDURE — G9899 SCRN MAM PERF RSLTS DOC: HCPCS | Performed by: INTERNAL MEDICINE

## 2021-07-19 PROCEDURE — G8399 PT W/DXA RESULTS DOCUMENT: HCPCS | Performed by: INTERNAL MEDICINE

## 2021-07-19 PROCEDURE — 1101F PT FALLS ASSESS-DOCD LE1/YR: CPT | Performed by: INTERNAL MEDICINE

## 2021-07-19 PROCEDURE — G8420 CALC BMI NORM PARAMETERS: HCPCS | Performed by: INTERNAL MEDICINE

## 2021-07-19 NOTE — PROGRESS NOTES
[facility] at [address], [id number] [place] [address] Road, [facility] [phone]  F: [phone]    Reason for Visit:   Faye Palacio is a 78 y.o. female who is seen in consultation at the request of Dr. Jelly Dubon for evaluation of breast cancer. Treatment History:   Breast biopsy 6/18/21    STAGE: clinical 1 ER- HER2+     History of Present Illness:     Pt seen today for office consult for new breast cancer ER- HER2+ post biopsy only. Abnormal screening mammo 6/21  IMPRESSION  1. 1.8 cm biopsy-proven carcinoma in the right breast at 9:00. No multicentric  disease. 2. No lymphadenopathy. 3. No MRI evidence of malignancy in the left breast.    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 1.8 cm biopsy-proven carcinoma in the right breast at 9:00. No multicentric  disease. 2. No lymphadenopathy. 3. No MRI evidence of malignancy in the left breast.    Pt is healthy overall. Has arthritis/ kidney stone/ osteopenia. Pt is planning to go to lumpectomy 8/3/21. Here to discuss to therapy.  with one child and has grand kid. FamHx mom brain cancer  Sister spine cancer  GM ovarian cancer.        Past Medical History:   Diagnosis Date    Arthritis     left ankle    Chronic kidney disease     kidney stone    Chronic pain of left ankle 2/1/2017    Seeing Dr Krishna Ferguson    Fracture     left ankle    Ill-defined condition     PICC line for UTI - borderline sepsis    Osteopenia 2/1/2017      Past Surgical History:   Procedure Laterality Date    COLONOSCOPY N/A 2/8/2019    tubular adenoma, repeat 1 yr d/t poor prep - [facility];     COLONOSCOPY N/A 5/14/2020    normal - performed by [facility] at [facility] ENDOSCOPY    HX ANKLE FRACTURE [address] Left 2005    surgery - has a plate a screws    HX DILATION AND CURETTAGE  2015    HX OTHER SURGICAL Right 01/2020    growth removed from right eyelid    HX OTHER SURGICAL Right 02/18/2020    PICC line placed for IV abx, removed after completing course for pyelonephritis    HX TONSILLECTOMY        Social History     Tobacco Use    Smoking status: Former Smoker    Smokeless tobacco: Never Used    Tobacco comment: was a causal smoker when smoker in college   Substance Use Topics    Alcohol use: Yes     Alcohol/week: 5.0 standard drinks     Types: 5 Glasses of wine per week      Family History   Problem Relation Age of Onset    Cancer Mother         Brain tumor    Other Father         ? lung problem    Cancer Sister         tumors of spine    Other Sister         kidney stones    Other Daughter         kidney stone    Other Maternal Aunt         kidney stones    No Known Problems Sister     No Known Problems Sister      Current Outpatient Medications   Medication Sig    cortisone acetate (CORTISONE PO) Take  by mouth. Injections every 3 months in her foot    DULoxetine (CYMBALTA) 20 mg capsule TAKE ONE CAPSULE BY MOUTH EVERY EVENING    varicella-zoster recombinant, PF, (Shingrix, PF,) 50 mcg/0.5 mL susr injection 0.5 ml IM once and then repeat in 2-6 months.  alendronate (FOSAMAX) 70 mg tablet TAKE 1 TABLET BY MOUTH ONCE WEEKLY ON AN EMPTY STOMACH BEFORE BREAKFAST. REMAIN UPRIGHT FOR 30 MINUTES & TAKE WITH 8 OUNCES OF WATER    diclofenac EC (VOLTAREN) 75 mg EC tablet TAKE ONE TABLET BY MOUTH TWICE A DAY    acetaminophen (TYLENOL) 500 mg tablet 1 or 2 every 6 hrs prn for headache    cholecalciferol (VITAMIN D3) 25 mcg (1,000 unit) cap Take 1,000 Units by mouth daily.  cyanocobalamin, vitamin B-12, (VITAMIN B-12 SL) 3,000 mcg by SubLINGual route daily. No current facility-administered medications for this visit.       Allergies   Allergen Reactions    Other Medication Hives     Cranberry or Probiotic, had severe itching and hives, not sure to which. Has taken a probiotic recently without a reaction. A complete review of systems was obtained, negative except as described above and as reported on ROS sheet scanned into system. Physical Exam:     Visit Vitals  /80 (BP 1 Location: Left upper arm, BP Patient Position: Sitting)   Pulse 89   Temp 97.8 °F (36.6 °C) (Oral)   Ht 5' 5\" (1.651 m)   Wt 148 lb 12.8 oz (67.5 kg)   SpO2 98%   BMI 24.76 kg/m²     ECOG PS: 0  General: No distress  Eyes: PERRLA, anicteric sclerae  HENT: Atraumatic, wearing mask  Neck: Supple  Respiratory: CTAB, normal respiratory effort  CV: Normal rate, regular rhythm, no murmurs, no peripheral edema  GI: Soft, nontender, nondistended, no masses  MS: Normal gait and station. Digits without clubbing or cyanosis. Skin: No rashes, ecchymoses, or petechiae. Normal temperature, turgor, and texture. Psych: Alert, oriented, appropriate affect, normal judgment/insight  Neuro non focal  Breast RIGHT breast mass about 1.5 cm at about 9oclock, otherwise not LAD/ masses    Results:     Lab Results   Component Value Date/Time    WBC 5.6 05/18/2021 02:10 PM    HGB 13.4 05/18/2021 02:10 PM    HCT 39.0 05/18/2021 02:10 PM    PLATELET 181 83/57/5614 02:10 PM    MCV 99 (H) 05/18/2021 02:10 PM    ABS. NEUTROPHILS 3.7 02/16/2020 04:18 AM     Lab Results   Component Value Date/Time    Sodium 142 05/18/2021 02:10 PM    Potassium 3.9 05/18/2021 02:10 PM    Chloride 101 05/18/2021 02:10 PM    CO2 26 05/18/2021 02:10 PM    Glucose 107 (H) 05/18/2021 02:10 PM    BUN 21 05/18/2021 02:10 PM    Creatinine 0.75 05/18/2021 02:10 PM    GFR est AA 95 05/18/2021 02:10 PM    GFR est non-AA 82 05/18/2021 02:10 PM    Calcium 9.8 05/18/2021 02:10 PM     Lab Results   Component Value Date/Time    Bilirubin, total 0.3 05/18/2021 02:10 PM    ALT (SGPT) 19 05/18/2021 02:10 PM    Alk.  phosphatase 212 (H) 05/18/2021 02:10 PM    Alk. phosphatase 212 (H) 05/18/2021 02:10 PM    Protein, total 6.6 05/18/2021 02:10 PM    Albumin 4.4 05/18/2021 02:10 PM    Globulin 3.6 02/16/2020 04:18 AM         Records reviewed and summarized above. Pathology report(s) reviewed above. Radiology report(s) reviewed above. Assessment:/PLAN     1)  Clinical stage 1 breast cancer ER- Her2+ post biopsy only  Records and history reviewed with pt today. Reviewed path and data specifically with pt. Discussed dx, stage and treatment of breast cancer. Plan for lumpectomy in August. Pt / prefer surgery first.   Discussed adjuvant chemo and hormonal therapy options. Discussed no hormonal therapy options due to ER/CO negative. Discussed adjuvant herceptin based chemo today DDAC-THP vs TCHP vs TH. Pt and  are open to all treatment options. May want less chemo if possible. Discussed port at surgery and they are fine with this. Will fu with us 2 weeks post surgery for treatment planning. Pt clinically well today and healthy overall. 2)  Arthritis/ hx of kidney stone. Per PCP. 3) Psychosocial.  Mood good. Coping well. Here with . Has grand kids. SW support today. Fu here in 3 weeks  Call if questions    I appreciate the opportunity to participate in Ms. Natacha Kowalski's care.     Signed By: Kasey Torres DO

## 2021-07-19 NOTE — PROGRESS NOTES
Oncology Social Work  Psychosocial Assessment    Reason for Assessment:      [] Social Work Referral [x] Initial Assessment [] New Diagnosis [] Other    Advance Care Planning:  Advance Care Planning 2/17/2020   Confirm Advance Directive Yes, not on file   Patient does not have an advanced medical directive, and did not express interest in completing one today. Sources of Information:    [x]Patient  []Family  []Staff  []Medical Record    Mental Status:    [x]Alert  []Lethargic  []Unresponsive   [] Unable to assess   Oriented to:  [x]Person  [x]Place  [x]Time  [x]Situation      Relationship Status:  []Single  [x]  []Significant Other/Life Partner  []  []  []    Living Circumstances:  []Lives Alone  [x]Family/Significant Other in Household  []Roommates  []Children in the Home  []Paid Caregivers  []Assisted Living Facility/Group Home  []Skilled [address]  []Homeless  []Incarcerated  []Environmental/Care Concerns  []Other:    Employment Status:  []Employed Full-time []Employed Part-time []Homemaker  [x] Retired [] Short-Term Disability [] [facility]  [] Unemployed     Barriers to Learning:    []Language  []Developmental  []Cognitive  []Altered Mental Status  []Visual/Hearing Impairment  []Unable to Read/Write  []Motivational  [] Challenges Understanding Medical Jargon [x]No Barriers Identified      Financial/Legal Concerns:    []Uninsured  []Limited Income/Resources  []Non-Citizen  []Food Insecurity [x]No Concerns Identified   []Other:    Anabaptism/Spiritual/Existential:  Does patient have any spiritual or Roman Catholic beliefs? [x] Yes [] No  Is the patient involved in a spiritual, sherrill or Roman Catholic community? [] Yes [] No  Patient expressing spiritual/existential angst? [] Yes [x] No  Notes: Patient did not express any spiritual or Roman Catholic preferences today.        Support System:    Identified Support Person/Group:  [x]Strong  []Fair  []Limited    Coping with Illness: [x]  Coping Well  [] Challenges Coping with Serious Illness [] Situational Depression [] Situational Anxiety [] Anticipatory Grief  [] Recent Loss [] Caregiver [place]            Narrative:   Met with the patient and her  during her initial office visit today. Patient is being seen for evaluation of breast cancer. The patient has a PCP - Dr. Alexandre Tate. Living Situation - The patient lives with her  of 39 years. They live in a two story home, but have a first floor main bedroom and bathroom. The patient has arthritis, but does not use any DME to ambulate. She had been active at the [facility] prior to [facility], but has not yet returned to the gym. She feels limited in activity due to her arthritis. Employment Status - The patient and her  are both retired from a career at [employer]. They met at [employer]. Insurance - The patient has [employer] Complete. Social Support - The patient is . They have a daughter, Michelle Durán, who lives in [place] with her  and two children, ages 6 and 8. The patient shared that she told her grandchildren yesterday of her diagnosis, but she is concerned about them during this process. Provided a list of websites and age appropriate books that will facilitate conversation regarding cancer. Resources provided -  Provided the patient with a list of places to obtain wigs, head scarves, mastectomy bras, prosthesis, and other accessories. Patient is interested in the [employer]. Provided education regarding this system, along with pricing information. Explained that if she is interested, nursing would later determine her cap size, and complete paperwork at her next office visit. Explained that more information can be obtained by visiting [facility] and watching educational videos, or by calling Noah directly at (P#[phone]).     Invited the patient to the upcoming Virtual Support Group meetings, led by this . Provided this 's contact information as well as information regarding the complementary services provided by the [facility], explaining that the center is currently closed due to COVID-19 restrictions. Explained that meditation and yoga are both being offered virtually. Plan:   1. Introduced self and role of this  in the [address] 2.  Informed the patient of the [facility] and available resources there. 3.  Continue to meet with the patient when she returns to the clinic for ongoing assessment of the patients adjustment to her diagnosis and treatment. 4.  Ongoing psychosocial support as desired by patient.       Referral:   Complementary therapies referral  Support Groups referral  DME/WIG referral  Dependent Care (adult or child) referral (List of books)  [facility] Cold Cap referral   Garth Duncan LCSW

## 2021-07-19 NOTE — PROGRESS NOTES
Adeola Tinsley is a 78 y.o. female  Chief Complaint   Patient presents with    New Patient    Breast Cancer     1. Have you been to the ER, urgent care clinic since your last visit? Hospitalized since your last visit? No.    2. Have you seen or consulted any other health care providers outside of the [address] [place] [address] since your last visit? Include any pap smears or colon screening.  No.

## 2021-07-22 ENCOUNTER — CLINICAL SUPPORT (OUTPATIENT)
Dept: INTERNAL MEDICINE CLINIC | Age: 71
End: 2021-07-22

## 2021-07-22 ENCOUNTER — HOSPITAL ENCOUNTER (OUTPATIENT)
Dept: PREADMISSION TESTING | Age: 71
Discharge: HOME | End: 2021-07-22
Payer: MEDICARE

## 2021-07-22 VITALS
DIASTOLIC BLOOD PRESSURE: 85 MMHG | TEMPERATURE: 97.9 F | BODY MASS INDEX: 24.65 KG/M2 | HEART RATE: 78 BPM | WEIGHT: 144.4 LBS | HEIGHT: 64 IN | SYSTOLIC BLOOD PRESSURE: 154 MMHG

## 2021-07-22 VITALS
OXYGEN SATURATION: 98 % | BODY MASS INDEX: 24.79 KG/M2 | HEART RATE: 82 BPM | DIASTOLIC BLOOD PRESSURE: 87 MMHG | SYSTOLIC BLOOD PRESSURE: 127 MMHG | RESPIRATION RATE: 16 BRPM | HEIGHT: 64 IN

## 2021-07-22 DIAGNOSIS — R03.0 ELEVATED BP WITHOUT DIAGNOSIS OF HYPERTENSION: Primary | ICD-10-CM

## 2021-07-22 LAB
BASOPHILS # BLD: 0 K/UL (ref 0–0.1)
BASOPHILS NFR BLD: 0 % (ref 0–1)
DIFFERENTIAL METHOD BLD: ABNORMAL
EOSINOPHIL # BLD: 0.1 K/UL (ref 0–0.4)
EOSINOPHIL NFR BLD: 2 % (ref 0–7)
ERYTHROCYTE [DISTWIDTH] IN BLOOD BY AUTOMATED COUNT: 12.3 % (ref 11.5–14.5)
HCT VFR BLD AUTO: 38.4 % (ref 35–47)
HGB BLD-MCNC: 13 G/DL (ref 11.5–16)
IMM GRANULOCYTES # BLD AUTO: 0 K/UL (ref 0–0.04)
IMM GRANULOCYTES NFR BLD AUTO: 0 % (ref 0–0.5)
LYMPHOCYTES # BLD: 1.3 K/UL (ref 0.8–3.5)
LYMPHOCYTES NFR BLD: 27 % (ref 12–49)
MCH RBC QN AUTO: 33.8 PG (ref 26–34)
MCHC RBC AUTO-ENTMCNC: 33.9 G/DL (ref 30–36.5)
MCV RBC AUTO: 99.7 FL (ref 80–99)
MONOCYTES # BLD: 0.4 K/UL (ref 0–1)
MONOCYTES NFR BLD: 8 % (ref 5–13)
NEUTS SEG # BLD: 3 K/UL (ref 1.8–8)
NEUTS SEG NFR BLD: 63 % (ref 32–75)
NRBC # BLD: 0 K/UL (ref 0–0.01)
NRBC BLD-RTO: 0 PER 100 WBC
PLATELET # BLD AUTO: 185 K/UL (ref 150–400)
PMV BLD AUTO: 10.9 FL (ref 8.9–12.9)
RBC # BLD AUTO: 3.85 M/UL (ref 3.8–5.2)
WBC # BLD AUTO: 4.8 K/UL (ref 3.6–11)

## 2021-07-22 PROCEDURE — 85025 COMPLETE CBC W/AUTO DIFF WBC: CPT

## 2021-07-22 PROCEDURE — 36415 COLL VENOUS BLD VENIPUNCTURE: CPT

## 2021-07-22 NOTE — PROGRESS NOTES
Patient arrived at office today after completing pre-op clearance for breast surgery scheduled on 08/03/21. Patient concerned with elevated BP. Blood pressure intervals performed during nurse visit. Per on-call Jose Bee NP. BP readings are normal. Will send to PCP for review. Patient asking for medication to relax prior to surgery.  Advise will forward to MD

## 2021-07-22 NOTE — PROGRESS NOTES
Chief Complaint   Patient presents with    Blood Pressure Check     nurse visit        1. Have you been to the ER, urgent care clinic since your last visit? Hospitalized since your last visit? No    2. Have you seen or consulted any other health care providers outside of the [address] [place] [address] since your last visit? Include any pap smears or colon screening.  No

## 2021-07-22 NOTE — PERIOP NOTES
PREOPERATIVE INSTRUCTIONS REVIEWED WITH PATIENT. PATIENT GIVEN TWO-- BOTTLES OF CHG SOAPS  INSTRUCTIONS REVIEWED ON USE OF CHG SOAPS   PATIENT GIVEN SSI INFECTIONS SHEET. PATIENT WAS GIVEN THE OPPORTUNITY TO ASK QUESTIONS ON THE INFORMATION PROVIDED.              FULLY VACCINATED, COPY IN THE CHART

## 2021-07-22 NOTE — PROGRESS NOTES
Agree, I think elevated BP is anxiety. Last 5-7 BP's reviewed. Most are well controlled, She is not on BP medications. Mediations to relax the day of surgery need to come from surgeon. If she wants medications until then (10 days from now) I would recommend increasing Cymbalta from 20mg, 1 tab daily to 2 tabs daily.

## 2021-07-26 DIAGNOSIS — C50.411 MALIGNANT NEOPLASM OF UPPER-OUTER QUADRANT OF RIGHT BREAST IN FEMALE, ESTROGEN RECEPTOR NEGATIVE (HCC): Primary | ICD-10-CM

## 2021-07-26 DIAGNOSIS — Z17.1 MALIGNANT NEOPLASM OF UPPER-OUTER QUADRANT OF RIGHT BREAST IN FEMALE, ESTROGEN RECEPTOR NEGATIVE (HCC): Primary | ICD-10-CM

## 2021-07-28 ENCOUNTER — TELEPHONE (OUTPATIENT)
Dept: INTERNAL MEDICINE CLINIC | Age: 71
End: 2021-07-28

## 2021-07-28 NOTE — PROGRESS NOTES
Patient returned call. Tried to reach her again. Told her I would send a [employer] message since we are playing phone tag. Requested she call back if any questions.

## 2021-07-29 ENCOUNTER — NURSE NAVIGATOR (OUTPATIENT)
Dept: CASE MANAGEMENT | Age: 71
End: 2021-07-29

## 2021-07-29 NOTE — PROGRESS NOTES
[address]  Breast Navigator Encounter    Name:    Faye Palacio  Age:    78 y.o. Diagnosis:   RIGHT breast cancer, HER-2+    Interdisciplinary Team:  Med-Onc:     Dr. Natalie Venegas  Surg-Onc:     Dr. Faustina Deleon:    Plastics:    :     Nurse Navigator:  Laurent Rollins RN, BSN, [facility]      Encounter type:  []Patient Initiated  [x]Navigator Follow-up [x]Pre-op  []Post-op  []Check-in Prior to First Treatment []Treatment Modality Change  []Survivorship Transition []Other:       Narrative:    Called to check in on patient. She is scheduled for surgery next week. She did not answer. Left a voicemail on her cell phone for her to call me back at her convenience.       Referrals/Handouts:            Laurent Rollins RN, BSN, [facility]  Oncology Breast Navigator     [facility] [address]  [address] [facility] [address].  W: [phone]  F: [phone]  [email or web address]  Good Help to Those in [facility]

## 2021-07-30 ENCOUNTER — NURSE NAVIGATOR (OUTPATIENT)
Dept: SURGERY | Age: 71
End: 2021-07-30

## 2021-07-30 NOTE — PROGRESS NOTES
[employer]  Breast Navigator Encounter    Name:    Anastasiya Shukla  Age:    78 y.o. Diagnosis:     RIGHT breast cancer    Interdisciplinary Team:  Med-Onc:    Dr. Leonela Butler  Surg-Onc:    Rad-Onc:    Plastics:    :    Maynor Arzola LCSW  Nurse Navigator:  Belkis Foster RN, BSN, CBCN      Encounter type:  []Patient Initiated  [x]Navigator Follow-up [x]Pre-op  []Post-op  []Check-in Prior to First Treatment []Treatment Modality Change  []Survivorship Transition []Other:       Narrative:   Patient is scheduled for lumpectomy/SNBX/PAC insertion next week. · She has told her grandchildren about her breast cancer. Felt like the articles  I provided her really helped. They continue to talk about this. · Has been to [employer]; this was very emotional for her. · Discussed her surgery next week - time to be there/what to wear/where  can wait. · Discussed cold caps. She is still thinking about these. Provided her with the name/number of the local rep in case she wants to talk to her; the rep did use them during her chemotherapy so she can speak from that perspective. · Has something planned with her 's [employer] club in [place] on the 14th or 15th of August.  Wanted to know if she would be up for this. I encouraged her to go. · Discussed meds to take day of surgery. She is stopping her Diclofenac this weekend per PAT. She will skip her Fosamax on that day. That is the only morning med she takes. · BP has been up. PCP recommended her doubling her Cymbalta temporarily to see if this helps with the anxiety. She is thinking        about that. · Is due for her second shingles shot in August.  I told her to check with Dr. Leonela Butler to see when the appropriate time would be for her to get this. · Asked about getting a cortisone shot in her ankle. Did she need to get this before surgery?   I told her she could get this post-op and possibly even during chemo, but recommended that she check with Dr. Denia Connelly about that. · Patient feels like her lump is getting larger. Provided reassurance that this will be removed in just a few days. I will follow-up with her post-op.             Josey Dotson, RN, BSN, [facility]  Oncology Breast Navigator     [facility] [employer]  [address] [place] [address], [address]  W: [phone]  F: [phone]  [email or web address]  Good Help to Those in [facility]

## 2021-08-03 ENCOUNTER — APPOINTMENT (OUTPATIENT)
Dept: GENERAL RADIOLOGY | Age: 71
End: 2021-08-03
Attending: SURGERY
Payer: MEDICARE

## 2021-08-03 ENCOUNTER — HOSPITAL ENCOUNTER (OUTPATIENT)
Age: 71
Setting detail: OUTPATIENT SURGERY
Discharge: HOME | End: 2021-08-03
Attending: SURGERY | Admitting: SURGERY
Payer: MEDICARE

## 2021-08-03 ENCOUNTER — ANESTHESIA EVENT (OUTPATIENT)
Dept: MEDSURG UNIT | Age: 71
End: 2021-08-03
Payer: MEDICARE

## 2021-08-03 ENCOUNTER — ANESTHESIA (OUTPATIENT)
Dept: MEDSURG UNIT | Age: 71
End: 2021-08-03
Payer: MEDICARE

## 2021-08-03 ENCOUNTER — APPOINTMENT (OUTPATIENT)
Dept: NUCLEAR MEDICINE | Age: 71
End: 2021-08-03
Attending: SURGERY
Payer: MEDICARE

## 2021-08-03 VITALS
TEMPERATURE: 97.8 F | HEART RATE: 79 BPM | WEIGHT: 144.4 LBS | DIASTOLIC BLOOD PRESSURE: 80 MMHG | RESPIRATION RATE: 19 BRPM | BODY MASS INDEX: 24.79 KG/M2 | SYSTOLIC BLOOD PRESSURE: 134 MMHG | OXYGEN SATURATION: 98 %

## 2021-08-03 DIAGNOSIS — C50.411 MALIGNANT NEOPLASM OF UPPER-OUTER QUADRANT OF RIGHT BREAST IN FEMALE, ESTROGEN RECEPTOR NEGATIVE (HCC): ICD-10-CM

## 2021-08-03 DIAGNOSIS — Z17.1 MALIGNANT NEOPLASM OF UPPER-OUTER QUADRANT OF RIGHT BREAST IN FEMALE, ESTROGEN RECEPTOR NEGATIVE (HCC): ICD-10-CM

## 2021-08-03 PROCEDURE — 36561 INSERT TUNNELED CV CATH: CPT | Performed by: SURGERY

## 2021-08-03 PROCEDURE — 74011250637 HC RX REV CODE- 250/637: Performed by: ANESTHESIOLOGY

## 2021-08-03 PROCEDURE — 76210000038 HC AMBSU PH I REC 2.5 TO 3 HR: Performed by: SURGERY

## 2021-08-03 PROCEDURE — 2709999900 HC NON-CHARGEABLE SUPPLY: Performed by: SURGERY

## 2021-08-03 PROCEDURE — 77030040922 HC BLNKT HYPOTHRM STRY -A

## 2021-08-03 PROCEDURE — 76060000063 HC AMB SURG ANES 1.5 TO 2 HR: Performed by: SURGERY

## 2021-08-03 PROCEDURE — 77030010507 HC ADH SKN DERMBND J&J -B: Performed by: SURGERY

## 2021-08-03 PROCEDURE — C1788 PORT, INDWELLING, IMP: HCPCS | Performed by: SURGERY

## 2021-08-03 PROCEDURE — 74011250636 HC RX REV CODE- 250/636: Performed by: SURGERY

## 2021-08-03 PROCEDURE — 74011000250 HC RX REV CODE- 250: Performed by: NURSE ANESTHETIST, CERTIFIED REGISTERED

## 2021-08-03 PROCEDURE — 88307 TISSUE EXAM BY PATHOLOGIST: CPT

## 2021-08-03 PROCEDURE — 76030000003 HC AMB SURG OR TIME 1.5 TO 2: Performed by: SURGERY

## 2021-08-03 PROCEDURE — 38525 BIOPSY/REMOVAL LYMPH NODES: CPT | Performed by: SURGERY

## 2021-08-03 PROCEDURE — 77030011825 HC SUPP SURG PSTOP S2SG -B: Performed by: SURGERY

## 2021-08-03 PROCEDURE — 77030031139 HC SUT VCRL2 J&J -A: Performed by: SURGERY

## 2021-08-03 PROCEDURE — 77030011267 HC ELECTRD BLD COVD -A: Performed by: SURGERY

## 2021-08-03 PROCEDURE — 77030010509 HC AIRWY LMA MSK TELE -A: Performed by: ANESTHESIOLOGY

## 2021-08-03 PROCEDURE — 77001 FLUOROGUIDE FOR VEIN DEVICE: CPT | Performed by: SURGERY

## 2021-08-03 PROCEDURE — 77030041680 HC PNCL ELECSURG SMK EVAC CNMD -B: Performed by: SURGERY

## 2021-08-03 PROCEDURE — C9290 INJ, BUPIVACAINE LIPOSOME: HCPCS | Performed by: SURGERY

## 2021-08-03 PROCEDURE — 74011250636 HC RX REV CODE- 250/636: Performed by: NURSE ANESTHETIST, CERTIFIED REGISTERED

## 2021-08-03 PROCEDURE — 77030002996 HC SUT SLK J&J -A: Performed by: SURGERY

## 2021-08-03 PROCEDURE — 74011000250 HC RX REV CODE- 250: Performed by: SURGERY

## 2021-08-03 PROCEDURE — A9520 TC99 TILMANOCEPT DIAG 0.5MCI: HCPCS

## 2021-08-03 PROCEDURE — 19301 PARTIAL MASTECTOMY: CPT | Performed by: SURGERY

## 2021-08-03 PROCEDURE — 77030034626 HC LIGASURE SM JAW SEAL OPN SURG COVD -E: Performed by: SURGERY

## 2021-08-03 PROCEDURE — 77030002933 HC SUT MCRYL J&J -A: Performed by: SURGERY

## 2021-08-03 PROCEDURE — 77030002986 HC SUT PROL J&J -A: Performed by: SURGERY

## 2021-08-03 PROCEDURE — 77030034556 HC PRB MARGN DETECT LUMPECTMY DISP DUNE -G: Performed by: SURGERY

## 2021-08-03 PROCEDURE — 77030040361 HC SLV COMPR DVT MDII -B: Performed by: SURGERY

## 2021-08-03 PROCEDURE — 71045 X-RAY EXAM CHEST 1 VIEW: CPT

## 2021-08-03 DEVICE — POWERPORT IMPLANTABLE PORT WITH ATTACHABLE 8F CHRONOFLEX OPEN-ENDED SINGLE-LUMEN VENOUS CATHETER INTERMEDIATE KIT (WITH SUTURE PLUGS)
Type: IMPLANTABLE DEVICE | Site: CHEST | Status: FUNCTIONAL
Brand: POWERPORT, CHRONOFLEX

## 2021-08-03 RX ORDER — HEPARIN 100 UNIT/ML
300 SYRINGE INTRAVENOUS ONCE
Status: COMPLETED | OUTPATIENT
Start: 2021-08-03 | End: 2021-08-03

## 2021-08-03 RX ORDER — CEFAZOLIN SODIUM 1 G/3ML
INJECTION, POWDER, FOR SOLUTION INTRAMUSCULAR; INTRAVENOUS AS NEEDED
Status: DISCONTINUED | OUTPATIENT
Start: 2021-08-03 | End: 2021-08-03 | Stop reason: HOSPADM

## 2021-08-03 RX ORDER — LIDOCAINE HYDROCHLORIDE 20 MG/ML
INJECTION, SOLUTION EPIDURAL; INFILTRATION; INTRACAUDAL; PERINEURAL AS NEEDED
Status: DISCONTINUED | OUTPATIENT
Start: 2021-08-03 | End: 2021-08-03 | Stop reason: HOSPADM

## 2021-08-03 RX ORDER — SODIUM CHLORIDE, SODIUM LACTATE, POTASSIUM CHLORIDE, CALCIUM CHLORIDE 600; 310; 30; 20 MG/100ML; MG/100ML; MG/100ML; MG/100ML
1000 INJECTION, SOLUTION INTRAVENOUS CONTINUOUS
Status: DISCONTINUED | OUTPATIENT
Start: 2021-08-03 | End: 2021-08-03 | Stop reason: HOSPADM

## 2021-08-03 RX ORDER — SODIUM CHLORIDE, SODIUM LACTATE, POTASSIUM CHLORIDE, CALCIUM CHLORIDE 600; 310; 30; 20 MG/100ML; MG/100ML; MG/100ML; MG/100ML
100 INJECTION, SOLUTION INTRAVENOUS CONTINUOUS
Status: DISCONTINUED | OUTPATIENT
Start: 2021-08-03 | End: 2021-08-03 | Stop reason: HOSPADM

## 2021-08-03 RX ORDER — SODIUM CHLORIDE, SODIUM LACTATE, POTASSIUM CHLORIDE, CALCIUM CHLORIDE 600; 310; 30; 20 MG/100ML; MG/100ML; MG/100ML; MG/100ML
INJECTION, SOLUTION INTRAVENOUS
Status: DISCONTINUED | OUTPATIENT
Start: 2021-08-03 | End: 2021-08-03 | Stop reason: HOSPADM

## 2021-08-03 RX ORDER — EPHEDRINE SULFATE/0.9% NACL/PF 50 MG/5 ML
SYRINGE (ML) INTRAVENOUS AS NEEDED
Status: DISCONTINUED | OUTPATIENT
Start: 2021-08-03 | End: 2021-08-03 | Stop reason: HOSPADM

## 2021-08-03 RX ORDER — ONDANSETRON 2 MG/ML
INJECTION INTRAMUSCULAR; INTRAVENOUS AS NEEDED
Status: DISCONTINUED | OUTPATIENT
Start: 2021-08-03 | End: 2021-08-03 | Stop reason: HOSPADM

## 2021-08-03 RX ORDER — MIDAZOLAM HYDROCHLORIDE 1 MG/ML
1 INJECTION, SOLUTION INTRAMUSCULAR; INTRAVENOUS AS NEEDED
Status: DISCONTINUED | OUTPATIENT
Start: 2021-08-03 | End: 2021-08-03 | Stop reason: HOSPADM

## 2021-08-03 RX ORDER — ONDANSETRON 2 MG/ML
4 INJECTION INTRAMUSCULAR; INTRAVENOUS AS NEEDED
Status: DISCONTINUED | OUTPATIENT
Start: 2021-08-03 | End: 2021-08-03 | Stop reason: HOSPADM

## 2021-08-03 RX ORDER — FENTANYL CITRATE 50 UG/ML
50 INJECTION, SOLUTION INTRAMUSCULAR; INTRAVENOUS AS NEEDED
Status: DISCONTINUED | OUTPATIENT
Start: 2021-08-03 | End: 2021-08-03 | Stop reason: HOSPADM

## 2021-08-03 RX ORDER — FENTANYL CITRATE 50 UG/ML
INJECTION, SOLUTION INTRAMUSCULAR; INTRAVENOUS AS NEEDED
Status: DISCONTINUED | OUTPATIENT
Start: 2021-08-03 | End: 2021-08-03 | Stop reason: HOSPADM

## 2021-08-03 RX ORDER — ACETAMINOPHEN 325 MG/1
650 TABLET ORAL ONCE
Status: COMPLETED | OUTPATIENT
Start: 2021-08-03 | End: 2021-08-03

## 2021-08-03 RX ORDER — MIDAZOLAM HYDROCHLORIDE 1 MG/ML
0.5 INJECTION, SOLUTION INTRAMUSCULAR; INTRAVENOUS
Status: DISCONTINUED | OUTPATIENT
Start: 2021-08-03 | End: 2021-08-03 | Stop reason: HOSPADM

## 2021-08-03 RX ORDER — HYDROMORPHONE HYDROCHLORIDE 1 MG/ML
0.2 INJECTION, SOLUTION INTRAMUSCULAR; INTRAVENOUS; SUBCUTANEOUS
Status: ACTIVE | OUTPATIENT
Start: 2021-08-03 | End: 2021-08-03

## 2021-08-03 RX ORDER — PROPOFOL 10 MG/ML
INJECTION, EMULSION INTRAVENOUS AS NEEDED
Status: DISCONTINUED | OUTPATIENT
Start: 2021-08-03 | End: 2021-08-03 | Stop reason: HOSPADM

## 2021-08-03 RX ORDER — HYDROMORPHONE HYDROCHLORIDE 2 MG/1
2 TABLET ORAL
Qty: 30 TABLET | Refills: 0 | Status: SHIPPED | OUTPATIENT
Start: 2021-08-03 | End: 2021-08-10

## 2021-08-03 RX ORDER — MORPHINE SULFATE 2 MG/ML
2 INJECTION, SOLUTION INTRAMUSCULAR; INTRAVENOUS
Status: DISCONTINUED | OUTPATIENT
Start: 2021-08-03 | End: 2021-08-03 | Stop reason: HOSPADM

## 2021-08-03 RX ORDER — LIDOCAINE HYDROCHLORIDE 10 MG/ML
0.1 INJECTION, SOLUTION EPIDURAL; INFILTRATION; INTRACAUDAL; PERINEURAL AS NEEDED
Status: DISCONTINUED | OUTPATIENT
Start: 2021-08-03 | End: 2021-08-03 | Stop reason: HOSPADM

## 2021-08-03 RX ORDER — SODIUM CHLORIDE 9 MG/ML
25 INJECTION, SOLUTION INTRAVENOUS CONTINUOUS
Status: DISCONTINUED | OUTPATIENT
Start: 2021-08-03 | End: 2021-08-03 | Stop reason: HOSPADM

## 2021-08-03 RX ORDER — MIDAZOLAM HYDROCHLORIDE 1 MG/ML
INJECTION, SOLUTION INTRAMUSCULAR; INTRAVENOUS AS NEEDED
Status: DISCONTINUED | OUTPATIENT
Start: 2021-08-03 | End: 2021-08-03 | Stop reason: HOSPADM

## 2021-08-03 RX ORDER — DEXAMETHASONE SODIUM PHOSPHATE 4 MG/ML
INJECTION, SOLUTION INTRA-ARTICULAR; INTRALESIONAL; INTRAMUSCULAR; INTRAVENOUS; SOFT TISSUE AS NEEDED
Status: DISCONTINUED | OUTPATIENT
Start: 2021-08-03 | End: 2021-08-03 | Stop reason: HOSPADM

## 2021-08-03 RX ORDER — EPHEDRINE SULFATE/0.9% NACL/PF 50 MG/5 ML
5 SYRINGE (ML) INTRAVENOUS AS NEEDED
Status: DISCONTINUED | OUTPATIENT
Start: 2021-08-03 | End: 2021-08-03 | Stop reason: HOSPADM

## 2021-08-03 RX ORDER — DIPHENHYDRAMINE HYDROCHLORIDE 50 MG/ML
12.5 INJECTION, SOLUTION INTRAMUSCULAR; INTRAVENOUS AS NEEDED
Status: DISCONTINUED | OUTPATIENT
Start: 2021-08-03 | End: 2021-08-03 | Stop reason: HOSPADM

## 2021-08-03 RX ORDER — ROPIVACAINE HYDROCHLORIDE 5 MG/ML
150 INJECTION, SOLUTION EPIDURAL; INFILTRATION; PERINEURAL AS NEEDED
Status: DISCONTINUED | OUTPATIENT
Start: 2021-08-03 | End: 2021-08-03 | Stop reason: HOSPADM

## 2021-08-03 RX ORDER — FENTANYL CITRATE 50 UG/ML
25 INJECTION, SOLUTION INTRAMUSCULAR; INTRAVENOUS
Status: DISCONTINUED | OUTPATIENT
Start: 2021-08-03 | End: 2021-08-03 | Stop reason: HOSPADM

## 2021-08-03 RX ORDER — HYDROMORPHONE HYDROCHLORIDE 2 MG/1
2 TABLET ORAL ONCE
Status: COMPLETED | OUTPATIENT
Start: 2021-08-03 | End: 2021-08-03

## 2021-08-03 RX ORDER — ONDANSETRON 4 MG/1
4 TABLET, ORALLY DISINTEGRATING ORAL
Qty: 5 TABLET | Refills: 1 | Status: SHIPPED
Start: 2021-08-03 | End: 2022-03-29 | Stop reason: SDUPTHER

## 2021-08-03 RX ADMIN — PHENYLEPHRINE HYDROCHLORIDE 120 MCG: 10 INJECTION INTRAVENOUS at 10:22

## 2021-08-03 RX ADMIN — MIDAZOLAM 2 MG: 1 INJECTION INTRAMUSCULAR; INTRAVENOUS at 09:27

## 2021-08-03 RX ADMIN — ONDANSETRON HYDROCHLORIDE 4 MG: 2 INJECTION, SOLUTION INTRAMUSCULAR; INTRAVENOUS at 10:18

## 2021-08-03 RX ADMIN — HYDROMORPHONE HYDROCHLORIDE 2 MG: 2 TABLET ORAL at 12:20

## 2021-08-03 RX ADMIN — PROPOFOL 150 MG: 10 INJECTION, EMULSION INTRAVENOUS at 09:31

## 2021-08-03 RX ADMIN — Medication 20 MG: at 09:50

## 2021-08-03 RX ADMIN — LIDOCAINE HYDROCHLORIDE 80 MG: 20 INJECTION, SOLUTION EPIDURAL; INFILTRATION; INTRACAUDAL; PERINEURAL at 09:31

## 2021-08-03 RX ADMIN — FENTANYL CITRATE 25 MCG: 50 INJECTION, SOLUTION INTRAMUSCULAR; INTRAVENOUS at 09:50

## 2021-08-03 RX ADMIN — SODIUM CHLORIDE, POTASSIUM CHLORIDE, SODIUM LACTATE AND CALCIUM CHLORIDE: 600; 310; 30; 20 INJECTION, SOLUTION INTRAVENOUS at 09:20

## 2021-08-03 RX ADMIN — CEFAZOLIN 2 G: 330 INJECTION, POWDER, FOR SOLUTION INTRAMUSCULAR; INTRAVENOUS at 09:40

## 2021-08-03 RX ADMIN — FENTANYL CITRATE 25 MCG: 50 INJECTION, SOLUTION INTRAMUSCULAR; INTRAVENOUS at 09:30

## 2021-08-03 RX ADMIN — ACETAMINOPHEN 650 MG: 325 TABLET ORAL at 09:15

## 2021-08-03 RX ADMIN — Medication 10 MG: at 09:55

## 2021-08-03 RX ADMIN — Medication 20 MG: at 09:47

## 2021-08-03 RX ADMIN — PHENYLEPHRINE HYDROCHLORIDE 80 MCG: 10 INJECTION INTRAVENOUS at 10:50

## 2021-08-03 RX ADMIN — PHENYLEPHRINE HYDROCHLORIDE 80 MCG: 10 INJECTION INTRAVENOUS at 10:59

## 2021-08-03 RX ADMIN — DEXAMETHASONE SODIUM PHOSPHATE 8 MG: 4 INJECTION, SOLUTION INTRAMUSCULAR; INTRAVENOUS at 09:40

## 2021-08-03 NOTE — DISCHARGE INSTRUCTIONS
Discharge Instructions from Dr. Angeles Sanchez    · I will call you with the pathology results, typically within 1 week from today. · You may shower, but no hot tubs, swimming pools, or baths until your incision is healed. · No heavy lifting with the affected extremity (nothing greater than 5 pounds), and limit its use for the next 4-5 days. · You may use an ice pack for comfort for the next couple of days, but do not place ice directly on the skin. Rather, use a towel or clothing to serve as a barrier between skin and ice to prevent injury. · If I placed a drain, follow the drain instructions provided, especially as you keep a record of the drain output. · Follow medication instructions carefully. No aspirin, ibuprofen aleve or voltaren. May take tylenol. · Wear surgical bra for 24 hours, then remove. Wear supportive bra at all times. · You will have bruising and swelling  · Watch for signs of infection as listed below. · Redness  · Swelling  · Drainage from the incision or from your nipple that appears infected  · Fever over 101.5 degrees for consecutive readings, or over 99.5 if you are currently undergoing chemotherapy. · Call our office (number is below) for a follow-up appointment.   · If you have any problems, our phone number is [phone]

## 2021-08-03 NOTE — OP NOTES
[address]   OPERATIVE REPORT    Name:  Danny Wilson  MR#:  [id number]  :  1950  ACCOUNT #:  [de-identified]  DATE OF SERVICE:  2021      PREOPERATIVE DIAGNOSIS :  Right breast cancer, upper outer quadrant, need IV access for chemotherapy. POSTOPERATIVE DIAGNOSIS :  Right breast cancer, upper outer quadrant, need IV access for chemotherapy. PROCEDURES PERFORMED:  Right breast ultrasound-guided lumpectomy, right deep axillary sentinel lymph node biopsy, left subclavian port insertion and margin assessment using RF spectroscopy. SURGEON:  Houston Saenz MD    ASSISTANT:  Verito Rene    ANESTHESIA:  General.    COMPLICATIONS:  None. SPECIMENS REMOVED:  1. Right axillary sentinel lymph node #1.  2.  Right axillary sentinel lymph node #2.  3.  Right lumpectomy. 4.  Lateral margin. 5.  Medial margin. 6.  Inferior margin. IMPLANTS:  An 8-[employer] [employer] PowerPort and a VeraForm suture. ESTIMATED BLOOD LOSS:  Less than 50. DRAINS:  None. FINDINGS:  Two sentinel lymph nodes sent for permanent. INDICATIONS FOR PROCEDURE:  A 72-year-old female with a HER2 positive breast cancer of the right breast.  She wished to have a lumpectomy, deep axillary sentinel node biopsy. She needed port placement for adjuvant therapy. PROCEDURE IN DETAIL:  The patient initially went to nuclear imaging where technetium-99 was injected into the right breast, she tolerated this well. She went to the preop holding area where surgical site was marked by surgeon and informed consent was obtained. She was taken to the operating room, laid in supine position where LMA anesthesia induced. Bilateral breasts were prepped and draped in the usual fashion and time-out was performed. The patient was placed in Trendelenburg position and the left subclavian vein was accessed using an 18-gauge introducer needle. This went easily. The syringe was removed.   The wires threaded through the needle and the needle was removed. The wire initially was going towards the left IJ vein and then redirected toward the SVC on the right side. Next, a 15 blade was used to make an incision at the wire. Bovie cautery was used to create a port pocket. The dilator sheath was threaded over the wire. The inner cannula and wire were removed. The catheter was clamped and threaded through the sheath and this was torn away and the tip of the catheter was at the junction of SVC and right atrium on fluoroscopy. Next, the catheter was cut, unclamped, and threaded onto the port. The port secured on either side with 3-0 Prolene. This aspirated and flushed well with injectable saline and final heparin solution. The mixture of 20 mL of Exparel and 40 mL of 0.25% Marcaine plain was mixed together and 10 mL of this was injected in the left subclavian port site. The incision was closed with interrupted 3-0 Vicryl and 4-0 subcuticular Monocryl. Next, attention was turned to the right axilla. Inferior axillary hairline incision was made with a 15 blade. Bovie cautery was used to dissect through the axillary fascia. Two sentinel nodes were identified using Neoprobe guidance, excised with LigaSure. These were normal in gross size and appearance and sent for permanent pathology. The cavity was irrigated. 20 mL of Exparel mixture was injected in the right axilla, axillary tissue and skin. A 3-0 interrupted Vicryl was used to close the axillary fascia and 4-0 subcuticular Monocryl in the skin. Next, attention was turned to 10 o'clock in the right breast where the clip and mass were identified using ultrasound guidance. A curvilinear incision was made with a 15 blade. Bovie cautery was used to dissect down and around the lesion of the chest wall. This was excised and marked short stitch superior, long stitch lateral.  MarginProbe device was plugged and calibrated. All 6 margins were assessed.   For additional margins, please see above.  Total intraop time for margin assessment was 2 minutes. Next, the cavity was irrigated. 20 mL of Exparel mixture was injected in the chest wall, breast, and skin. The VeraForm suture was ran around the cavity of the lumpectomy bed and the incision was closed with interrupted 2-0 Vicryl, interrupted 3-0 Vicryl, and 4-0 subcuticular Monocryl. Skin glue was placed on incision as well as dressing and a bra. All sponge, needle, and instrument counts were correct. The patient went to the recovery room in stable condition.         MD ZACKARY Rivera/[id number]  D:  08/03/2021 10:55  T:  08/03/2021 12:09  JOB #:  [id number]

## 2021-08-03 NOTE — INTERVAL H&P NOTE
Update History & Physical    The Patient's History and Physical of 7/7/2021   Right us guided lumpectomy, sln biopsy, port placement  was reviewed with the patient and I examined the patient. There was no change. The surgical site was confirmed by the patient and me. Plan:  The risk, benefits, expected outcome, and alternative to the recommended procedure have been discussed with the patient. Patient understands and wants to proceed with the procedure.     Electronically signed by Ching Jones MD on 8/3/2021 at 9:05 AM

## 2021-08-03 NOTE — ANESTHESIA PREPROCEDURE EVALUATION
Relevant Problems   RESPIRATORY SYSTEM   (+) History of pyelonephritis      PERSONAL HX & FAMILY HX OF CANCER   (+) Infiltrating ductal carcinoma of right breast (HCC)       Anesthetic History   No history of anesthetic complications            Review of Systems / Medical History  Patient summary reviewed, nursing notes reviewed and pertinent labs reviewed    Pulmonary  Within defined limits                 Neuro/Psych   Within defined limits           Cardiovascular                  Exercise tolerance: >4 METS     GI/Hepatic/Renal                Endo/Other        Arthritis and cancer     Other Findings              Physical Exam    Airway  Mallampati: I  TM Distance: > 6 cm  Neck ROM: normal range of motion   Mouth opening: Normal     Cardiovascular  Regular rate and rhythm,  S1 and S2 normal,  no murmur, click, rub, or gallop  Rhythm: regular  Rate: normal         Dental  No notable dental hx       Pulmonary  Breath sounds clear to auscultation               Abdominal  GI exam deferred       Other Findings            Anesthetic Plan    ASA: 2  Anesthesia type: general          Induction: Intravenous  Anesthetic plan and risks discussed with: Patient

## 2021-08-03 NOTE — ANESTHESIA POSTPROCEDURE EVALUATION
Post-Anesthesia Evaluation and Assessment    Patient: Mario Coreas MRN: [id number]  SSN: [id number]    YOB: 1950  Age: 78 y.o. Sex: female      I have evaluated the patient and they are stable and ready for discharge from the PACU. Cardiovascular Function/Vital Signs  Visit Vitals  BP (!) 145/89   Pulse 72   Temp 36.6 °C (97.8 °F)   Resp 13   Wt 65.5 kg (144 lb 6.4 oz)   SpO2 100%   BMI 24.79 kg/m²       Patient is status post General anesthesia for Procedure(s):  RIGHT BREAST LUMPECTOMY WITH ULTRASOUND AND RIGHT BREAST SENTINEL NODE BIOPSY AND PORTACATH INSERTION  . ..    Nausea/Vomiting: None    Postoperative hydration reviewed and adequate. Pain:  Pain Scale 1: Numeric (0 - 10) (08/03/21 1115)  Pain Intensity 1: 0 (08/03/21 1115)   Managed    Neurological Status:   Neuro (WDL): Within Defined Limits (08/03/21 0857)   At baseline    Mental Status, Level of Consciousness: Alert and  oriented to person, place, and time    Pulmonary Status:   O2 Device: CO2 nasal cannula (08/03/21 1105)   Adequate oxygenation and airway patent    Complications related to anesthesia: None    Post-anesthesia assessment completed. No concerns    Signed By: Frances Liu MD     August 3, 2021              Procedure(s):  RIGHT BREAST LUMPECTOMY WITH ULTRASOUND AND RIGHT BREAST SENTINEL NODE BIOPSY AND PORTACATH INSERTION  . Artemio  .    general    <[email or web address]>    INITIAL Post-op Vital signs:   Vitals Value Taken Time   /90 08/03/21 1130   Temp 36.6 °C (97.8 °F) 08/03/21 1105   Pulse 76 08/03/21 1132   Resp 18 08/03/21 1132   SpO2 99 % 08/03/21 1132   Vitals shown include unvalidated device data.

## 2021-08-03 NOTE — BRIEF OP NOTE
Brief Postoperative Note    Patient: Jorge Alberto Keller  YOB: 1950  MRN: [id number]    Date of Procedure: 8/3/2021     Pre-Op Diagnosis: RIGHT BREAST CANCER    Post-Op Diagnosis: Same as preoperative diagnosis. Procedure(s):  RIGHT BREAST LUMPECTOMY WITH ULTRASOUND AND RIGHT BREAST SENTINEL NODE BIOPSY AND PORTACATH INSERTION  . [place] Villa Surgeon(s): Angie Vincent MD    Surgical Assistant: Surg Asst-1: Radha Sy    Anesthesia: General     Estimated Blood Loss (mL): less than 50     Complications: None    Specimens:   ID Type Source Tests Collected by Time Destination   1 : RIGHT AXILLARY SENTINEL NODE BIOPSY #1 Fresh Axillary  Angie Vincent MD 8/3/2021 1020 Pathology   2 : RIGHT AXILLARY SENTINEL NODE BIOPSY #2 Fresh Axillary  Angie Vincent MD 8/3/2021 1020 Pathology   3 : RIGHT BREAST LUMPECTOMY Fresh Breast  Angie Vincent MD 8/3/2021 1025 Pathology   4 : LATERAL MARGIN RIGHT BREAST Lauren Breast  Angie Vincent MD 8/3/2021 1034 Pathology   5 : MEDIAL MARGIN RIGHT BREAST Fresh Breast  Angie Vincent MD 8/3/2021 1035 Pathology   6 : INFERIOR MARGIN RIGHT BREAST Fresh Breast  Angie Vincent MD 8/3/2021 1036 Pathology        Implants:   Implant Name Type Inv.  Item Serial No.  Lot No. LRB No. Used Action   PORT ATTACH CATH POWERPORT 8FR --  - SN/A Other PORT ATTACH CATH POWERPORT 8FR --  N/A [employer]WD [id number] Left 1 Implanted       Drains: * No LDAs found *    Findings: 2 sln sent for permanent    Electronically Signed by Bryon Carr MD on 8/3/2021 at 10:50 AM  Dictated stat

## 2021-08-04 ENCOUNTER — TELEPHONE (OUTPATIENT)
Dept: SURGERY | Age: 71
End: 2021-08-04

## 2021-08-04 ENCOUNTER — DOCUMENTATION ONLY (OUTPATIENT)
Dept: SURGERY | Age: 71
End: 2021-08-04

## 2021-08-04 NOTE — TELEPHONE ENCOUNTER
Called patient to check on her post op. She is doing well. Taking pain pills to help with discomfort. Advised her to call us if she has any questions.

## 2021-08-10 ENCOUNTER — NURSE NAVIGATOR (OUTPATIENT)
Dept: CASE MANAGEMENT | Age: 71
End: 2021-08-10

## 2021-08-10 ENCOUNTER — NURSE NAVIGATOR (OUTPATIENT)
Dept: SURGERY | Age: 71
End: 2021-08-10

## 2021-08-10 NOTE — PROGRESS NOTES
[address]  Breast Navigator Encounter    Name:    Rashid Hernandez  Age:    78 y.o. Diagnosis:    RIGHT breast cancer, S/P lumpectomy 8/3/2021    Interdisciplinary Team:  Med-Onc:    Dr. Ralph Butts  Surg-Onc:    Dr. Charis Kayser:    Plastics:    :     Nurse Navigator:  Millicent Garcia RN, BSN, [facility]      Encounter type:  []Patient Initiated  [x]Navigator Follow-up []Pre-op  []Post-op  []Check-in Prior to First Treatment []Treatment Modality Change  []Survivorship Transition []Other:       Narrative:    Called to check on patient post-op and prior to her appt next week with Dr. Ralph Butts to discuss chemo schedule. She was not available, so I spoke to her . He says that she is doing pretty well, has some \"pins and needles\" pain and some periods where she is \"down in the dumps,\" but otherwise is recovering fine. Is out to lunch with a friend. He will give her the message and have her return my call at her convenience.             Millicent Garcia RN, BSN, [facility]  Oncology Breast Navigator     [address]  [address] [facility].  W: [phone]  F: [phone]  [email or web address]  Good Help to Those in [facility]

## 2021-08-10 NOTE — PROGRESS NOTES
[employer]  Breast Navigator Encounter    Name:    Shady Pak  Age:    78 y.o. Diagnosis:   RIGHT breast cancer    Interdisciplinary Team:  Med-Onc:    Dr. Mandy Main  Surg-Onc:    Dr. Pavon Bun:    Plastics:    :     Nurse Navigator:  Bettejane Sacks, RN, BSN, [facility]      Encounter type:  []Patient Initiated  [x]Navigator Follow-up []Pre-op  []Post-op  []Check-in Prior to First Treatment []Treatment Modality Change  []Survivorship Transition []Other:       Narrative:    Patient is about one week S/P RIGHT lumpectomy with U/S. She is doing well. Has some irritation at the SNBX incision where it rubs on her bra. Otherwise, she is doing well. Having some mood swings and feeling \"down in the dumps. \"  I told her that this sometimes happens in the postoperative period for some reason; I reassured her that these feelings can also be due to thinking/planning for her upcoming chemotherapy in the next couple of weeks. Provided support. I told her it was okay to [facility] [place] for yourself\" on one day as long as you can pull yourself up the next day, which she says that she can. Is going to a Daniela outing this weekend with her  in [place]. I encouraged her to do this because getting out of the house may be just what she needs. I will continue to follow her.             Bettejane Sacks, RN, BSN, [facility]  Oncology Breast Navigator     [employer]  [address] [place] [address] [address]  [address].  W: [phone]  F: [phone]  [email or web address]  Good Help to Those in [facility]

## 2021-08-11 ENCOUNTER — NURSE NAVIGATOR (OUTPATIENT)
Dept: CASE MANAGEMENT | Age: 71
End: 2021-08-11

## 2021-08-11 NOTE — PROGRESS NOTES
[address]  Breast Navigator Encounter    Name:    Annelise Lyon  Age:    78 y.o. Diagnosis:    RIGHT breast cancer    Interdisciplinary Team:  Med-Onc:    Dr. Charles Nolasco  Surg-Onc:    Dr. Megha Stevens:    Plastics:    :     Nurse Navigator:  Ninfa Ashraf RN, BSN, [facility]      Encounter type:  [x]Patient Initiated  []Navigator Follow-up []Pre-op  []Post-op  []Check-in Prior to First Treatment []Treatment Modality Change  []Survivorship Transition []Other:       Narrative:    Patient sent me an e-mail asking if she can get cortisone shots in her ankle this week. I responded back in an e-mail that this was fine. She sees Dr. Charles Nolasco next week and will start chemotherapy in the near future.             Ninfa Ashraf RN, BSN, [facility]  Oncology Breast Navigator     [address] [facility]   [address] [facility] 22.  W: [phone]  F: [phone]  [email or web address]  Good Help to Those in [facility]

## 2021-08-12 ENCOUNTER — NURSE NAVIGATOR (OUTPATIENT)
Dept: CASE MANAGEMENT | Age: 71
End: 2021-08-12

## 2021-08-12 NOTE — PROGRESS NOTES
Patient sent me a message about restarting her diclofenac 75 mg x2 for her ankle. Sent a message back to her asking that she wait until she sees Dr. Adela Bedolla on Monday. She will do that.

## 2021-08-16 ENCOUNTER — NURSE NAVIGATOR (OUTPATIENT)
Dept: CASE MANAGEMENT | Age: 71
End: 2021-08-16

## 2021-08-16 ENCOUNTER — OFFICE VISIT (OUTPATIENT)
Dept: SURGERY | Age: 71
End: 2021-08-16
Payer: MEDICARE

## 2021-08-16 VITALS — HEIGHT: 64 IN | BODY MASS INDEX: 24.59 KG/M2 | WEIGHT: 144 LBS

## 2021-08-16 DIAGNOSIS — Z98.890 S/P LUMPECTOMY, RIGHT BREAST: ICD-10-CM

## 2021-08-16 DIAGNOSIS — C50.411 MALIGNANT NEOPLASM OF UPPER-OUTER QUADRANT OF RIGHT FEMALE BREAST, UNSPECIFIED ESTROGEN RECEPTOR STATUS (HCC): Primary | ICD-10-CM

## 2021-08-16 PROCEDURE — 99024 POSTOP FOLLOW-UP VISIT: CPT | Performed by: SURGERY

## 2021-08-16 NOTE — PROGRESS NOTES
[address]  Breast Navigator Encounter    Name:    Shady Pak  Age:    78 y.o. Diagnosis:   RIGHT breast cancer    Interdisciplinary Team:  Med-Onc:    Dr. Mandy Main  Surg-Onc:    Dr. Pavon Bun:    Plastics:    :     Nurse Navigator:  Bettejane Sacks, RN, BSN, [facility]      Encounter type:  []Patient Initiated  [x]Navigator Follow-up []Pre-op  []Post-op  []Check-in Prior to First Treatment []Treatment Modality Change  []Survivorship Transition []Other:       Narrative:    Met patient and  briefly when in the office today for a post-op check. Provided supportive listening. She is very nervous about starting chemotherapy. Per Dr. Mauricio Lomas, I let her know that she can start her diclofenac again for her ankle. I provided her with my business card, a letter from [employer]" and a cover for her seatbelt. I will continue to follow her.         Referrals/Handouts:    [employer], seatbelt cover         Bettejane Sacks, RN, BSN, [facility]  Oncology Breast Navigator     [address]  [address] [facility] 66: [phone]  F: [phone]  [email or web address]  Good Help to Those in [facility]

## 2021-08-16 NOTE — PROGRESS NOTES
HISTORY OF PRESENT ILLNESS  Ki Bolden is a 78 y.o. female. HPI ESTABLISHED patient here for follow up 8-3-21, Right breast ultrasound-guided lumpectomy, right deep axillary sentinel lymph node biopsy, left subclavian port insertion. Slight pain but no change sin the breast area. 6/18/21 - RIGHT IDC, ER 0, [place] 0, HER2 equiv, FISH positive, ki-67 90%     Family History: Mother, brain cancer  Sister - spinal cancer    Review of Systems   All other systems reviewed and are negative. Physical Exam  Vitals and nursing note reviewed. Chest:          Comments: Right breast and axillary incision healing well          ASSESSMENT and PLAN    ICD-10-CM ICD-9-CM    1. Malignant neoplasm of upper-outer quadrant of right female breast, unspecified estrogen receptor status (HCC)  C50.411 174.4    2. S/P lumpectomy, right breast  Z98.890 V45.89      - healing well  Ok to proceed with adjuvant therapy  F/u here in 6 months.    Refer to rad onc

## 2021-08-17 ENCOUNTER — DOCUMENTATION ONLY (OUTPATIENT)
Dept: ONCOLOGY | Age: 71
End: 2021-08-17

## 2021-08-17 ENCOUNTER — OFFICE VISIT (OUTPATIENT)
Dept: ONCOLOGY | Age: 71
End: 2021-08-17
Payer: MEDICARE

## 2021-08-17 VITALS
HEART RATE: 79 BPM | OXYGEN SATURATION: 98 % | SYSTOLIC BLOOD PRESSURE: 141 MMHG | WEIGHT: 146.9 LBS | TEMPERATURE: 97.9 F | DIASTOLIC BLOOD PRESSURE: 82 MMHG | HEIGHT: 64 IN | BODY MASS INDEX: 25.08 KG/M2

## 2021-08-17 DIAGNOSIS — C50.911 INFILTRATING DUCTAL CARCINOMA OF RIGHT BREAST (HCC): Primary | ICD-10-CM

## 2021-08-17 DIAGNOSIS — M19.90 ARTHRITIS: ICD-10-CM

## 2021-08-17 DIAGNOSIS — Z98.890 HISTORY OF LUMPECTOMY OF RIGHT BREAST: ICD-10-CM

## 2021-08-17 PROCEDURE — G8399 PT W/DXA RESULTS DOCUMENT: HCPCS | Performed by: INTERNAL MEDICINE

## 2021-08-17 PROCEDURE — G9899 SCRN MAM PERF RSLTS DOC: HCPCS | Performed by: INTERNAL MEDICINE

## 2021-08-17 PROCEDURE — G8427 DOCREV CUR MEDS BY ELIG CLIN: HCPCS | Performed by: INTERNAL MEDICINE

## 2021-08-17 PROCEDURE — 1101F PT FALLS ASSESS-DOCD LE1/YR: CPT | Performed by: INTERNAL MEDICINE

## 2021-08-17 PROCEDURE — G8510 SCR DEP NEG, NO PLAN REQD: HCPCS | Performed by: INTERNAL MEDICINE

## 2021-08-17 PROCEDURE — 3017F COLORECTAL CA SCREEN DOC REV: CPT | Performed by: INTERNAL MEDICINE

## 2021-08-17 PROCEDURE — 1090F PRES/ABSN URINE INCON ASSESS: CPT | Performed by: INTERNAL MEDICINE

## 2021-08-17 PROCEDURE — 99215 OFFICE O/P EST HI 40 MIN: CPT | Performed by: INTERNAL MEDICINE

## 2021-08-17 PROCEDURE — G8536 NO DOC ELDER MAL SCRN: HCPCS | Performed by: INTERNAL MEDICINE

## 2021-08-17 PROCEDURE — G8419 CALC BMI OUT NRM PARAM NOF/U: HCPCS | Performed by: INTERNAL MEDICINE

## 2021-08-17 RX ORDER — PROCHLORPERAZINE MALEATE 5 MG
5-10 TABLET ORAL
Qty: 30 TABLET | Refills: 1 | Status: SHIPPED | OUTPATIENT
Start: 2021-08-17 | End: 2022-04-14 | Stop reason: ALTCHOICE

## 2021-08-17 RX ORDER — ONDANSETRON 4 MG/1
4 TABLET, ORALLY DISINTEGRATING ORAL
Qty: 60 TABLET | Refills: 1 | Status: SHIPPED | OUTPATIENT
Start: 2021-08-17 | End: 2022-04-14 | Stop reason: ALTCHOICE

## 2021-08-17 NOTE — PROGRESS NOTES
Jack Willis is a 78 y.o. female  Chief Complaint   Patient presents with    Follow-up    Breast Cancer     1. Have you been to the ER, urgent care clinic since your last visit? Hospitalized since your last visit? Yes, patient went to have a Lumpectomy. 2. Have you seen or consulted any other health care providers outside of the [address] [place] [address] since your last visit? Include any pap smears or colon screening.  No.

## 2021-08-17 NOTE — PROGRESS NOTES
[facility] [place] [address] [address], [address]  W: [phone]  F: [phone]    Reason for Visit:   Annelise Lyon is a 78 y.o. female who is seen for fu of breast cancer. Treatment History:   Breast biopsy 6/18/21  Lumpectomy 8/3/21    STAGE: 2  ER- HER2+     History of Present Illness:     Pt seen today for office for breast cancer post lumpectomy 8/3/21.  20 mm IDC with negative LNs. Margins negative. Pt is here with  today. Daughter on phone today. Pt is here today to discuss adjuvant chemo. Feeling well overall. Concerned about doing chemo. Has port. No fevers/ chills/ chest pain/ SOB/ nausea/ vomiting/diarrhea/ pain/fatigue              Last visit: consult for new breast cancer ER- HER2+ post biopsy only. Abnormal screening mammo 6/21  IMPRESSION  1. 1.8 cm biopsy-proven carcinoma in the right breast at 9:00. No multicentric  disease. 2. No lymphadenopathy. 3. No MRI evidence of malignancy in the left breast.    Biopsy 6/18/21:   FINAL PATHOLOGIC DIAGNOSIS   Breast, right, needle core biopsy:   Invasive ductal carcinoma, [place] histologic grade 3   Largest glass slide measurement of invasive carcinoma: 6 mm   ER/[place] negative Her2+ Ki67 90%    Breast MRI:  IMPRESSION  1. 1.8 cm biopsy-proven carcinoma in the right breast at 9:00. No multicentric  disease. 2. No lymphadenopathy. 3. No MRI evidence of malignancy in the left breast.    Pt is healthy overall. Has arthritis/ kidney stone/ osteopenia. Pt is planning to go to lumpectomy 8/3/21. Here to discuss to therapy.  with one child and has grand kid. FamHx mom brain cancer  Sister spine cancer  GM ovarian cancer.        Past Medical History:   Diagnosis Date    Arthritis     left ankle    Breast cancer ([address] 2021    RIGHT    Chronic kidney disease     kidney stone    Chronic pain of left ankle 2/1/2017    Seeing Dr Javier Ochoa    Fracture     left ankle  Ill-defined condition     PICC line for UTI - borderline sepsis    Osteopenia 2017      Past Surgical History:   Procedure Laterality Date    COLONOSCOPY N/A 2019    tubular adenoma, repeat 1 yr d/t poor prep - performed by Davide Rose MD at [facility] ENDOSCOPY;     COLONOSCOPY N/A 2020    normal - performed by Davide Rose MD at [facility] ENDOSCOPY    HX [address] Left     surgery - has a plate a screws    HX BREAST LUMPECTOMY Right 8/3/2021    RIGHT BREAST LUMPECTOMY WITH ULTRASOUND AND RIGHT BREAST SENTINEL NODE BIOPSY AND PORTACATH INSERTION performed by Abbey Loredo MD at [facility] AMBULATORY OR    HX [address]      HX GI      COLONOSCOPY    HX OTHER SURGICAL Right 2020    growth removed from right eyelid    HX OTHER SURGICAL Right 2020    PICC line placed for IV abx, removed after completing course for pyelonephritis    HX POLYPECTOMY      HX TONSILLECTOMY      [place] BIOPSY OF BREAST, NEEDLE CORE Right       Social History     Tobacco Use    Smoking status: Former Smoker     Quit date:      Years since quittin.6    Smokeless tobacco: Never Used    Tobacco comment: was a causal smoker when smoker in college   Substance Use Topics    Alcohol use:  Yes     Alcohol/week: 5.0 standard drinks     Types: 5 Glasses of wine per week      Family History   Problem Relation Age of Onset    Cancer Mother         Brain tumor    Other Father         ? lung problem    Cancer Sister         tumors of spine    Other Sister         kidney stones    Other Daughter         kidney stone    Other Maternal Aunt         kidney stones    No Known Problems Sister     No Known Problems Sister     Anesth Problems Neg Hx      Current Outpatient Medications   Medication Sig    CRANIAL PROSTHESIS misc Breast cancer for chemo/ alopecia needs wig    ondansetron (ZOFRAN ODT) 4 mg disintegrating tablet Take 1 Tablet by mouth every eight (8) hours as needed for Nausea or Vomiting.  prochlorperazine (Compazine) 5 mg tablet Take 1-2 Tablets by mouth every six (6) hours as needed for Nausea or Vomiting.  ondansetron (ZOFRAN ODT) 4 mg disintegrating tablet Take 1 Tablet by mouth every eight (8) hours as needed for Nausea or Vomiting.  cortisone acetate (CORTISONE PO) Take  by mouth. Injections every 3 months in her foot    DULoxetine (CYMBALTA) 20 mg capsule TAKE ONE CAPSULE BY MOUTH EVERY EVENING    alendronate (FOSAMAX) 70 mg tablet TAKE 1 TABLET BY MOUTH ONCE WEEKLY ON AN EMPTY STOMACH BEFORE BREAKFAST. REMAIN UPRIGHT FOR 30 MINUTES & TAKE WITH 8 OUNCES OF WATER    acetaminophen (TYLENOL) 500 mg tablet 1 or 2 every 6 hrs prn for headache     No current facility-administered medications for this visit. Allergies   Allergen Reactions    Adhesive Tape-Silicones Rash    Other Medication Hives     Cranberry or Probiotic, had severe itching and hives, not sure to which. Has taken a probiotic recently without a reaction.  Oxycodone Itching        A complete review of systems was obtained, negative except as described above and as reported on ROS sheet scanned into system. Physical Exam:     Visit Vitals  BP (!) 141/82 (BP 1 Location: Left upper arm, BP Patient Position: Sitting)   Pulse 79   Temp 97.9 °F (36.6 °C) (Oral)   Ht 5' 4\" (1.626 m)   Wt 146 lb 14.4 oz (66.6 kg)   SpO2 98%   BMI 25.22 kg/m²     ECOG PS: 0  General: No distress  Eyes: PERRLA, anicteric sclerae  HENT: Atraumatic, wearing mask  Neck: Supple  Respiratory:  normal respiratory effort  MS: Normal gait and station. Digits without clubbing or cyanosis. Skin: No rashes, ecchymoses, or petechiae. Normal temperature, turgor, and texture.   Psych: Alert, oriented, appropriate affect, normal judgment/insight  Neuro non focal  Breast RIGHT lumpectomy scars healing well  Port site ok today    Results:     Lab Results   Component Value Date/Time    WBC 4.8 07/22/2021 02:24 PM    HGB 13.0 07/22/2021 02:24 PM    HCT 38.4 07/22/2021 02:24 PM    PLATELET 822 37/53/7027 02:24 PM    MCV 99.7 (H) 07/22/2021 02:24 PM    ABS. NEUTROPHILS 3.0 07/22/2021 02:24 PM     Lab Results   Component Value Date/Time    Sodium 142 05/18/2021 02:10 PM    Potassium 3.9 05/18/2021 02:10 PM    Chloride 101 05/18/2021 02:10 PM    CO2 26 05/18/2021 02:10 PM    Glucose 107 (H) 05/18/2021 02:10 PM    BUN 21 05/18/2021 02:10 PM    Creatinine 0.75 05/18/2021 02:10 PM    GFR est AA 95 05/18/2021 02:10 PM    GFR est non-AA 82 05/18/2021 02:10 PM    Calcium 9.8 05/18/2021 02:10 PM     Lab Results   Component Value Date/Time    Bilirubin, total 0.3 05/18/2021 02:10 PM    ALT (SGPT) 19 05/18/2021 02:10 PM    Alk. phosphatase 212 (H) 05/18/2021 02:10 PM    Alk. phosphatase 212 (H) 05/18/2021 02:10 PM    Protein, total 6.6 05/18/2021 02:10 PM    Albumin 4.4 05/18/2021 02:10 PM    Globulin 3.6 02/16/2020 04:18 AM         Records reviewed and summarized above. Pathology report(s) reviewed above. Radiology report(s) reviewed above. Assessment:/PLAN     1)  Clinical stage 1 breast cancer ER- Her2+ post biopsy only  Records and history reviewed with pt today. Reviewed path and data specifically with pt. Discussed dx, stage and treatment of breast cancer. Post lumpectomy in August. Reviewed path in detail today. Discussed adjuvant chemo and hormonal therapy options. Discussed no hormonal therapy options due to ER/[place] negative. Discussed adjuvant herceptin based chemo today DDAC-THP vs TCHP vs TH. Detail discussion about benefit of herceptin based therapy. Pt and family is open to chemo and prefers least chemo. Will do TH. We discussed the risks and benefits of TH chemotherapy.  Potential side effects include, but are not limited to: nausea, vomiting, diarrhea, constipation, flu-like symptoms, taste changes, myelosuppression, increased risk of infection, fatigue, alopecia, neuropathy, skin and nail changes, mucositis, cardiac damage, allergic reactions, infertility, and rarely, death. The patient has consented to beginning chemotherapy. Has port. Pharmacy teaching today. Will set up ECHO and do labs at chemo. Set up for 8/31/21. Needs Rad/onc referral and done. Pt clinically well today and healthy overall. 2)  Arthritis/ hx of kidney stone. Per PCP. 3) Psychosocial.  Mood good. Coping well. Here with . Has grand kids. Daughter on phone today. SW support today. t greater than 70 min today in discussion. Fu here in 3 weeks/ at chemo. Call if questions    I appreciate the opportunity to participate in Ms. Silke Li care.     Signed By: Della Purdy DO

## 2021-08-17 NOTE — PROGRESS NOTES
Adjuvant weekly Taxol/Herceptin orders entered into [employer] to start on 8/31/21. Anti emetics sent to pharmacy on file.

## 2021-08-17 NOTE — PATIENT INSTRUCTIONS
Paclitaxel (Taxol®)   Paclitaxel is a chemotherapy drug usually given to treat ovarian, breast and non-small cell lung cancer. It's also known as Taxol®. This information should ideally be read with our general information about chemotherapy and your type of cancer. On this page     What paclitaxel looks like   How it is given   Possible side effects   Less common side effects   Additional information   References    What paclitaxel looks like Back to top  Paclitaxel is a colourless fluid. How it is given Back to top  Paclitaxel may be given in one of the following ways:   as an injection through a fine tube (cannula) inserted into a vein (intravenously), usually in the back of your hand   through a fine, plastic tube inserted under the skin and into a vein near your collarbone (central line)   into a fine tube inserted into a vein in the crook of your arm (PICC line). Chemotherapy is usually given as a course of several sessions (cycles) of treatment over a few months. The length of your treatment and the number of cycles you have will depend on the type of cancer you're being treated for. Your nurse or doctor will discuss your treatment plan with you. Before you begin your treatment your doctor will arrange for you to have blood tests. Juvenal Batters usually be given anti-sickness drugs before and/or during your treatment. Possible side effects Back to top  Each persons reaction to chemotherapy is different. Some people have very few side effects while others may experience more. The side effects described here won't affect everyone who has paclitaxel and may be different if you're having more than one type of chemotherapy drug. We have outlined the most common side effects but haven't included those that are rare and unlikely to affect you.  If you notice any effects that aren't listed below, discuss them with your doctor, chemotherapy nurse or pharmacist.   Risk of infection   Paclitaxel can reduce the number of white blood cells, which help fight infection. White blood cells are produced by the bone marrow. If the number of your white blood cells is low you'll be more prone to infections. A low white blood cell count is called neutropenia. Neutropenia begins seven days after treatment, and your resistance to infection is usually at its lowest 10-14 days after chemotherapy. The number of your white blood cells will then increase steadily and usually return to normal before your next cycle of chemotherapy is due. Contact your doctor or the hospital straight away if:   your temperature goes above 38ºC (100. 4ºF)   you suddenly feel unwell even with a normal temperature. You will have a blood test before having more chemotherapy to check the number of white blood cells. Occasionally, your treatment may need to be delayed if the number of your blood cells (blood count) is still low. Bruising or bleeding   Paclitaxel can reduce the production of platelets, which help the blood to clot. Tell your doctor if you have any unexplained bruising or bleeding, such as nosebleeds, bleeding gums, blood spots or rashes on the skin. You may need to have a platelet transfusion if your platelet count is low. Anaemia   Paclitaxel can reduce the number of red blood cells, which carry oxygen around the body. A low red blood cell count is called anaemia. This may make you feel tired and breathless. Tell your doctor or nurse if you have these symptoms. You may need to have a blood transfusion if the number of your red blood cells becomes too low. Allergic reaction   Some people can have an allergic reaction to paclitaxel while its being given. Signs of this can include skin rashes and itching, a high temperature (fever), shivering, dizziness, a headache and breathlessness. Usually you'll be given steroid tablets to take at home the day before treatment to reduce the chance of this happening.  Its important to take the steroid tablets as you have been directed. Instead of steroid tablets, you may be given a steroid by injection into a vein (intravenously) 30-60 minutes before the paclitaxel. Other drugs, such as antihistamines, may also be injected into a vein before your treatment to prevent an allergic reaction. You'll be closely monitored for any signs of an allergic reaction during the treatment. Tell your doctor or nurse if you have any of these symptoms. If you do have a reaction, it can be treated quickly. Numbness or tingling in hands or feet   This is because of the effect paclitaxel has on nerves and is known as peripheral neuropathy. You may also notice that you have difficulty doing up buttons or similar fiddly tasks. Tell your doctor if you notice any numbness or tingling in your hands or feet. It's important to report your symptoms to your doctor as they may be controlled by slightly lowering the dose of the drug. This side effect usually improves slowly for a few months after the treatment has finished. Sometimes symptoms can persist; talk to your doctor if this happens. Feeling sick (nausea) and being sick (vomiting)   This may begin a few hours after the treatment is given and last for a few days. Your doctor can prescribe very effective anti-sickness (anti-emetic) drugs to prevent or greatly reduce nausea and vomiting. If the sickness isn't controlled, or if it continues, tell your doctor; they can prescribe other anti-sickness drugs that may be more effective. Some anti-sickness drugs can cause constipation. Let your doctor or nurse know if this is a problem. Sore mouth   Your mouth may become sore or dry, or you may notice small ulcers during this treatment. Drinking plenty of fluids, and cleaning your teeth regularly and gently with a soft toothbrush, can help reduce the risk of this happening. Some people may find sucking on ice soothing.  Tell your nurse or doctor if you have any of these problems, as they can prescribe mouthwashes and medicine to prevent or clear mouth infections. Diarrhoea   This can usually be easily controlled with medicine, but tell your doctor if it is severe or continues. It's important to drink plenty of fluids if you have diarrhoea. Tiredness (fatigue)   Feeling tired is a common side effect of chemotherapy, especially towards the end of treatment and for some weeks after its over. Its important to try to pace yourself and get as much rest as you need. Try to balance this with some gentle exercise, such as short walks, which will help. If tiredness is making you feel sleepy, dont drive or operate machinery. Hair loss   This usually starts 2-3 weeks after the first dose of paclitaxel, although it may happen earlier. Hair usually falls out completely, but it may just thin. You may also have thinning and loss of eyelashes, eyebrows and hair in other areas of the body. This is usually temporary, and your hair will start to grow back once treatment has finished. Your hair may grow back straighter, curlier, finer, or a slightly different colour than it was before treatment. Your nurse can give you advice about coping with hair loss. Aching or pain in joints and muscles   You may have this a few days after you're given paclitaxel. It doesn't usually last long and your doctor can prescribe painkillers or anti-inflammatory drugs to help. Skin changes   Paclitaxel can cause a rash or dry skin, which may be itchy. Your doctor can prescribe medicine to help with this. Areas of skin that have previously been treated with radiotherapy may become red and sore. Let your doctor know if this happens. Headaches   Some people find that paclitaxel causes headaches. Let your doctor or nurse know. They can give you painkillers to relieve this. Taste changes   You may notice that food tastes different. Normal taste usually comes back after treatment finishes.  A dietitian or specialist nurse at your hospital can give you advice about ways of coping with this side effect. Pain along the vein   Paclitaxel can cause pain along the vein that is used to give you chemotherapy. If you feel any pain, tell your doctor or nurse straight away so that they can check the infusion site. They may slow the infusion down to reduce pain. Less common side effects Back to top  Low blood pressure   Some people may experience this when the drug is being given. Your blood pressure will be checked regularly during treatment. Tell your doctor or nurse if you feel faint or dizzy. Changes in heart rate   Paclitaxel can sometimes cause a temporary slowing of the heart rate known as bradycardia. This usually doesn't cause any harm. Liver changes   Paclitaxel may cause changes in the way your liver works, although it will return to normal when the treatment finishes. You're very unlikely to notice any problems, but your doctor will take regular blood samples to check your liver is working properly. Abdominal pain   Let your doctor know if you develop any pain in your tummy (abdomen) pain. It can usually be controlled with mild painkillers. Its important to let your doctor know straight away if you feel unwell or have any severe side effects, even if theyre not mentioned above. Additional information Back to top  Risk of developing a blood clot   Cancer can increase your risk of developing a blood clot (thrombosis), and having chemotherapy may increase this risk further. A blood clot may cause symptoms such as pain, redness and swelling in a leg, or breathlessness and chest pain. Blood clots can be very serious so it's important to tell your doctor straight away if you have any of these symptoms. Most clots can be treated with drugs that thin the blood. Your doctor or nurse can give you more information.    Other medicines   Some medicines, including those you can buy in a shop or , can be harmful to take when you're having chemotherapy. Tell your doctor about any medicines you're taking, including over-the-counter drugs, complementary therapies and herbal drugs. Fertility   Your ability to become pregnant or father a child may be affected by having this treatment. It's important to discuss fertility with your doctor before starting treatment. Contraception   It's not advisable to become pregnant or father a child while taking paclitaxel as it may harm the developing baby. It's important to use effective contraception while taking this drug and for at least a few months afterwards. You can discuss this with your doctor. Its not known whether chemotherapy drugs can be present in semen or vaginal fluids. To protect your partner, its safest to either avoid sex or use a barrier form of contraception for about 48 hours after chemotherapy. Breastfeeding   There's a potential risk that chemotherapy drugs may be present in breast milk. Women are advised not to breastfeed during chemotherapy and for a few months afterwards. Non-cancer admission   If youre admitted to hospital for a reason not related to the cancer, its important to tell the doctors and nurses looking after you that you're having chemotherapy treatment. You should tell them the name of your cancer specialist so that they can ask for advice. Emergency contacts   Its a good idea to know who you should contact if you have any problems or troublesome side effects when youre at home. During office hours you can contact the clinic or [facility]iser where you had your treatment. Your chemotherapy nurse or doctor will tell you who to contact during the evening or at weekends. References Back to top  This section is based upon our paclitaxel fact sheet, which has been compiled using information from a number of reliable sources, including:   Joy Arreaga: The Complete Drug Reference. 37th edition. 2011. [employer]. No Chains. 62nd edition. 2011. [employer] and [address] [facility]). www.[employer]. org.uk (accessed October 2011). Sudha Gamble 1967 [address]. The Chemotherapy Source Book. 4th edition. 2007. [facility]. Trastuzumab (Herceptin®)   This information is about trastuzumab, which is commonly known as as Herceptin®. It may be used as to treat breast cancer and stomach cancer. It may also be used to treat other types of cancer as part of a cancer research trial.   On this page     What is Herceptin? How it works   When it is used   How it is given   Possible side effects   References    What is Herceptin? Back to top  Herceptin is one of a group of cancer drugs called monoclonal antibodies. Monoclonal antibodies recognise and lock on to specific proteins on the surface of cancer cells. This helps the body's immune system recognise the cancer cells and destroy them. Monoclonal antibodies are sometimes called targeted therapies because they are designed to target cancer cells. How it works Back to top  Herceptin works by interfering with one of the ways in which some cancer cells divide and grow. Many types of cells in the body have proteins called growth factor receptors on their surfaces. When growth factors, which occur naturally in the body, lock on to these receptors the cells are stimulated to grow. Some cancers have too much of a protein called human epidermal growth factor receptor 2 (HER2) on their surfaces. They are called HER2 positive cancers. The extra HER2 receptors on HER2 positive cancers helps them grow. Herceptin is designed to treat people with some types of HER2 positive cancers. It works by attaching to the HER2 protein on the cancer cells so that growth factor cannot reach the cancer cells. This stops the cells from dividing and growing.  Herceptin also works by attracting the bodys own immune cells to help destroy the cancer cells. Herceptin only works in people who have cancer with high levels of the HER2 protein. Several tests are available to measure HER2 levels. Testing can be done at the same time as initial cancer surgery, or samples of cancer cells from previous biopsies or surgery may be used. When it is used Back to top  Breast cancer   It may be used in the early stages to increase the chances of a cure or in advanced stages to help control the disease if the breast cancer has come back. Stomach cancer   Herceptin may be used in combination with chemotherapy to treat stomach cancer that has spread beyond the stomach (metastatic or advanced gastric cancer). Research   Some other cancers also have high levels of the HER2 protein and research trials are currently underway to see if Herceptin may help in these cancers. Because Herceptin is a relatively new drug, it may not always be widely available on the NHS. We have more information on what you can do if a treatment isn't available. How it is given Back to top  Herceptin is given by a drip into a vein (intravenous infusion). It can usually be given in the outpatient department at the hospital.   The first dose is given slowly, usually over about an hour and a half. After the first infusion, you'll need to stay in hospital for about 4-6 hours so the nurses can monitor you for any reaction to the Herceptin. If you have no problems with this, later infusions will normally take about 30 minutes. You'll be able go home soon after they finish. Herceptin may be given once a week or once every three weeks. If it's given together with chemotherapy drugs, they are given in the normal way, which is usually every three weeks. Possible side effects Back to top  Each person's reaction to a cancer drug is different. Some people have very few side effects while others may experience more.    We've outlined the most common side effects but haven't included those that are rare and therefore extremely unlikely to affect you. If you notice any effects that aren't listed below, discuss them with your doctor or nurse. Side effects of Herceptin fall into two groups:   Early, infusion-related side effects, which may occur during the infusion or within about 4 hours of the drug being given. These side effects are usually most noticeable with the first dose and less noticeable with following doses. Later side effects, which may occur a few days or weeks after treatment. Early, infusion-related side effects   Flu-like symptoms   These may include a headache, high temperature (fever) and chills, feeling sick (nausea) or being sick (vomiting). These symptoms can be controlled or reduced with medicines, which your doctor can prescribe for you. They generally get better within a few hours of the infusion finishing. Allergic reaction   This is uncommon. Signs may include a skin rash, itching, wheezing, difficulty breathing and breathlessness. You'll be closely monitored during your treatment, but let your nurse or doctor know if you have any of these symptoms. Antihistamines can be given before the infusion to reduce the chance of an allergic reaction. Other possible side effects   Diarrhoea   This can usually be controlled with medicine, but let your doctor know if it's severe or if it continues. It's important to drink plenty of fluids if you have diarrhoea. Headaches   Let your doctor know if you have headaches while having treatment with Herceptin. Feeling sick (nausea)   This can be troublesome for a day or two after the infusion. Anti-sickness medicines will usually ease the problem of nausea. Effects on the heart   Herceptin may lead to some people having heart problems. It's recommended that Herceptin is not given to people with a history of heart disease or high blood pressure.    In order to check your normal heart function, you will have tests (usually an echocardiogram) before starting treatment with Herceptin. If Herceptin is given in combination with chemotherapy, you'll also experience the side effects of the chemotherapy drug. It's important to let your doctor know straight away if you feel unwell or have any severe side effects, even if they're not mentioned above. References Back to top  This section is based on our Trastuzumab fact sheet and has been compiled using information from a number of reliable sources including:   [employer]. 60th edition. 2010. [employer] and [address] [place] [address]. [employer] Agency. Summary of product characteristics. (accessed 24 September 2010). CG80 Breast cancer (early & locally advanced): diagnosis and treatment. 2009. [employer] (NICE). CG81 Advanced breast cancer: diagnosis and treatment. 2009. [employer] (NICE). Gastric cancer (HER2-positive metastatic) -trastuzumab: final appraisal determination. October 2010. [employer] (NICE).

## 2021-08-18 ENCOUNTER — DOCUMENTATION ONLY (OUTPATIENT)
Dept: ONCOLOGY | Age: 71
End: 2021-08-18

## 2021-08-18 NOTE — PROGRESS NOTES
Pharmacy Note- Chemotherapy Education    Natalia Stauffer is a  78 y. o.female  diagnosed with breast cancer contacted today via telephone for chemotherapy counseling and consent. Ms. Sebastian Light is being treated with PACLItaxel and trastsuzumab. Provided education on PACLItaxel, trastuzumab and premedications - dexamethasone, diphenhydramine and famotidine. Reviewed SQ vs. IV trastuzumab. Ms. Sebastian Light would like to start with SQ trastuzumab. Side effects of chemotherapy reviewed included s/s infection, anemia, appetite changes, thromboyctopenia, fatigue, hair loss/alopecia, bone pain, skin and nail changes, diarrhea/constipation, infusion reaction, peripheral neuropathy and cardiac toxicity. Patient given ways to manage these side effects and when to contact office. [address] Handout of medications provided to patient. Ms. Sebastian Light verbalized understanding of the information presented and all of the patient's questions were answered.     Sandy Noyola, PharmD, BCPS, BCOP    For Pharmacy Admin Tracking Only     CPA in place: No   Recommendation Provided To: Patient/Caregiver: 1 via Telephone     Intervention Accepted By: Patient/Caregiver: 1   Time Spent (min): 30

## 2021-08-20 ENCOUNTER — TELEPHONE (OUTPATIENT)
Dept: ONCOLOGY | Age: 71
End: 2021-08-20

## 2021-08-20 NOTE — TELEPHONE ENCOUNTER
Check in call with patient, ID verified X 2. Patient scheduled to begin chemo on 8/31/21, had expressed interest in [address] [place] [address], remains undecided. Was given local [employer] rep's name and number by NN, but has not contacted her. Agreed to contact RN on 8/23/21 with decision regarding [employer] system. Close friend recommended that patient obtain \"anti anxiety medication for coping with diagnosis. \"  Patient and RN discussed medication, dislikes narcotic/sedative type meds that make her feel sleepy or unmotivated. Advised patient that PCP may be best for anti anxiety meds, states that she really does not feel like she wants them, just following up on friend's recommendation.   Update to Provider/NN

## 2021-08-22 RX ORDER — SODIUM CHLORIDE 0.9 % (FLUSH) 0.9 %
10 SYRINGE (ML) INJECTION AS NEEDED
Status: CANCELLED | OUTPATIENT
Start: 2021-08-31

## 2021-08-22 RX ORDER — SODIUM CHLORIDE 9 MG/ML
25 INJECTION, SOLUTION INTRAVENOUS CONTINUOUS
Status: CANCELLED | OUTPATIENT
Start: 2021-09-08

## 2021-08-22 RX ORDER — EPINEPHRINE 1 MG/ML
0.3 INJECTION, SOLUTION, CONCENTRATE INTRAVENOUS AS NEEDED
Status: CANCELLED | OUTPATIENT
Start: 2021-09-13

## 2021-08-22 RX ORDER — SODIUM CHLORIDE 9 MG/ML
10 INJECTION INTRAMUSCULAR; INTRAVENOUS; SUBCUTANEOUS AS NEEDED
Status: CANCELLED | OUTPATIENT
Start: 2021-08-31

## 2021-08-22 RX ORDER — DIPHENHYDRAMINE HYDROCHLORIDE 50 MG/ML
50 INJECTION, SOLUTION INTRAMUSCULAR; INTRAVENOUS ONCE
Status: CANCELLED
Start: 2021-09-08 | End: 2021-09-07

## 2021-08-22 RX ORDER — DIPHENHYDRAMINE HYDROCHLORIDE 50 MG/ML
25 INJECTION, SOLUTION INTRAMUSCULAR; INTRAVENOUS AS NEEDED
Status: CANCELLED
Start: 2021-09-13

## 2021-08-22 RX ORDER — SODIUM CHLORIDE 9 MG/ML
25 INJECTION, SOLUTION INTRAVENOUS CONTINUOUS
Status: CANCELLED | OUTPATIENT
Start: 2021-09-13

## 2021-08-22 RX ORDER — ONDANSETRON 2 MG/ML
8 INJECTION INTRAMUSCULAR; INTRAVENOUS AS NEEDED
Status: CANCELLED | OUTPATIENT
Start: 2021-08-31

## 2021-08-22 RX ORDER — SODIUM CHLORIDE 9 MG/ML
25 INJECTION, SOLUTION INTRAVENOUS CONTINUOUS
Status: CANCELLED | OUTPATIENT
Start: 2021-08-31

## 2021-08-22 RX ORDER — DEXAMETHASONE SODIUM PHOSPHATE 4 MG/ML
10 INJECTION, SOLUTION INTRA-ARTICULAR; INTRALESIONAL; INTRAMUSCULAR; INTRAVENOUS; SOFT TISSUE ONCE
Status: CANCELLED | OUTPATIENT
Start: 2021-09-08 | End: 2021-09-07

## 2021-08-22 RX ORDER — ONDANSETRON 2 MG/ML
8 INJECTION INTRAMUSCULAR; INTRAVENOUS AS NEEDED
Status: CANCELLED | OUTPATIENT
Start: 2021-09-08

## 2021-08-22 RX ORDER — HYDROCORTISONE SODIUM SUCCINATE 100 MG/2ML
100 INJECTION, POWDER, FOR SOLUTION INTRAMUSCULAR; INTRAVENOUS AS NEEDED
Status: CANCELLED | OUTPATIENT
Start: 2021-09-08

## 2021-08-22 RX ORDER — DEXAMETHASONE SODIUM PHOSPHATE 4 MG/ML
10 INJECTION, SOLUTION INTRA-ARTICULAR; INTRALESIONAL; INTRAMUSCULAR; INTRAVENOUS; SOFT TISSUE ONCE
Status: CANCELLED | OUTPATIENT
Start: 2021-08-31 | End: 2021-08-31

## 2021-08-22 RX ORDER — HYDROCORTISONE SODIUM SUCCINATE 100 MG/2ML
100 INJECTION, POWDER, FOR SOLUTION INTRAMUSCULAR; INTRAVENOUS AS NEEDED
Status: CANCELLED | OUTPATIENT
Start: 2021-08-31

## 2021-08-22 RX ORDER — DIPHENHYDRAMINE HYDROCHLORIDE 50 MG/ML
25 INJECTION, SOLUTION INTRAMUSCULAR; INTRAVENOUS AS NEEDED
Status: CANCELLED
Start: 2021-08-31

## 2021-08-22 RX ORDER — ACETAMINOPHEN 325 MG/1
650 TABLET ORAL AS NEEDED
Status: CANCELLED
Start: 2021-09-08

## 2021-08-22 RX ORDER — SODIUM CHLORIDE 9 MG/ML
10 INJECTION INTRAMUSCULAR; INTRAVENOUS; SUBCUTANEOUS AS NEEDED
Status: CANCELLED | OUTPATIENT
Start: 2021-09-08

## 2021-08-22 RX ORDER — EPINEPHRINE 1 MG/ML
0.3 INJECTION, SOLUTION, CONCENTRATE INTRAVENOUS AS NEEDED
Status: CANCELLED | OUTPATIENT
Start: 2021-08-31

## 2021-08-22 RX ORDER — DIPHENHYDRAMINE HYDROCHLORIDE 50 MG/ML
50 INJECTION, SOLUTION INTRAMUSCULAR; INTRAVENOUS ONCE
Status: CANCELLED
Start: 2021-08-31 | End: 2021-08-31

## 2021-08-22 RX ORDER — EPINEPHRINE 1 MG/ML
0.3 INJECTION, SOLUTION, CONCENTRATE INTRAVENOUS AS NEEDED
Status: CANCELLED | OUTPATIENT
Start: 2021-09-08

## 2021-08-22 RX ORDER — SODIUM CHLORIDE 9 MG/ML
10 INJECTION INTRAMUSCULAR; INTRAVENOUS; SUBCUTANEOUS AS NEEDED
Status: CANCELLED | OUTPATIENT
Start: 2021-09-13

## 2021-08-22 RX ORDER — HYDROCORTISONE SODIUM SUCCINATE 100 MG/2ML
100 INJECTION, POWDER, FOR SOLUTION INTRAMUSCULAR; INTRAVENOUS AS NEEDED
Status: CANCELLED | OUTPATIENT
Start: 2021-09-13

## 2021-08-22 RX ORDER — HEPARIN 100 UNIT/ML
300-500 SYRINGE INTRAVENOUS AS NEEDED
Status: CANCELLED
Start: 2021-09-08

## 2021-08-22 RX ORDER — DIPHENHYDRAMINE HYDROCHLORIDE 50 MG/ML
50 INJECTION, SOLUTION INTRAMUSCULAR; INTRAVENOUS AS NEEDED
Status: CANCELLED
Start: 2021-08-31

## 2021-08-22 RX ORDER — DIPHENHYDRAMINE HYDROCHLORIDE 50 MG/ML
50 INJECTION, SOLUTION INTRAMUSCULAR; INTRAVENOUS ONCE
Status: CANCELLED
Start: 2021-09-13 | End: 2021-09-14

## 2021-08-22 RX ORDER — SODIUM CHLORIDE 0.9 % (FLUSH) 0.9 %
10 SYRINGE (ML) INJECTION AS NEEDED
Status: CANCELLED | OUTPATIENT
Start: 2021-09-13

## 2021-08-22 RX ORDER — DIPHENHYDRAMINE HYDROCHLORIDE 50 MG/ML
50 INJECTION, SOLUTION INTRAMUSCULAR; INTRAVENOUS AS NEEDED
Status: CANCELLED
Start: 2021-09-08

## 2021-08-22 RX ORDER — ONDANSETRON 2 MG/ML
8 INJECTION INTRAMUSCULAR; INTRAVENOUS AS NEEDED
Status: CANCELLED | OUTPATIENT
Start: 2021-09-13

## 2021-08-22 RX ORDER — DIPHENHYDRAMINE HYDROCHLORIDE 50 MG/ML
50 INJECTION, SOLUTION INTRAMUSCULAR; INTRAVENOUS AS NEEDED
Status: CANCELLED
Start: 2021-09-13

## 2021-08-22 RX ORDER — ALBUTEROL SULFATE 0.83 MG/ML
2.5 SOLUTION RESPIRATORY (INHALATION) AS NEEDED
Status: CANCELLED
Start: 2021-09-08

## 2021-08-22 RX ORDER — ALBUTEROL SULFATE 0.83 MG/ML
2.5 SOLUTION RESPIRATORY (INHALATION) AS NEEDED
Status: CANCELLED
Start: 2021-08-31

## 2021-08-22 RX ORDER — DIPHENHYDRAMINE HYDROCHLORIDE 50 MG/ML
25 INJECTION, SOLUTION INTRAMUSCULAR; INTRAVENOUS AS NEEDED
Status: CANCELLED
Start: 2021-09-08

## 2021-08-22 RX ORDER — ACETAMINOPHEN 325 MG/1
650 TABLET ORAL AS NEEDED
Status: CANCELLED
Start: 2021-09-13

## 2021-08-22 RX ORDER — DEXAMETHASONE SODIUM PHOSPHATE 4 MG/ML
10 INJECTION, SOLUTION INTRA-ARTICULAR; INTRALESIONAL; INTRAMUSCULAR; INTRAVENOUS; SOFT TISSUE ONCE
Status: CANCELLED | OUTPATIENT
Start: 2021-09-13 | End: 2021-09-14

## 2021-08-22 RX ORDER — HEPARIN 100 UNIT/ML
300-500 SYRINGE INTRAVENOUS AS NEEDED
Status: CANCELLED
Start: 2021-09-13

## 2021-08-22 RX ORDER — SODIUM CHLORIDE 0.9 % (FLUSH) 0.9 %
10 SYRINGE (ML) INJECTION AS NEEDED
Status: CANCELLED | OUTPATIENT
Start: 2021-09-08

## 2021-08-22 RX ORDER — ACETAMINOPHEN 325 MG/1
650 TABLET ORAL AS NEEDED
Status: CANCELLED
Start: 2021-08-31

## 2021-08-22 RX ORDER — HEPARIN 100 UNIT/ML
300-500 SYRINGE INTRAVENOUS AS NEEDED
Status: CANCELLED
Start: 2021-08-31

## 2021-08-22 RX ORDER — ALBUTEROL SULFATE 0.83 MG/ML
2.5 SOLUTION RESPIRATORY (INHALATION) AS NEEDED
Status: CANCELLED
Start: 2021-09-13

## 2021-08-23 ENCOUNTER — HOSPITAL ENCOUNTER (OUTPATIENT)
Dept: NON INVASIVE DIAGNOSTICS | Age: 71
Discharge: HOME | End: 2021-08-23
Attending: INTERNAL MEDICINE
Payer: MEDICARE

## 2021-08-23 VITALS
SYSTOLIC BLOOD PRESSURE: 141 MMHG | HEIGHT: 64 IN | DIASTOLIC BLOOD PRESSURE: 82 MMHG | WEIGHT: 146 LBS | BODY MASS INDEX: 24.92 KG/M2

## 2021-08-23 DIAGNOSIS — C50.911 INFILTRATING DUCTAL CARCINOMA OF RIGHT BREAST (HCC): ICD-10-CM

## 2021-08-23 DIAGNOSIS — C50.411 MALIGNANT NEOPLASM OF UPPER-OUTER QUADRANT OF RIGHT FEMALE BREAST, UNSPECIFIED ESTROGEN RECEPTOR STATUS (HCC): ICD-10-CM

## 2021-08-23 DIAGNOSIS — Z98.890 S/P BREAST LUMPECTOMY: Primary | ICD-10-CM

## 2021-08-23 LAB
ECHO AO ROOT DIAM: 2.95 CM
ECHO AV AREA PEAK VELOCITY: 2.09 CM2
ECHO AV AREA/BSA PEAK VELOCITY: 1.2 CM2/M2
ECHO AV PEAK GRADIENT: 10.08 MMHG
ECHO AV PEAK VELOCITY: 158.77 CM/S
ECHO LA AREA 4C: 12.59 CM2
ECHO LA MAJOR AXIS: 2.17 CM
ECHO LA MINOR AXIS: 1.27 CM
ECHO LA VOL 2C: 29.78 ML (ref 22–52)
ECHO LA VOL 4C: 28.7 ML (ref 22–52)
ECHO LA VOL BP: 31.84 ML (ref 22–52)
ECHO LA VOL/BSA BIPLANE: 18.62 ML/M2 (ref 16–28)
ECHO LA VOLUME INDEX A2C: 17.42 ML/M2 (ref 16–28)
ECHO LA VOLUME INDEX A4C: 16.78 ML/M2 (ref 16–28)
ECHO LV EDV A2C: 69.49 ML
ECHO LV EDV A4C: 90.71 ML
ECHO LV EDV BP: 80.08 ML (ref 56–104)
ECHO LV EDV INDEX A4C: 53 ML/M2
ECHO LV EDV INDEX BP: 46.8 ML/M2
ECHO LV EDV NDEX A2C: 40.6 ML/M2
ECHO LV EJECTION FRACTION A2C: 60 PERCENT
ECHO LV EJECTION FRACTION A4C: 63 PERCENT
ECHO LV EJECTION FRACTION BIPLANE: 61.3 PERCENT (ref 55–100)
ECHO LV ESV A2C: 27.96 ML
ECHO LV ESV A4C: 33.79 ML
ECHO LV ESV BP: 31.02 ML (ref 19–49)
ECHO LV ESV INDEX A2C: 16.4 ML/M2
ECHO LV ESV INDEX A4C: 19.8 ML/M2
ECHO LV ESV INDEX BP: 18.1 ML/M2
ECHO LV GLOBAL LONGITUDINAL STRAIN (GLS): -15.9 PERCENT
ECHO LV INTERNAL DIMENSION DIASTOLIC: 3.88 CM (ref 3.9–5.3)
ECHO LV INTERNAL DIMENSION SYSTOLIC: 2.92 CM
ECHO LV IVSD: 0.95 CM (ref 0.6–0.9)
ECHO LV IVSS: 1 CM
ECHO LV MASS 2D: 98.9 G (ref 67–162)
ECHO LV MASS INDEX 2D: 57.8 G/M2 (ref 43–95)
ECHO LV POSTERIOR WALL DIASTOLIC: 0.78 CM (ref 0.6–0.9)
ECHO LV POSTERIOR WALL SYSTOLIC: 1.05 CM
ECHO LVOT DIAM: 1.96 CM
ECHO LVOT PEAK GRADIENT: 4.84 MMHG
ECHO LVOT PEAK VELOCITY: 109.96 CM/S
ECHO MV A VELOCITY: 89.77 CM/S
ECHO MV E DECELERATION TIME (DT): 227.98 MS
ECHO MV E VELOCITY: 83.32 CM/S
ECHO MV E/A RATIO: 0.93
ECHO PV PEAK INSTANTANEOUS GRADIENT SYSTOLIC: 4.44 MMHG
ECHO RV INTERNAL DIMENSION: 3.67 CM
ECHO RV TAPSE: 2.38 CM (ref 1.5–2)
ECHO TV REGURGITANT MAX VELOCITY: 224.6 CM/S
ECHO TV REGURGITANT PEAK GRADIENT: 20.18 MMHG
GLOBAL LONGITUDINAL STRAIN 2 CHAMBER: -16.2 PERCENT
GLOBAL LONGITUDINAL STRAIN 4 CHAMBER: -15.7 PERCENT
GLOBAL LONGITUDINAL STRAIN LONG AXIS: -15.8 PERCENT
LA VOL DISK BP: 29.89 ML (ref 22–52)

## 2021-08-23 PROCEDURE — 93306 TTE W/DOPPLER COMPLETE: CPT

## 2021-08-23 NOTE — TELEPHONE ENCOUNTER
Incoming call from patient, ID verified X 2. Patient states has decided to use [employer]axman cap, understands forms to be faxed today, and company will contact to ship. Inquired about a \"capper\" to assist with cap usage in [facility], advised to follow up with local [employer] rep, Brandon Damon. Patient confirmed she had rep's contact info. Update to Provider.

## 2021-08-27 NOTE — MR AVS SNAPSHOT
[address] [address] [address] [place] [address] 
[phone] Patient: Darrell Ceron MRN: [id number] XPC:[id number] Visit Information Date & Time Provider Department Dept. Phone Encounter #  
 8/21/2018 10:30 AM Olya Marques, [facility] Internal Medicine [phone] [id number] Follow-up Instructions Return if symptoms worsen or fail to improve. Upcoming Health Maintenance Date Due COLONOSCOPY 12/5/1970 GLAUCOMA SCREENING Q2Y 12/5/2017 Pneumococcal 65+ Low/Medium Risk (1 of 2 - PCV13) 12/5/2017 Influenza Age 5 to Adult 8/1/2018 BREAST CANCER SCRN MAMMOGRAM 5/8/2019 DTaP/Tdap/Td series (2 - Td) 2/1/2027 Allergies as of 8/21/2018  Review Complete On: 8/21/2018 By: Olya Marques MD  
 No Known Allergies Current Immunizations  Reviewed on 8/21/2018 Name Date Influenza High Dose Vaccine PF 1/9/2018 Influenza Vaccine 10/25/2016 Tdap 2/1/2017 Zoster Vaccine, Live 12/1/2014 Reviewed by Lesa Sanchez RN on 8/21/2018 at 10:21 AM  
You Were Diagnosed With   
  
 Codes Comments Acute pain of left shoulder    -  Primary ICD-10-CM: M25.512 ICD-9-CM: 719.41 Elevated BP without diagnosis of hypertension     ICD-10-CM: R03.0 ICD-9-CM: 796.2 Vitals BP Pulse Temp Resp Height(growth percentile) Weight(growth percentile) 124/78 70 97.7 °F (36.5 °C) (Oral) 14 5' 3.5\" (1.613 m) 142 lb (64.4 kg) SpO2 BMI OB Status Smoking Status 99% 24.76 kg/m2 Postmenopausal Former Smoker Vitals History BMI and BSA Data Body Mass Index Body Surface Area 24.76 kg/m 2 1.7 m 2 Preferred Pharmacy Pharmacy Name Phone Shaun Kumar [address], [address] [phone] Your Updated Medication List  
  
   
This list is accurate as of 8/21/18 11:06 AM.  Always use your most recent med list.  
  
  
  
  
 alendronate 70 mg tablet Commonly known as:  FOSAMAX TAKE 1 TABLET BY MOUTH EVERY SEVEN (7) DAYS. DULoxetine 20 mg capsule Commonly known as:  CYMBALTA Take 1 Cap by mouth nightly.  
  
 gabapentin 300 mg capsule Commonly known as:  NEURONTIN Take 3 Caps by mouth two (2) times a day. meloxicam 15 mg tablet Commonly known as:  MOBIC Take 1 Tab by mouth daily. triamcinolone acetonide 0.1 % ointment Commonly known as:  KENALOG Apply  to affected area two (2) times a day. use thin layer VITAMIN B-1 100 mg tablet Generic drug:  thiamine HCL Take 40 mg by mouth daily. VITAMIN E PO Take 1,200 mg by mouth daily. Follow-up Instructions Return if symptoms worsen or fail to improve. Patient Instructions The phone number to [phone] is [phone]. 
 
------------------------------------------ Heat: apply heating pad 10-15 minutes at a time 3-4 times per day Medications: 
Ibuprofen/Advil: 200mg (1-3 tabs every 4-6 hours, take with food) OR Naproxen/Aleve: 220mg (1-2 tabs every 12 hours, take with food) *Can not take these medications together. *You can take Tylenol 500mg (1 tabs every 6 hours, max dose of acetaminophen in 24 hrs is 3,000mg) with either Advil or Aleve Stretching: 
Limit the amount of lifting to less then 25 lbs for the next week Introducing [facility] [place]! Dear Ney Mejia: Thank you for requesting a [employer] account. Our records indicate that you already have an active [employer] account. You can access your account anytime at [email or web address] Did you know that you can access your hospital and ER discharge instructions at any time in [employer]? You can also review all of your test results from your hospital stay or ER visit. Additional Information If you have questions, please visit the Frequently Asked Questions section of the [employer] website at [email or web address] Remember, [employer] is NOT to be used for urgent needs. For medical emergencies, dial 911. Now available from your iPhone and Android! Please provide this summary of care documentation to your next provider. Your primary care clinician is listed as Tato Morgan. If you have any questions after today's visit, please call [phone]. negative...

## 2021-08-30 ENCOUNTER — DOCUMENTATION ONLY (OUTPATIENT)
Dept: ONCOLOGY | Age: 71
End: 2021-08-30

## 2021-08-30 ENCOUNTER — TELEPHONE (OUTPATIENT)
Dept: ONCOLOGY | Age: 71
End: 2021-08-30

## 2021-08-30 DIAGNOSIS — Z95.828 PORT-A-CATH IN PLACE: ICD-10-CM

## 2021-08-30 DIAGNOSIS — C50.911 INFILTRATING DUCTAL CARCINOMA OF RIGHT BREAST (HCC): Primary | ICD-10-CM

## 2021-08-30 RX ORDER — LIDOCAINE AND PRILOCAINE 25; 25 MG/G; MG/G
CREAM TOPICAL AS NEEDED
Qty: 30 G | Refills: 0 | Status: SHIPPED | OUTPATIENT
Start: 2021-08-30 | End: 2022-04-14 | Stop reason: ALTCHOICE

## 2021-08-30 NOTE — TELEPHONE ENCOUNTER
Returned call to patient, ID verified X 2. Scheduled for C 1 D 1 TH on 8/31/21. Reviewed location of [facility], appt times of [facility] OV. Understands to eat breakfast/lunch prior to admission and that [facility] has snacks/drinks. Will take regular meds prior to admission including Fosamax/vitamins as desired. Reviewed EMLA usage, that had been sent to pharmacy of record, as well as post chemo meds. Encouraged to dress comfortably and bring items of comfort/support to appt. Reviewed timing for applying Paxman cap - states rep is planning to meet her at treatment to assist with application of cap, but has told her the wrong appt times and will be contacting her now to update rep of correct times. Update to Provider. ----- Message from Geneva London LCSW sent at 8/30/2021  2:50 PM EDT -----  Patient has multiple pre-chemo questions. If someone could please give her a call back, she would greatly appreciate it. #[phone]    1. \"Can I still take Faxomax? I need to take it first thing in the morning, on an empty stomach, with several other stipulations. \"   2. \"Can numbing cream be ordered and sent to my pharmacy? \" Dorcas Hoffman sent this script in today)  3. \"Can I still take my vitamins? \"  4. \"At what point do I take the anti-nausea medication that I put under my tongue? It is my understanding that I take it 30 minutes prior to treatment. \"   5. \"What time do I start my Cold Caps? Do I put that on when I get to [facility]? I don't want to walk around the building with it on? Sueellen Payment Sueellen Payment Sueellen Payment \"     Geneva London LCSW

## 2021-08-30 NOTE — PROGRESS NOTES
[facility] at [address] [place] [address] [address], [place]: [phone]  F: [phone]    Reason for Visit:   Anita Light is a 78 y.o. female seen today in office for follow up of Right Breast Cancer here to start adjuvant weekly Taxol + Herceptin. Treatment History:   · Abnormal screening mammo 6/3/21: Bilateral digital screening mammography was performed and is interpreted in conjunction with a computer assisted detection (CAD) system. In the left breast, no suspicious masses or calcifications are identified. The right breast upper outer quadrant at 10:00 posterior depth 9 cm from the nipple there is a focal asymmetry  · Right Dx Mammo/US 6/7/21: Further evaluation was performed for a right breast focal asymmetry. In the right breast upper outer quadrant at 10:00 posterior depth there is an equal density irregular mass with indistinct margins measuring up to 1.7 cm. In the right breast at 10:00 10 cm from the nipple there is a 1.6 x 0.9 x 1.0 cm hypoechoic oval mass with indistinct margins, internal vascularity, and posterior acoustic enhancement  · Right Breast Biopsy 6/18/21: PATH - Invasive ductal carcinoma, [place] histologic grade 3. Largest glass slide measurement of invasive carcinoma: 6 mm   · ER/OR negative, HER2 FISH +  · Ki67 90%  · Right Lumpectomy 8/3/2: PATH - 20 mm IDC with negative LNs. Margins negative. · Adjuvant weekly Taxol + Herceptin 8/31/21 -     STAGE: Pathologic T1cN0, ER/OR negative, HER2+     History of Present Illness:   Anita Light is a 78 y.o. female seen today in office for follow up of right breast cancer post lumpectomy 8/3/21. She is here today for Day 1 Cycle 1 of adjuvant weekly Taxol + Herceptin. She reports that she feels well overall today. Her appetite and energy levels are good. She denies fever, chills, mouth sores, cough, SOB, CP, nausea, vomiting, diarrhea, and constipation. She denies pain today.  CBC and CMP are still pending. She is ready to start treatment today. Her supportive  is here today. FamHx:  · Mom brain cancer  · Sister spine cancer  · GM ovarian cancer. Past Medical History:   Diagnosis Date    Arthritis     left ankle    Breast cancer ([address]     RIGHT    Chronic kidney disease     kidney stone    Chronic pain of left ankle 2017    Seeing Dr eMdellin Gravralph    Fracture     left ankle    Ill-defined condition     PICC line for UTI - borderline sepsis    Osteopenia 2017      Past Surgical History:   Procedure Laterality Date    COLONOSCOPY N/A 2019    tubular adenoma, repeat 1 yr d/t poor prep - [facility];     COLONOSCOPY N/A 2020    normal - performed by Kemal Allison MD at [facility] ENDOSCOPY    HX [address] Left     surgery - has a plate a screws    HX BREAST LUMPECTOMY Right 8/3/2021    RIGHT BREAST LUMPECTOMY WITH ULTRASOUND AND RIGHT BREAST SENTINEL NODE BIOPSY AND PORTACATH INSERTION performed by Diana Chan MD at [facility] AMBULATORY OR    HX [address]      HX GI      COLONOSCOPY    HX OTHER SURGICAL Right 2020    growth removed from right eyelid    HX OTHER SURGICAL Right 2020    PICC line placed for IV abx, removed after completing course for pyelonephritis    HX POLYPECTOMY      HX TONSILLECTOMY      NC BIOPSY OF BREAST, NEEDLE CORE Right       Social History     Tobacco Use    Smoking status: Former Smoker     Quit date:      Years since quittin.6    Smokeless tobacco: Never Used    Tobacco comment: was a causal smoker when smoker in college   Substance Use Topics    Alcohol use:  Yes     Alcohol/week: 5.0 standard drinks     Types: 5 Glasses of wine per week      Family History   Problem Relation Age of Onset    Cancer Mother         Brain tumor    Other Father         ? lung problem    Cancer Sister         tumors of spine    Other Sister         kidney stones    Other Daughter         kidney stone    Other Maternal Aunt         kidney stones    No Known Problems Sister     No Known Problems Sister     Anesth Problems Neg Hx      Current Outpatient Medications   Medication Sig    lidocaine-prilocaine (EMLA) topical cream Apply  to affected area as needed for Pain.  CRANIAL PROSTHESIS misc Breast cancer for chemo/ alopecia needs wig    ondansetron (ZOFRAN ODT) 4 mg disintegrating tablet Take 1 Tablet by mouth every eight (8) hours as needed for Nausea or Vomiting.  prochlorperazine (Compazine) 5 mg tablet Take 1-2 Tablets by mouth every six (6) hours as needed for Nausea or Vomiting.  ondansetron (ZOFRAN ODT) 4 mg disintegrating tablet Take 1 Tablet by mouth every eight (8) hours as needed for Nausea or Vomiting.  cortisone acetate (CORTISONE PO) Take  by mouth. Injections every 3 months in her foot    DULoxetine (CYMBALTA) 20 mg capsule TAKE ONE CAPSULE BY MOUTH EVERY EVENING    alendronate (FOSAMAX) 70 mg tablet TAKE 1 TABLET BY MOUTH ONCE WEEKLY ON AN EMPTY STOMACH BEFORE BREAKFAST. REMAIN UPRIGHT FOR 30 MINUTES & TAKE WITH 8 OUNCES OF WATER    acetaminophen (TYLENOL) 500 mg tablet 1 or 2 every 6 hrs prn for headache     No current facility-administered medications for this visit. Allergies   Allergen Reactions    Adhesive Tape-Silicones Rash    Other Medication Hives     Cranberry or Probiotic, had severe itching and hives, not sure to which. Has taken a probiotic recently without a reaction.  Oxycodone Itching      Review of Systems:  A complete review of systems was obtained, negative except as described above and as reported on ROS sheet scanned into system.      Physical Exam:     Visit Vitals  BP (!) 150/88 (BP 1 Location: Left upper arm, BP Patient Position: Sitting)   Pulse 78   Temp 98 °F (36.7 °C) (Oral)   Ht 5' 4\" (1.626 m)   Wt 148 lb 12.8 oz (67.5 kg)   SpO2 97%   BMI 25.54 kg/m² ECOG PS: 0  General: No distress  Eyes: Anicteric sclerae  HENT: Atraumatic, wearing a mask  Neck: Supple  Respiratory:  normal respiratory effort  MS: Normal gait and station. Digits without clubbing or cyanosis. Skin: No rashes, ecchymoses, or petechiae. Normal temperature, turgor, and texture. Port site without redness/swelling  Psych: Alert, oriented, appropriate affect, normal judgment/insight  Breast: Not examined today. Last Visit: RIGHT lumpectomy scars healing well  Neuro: Non focal    Results:     Lab Results   Component Value Date/Time    WBC 4.4 08/31/2021 12:59 PM    HGB 13.1 08/31/2021 12:59 PM    HCT 39.2 08/31/2021 12:59 PM    PLATELET 360 51/91/1568 12:59 PM    .6 (H) 08/31/2021 12:59 PM    ABS. NEUTROPHILS 2.8 08/31/2021 12:59 PM     Lab Results   Component Value Date/Time    Sodium 140 08/31/2021 12:59 PM    Potassium 4.0 08/31/2021 12:59 PM    Chloride 108 08/31/2021 12:59 PM    CO2 25 08/31/2021 12:59 PM    Glucose 103 (H) 08/31/2021 12:59 PM    BUN 19 08/31/2021 12:59 PM    Creatinine 0.78 08/31/2021 12:59 PM    GFR est AA >60 08/31/2021 12:59 PM    GFR est non-AA >60 08/31/2021 12:59 PM    Calcium 9.7 08/31/2021 12:59 PM     Lab Results   Component Value Date/Time    Bilirubin, total 0.5 08/31/2021 12:59 PM    ALT (SGPT) 27 08/31/2021 12:59 PM    Alk. phosphatase 89 08/31/2021 12:59 PM    Protein, total 6.7 08/31/2021 12:59 PM    Albumin 3.8 08/31/2021 12:59 PM    Globulin 2.9 08/31/2021 12:59 PM     6/18/21  FINAL PATHOLOGIC DIAGNOSIS   Breast, right, needle core biopsy:   Invasive ductal carcinoma, [place] histologic grade 3   Largest glass slide measurement of invasive carcinoma: 6 mm   ER/AK negative, HER2 FISH +    8/3/21  FINAL PATHOLOGIC DIAGNOSIS   1. Lymph node, right axillary sentinel lymph node #1, excision:   One lymph node negative for metastatic carcinoma.    2. Lymph node, right axillary sentinel lymph node #2, excision:   One lymph node negative for metastatic carcinoma. 3. Right breast, lumpectomy, excision:   Procedure: Excision (less than total mastectomy)   Specimen Laterality: Right   TUMOR   Histologic Type: Invasive carcinoma of no special type (ductal)   Glandular (Acinar) / Tubular Differentiation: Score 3   Nuclear Pleomorphism: Score 3   Mitotic Rate: Score 3   Overall Grade: Grade 3   Tumor Size: 20 mm   Tumor Focality: Single focus of invasive carcinoma   Ductal Carcinoma in Situ (DCIS): Not identified   Tumor Extent   Lymphovascular Invasion: Not identified   Treatment Effect in the Breast: No known presurgical therapy   MARGINS   Invasive Carcinoma Margins: Uninvolved by invasive carcinoma   Distance from Closest Margin (Millimeters): Less than: 1 mm   Closest Margin(s): Anterior   LYMPH NODES   Regional Lymph Nodes: Uninvolved by tumor cells   Total Number of Lymph Nodes Examined: 2   Number of Ava Nodes Examined: 2   PATHOLOGIC STAGE CLASSIFICATION (pTNM, AJCC 8th Edition)   Primary Tumor (pT): pT1c N0(sn)   Breast biomarkers previously reported ([id number]): ER negative, [place] negative, HER-2/mili positive by FISH. Tumor blocks for potential additional studies: 3-B-D.   4. Right breast, lateral margin right breast, excision:   Benign fibroadipose tissue with minimal benign fibroglandular breast tissue. 5. Right breast, medial margin right breast, excision:   Benign fibroglandular breast tissue with epithelial cyst and usual ductal hyperplasia. 6. Right breast, inferior margin right breast, excision:   Benign fibroglandular breast tissue. 08/23/21    ECHO ADULT COMPLETE 08/23/2021 8/23/2021    Interpretation Summary  · LV: Estimated LVEF is 60 - 65%. Normal cavity size, wall thickness and systolic function (ejection fraction normal). Normal left ventricular strain. Global longitudinal strain is -15.9%. Mild (grade 1) left ventricular diastolic dysfunction.     Signed by: Yue Rosa MD on 8/23/2021  1:01 PM    Records reviewed and summarized above. Pathology report(s) reviewed above. Radiology report(s) reviewed above. Assessment:/PLAN     1) Stage 1 Breast Cancer ER- Her2+ post Lumpectomy 8/3/21  Records and history reviewed. Reviewed path and data. Discussed dx, stage and treatment of breast cancer. Post lumpectomy in August. Reviewed path in detail. Discussed adjuvant chemo and hormonal therapy options. Discussed no hormonal therapy options due to ER/MS negative. Discussed adjuvant Herceptin based chemo today ddAC-THP vs TCHP vs TH. Detailed discussion about benefit of Herceptin based therapy. Paient and family open to chemo and prefer least chemo. Decided on Taxol/Herceptin. Teaching and consent with Pharmacy. Port placed on 8/3/21. Pre chemo ECHO 8/23/21 stable with EF 60-65%. Referred to Rad/Onc - appt 9/20/21. Patient is here today for Day 1 Cycle 1 of weekly Taxol/Herceptin. She will be doing Herceptin SQ for duration of treatment. She is clinically stable and doing well overall. Labs (CBC and CMP) reviewed. Patient is ready to start treatment today. Follow up in 1 week with Day 8 Cycle 1. Patient agrees with plan. We discussed the risks and benefits of Taxol + Herceptin chemotherapy. Potential side effects include, but are not limited to: nausea, vomiting, diarrhea, constipation, flu-like symptoms, taste changes, myelosuppression, increased risk of infection, fatigue, alopecia, neuropathy, skin and nail changes, mucositis, cardiac damage, allergic reactions, infertility, and rarely, death. The patient has consented to beginning chemotherapy. 2) Arthritis/Hx of Kidney Stone/Osteopenia   Management per PCP. 3) Management of High Risk Medications - Chemotherapy  No toxicities noted as patient is starting treatment today. Labs (CBC and CMP) reviewed. ECHO reviewed. No dose adjustments needed today. Will monitor for side effects. 4) Psychosocial  Mood good, coping well.    Her family is very supportive. SW/NN support as needed. Her  is here today. Call if questions. Follow up in 1 week. This patient was seen in conjunction with Darren Nurse, ALTHEA. I personally performed a face to face diagnostic evaluation on this patient. I personally reviewed the history and performed the key points on the exam.   I personally reviewed all points in the assessment and created treatment plan with the patient. Specifically, pt seen by me today  Feels fine overall  Ready for chemo  Labs personally reviewed today  Proceed with chemo as ordered. I appreciate the opportunity to participate in Ms. Merilee Severance Brannan's care.     Signed By: Sarah Beth Stephens, DO

## 2021-08-30 NOTE — PROGRESS NOTES
[address]  Social Work Navigator Encounter     Patient Name:  Anita Light    Medical History: Breast Cancer, status post lumpectomy 8/3/2021. Advance Directives: Patient has an AMD on file. Narrative:   Patient has multiple pre-chemo questions, and requested a call back from a nurse as soon as possible. Patient provided the following call back number [phone]. 1.  \"Can I still take Faxomax? I need to take it first thing in the morning, on an empty stomach, with several other stipulations. \"   2. \"Can numbing cream be ordered and sent to my pharmacy? \"   3. \"Can I still take my vitamins? \"  4. \"At what point do I take the anti-nausea medication that I put under my tongue? It is my understanding that I take it 30 minutes prior to treatment. \"   5. \"What time do I start my Cold Caps? Do I put that on when I get to [facility]? I don't want to walk around the building with it on? [place] Kid Ambreen Kid [place] Kid \"     Len Woodall RN, called the patient back to address her questions and concerns. Offered continued support to the patient as needed. Barriers to Care: Anxiety regarding the start of chemotherapy. Plan:  1. Continue to meet with the patient when she returns to the clinic for ongoing assessment of the patients adjustment to her diagnosis and treatment. 2.  Ongoing psychosocial support as desired by patient. Referral:   No referrals placed at this time.    Viridiana Atkinson LCSW

## 2021-08-31 ENCOUNTER — OFFICE VISIT (OUTPATIENT)
Dept: ONCOLOGY | Age: 71
End: 2021-08-31

## 2021-08-31 ENCOUNTER — DOCUMENTATION ONLY (OUTPATIENT)
Dept: ONCOLOGY | Age: 71
End: 2021-08-31

## 2021-08-31 ENCOUNTER — HOSPITAL ENCOUNTER (OUTPATIENT)
Dept: INFUSION THERAPY | Age: 71
Discharge: HOME | End: 2021-08-31
Payer: MEDICARE

## 2021-08-31 VITALS
BODY MASS INDEX: 25.4 KG/M2 | TEMPERATURE: 98 F | HEIGHT: 64 IN | DIASTOLIC BLOOD PRESSURE: 88 MMHG | SYSTOLIC BLOOD PRESSURE: 150 MMHG | HEART RATE: 78 BPM | WEIGHT: 148.8 LBS | OXYGEN SATURATION: 97 %

## 2021-08-31 VITALS
WEIGHT: 148 LBS | BODY MASS INDEX: 25.27 KG/M2 | DIASTOLIC BLOOD PRESSURE: 95 MMHG | RESPIRATION RATE: 18 BRPM | SYSTOLIC BLOOD PRESSURE: 182 MMHG | TEMPERATURE: 96.8 F | HEART RATE: 64 BPM | HEIGHT: 64 IN

## 2021-08-31 DIAGNOSIS — Z98.890 HISTORY OF LUMPECTOMY OF RIGHT BREAST: ICD-10-CM

## 2021-08-31 DIAGNOSIS — M19.90 ARTHRITIS: ICD-10-CM

## 2021-08-31 DIAGNOSIS — C50.911 INFILTRATING DUCTAL CARCINOMA OF RIGHT BREAST (HCC): Primary | ICD-10-CM

## 2021-08-31 DIAGNOSIS — M85.89 OSTEOPENIA OF MULTIPLE SITES: ICD-10-CM

## 2021-08-31 DIAGNOSIS — Z87.442 HISTORY OF KIDNEY STONES: ICD-10-CM

## 2021-08-31 DIAGNOSIS — Z51.11 ENCOUNTER FOR CHEMOTHERAPY MANAGEMENT: ICD-10-CM

## 2021-08-31 LAB
ALBUMIN SERPL-MCNC: 3.8 G/DL (ref 3.5–5)
ALBUMIN/GLOB SERPL: 1.3 {RATIO} (ref 1.1–2.2)
ALP SERPL-CCNC: 89 U/L (ref 45–117)
ALT SERPL-CCNC: 27 U/L (ref 12–78)
ANION GAP SERPL CALC-SCNC: 7 MMOL/L (ref 5–15)
AST SERPL-CCNC: 16 U/L (ref 15–37)
BASOPHILS # BLD: 0 K/UL (ref 0–0.1)
BASOPHILS NFR BLD: 1 % (ref 0–1)
BILIRUB SERPL-MCNC: 0.5 MG/DL (ref 0.2–1)
BUN SERPL-MCNC: 19 MG/DL (ref 6–20)
BUN/CREAT SERPL: 24 (ref 12–20)
CALCIUM SERPL-MCNC: 9.7 MG/DL (ref 8.5–10.1)
CHLORIDE SERPL-SCNC: 108 MMOL/L (ref 97–108)
CO2 SERPL-SCNC: 25 MMOL/L (ref 21–32)
CREAT SERPL-MCNC: 0.78 MG/DL (ref 0.55–1.02)
DIFFERENTIAL METHOD BLD: ABNORMAL
EOSINOPHIL # BLD: 0.1 K/UL (ref 0–0.4)
EOSINOPHIL NFR BLD: 2 % (ref 0–7)
ERYTHROCYTE [DISTWIDTH] IN BLOOD BY AUTOMATED COUNT: 12.2 % (ref 11.5–14.5)
GLOBULIN SER CALC-MCNC: 2.9 G/DL (ref 2–4)
GLUCOSE SERPL-MCNC: 103 MG/DL (ref 65–100)
HBV SURFACE AB SER QL: NONREACTIVE
HBV SURFACE AB SER-ACNC: <3.1 MIU/ML
HBV SURFACE AG SER QL: <0.1 INDEX
HBV SURFACE AG SER QL: NEGATIVE
HCT VFR BLD AUTO: 39.2 % (ref 35–47)
HGB BLD-MCNC: 13.1 G/DL (ref 11.5–16)
IMM GRANULOCYTES # BLD AUTO: 0 K/UL (ref 0–0.04)
IMM GRANULOCYTES NFR BLD AUTO: 0 % (ref 0–0.5)
LYMPHOCYTES # BLD: 1.2 K/UL (ref 0.8–3.5)
LYMPHOCYTES NFR BLD: 28 % (ref 12–49)
MCH RBC QN AUTO: 33.9 PG (ref 26–34)
MCHC RBC AUTO-ENTMCNC: 33.4 G/DL (ref 30–36.5)
MCV RBC AUTO: 101.6 FL (ref 80–99)
MONOCYTES # BLD: 0.3 K/UL (ref 0–1)
MONOCYTES NFR BLD: 7 % (ref 5–13)
NEUTS SEG # BLD: 2.8 K/UL (ref 1.8–8)
NEUTS SEG NFR BLD: 62 % (ref 32–75)
NRBC # BLD: 0 K/UL (ref 0–0.01)
NRBC BLD-RTO: 0 PER 100 WBC
PLATELET # BLD AUTO: 175 K/UL (ref 150–400)
PMV BLD AUTO: 11.1 FL (ref 8.9–12.9)
POTASSIUM SERPL-SCNC: 4 MMOL/L (ref 3.5–5.1)
PROT SERPL-MCNC: 6.7 G/DL (ref 6.4–8.2)
RBC # BLD AUTO: 3.86 M/UL (ref 3.8–5.2)
SODIUM SERPL-SCNC: 140 MMOL/L (ref 136–145)
WBC # BLD AUTO: 4.4 K/UL (ref 3.6–11)

## 2021-08-31 PROCEDURE — 86704 HEP B CORE ANTIBODY TOTAL: CPT

## 2021-08-31 PROCEDURE — 74011250636 HC RX REV CODE- 250/636: Performed by: INTERNAL MEDICINE

## 2021-08-31 PROCEDURE — 1101F PT FALLS ASSESS-DOCD LE1/YR: CPT | Performed by: INTERNAL MEDICINE

## 2021-08-31 PROCEDURE — 86706 HEP B SURFACE ANTIBODY: CPT

## 2021-08-31 PROCEDURE — G8427 DOCREV CUR MEDS BY ELIG CLIN: HCPCS | Performed by: INTERNAL MEDICINE

## 2021-08-31 PROCEDURE — 36415 COLL VENOUS BLD VENIPUNCTURE: CPT

## 2021-08-31 PROCEDURE — 96417 CHEMO IV INFUS EACH ADDL SEQ: CPT

## 2021-08-31 PROCEDURE — 87340 HEPATITIS B SURFACE AG IA: CPT

## 2021-08-31 PROCEDURE — 3017F COLORECTAL CA SCREEN DOC REV: CPT | Performed by: INTERNAL MEDICINE

## 2021-08-31 PROCEDURE — 96413 CHEMO IV INFUSION 1 HR: CPT

## 2021-08-31 PROCEDURE — G8510 SCR DEP NEG, NO PLAN REQD: HCPCS | Performed by: INTERNAL MEDICINE

## 2021-08-31 PROCEDURE — 1090F PRES/ABSN URINE INCON ASSESS: CPT | Performed by: INTERNAL MEDICINE

## 2021-08-31 PROCEDURE — 80053 COMPREHEN METABOLIC PANEL: CPT

## 2021-08-31 PROCEDURE — 77030012965 HC NDL HUBR BBMI -A

## 2021-08-31 PROCEDURE — 85025 COMPLETE CBC W/AUTO DIFF WBC: CPT

## 2021-08-31 PROCEDURE — G8399 PT W/DXA RESULTS DOCUMENT: HCPCS | Performed by: INTERNAL MEDICINE

## 2021-08-31 PROCEDURE — 99214 OFFICE O/P EST MOD 30 MIN: CPT | Performed by: INTERNAL MEDICINE

## 2021-08-31 PROCEDURE — G8419 CALC BMI OUT NRM PARAM NOF/U: HCPCS | Performed by: INTERNAL MEDICINE

## 2021-08-31 PROCEDURE — G8536 NO DOC ELDER MAL SCRN: HCPCS | Performed by: INTERNAL MEDICINE

## 2021-08-31 PROCEDURE — G9899 SCRN MAM PERF RSLTS DOC: HCPCS | Performed by: INTERNAL MEDICINE

## 2021-08-31 PROCEDURE — 74011000250 HC RX REV CODE- 250: Performed by: INTERNAL MEDICINE

## 2021-08-31 PROCEDURE — 96375 TX/PRO/DX INJ NEW DRUG ADDON: CPT

## 2021-08-31 RX ORDER — DEXAMETHASONE SODIUM PHOSPHATE 10 MG/ML
10 INJECTION INTRAMUSCULAR; INTRAVENOUS ONCE
Status: COMPLETED | OUTPATIENT
Start: 2021-08-31 | End: 2021-08-31

## 2021-08-31 RX ORDER — SODIUM CHLORIDE 9 MG/ML
10 INJECTION INTRAMUSCULAR; INTRAVENOUS; SUBCUTANEOUS AS NEEDED
Status: DISCONTINUED | OUTPATIENT
Start: 2021-08-31 | End: 2021-09-01 | Stop reason: HOSPADM

## 2021-08-31 RX ORDER — DIPHENHYDRAMINE HYDROCHLORIDE 50 MG/ML
50 INJECTION, SOLUTION INTRAMUSCULAR; INTRAVENOUS ONCE
Status: COMPLETED | OUTPATIENT
Start: 2021-08-31 | End: 2021-08-31

## 2021-08-31 RX ORDER — SODIUM CHLORIDE 9 MG/ML
25 INJECTION, SOLUTION INTRAVENOUS CONTINUOUS
Status: DISCONTINUED | OUTPATIENT
Start: 2021-08-31 | End: 2021-09-01 | Stop reason: HOSPADM

## 2021-08-31 RX ORDER — SODIUM CHLORIDE 0.9 % (FLUSH) 0.9 %
10 SYRINGE (ML) INJECTION AS NEEDED
Status: DISCONTINUED | OUTPATIENT
Start: 2021-08-31 | End: 2021-09-01 | Stop reason: HOSPADM

## 2021-08-31 RX ORDER — HEPARIN 100 UNIT/ML
300-500 SYRINGE INTRAVENOUS AS NEEDED
Status: DISCONTINUED | OUTPATIENT
Start: 2021-08-31 | End: 2021-09-01 | Stop reason: HOSPADM

## 2021-08-31 RX ADMIN — PACLITAXEL 139 MG: 6 INJECTION, SOLUTION INTRAVENOUS at 16:57

## 2021-08-31 RX ADMIN — FAMOTIDINE 20 MG: 10 INJECTION, SOLUTION INTRAVENOUS at 16:14

## 2021-08-31 RX ADMIN — SODIUM CHLORIDE 25 ML/HR: 900 INJECTION, SOLUTION INTRAVENOUS at 15:40

## 2021-08-31 RX ADMIN — TRASTUZUMAB AND HYALURONIDASE-OYSK 600 MG: 600; 10000 INJECTION, SOLUTION SUBCUTANEOUS at 15:42

## 2021-08-31 RX ADMIN — DEXAMETHASONE SODIUM PHOSPHATE 10 MG: 10 INJECTION, SOLUTION INTRAMUSCULAR; INTRAVENOUS at 16:16

## 2021-08-31 RX ADMIN — DIPHENHYDRAMINE HYDROCHLORIDE 50 MG: 50 INJECTION INTRAMUSCULAR; INTRAVENOUS at 16:17

## 2021-08-31 NOTE — PROGRESS NOTES
[facility] Chemotherapy Progress Note    Date: 2021    Name: Mario Coreas    MRN: [id number]         : 1950      1300 Pt admit to [facility] for Herceptin/Taxol ambulatory in stable condition. Assessment completed. No new concerns voiced. Port with positive blood return. Patient denies SOB, fever, cough, general not feeling well. Patient denies recent exposure to someone who has tested positive for COVID-19. Patient denies having contact with anyone who has a pending COVID test.         Patient Vitals for the past 12 hrs:   Temp Pulse Resp BP   21 1917  64  (!) 182/95   21 1300 96.8 °F (36 °C) 88 18 (!) 155/92       Chemotherapy Flowsheet 2021   Cycle C1   Date 2021   Drug / Regimen Taxol/Herceptin   Pre Meds given   Notes given         Lab results were obtained and reviewed. Recent Results (from the past 12 hour(s))   CBC WITH AUTOMATED DIFF    Collection Time: 21 12:59 PM   Result Value Ref Range    WBC 4.4 3.6 - 11.0 K/uL    RBC 3.86 3.80 - 5.20 M/uL    HGB 13.1 11.5 - 16.0 g/dL    HCT 39.2 35.0 - 47.0 %    .6 (H) 80.0 - 99.0 FL    MCH 33.9 26.0 - 34.0 PG    MCHC 33.4 30.0 - 36.5 g/dL    RDW 12.2 11.5 - 14.5 %    PLATELET 296 979 - 004 K/uL    MPV 11.1 8.9 - 12.9 FL    NRBC 0.0 0  WBC    ABSOLUTE NRBC 0.00 0.00 - 0.01 K/uL    NEUTROPHILS 62 32 - 75 %    LYMPHOCYTES 28 12 - 49 %    MONOCYTES 7 5 - 13 %    EOSINOPHILS 2 0 - 7 %    BASOPHILS 1 0 - 1 %    IMMATURE GRANULOCYTES 0 0.0 - 0.5 %    ABS. NEUTROPHILS 2.8 1.8 - 8.0 K/UL    ABS. LYMPHOCYTES 1.2 0.8 - 3.5 K/UL    ABS. MONOCYTES 0.3 0.0 - 1.0 K/UL    ABS. EOSINOPHILS 0.1 0.0 - 0.4 K/UL    ABS. BASOPHILS 0.0 0.0 - 0.1 K/UL    ABS. IMM.  GRANS. 0.0 0.00 - 0.04 K/UL    DF AUTOMATED     METABOLIC PANEL, COMPREHENSIVE    Collection Time: 21 12:59 PM   Result Value Ref Range    Sodium 140 136 - 145 mmol/L    Potassium 4.0 3.5 - 5.1 mmol/L    Chloride 108 97 - 108 mmol/L    CO2 25 21 - 32 mmol/L    Anion gap 7 5 - 15 mmol/L    Glucose 103 (H) 65 - 100 mg/dL    BUN 19 6 - 20 MG/DL    Creatinine 0.78 0.55 - 1.02 MG/DL    BUN/Creatinine ratio 24 (H) 12 - 20      GFR est AA >60 >60 ml/min/1.73m2    GFR est non-AA >60 >60 ml/min/1.73m2    Calcium 9.7 8.5 - 10.1 MG/DL    Bilirubin, total 0.5 0.2 - 1.0 MG/DL    ALT (SGPT) 27 12 - 78 U/L    AST (SGOT) 16 15 - 37 U/L    Alk. phosphatase 89 45 - 117 U/L    Protein, total 6.7 6.4 - 8.2 g/dL    Albumin 3.8 3.5 - 5.0 g/dL    Globulin 2.9 2.0 - 4.0 g/dL    A-G Ratio 1.3 1.1 - 2.2     HEP B SURFACE AB    Collection Time: 08/31/21 12:59 PM   Result Value Ref Range    Hepatitis B surface Ab <3.10 mIU/mL    Hep B surface Ab Interp. NONREACTIVE NR     HEP B SURFACE AG    Collection Time: 08/31/21 12:59 PM   Result Value Ref Range    Hepatitis B surface Ag <0.10 Index    Hep B surface Ag Interp. Negative NEG         Pre-medications  were administered as ordered and chemotherapy was initiated.   Medications Administered     0.9% sodium chloride infusion     Admin Date  08/31/2021 Action  New Bag Dose  25 mL/hr Rate  25 mL/hr Route  IntraVENous Administered By  Chayito Shaver RN          dexamethasone (PF) (DECADRON) 10 mg/mL injection 10 mg     Admin Date  08/31/2021 Action  Given Dose  10 mg Route  IntraVENous Administered By  Chayito Shaver RN          diphenhydrAMINE (BENADRYL) injection 50 mg     Admin Date  08/31/2021 Action  Given Dose  50 mg Route  IntraVENous Administered By  Chayito Shaver RN          famotidine (PF) (PEPCID) 20 mg in 0.9% sodium chloride 10 mL injection     Admin Date  08/31/2021 Action  Given Dose  20 mg Route  IntraVENous Administered By  Chayito Shaver RN          PACLitaxeL (TAXOL) 139 mg in 0.9% sodium chloride 250 mL, overfill volume 25 mL chemo infusion     Admin Date  08/31/2021 Action  New Bag Dose  139 mg Rate  298.2 mL/hr Route  IntraVENous Administered By  Chayito Shaver RN          trastuzumab-hyaluronidase-oysk (HERCEPTIN HYLECTA) injection 600 mg Admin Date  08/31/2021 Action  Given Dose  600 mg Route  SubCUTAneous Administered By  Gil Treviño RN                0120 Pt tolerated treatment well. Port maintained positive blood return throughout treatment. Flushed, heparinized and de-accessed per protocol. D/c home ambulatory in no distress.     Future Appointments   Date Time Provider Wendie Mcbrideisti   9/8/2021  1:30 PM D4 NAT MED [address] [place] [address]   9/8/2021  1:45 PM Xenia Dutta  [address] BS AMB   9/14/2021  3:00 PM B3 NAT MED [address] [place] [address]   9/20/2021  8:30 AM RAD ONC THERAPY RCR [address] [facility]   9/21/2021  1:00 PM A3 NAT MED [address] [place] [address]   9/28/2021 11:00 AM B3 NAT MED [address] [place] [address]   10/5/2021  1:00 PM B3 NAT MED [facility]   10/12/2021  1:00 PM F4 NAT MED [address] [place] [address]   10/19/2021 11:00 AM C1 NAT MED [address] [place] [address]   10/26/2021 11:00 AM F4 NAT MED [facility]   11/2/2021 11:00 AM B3 NAT  [facility]   11/9/2021 11:00 AM B3 NAT MED [facility]   11/16/2021 11:00 AM B3 NAT MED [address] [place] [address]   2/21/2022 10:45 AM Kennedi Brandt MD [facility] BS [facility]   5/4/2022 10:30 AM Mar Eason MD [facility]         Mary Sherman, ANGEL  August 31, 2021

## 2021-08-31 NOTE — PROGRESS NOTES
Pharmacy Note- Chemotherapy Education     Lacey Burkitt is a  78 y. o.female  diagnosed with breast cancer contacted today via telephone for chemotherapy counseling and consent. Ms. Arjun Ahn is being treated with PACLItaxel and trastsuzumab. Provided education on PACLItaxel, trastuzumab and premedications - dexamethasone, diphenhydramine and famotidine.      Ms. Arjun Ahn is starting with SQ trastuzumab. Provided Ms. Arjun Ahn with a handout on supportive care medication and reviewed with her.      Reviewed side effects of chemotherapy to include s/s infection, anemia, appetite changes, thromboyctopenia, fatigue, hair loss/alopecia, bone pain, skin and nail changes, diarrhea/constipation, infusion reaction, peripheral neuropathy and cardiac toxicity.     Patient given ways to manage these side effects and when to contact office.       Ms. Kowalski verbalized understanding of the information presented and all of the patient's questions were answered.     Yoseph Pierce, PharmD, [facility], [facility] 82 in place: No   Recommendation Provided To: Patient/Caregiver: 1 via In person     Intervention Accepted By: Patient/Caregiver: 1   Time Spent (min): 15

## 2021-08-31 NOTE — PROGRESS NOTES
Lacey Burkitt is a 78 y.o. female  Chief Complaint   Patient presents with    Chemotherapy    Breast Cancer     1. Have you been to the ER, urgent care clinic since your last visit? Hospitalized since your last visit? No.  2. Have you seen or consulted any other health care providers outside of the [address] [place] [address] since your last visit? Include any pap smears or colon screening.  No.

## 2021-09-01 LAB
HBV CORE AB SERPL QL IA: NEGATIVE
HBV CORE IGM SERPL QL IA: NEGATIVE

## 2021-09-01 NOTE — PROGRESS NOTES
MA has faxed patients most recent labs done from 8/31/21 to patients PCP doctors office to Olesya Stewart to fax number: [phone]! Fax confirmation was received back! This was done.   James Morrow

## 2021-09-04 ENCOUNTER — TELEPHONE (OUTPATIENT)
Dept: ONCOLOGY | Age: 71
End: 2021-09-04

## 2021-09-04 DIAGNOSIS — G47.00 INSOMNIA, UNSPECIFIED TYPE: Primary | ICD-10-CM

## 2021-09-04 RX ORDER — ZOLPIDEM TARTRATE 5 MG/1
5 TABLET ORAL
Qty: 15 TABLET | Refills: 0 | Status: SHIPPED | OUTPATIENT
Start: 2021-09-04 | End: 2021-09-28 | Stop reason: SDUPTHER

## 2021-09-04 NOTE — TELEPHONE ENCOUNTER
Heme/Onc On Call Note  Patient called to report that she has been very emotional, which is causing problems with sleep. She asks for some ambien. I will prescribe her a few pills. I asked her to discuss with Dr. Charles Nolasco whether to continue this. May benefit from increasing dose of cymbalta.

## 2021-09-07 NOTE — TELEPHONE ENCOUNTER
NIKOLAY Verified. Called pt on mobile phone number listed. Patient was reached out too to find out how she is doing/feeling. Patient stated she feels \"way better now. \" Stated she had felt good after her first chemo but then started feeling so tired and could not go to sleep. Patient does not think she needs Ativan right now due to her thinking she just really needed something to make her sleep better. Patient will hold off on Ativan for now but overall is doing better she just has been feeling very anxious since this is all new to her. Patient was very appreciative for speaking with her!   Kanika Faustin

## 2021-09-08 ENCOUNTER — OFFICE VISIT (OUTPATIENT)
Dept: ONCOLOGY | Age: 71
End: 2021-09-08
Payer: MEDICARE

## 2021-09-08 ENCOUNTER — HOSPITAL ENCOUNTER (OUTPATIENT)
Dept: INFUSION THERAPY | Age: 71
Discharge: HOME | End: 2021-09-08
Payer: MEDICARE

## 2021-09-08 VITALS
HEART RATE: 88 BPM | DIASTOLIC BLOOD PRESSURE: 90 MMHG | TEMPERATURE: 97.7 F | SYSTOLIC BLOOD PRESSURE: 180 MMHG | RESPIRATION RATE: 18 BRPM

## 2021-09-08 VITALS
HEIGHT: 64 IN | BODY MASS INDEX: 25.51 KG/M2 | DIASTOLIC BLOOD PRESSURE: 88 MMHG | OXYGEN SATURATION: 98 % | TEMPERATURE: 97.7 F | HEART RATE: 68 BPM | SYSTOLIC BLOOD PRESSURE: 156 MMHG | WEIGHT: 149.4 LBS

## 2021-09-08 DIAGNOSIS — C50.911 INFILTRATING DUCTAL CARCINOMA OF RIGHT BREAST (HCC): Primary | ICD-10-CM

## 2021-09-08 DIAGNOSIS — Z51.81 ENCOUNTER FOR MONITORING CARDIOTOXIC DRUG THERAPY: ICD-10-CM

## 2021-09-08 DIAGNOSIS — Z79.899 ENCOUNTER FOR MONITORING CARDIOTOXIC DRUG THERAPY: ICD-10-CM

## 2021-09-08 DIAGNOSIS — G47.00 INSOMNIA, UNSPECIFIED TYPE: ICD-10-CM

## 2021-09-08 DIAGNOSIS — M19.90 ARTHRITIS: ICD-10-CM

## 2021-09-08 DIAGNOSIS — Z87.442 HISTORY OF KIDNEY STONES: ICD-10-CM

## 2021-09-08 DIAGNOSIS — M85.89 OSTEOPENIA OF MULTIPLE SITES: ICD-10-CM

## 2021-09-08 DIAGNOSIS — Z09 CHEMOTHERAPY FOLLOW-UP EXAMINATION: ICD-10-CM

## 2021-09-08 DIAGNOSIS — T45.1X5A CHEMOTHERAPY INDUCED DIARRHEA: ICD-10-CM

## 2021-09-08 DIAGNOSIS — Z98.890 HISTORY OF LUMPECTOMY OF RIGHT BREAST: ICD-10-CM

## 2021-09-08 DIAGNOSIS — K52.1 CHEMOTHERAPY INDUCED DIARRHEA: ICD-10-CM

## 2021-09-08 LAB
BASOPHILS # BLD: 0 K/UL (ref 0–0.1)
BASOPHILS NFR BLD: 1 % (ref 0–1)
DIFFERENTIAL METHOD BLD: ABNORMAL
EOSINOPHIL # BLD: 0.1 K/UL (ref 0–0.4)
EOSINOPHIL NFR BLD: 3 % (ref 0–7)
ERYTHROCYTE [DISTWIDTH] IN BLOOD BY AUTOMATED COUNT: 12 % (ref 11.5–14.5)
HCT VFR BLD AUTO: 36.2 % (ref 35–47)
HGB BLD-MCNC: 12.4 G/DL (ref 11.5–16)
IMM GRANULOCYTES # BLD AUTO: 0 K/UL (ref 0–0.04)
IMM GRANULOCYTES NFR BLD AUTO: 1 % (ref 0–0.5)
LYMPHOCYTES # BLD: 1.4 K/UL (ref 0.8–3.5)
LYMPHOCYTES NFR BLD: 38 % (ref 12–49)
MCH RBC QN AUTO: 34.5 PG (ref 26–34)
MCHC RBC AUTO-ENTMCNC: 34.3 G/DL (ref 30–36.5)
MCV RBC AUTO: 100.8 FL (ref 80–99)
MONOCYTES # BLD: 0.3 K/UL (ref 0–1)
MONOCYTES NFR BLD: 7 % (ref 5–13)
NEUTS SEG # BLD: 1.9 K/UL (ref 1.8–8)
NEUTS SEG NFR BLD: 50 % (ref 32–75)
NRBC # BLD: 0 K/UL (ref 0–0.01)
NRBC BLD-RTO: 0 PER 100 WBC
PLATELET # BLD AUTO: 216 K/UL (ref 150–400)
PMV BLD AUTO: 10.7 FL (ref 8.9–12.9)
RBC # BLD AUTO: 3.59 M/UL (ref 3.8–5.2)
WBC # BLD AUTO: 3.7 K/UL (ref 3.6–11)

## 2021-09-08 PROCEDURE — 96375 TX/PRO/DX INJ NEW DRUG ADDON: CPT

## 2021-09-08 PROCEDURE — G8399 PT W/DXA RESULTS DOCUMENT: HCPCS | Performed by: INTERNAL MEDICINE

## 2021-09-08 PROCEDURE — 85025 COMPLETE CBC W/AUTO DIFF WBC: CPT

## 2021-09-08 PROCEDURE — G9899 SCRN MAM PERF RSLTS DOC: HCPCS | Performed by: INTERNAL MEDICINE

## 2021-09-08 PROCEDURE — G8427 DOCREV CUR MEDS BY ELIG CLIN: HCPCS | Performed by: INTERNAL MEDICINE

## 2021-09-08 PROCEDURE — 74011250636 HC RX REV CODE- 250/636: Performed by: INTERNAL MEDICINE

## 2021-09-08 PROCEDURE — G8510 SCR DEP NEG, NO PLAN REQD: HCPCS | Performed by: INTERNAL MEDICINE

## 2021-09-08 PROCEDURE — 77030012965 HC NDL HUBR BBMI -A

## 2021-09-08 PROCEDURE — 99215 OFFICE O/P EST HI 40 MIN: CPT | Performed by: INTERNAL MEDICINE

## 2021-09-08 PROCEDURE — 1090F PRES/ABSN URINE INCON ASSESS: CPT | Performed by: INTERNAL MEDICINE

## 2021-09-08 PROCEDURE — 74011000250 HC RX REV CODE- 250: Performed by: INTERNAL MEDICINE

## 2021-09-08 PROCEDURE — 36415 COLL VENOUS BLD VENIPUNCTURE: CPT

## 2021-09-08 PROCEDURE — 1101F PT FALLS ASSESS-DOCD LE1/YR: CPT | Performed by: INTERNAL MEDICINE

## 2021-09-08 PROCEDURE — G8419 CALC BMI OUT NRM PARAM NOF/U: HCPCS | Performed by: INTERNAL MEDICINE

## 2021-09-08 PROCEDURE — 96413 CHEMO IV INFUSION 1 HR: CPT

## 2021-09-08 PROCEDURE — G8536 NO DOC ELDER MAL SCRN: HCPCS | Performed by: INTERNAL MEDICINE

## 2021-09-08 PROCEDURE — 3017F COLORECTAL CA SCREEN DOC REV: CPT | Performed by: INTERNAL MEDICINE

## 2021-09-08 RX ORDER — DEXAMETHASONE SODIUM PHOSPHATE 10 MG/ML
10 INJECTION INTRAMUSCULAR; INTRAVENOUS ONCE
Status: COMPLETED | OUTPATIENT
Start: 2021-09-08 | End: 2021-09-08

## 2021-09-08 RX ORDER — SODIUM CHLORIDE 9 MG/ML
25 INJECTION, SOLUTION INTRAVENOUS CONTINUOUS
Status: DISCONTINUED | OUTPATIENT
Start: 2021-09-08 | End: 2021-09-09 | Stop reason: HOSPADM

## 2021-09-08 RX ORDER — DIPHENHYDRAMINE HYDROCHLORIDE 50 MG/ML
50 INJECTION, SOLUTION INTRAMUSCULAR; INTRAVENOUS ONCE
Status: COMPLETED | OUTPATIENT
Start: 2021-09-08 | End: 2021-09-08

## 2021-09-08 RX ORDER — SODIUM CHLORIDE 9 MG/ML
10 INJECTION INTRAMUSCULAR; INTRAVENOUS; SUBCUTANEOUS AS NEEDED
Status: DISCONTINUED | OUTPATIENT
Start: 2021-09-08 | End: 2021-09-09 | Stop reason: HOSPADM

## 2021-09-08 RX ORDER — HEPARIN 100 UNIT/ML
300-500 SYRINGE INTRAVENOUS AS NEEDED
Status: DISCONTINUED | OUTPATIENT
Start: 2021-09-08 | End: 2021-09-09 | Stop reason: HOSPADM

## 2021-09-08 RX ORDER — SODIUM CHLORIDE 0.9 % (FLUSH) 0.9 %
10 SYRINGE (ML) INJECTION AS NEEDED
Status: DISCONTINUED | OUTPATIENT
Start: 2021-09-08 | End: 2021-09-09 | Stop reason: HOSPADM

## 2021-09-08 RX ADMIN — FAMOTIDINE 20 MG: 10 INJECTION INTRAVENOUS at 15:10

## 2021-09-08 RX ADMIN — DEXAMETHASONE SODIUM PHOSPHATE 10 MG: 10 INJECTION, SOLUTION INTRAMUSCULAR; INTRAVENOUS at 15:12

## 2021-09-08 RX ADMIN — DIPHENHYDRAMINE HYDROCHLORIDE 50 MG: 50 INJECTION INTRAMUSCULAR; INTRAVENOUS at 15:14

## 2021-09-08 RX ADMIN — SODIUM CHLORIDE 25 ML/HR: 900 INJECTION, SOLUTION INTRAVENOUS at 15:09

## 2021-09-08 RX ADMIN — PACLITAXEL 139 MG: 6 INJECTION, SOLUTION INTRAVENOUS at 15:40

## 2021-09-08 NOTE — PROGRESS NOTES
[facility] Chemotherapy Progress Note    Date: 2021    Name: Toshia Villegas    MRN: [id number]         : 1950      1315 Pt admit to [facility] for Taxol ambulatory in stable condition. Assessment completed. No new concerns voiced. Port with positive blood return. Labs drawn and sent for processing. Patient denies SOB, fever, cough, general not feeling well. Patient denies recent exposure to someone who has tested positive for COVID-19. Patient denies having contact with anyone who has a pending COVID test.       Chemotherapy Flowsheet 2021   Cycle C1D8   Date 2021   Drug / Regimen Taxol   Pre Meds given   Notes given         Ms. Kowalski's vitals were reviewed. Patient Vitals for the past 12 hrs:   Temp Pulse Resp BP   21 1750  88  (!) 180/90   21 1316 97.7 °F (36.5 °C) 65 18 (!) 156/88         Lab results were obtained and reviewed. Recent Results (from the past 12 hour(s))   CBC WITH AUTOMATED DIFF    Collection Time: 21  1:15 PM   Result Value Ref Range    WBC 3.7 3.6 - 11.0 K/uL    RBC 3.59 (L) 3.80 - 5.20 M/uL    HGB 12.4 11.5 - 16.0 g/dL    HCT 36.2 35.0 - 47.0 %    .8 (H) 80.0 - 99.0 FL    MCH 34.5 (H) 26.0 - 34.0 PG    MCHC 34.3 30.0 - 36.5 g/dL    RDW 12.0 11.5 - 14.5 %    PLATELET 469 292 - 782 K/uL    MPV 10.7 8.9 - 12.9 FL    NRBC 0.0 0  WBC    ABSOLUTE NRBC 0.00 0.00 - 0.01 K/uL    NEUTROPHILS 50 32 - 75 %    LYMPHOCYTES 38 12 - 49 %    MONOCYTES 7 5 - 13 %    EOSINOPHILS 3 0 - 7 %    BASOPHILS 1 0 - 1 %    IMMATURE GRANULOCYTES 1 (H) 0.0 - 0.5 %    ABS. NEUTROPHILS 1.9 1.8 - 8.0 K/UL    ABS. LYMPHOCYTES 1.4 0.8 - 3.5 K/UL    ABS. MONOCYTES 0.3 0.0 - 1.0 K/UL    ABS. EOSINOPHILS 0.1 0.0 - 0.4 K/UL    ABS. BASOPHILS 0.0 0.0 - 0.1 K/UL    ABS. IMM. GRANS. 0.0 0.00 - 0.04 K/UL    DF AUTOMATED         Pre-medications  were administered as ordered and chemotherapy was initiated.   Medications Administered     0.9% sodium chloride infusion     Admin Date  09/08/2021 Action  New Bag Dose  25 mL/hr Rate  25 mL/hr Route  IntraVENous Administered By  John Mills RN          dexamethasone (PF) (DECADRON) 10 mg/mL injection 10 mg     Admin Date  09/08/2021 Action  Given Dose  10 mg Route  IntraVENous Administered By  John Mills RN          diphenhydrAMINE (BENADRYL) injection 50 mg     Admin Date  09/08/2021 Action  Given Dose  50 mg Route  IntraVENous Administered By  John Mills RN          famotidine (PF) (PEPCID) 20 mg in 0.9% sodium chloride 10 mL injection     Admin Date  09/08/2021 Action  Given Dose  20 mg Route  IntraVENous Administered By  John Mills RN          PACLitaxeL (TAXOL) 139 mg in 0.9% sodium chloride 250 mL, overfill volume 25 mL chemo infusion     Admin Date  09/08/2021 Action  New Bag Dose  139 mg Rate  298.2 mL/hr Route  IntraVENous Administered By  John Mills RN                1750 Pt tolerated treatment well. Port maintained positive blood return throughout treatment. Flushed, heparinized and de-accessed per protocol. D/c home ambulatory in no distress.      Future Appointments   Date Time Provider Wendie Escobar   9/13/2021  1:00 PM E3 NAT MED [address] [place] [address] H   9/13/2021  1:15 PM Marcie Canseco  [address] BS AMB   9/20/2021  8:30 AM RAD ONC THERAPY RCR [address] [facility]   9/21/2021  1:00 PM A3 NAT MED [address] [place] [address] H   9/28/2021 11:00 AM B3 NAT  [facility]   10/5/2021  1:00 PM B3 NAT MED [facility]   10/12/2021  1:00 PM F4 NAT MED [address] [place] [address] H   10/19/2021 11:00 AM C1 NAT MED [facility]   10/26/2021 11:00 AM F4 NAT MED [facility]   11/2/2021 11:00 AM B3 NAT  [facility]   11/9/2021 11:00 AM B3 NAT MED [facility]   11/16/2021 11:00 AM [address]   2/21/2022 10:45 AM Darold Libman, MD [facility] BS AMB   5/4/2022 10:30 AM Dayo Sunshine MD [facility] BS AMB         Darek Walker, ANGLE  September 8, 2021

## 2021-09-08 NOTE — PROGRESS NOTES
Jorge Alberto Keller is a 78 y.o. female  Chief Complaint   Patient presents with    Chemotherapy    Breast Cancer   1. Have you been to the ER, urgent care clinic since your last visit? Hospitalized since your last visit? No.    2. Have you seen or consulted any other health care providers outside of the [address] [place] [address] since your last visit? Include any pap smears or colon screening.  No.

## 2021-09-08 NOTE — PROGRESS NOTES
[facility] [place] [address], [address], [place]: [phone]  F: [phone]    Reason for Visit:   Uriah Sarkar is a 78 y.o. female seen today in office for follow up of Right Breast Cancer on adjuvant weekly Taxol + Herceptin. Treatment History:   · Abnormal screening mammo 6/3/21: Bilateral digital screening mammography was performed and is interpreted in conjunction with a computer assisted detection (CAD) system. In the left breast, no suspicious masses or calcifications are identified. The right breast upper outer quadrant at 10:00 posterior depth 9 cm from the nipple there is a focal asymmetry  · Right Dx Mammo/US 6/7/21: Further evaluation was performed for a right breast focal asymmetry. In the right breast upper outer quadrant at 10:00 posterior depth there is an equal density irregular mass with indistinct margins measuring up to 1.7 cm. In the right breast at 10:00 10 cm from the nipple there is a 1.6 x 0.9 x 1.0 cm hypoechoic oval mass with indistinct margins, internal vascularity, and posterior acoustic enhancement  · Right Breast Biopsy 6/18/21: PATH - Invasive ductal carcinoma, [place] histologic grade 3. Largest glass slide measurement of invasive carcinoma: 6 mm   · ER/[place] negative, HER2 FISH +  · Ki67 90%  · Right Lumpectomy 8/3/2: PATH - 20 mm IDC with negative LNs. Margins negative. · Adjuvant weekly Taxol + Herceptin 8/31/21 - Current     STAGE: Pathologic T1cN0, ER/[place] negative, HER2+     History of Present Illness:   Uriah Sarkar is a 78 y.o. female seen today in office for follow up of right breast cancer post lumpectomy 8/3/21. She started adjuvant weekly Taxol + Herceptin on 8/31/21. She is here today for Day 8 Cycle 1 of adjuvant weekly Taxol + Herceptin. She reports that she feels well overall today. She tolerated first treatment well overall. She states that she \"crashed\" emotionally for 1-1/12 days.  She states she was sobbing constantly and could not sleep. She called the on call MD and received script for Ambien which helped a lot. She had 3-4 episodes of diarrhea after chemo but states it wasn't bad. She also had some mild bleeding with BMs. Her appetite and energy levels are good. She denies fever, chills, mouth sores, cough, SOB, CP, nausea, vomiting, constipation, and neuropathy. She denies pain today. CBC is pending. She is ready for treatment today. Her supportive  is here today. FamHx:  · Mom brain cancer  · Sister spine cancer  · GM ovarian cancer.      Past Medical History:   Diagnosis Date    Arthritis     left ankle    Breast cancer ([address]     RIGHT    Chronic kidney disease     kidney stone    Chronic pain of left ankle 2017    Seeing Dr Hugo Lomas    Fracture     left ankle    Ill-defined condition     PICC line for UTI - borderline sepsis    Osteopenia 2017      Past Surgical History:   Procedure Laterality Date    COLONOSCOPY N/A 2019    tubular adenoma, repeat 1 yr d/t poor prep - performed by Steve Rosenbaum MD at [facility] ENDOSCOPY;     COLONOSCOPY N/A 2020    normal - performed by Steve Rosenbaum MD at [facility] ENDOSCOPY    HX [address] Left     surgery - has a plate a screws    HX BREAST LUMPECTOMY Right 8/3/2021    RIGHT BREAST LUMPECTOMY WITH ULTRASOUND AND RIGHT BREAST SENTINEL NODE BIOPSY AND PORTACATH INSERTION performed by Keaton Desir MD at [facility] AMBULATORY OR    HX [address]      HX GI      COLONOSCOPY    HX OTHER SURGICAL Right 2020    growth removed from right eyelid    HX OTHER SURGICAL Right 2020    PICC line placed for IV abx, removed after completing course for pyelonephritis    HX POLYPECTOMY      HX TONSILLECTOMY      MD BIOPSY OF BREAST, NEEDLE CORE Right       Social History     Tobacco Use    Smoking status: Former Smoker     Quit date:      Years since quittin.7  Smokeless tobacco: Never Used    Tobacco comment: was a causal smoker when smoker in college   Substance Use Topics    Alcohol use: Yes     Alcohol/week: 5.0 standard drinks     Types: 5 Glasses of wine per week      Family History   Problem Relation Age of Onset    Cancer Mother         Brain tumor    Other Father         ? lung problem    Cancer Sister         tumors of spine    Other Sister         kidney stones    Other Daughter         kidney stone    Other Maternal Aunt         kidney stones    No Known Problems Sister     No Known Problems Sister     Anesth Problems Neg Hx      Current Outpatient Medications   Medication Sig    zolpidem (AMBIEN) 5 mg tablet Take 1 Tablet by mouth nightly as needed for Sleep. Max Daily Amount: 5 mg.  lidocaine-prilocaine (EMLA) topical cream Apply  to affected area as needed for Pain.  CRANIAL PROSTHESIS misc Breast cancer for chemo/ alopecia needs wig    ondansetron (ZOFRAN ODT) 4 mg disintegrating tablet Take 1 Tablet by mouth every eight (8) hours as needed for Nausea or Vomiting.  prochlorperazine (Compazine) 5 mg tablet Take 1-2 Tablets by mouth every six (6) hours as needed for Nausea or Vomiting.  ondansetron (ZOFRAN ODT) 4 mg disintegrating tablet Take 1 Tablet by mouth every eight (8) hours as needed for Nausea or Vomiting.  cortisone acetate (CORTISONE PO) Take  by mouth. Injections every 3 months in her foot    DULoxetine (CYMBALTA) 20 mg capsule TAKE ONE CAPSULE BY MOUTH EVERY EVENING    alendronate (FOSAMAX) 70 mg tablet TAKE 1 TABLET BY MOUTH ONCE WEEKLY ON AN EMPTY STOMACH BEFORE BREAKFAST. REMAIN UPRIGHT FOR 30 MINUTES & TAKE WITH 8 OUNCES OF WATER    acetaminophen (TYLENOL) 500 mg tablet 1 or 2 every 6 hrs prn for headache     No current facility-administered medications for this visit.      Facility-Administered Medications Ordered in Other Visits   Medication Dose Route Frequency    0.9% sodium chloride infusion  25 mL/hr IntraVENous CONTINUOUS    dexamethasone (PF) (DECADRON) 10 mg/mL injection 10 mg  10 mg IntraVENous ONCE    diphenhydrAMINE (BENADRYL) injection 50 mg  50 mg IntraVENous ONCE    famotidine (PF) (PEPCID) 20 mg in 0.9% sodium chloride 10 mL injection  20 mg IntraVENous ONCE    PACLitaxeL (TAXOL) 139 mg in 0.9% sodium chloride 250 mL, overfill volume 25 mL chemo infusion  80 mg/m2 (Treatment Plan Recorded) IntraVENous ONCE    sodium chloride (NS) flush 10 mL  10 mL IntraVENous PRN    0.9% sodium chloride injection 10 mL  10 mL IntraVENous PRN    heparin (porcine) pf 300-500 Units  300-500 Units InterCATHeter PRN      Allergies   Allergen Reactions    Adhesive Tape-Silicones Rash    Other Medication Hives     Cranberry or Probiotic, had severe itching and hives, not sure to which. Has taken a probiotic recently without a reaction.  Oxycodone Itching      Review of Systems:  A complete review of systems was obtained, negative except as described above and as reported on ROS sheet scanned into system. Physical Exam:     Visit Vitals  BP (!) 156/88 (BP 1 Location: Left upper arm, BP Patient Position: Sitting)   Pulse 68   Temp 97.7 °F (36.5 °C) (Temporal)   Ht 5' 4\" (1.626 m)   Wt 149 lb 6.4 oz (67.8 kg)   SpO2 98%   BMI 25.64 kg/m²     ECOG PS: 0  General: No distress  Eyes: Anicteric sclerae  HENT: Atraumatic, wearing a mask  Neck: Supple  Respiratory:  normal respiratory effort  MS: Normal gait and station. Digits without clubbing or cyanosis. Skin: No rashes, ecchymoses, or petechiae. Normal temperature, turgor, and texture. Port site without redness/swelling  Psych: Alert, oriented, appropriate affect, normal judgment/insight  Breast: Not examined today.  Last Visit: RIGHT lumpectomy scars healing well  Neuro: Non focal    Results:     Lab Results   Component Value Date/Time    WBC 3.7 09/08/2021 01:15 PM    HGB 12.4 09/08/2021 01:15 PM    HCT 36.2 09/08/2021 01:15 PM    PLATELET 031 35/06/3123 01:15 PM    .8 (H) 09/08/2021 01:15 PM    ABS. NEUTROPHILS 1.9 09/08/2021 01:15 PM     Lab Results   Component Value Date/Time    Sodium 140 08/31/2021 12:59 PM    Potassium 4.0 08/31/2021 12:59 PM    Chloride 108 08/31/2021 12:59 PM    CO2 25 08/31/2021 12:59 PM    Glucose 103 (H) 08/31/2021 12:59 PM    BUN 19 08/31/2021 12:59 PM    Creatinine 0.78 08/31/2021 12:59 PM    GFR est AA >60 08/31/2021 12:59 PM    GFR est non-AA >60 08/31/2021 12:59 PM    Calcium 9.7 08/31/2021 12:59 PM     Lab Results   Component Value Date/Time    Bilirubin, total 0.5 08/31/2021 12:59 PM    ALT (SGPT) 27 08/31/2021 12:59 PM    Alk. phosphatase 89 08/31/2021 12:59 PM    Protein, total 6.7 08/31/2021 12:59 PM    Albumin 3.8 08/31/2021 12:59 PM    Globulin 2.9 08/31/2021 12:59 PM     6/18/21  FINAL PATHOLOGIC DIAGNOSIS   Breast, right, needle core biopsy:   Invasive ductal carcinoma, [place] histologic grade 3   Largest glass slide measurement of invasive carcinoma: 6 mm   ER/[place] negative, HER2 FISH +    8/3/21  FINAL PATHOLOGIC DIAGNOSIS   1. Lymph node, right axillary sentinel lymph node #1, excision:   One lymph node negative for metastatic carcinoma. 2. Lymph node, right axillary sentinel lymph node #2, excision:   One lymph node negative for metastatic carcinoma. 3. Right breast, lumpectomy, excision:   Procedure: Excision (less than total mastectomy)   Specimen Laterality: Right   TUMOR   Histologic Type:  Invasive carcinoma of no special type (ductal)   Glandular (Acinar) / Tubular Differentiation: Score 3   Nuclear Pleomorphism: Score 3   Mitotic Rate: Score 3   Overall Grade: Grade 3   Tumor Size: 20 mm   Tumor Focality: Single focus of invasive carcinoma   Ductal Carcinoma in Situ (DCIS): Not identified   Tumor Extent   Lymphovascular Invasion: Not identified   Treatment Effect in the Breast: No known presurgical therapy   MARGINS   Invasive Carcinoma Margins: Uninvolved by invasive carcinoma   Distance from Closest Margin (Millimeters): Less than: 1 mm   Closest Margin(s): Anterior   LYMPH NODES   Regional Lymph Nodes: Uninvolved by tumor cells   Total Number of Lymph Nodes Examined: 2   Number of [place] Nodes Examined: 2   PATHOLOGIC STAGE CLASSIFICATION (pTNM, AJCC 8th Edition)   Primary Tumor (pT): pT1c N0(sn)   Breast biomarkers previously reported (U811721): ER negative, MD negative, HER-2/mili positive by FISH. Tumor blocks for potential additional studies: 3-B-D.   4. Right breast, lateral margin right breast, excision:   Benign fibroadipose tissue with minimal benign fibroglandular breast tissue. 5. Right breast, medial margin right breast, excision:   Benign fibroglandular breast tissue with epithelial cyst and usual ductal hyperplasia. 6. Right breast, inferior margin right breast, excision:   Benign fibroglandular breast tissue. 08/23/21    ECHO ADULT COMPLETE 08/23/2021 8/23/2021    Interpretation Summary  · LV: Estimated LVEF is 60 - 65%. Normal cavity size, wall thickness and systolic function (ejection fraction normal). Normal left ventricular strain. Global longitudinal strain is -15.9%. Mild (grade 1) left ventricular diastolic dysfunction. Signed by: Juan Jensen MD on 8/23/2021  1:01 PM    Records reviewed and summarized above. Pathology report(s) reviewed above. Radiology report(s) reviewed above. Assessment:/PLAN     1) Stage 1 Breast Cancer ER- Her2+ post Lumpectomy 8/3/21  Records and history reviewed. Reviewed path and data. Discussed dx, stage and treatment of breast cancer. Post lumpectomy in August. Reviewed path in detail. Discussed adjuvant chemo and hormonal therapy options. Discussed no hormonal therapy options due to ER/MD negative. Discussed adjuvant Herceptin based chemo today ddAC-THP vs TCHP vs TH. Detailed discussion about benefit of Herceptin based therapy. Paient and family open to chemo and prefer least chemo. Decided on Taxol/Herceptin.     Teaching and consent with Pharmacy. Port placed on 8/3/21. Pre chemo ECHO 8/23/21 stable with EF 60-65%. Referred to Rad/Onc - appt 9/20/21. Patient started weekly Taxol/Herceptin on 8/31/21. Patient is here today for Day 8 Cycle 1 of weekly Taxol/Herceptin. She tolerated first treatment well overall with mild diarrhea. She also states she \"crashed\" emotionally, couldn't sleep. On call MD sent script for Ambien - she took 1/2 pill which helped. She is doing Herceptin SQ for duration of treatment. She is clinically stable and doing well overall. Labs (CBC) reviewed today. Patient is ready for treatment today. Follow up in 1 week with Day 15 Cycle 1. Patient agrees with plan. 2) Arthritis/Hx of Kidney Stone/Osteopenia   Management per PCP. 3) Management of High Risk Medications - Chemotherapy  Toxicities include Grade 1 Diarrhea and Grade 1 Insomnia  Reviewed side effect management today. Continue Ambien as needed for sleep - she is only taking 1/2 tab. Labs (CBC) reviewed. ECHO reviewed. No dose adjustments needed today. Will monitor for side effects. 4) Psychosocial  Mood good, coping well. Her family is very supportive. SW/NN support as needed. Her  is here today. Call if questions. Follow up in 1 week in [facility]/office. This patient was seen in conjunction with Ever Siegel NP. I personally performed a face to face diagnostic evaluation on this patient. I personally reviewed the history and performed the key points on the exam.   I personally reviewed all points in the assessment and created treatment plan with the patient. Specifically, pt seen by me today  Feels fine overall. Tolerating chemo with manageable side effects. Will take ambien prn. Ready for chemo  Labs personally reviewed today  Proceed with chemo as ordered. I appreciate the opportunity to participate in Ms. Natacha Kowalski's care.     Signed By: Kasey Torres DO

## 2021-09-13 ENCOUNTER — TELEPHONE (OUTPATIENT)
Dept: ONCOLOGY | Age: 71
End: 2021-09-13

## 2021-09-13 ENCOUNTER — HOSPITAL ENCOUNTER (OUTPATIENT)
Dept: INFUSION THERAPY | Age: 71
Discharge: HOME | End: 2021-09-13
Payer: MEDICARE

## 2021-09-13 ENCOUNTER — OFFICE VISIT (OUTPATIENT)
Dept: ONCOLOGY | Age: 71
End: 2021-09-13

## 2021-09-13 VITALS
DIASTOLIC BLOOD PRESSURE: 89 MMHG | SYSTOLIC BLOOD PRESSURE: 166 MMHG | RESPIRATION RATE: 18 BRPM | HEART RATE: 64 BPM | TEMPERATURE: 97.1 F

## 2021-09-13 VITALS
OXYGEN SATURATION: 97 % | WEIGHT: 150.5 LBS | HEART RATE: 74 BPM | DIASTOLIC BLOOD PRESSURE: 83 MMHG | BODY MASS INDEX: 25.7 KG/M2 | TEMPERATURE: 97.1 F | HEIGHT: 64 IN | SYSTOLIC BLOOD PRESSURE: 147 MMHG

## 2021-09-13 DIAGNOSIS — Z09 CHEMOTHERAPY FOLLOW-UP EXAMINATION: ICD-10-CM

## 2021-09-13 DIAGNOSIS — Z87.442 HISTORY OF KIDNEY STONES: ICD-10-CM

## 2021-09-13 DIAGNOSIS — T45.1X5A CHEMOTHERAPY INDUCED DIARRHEA: ICD-10-CM

## 2021-09-13 DIAGNOSIS — C50.911 INFILTRATING DUCTAL CARCINOMA OF RIGHT BREAST (HCC): ICD-10-CM

## 2021-09-13 DIAGNOSIS — C50.911 INFILTRATING DUCTAL CARCINOMA OF RIGHT BREAST (HCC): Primary | ICD-10-CM

## 2021-09-13 DIAGNOSIS — M19.90 ARTHRITIS: ICD-10-CM

## 2021-09-13 DIAGNOSIS — K52.1 CHEMOTHERAPY INDUCED DIARRHEA: ICD-10-CM

## 2021-09-13 DIAGNOSIS — M85.89 OSTEOPENIA OF MULTIPLE SITES: ICD-10-CM

## 2021-09-13 DIAGNOSIS — G47.00 INSOMNIA, UNSPECIFIED TYPE: ICD-10-CM

## 2021-09-13 DIAGNOSIS — Z79.899 ENCOUNTER FOR MONITORING CARDIOTOXIC DRUG THERAPY: ICD-10-CM

## 2021-09-13 DIAGNOSIS — Z51.81 ENCOUNTER FOR MONITORING CARDIOTOXIC DRUG THERAPY: ICD-10-CM

## 2021-09-13 DIAGNOSIS — Z98.890 HISTORY OF LUMPECTOMY OF RIGHT BREAST: Primary | ICD-10-CM

## 2021-09-13 LAB
BASOPHILS # BLD: 0 K/UL (ref 0–0.1)
BASOPHILS NFR BLD: 1 % (ref 0–1)
DIFFERENTIAL METHOD BLD: ABNORMAL
EOSINOPHIL # BLD: 0.1 K/UL (ref 0–0.4)
EOSINOPHIL NFR BLD: 2 % (ref 0–7)
ERYTHROCYTE [DISTWIDTH] IN BLOOD BY AUTOMATED COUNT: 11.9 % (ref 11.5–14.5)
HCT VFR BLD AUTO: 35.1 % (ref 35–47)
HGB BLD-MCNC: 11.8 G/DL (ref 11.5–16)
IMM GRANULOCYTES # BLD AUTO: 0 K/UL (ref 0–0.04)
IMM GRANULOCYTES NFR BLD AUTO: 0 % (ref 0–0.5)
LYMPHOCYTES # BLD: 1.2 K/UL (ref 0.8–3.5)
LYMPHOCYTES NFR BLD: 35 % (ref 12–49)
MCH RBC QN AUTO: 33.8 PG (ref 26–34)
MCHC RBC AUTO-ENTMCNC: 33.6 G/DL (ref 30–36.5)
MCV RBC AUTO: 100.6 FL (ref 80–99)
MONOCYTES # BLD: 0.1 K/UL (ref 0–1)
MONOCYTES NFR BLD: 4 % (ref 5–13)
NEUTS SEG # BLD: 2.1 K/UL (ref 1.8–8)
NEUTS SEG NFR BLD: 58 % (ref 32–75)
NRBC # BLD: 0 K/UL (ref 0–0.01)
NRBC BLD-RTO: 0 PER 100 WBC
PLATELET # BLD AUTO: 231 K/UL (ref 150–400)
PMV BLD AUTO: 11.1 FL (ref 8.9–12.9)
RBC # BLD AUTO: 3.49 M/UL (ref 3.8–5.2)
WBC # BLD AUTO: 3.5 K/UL (ref 3.6–11)

## 2021-09-13 PROCEDURE — G8536 NO DOC ELDER MAL SCRN: HCPCS | Performed by: INTERNAL MEDICINE

## 2021-09-13 PROCEDURE — G8427 DOCREV CUR MEDS BY ELIG CLIN: HCPCS | Performed by: INTERNAL MEDICINE

## 2021-09-13 PROCEDURE — 36415 COLL VENOUS BLD VENIPUNCTURE: CPT

## 2021-09-13 PROCEDURE — 1101F PT FALLS ASSESS-DOCD LE1/YR: CPT | Performed by: INTERNAL MEDICINE

## 2021-09-13 PROCEDURE — G8419 CALC BMI OUT NRM PARAM NOF/U: HCPCS | Performed by: INTERNAL MEDICINE

## 2021-09-13 PROCEDURE — G9899 SCRN MAM PERF RSLTS DOC: HCPCS | Performed by: INTERNAL MEDICINE

## 2021-09-13 PROCEDURE — G8399 PT W/DXA RESULTS DOCUMENT: HCPCS | Performed by: INTERNAL MEDICINE

## 2021-09-13 PROCEDURE — 96375 TX/PRO/DX INJ NEW DRUG ADDON: CPT

## 2021-09-13 PROCEDURE — 74011250636 HC RX REV CODE- 250/636: Performed by: INTERNAL MEDICINE

## 2021-09-13 PROCEDURE — 1090F PRES/ABSN URINE INCON ASSESS: CPT | Performed by: INTERNAL MEDICINE

## 2021-09-13 PROCEDURE — 99214 OFFICE O/P EST MOD 30 MIN: CPT | Performed by: INTERNAL MEDICINE

## 2021-09-13 PROCEDURE — 3017F COLORECTAL CA SCREEN DOC REV: CPT | Performed by: INTERNAL MEDICINE

## 2021-09-13 PROCEDURE — 96413 CHEMO IV INFUSION 1 HR: CPT

## 2021-09-13 PROCEDURE — 77030012965 HC NDL HUBR BBMI -A

## 2021-09-13 PROCEDURE — G8510 SCR DEP NEG, NO PLAN REQD: HCPCS | Performed by: INTERNAL MEDICINE

## 2021-09-13 PROCEDURE — 85025 COMPLETE CBC W/AUTO DIFF WBC: CPT

## 2021-09-13 RX ORDER — DIPHENHYDRAMINE HYDROCHLORIDE 50 MG/ML
50 INJECTION, SOLUTION INTRAMUSCULAR; INTRAVENOUS ONCE
Status: COMPLETED | OUTPATIENT
Start: 2021-09-13 | End: 2021-09-13

## 2021-09-13 RX ORDER — SODIUM CHLORIDE 9 MG/ML
25 INJECTION, SOLUTION INTRAVENOUS CONTINUOUS
Status: DISCONTINUED | OUTPATIENT
Start: 2021-09-13 | End: 2021-09-14 | Stop reason: HOSPADM

## 2021-09-13 RX ORDER — DEXAMETHASONE SODIUM PHOSPHATE 10 MG/ML
10 INJECTION INTRAMUSCULAR; INTRAVENOUS ONCE
Status: COMPLETED | OUTPATIENT
Start: 2021-09-13 | End: 2021-09-13

## 2021-09-13 RX ORDER — SODIUM CHLORIDE 0.9 % (FLUSH) 0.9 %
10 SYRINGE (ML) INJECTION AS NEEDED
Status: DISCONTINUED | OUTPATIENT
Start: 2021-09-13 | End: 2021-09-14 | Stop reason: HOSPADM

## 2021-09-13 RX ORDER — HEPARIN 100 UNIT/ML
300-500 SYRINGE INTRAVENOUS AS NEEDED
Status: DISCONTINUED | OUTPATIENT
Start: 2021-09-13 | End: 2021-09-14 | Stop reason: HOSPADM

## 2021-09-13 RX ORDER — SODIUM CHLORIDE 9 MG/ML
10 INJECTION INTRAMUSCULAR; INTRAVENOUS; SUBCUTANEOUS AS NEEDED
Status: DISCONTINUED | OUTPATIENT
Start: 2021-09-13 | End: 2021-09-14 | Stop reason: HOSPADM

## 2021-09-13 RX ADMIN — Medication 10 ML: at 14:36

## 2021-09-13 RX ADMIN — SODIUM CHLORIDE 25 ML/HR: 900 INJECTION, SOLUTION INTRAVENOUS at 15:08

## 2021-09-13 RX ADMIN — DIPHENHYDRAMINE HYDROCHLORIDE 50 MG: 50 INJECTION INTRAMUSCULAR; INTRAVENOUS at 14:33

## 2021-09-13 RX ADMIN — HEPARIN 500 UNITS: 100 SYRINGE at 14:36

## 2021-09-13 RX ADMIN — SODIUM CHLORIDE 10 ML: 9 INJECTION INTRAMUSCULAR; INTRAVENOUS; SUBCUTANEOUS at 14:36

## 2021-09-13 RX ADMIN — DEXAMETHASONE SODIUM PHOSPHATE 10 MG: 10 INJECTION, SOLUTION INTRAMUSCULAR; INTRAVENOUS at 14:31

## 2021-09-13 RX ADMIN — PACLITAXEL 139 MG: 6 INJECTION, SOLUTION INTRAVENOUS at 15:01

## 2021-09-13 RX ADMIN — FAMOTIDINE 20 MG: 10 INJECTION INTRAVENOUS at 14:30

## 2021-09-13 NOTE — PROGRESS NOTES
[facility] Chemotherapy Progress Note    Date: 2021    Name: Glenis Varela    MRN: [id number]         : 1950      1255 Pt admit to [facility] for Taxol ambulatory in stable condition. Assessment completed. No new concerns voiced. Port with positive blood return. Labs drawn and sent for processing. Patient denies SOB, fever, cough, general not feeling well. Patient denies recent exposure to someone who has tested positive for COVID-19. Patient denies having contact with anyone who has a pending COVID test.     During post treatment cap cool down patient stated her cap was leaking with coolant fluid. Cap removed and towel provided. Called office upstairs new cap given, patient connected to finish 1hr cooling time. Noah notified and stated to have the patient keep cap from MD office and to keep her cap for rep to  and send back to company. Chemotherapy Flowsheet 2021   Cycle C1D15   Date 2021   Drug / Regimen Taxol   Pre Meds -   Notes -         Ms. Kowalski's vitals were reviewed. Patient Vitals for the past 12 hrs:   Temp Pulse Resp BP   21 1256 97.1 °F (36.2 °C) 69 18 (!) 147/83         Pre-medications  were administered as ordered and chemotherapy was initiated.   Medications Administered     0.9% sodium chloride infusion     Admin Date  2021 Action  New Bag Dose  25 mL/hr Rate  25 mL/hr Route  IntraVENous Administered By  Karon Boyd RN          0.9% sodium chloride injection 10 mL     Admin Date  2021 Action  Given Dose  10 mL Route  IntraVENous Administered By  Karon Boyd RN          dexamethasone (PF) (DECADRON) 10 mg/mL injection 10 mg     Admin Date  2021 Action  Given Dose  10 mg Route  IntraVENous Administered By  Karon Boyd RN          diphenhydrAMINE (BENADRYL) injection 50 mg     Admin Date  2021 Action  Given Dose  50 mg Route  IntraVENous Administered By  Karon Boyd RN          famotidine (PF) (PEPCID) 20 mg in 0.9% sodium chloride 10 mL injection     Admin Date  09/13/2021 Action  Given Dose  20 mg Route  IntraVENous Administered By  Rolo Nino RN          heparin (porcine) pf 300-500 Units     Admin Date  09/13/2021 Action  Given Dose  500 Units Route  InterCATHeter Administered By  Rolo Nino RN          PACLitaxeL (TAXOL) 139 mg in 0.9% sodium chloride 250 mL, overfill volume 25 mL chemo infusion     Admin Date  09/13/2021 Action  New Bag Dose  139 mg Rate  298.2 mL/hr Route  IntraVENous Administered By  Rolo Nino RN          sodium chloride (NS) flush 10 mL     Admin Date  09/13/2021 Action  Given Dose  10 mL Route  IntraVENous Administered By  Rolo Nino RN                 3880 Pt tolerated treatment well. Port maintained positive blood return throughout treatment. Flushed, heparinized and de-accessed per protocol. D/c home ambulatory in no distress. Patient care assumed by [facility] for the remainder of cold cap cooling process.       Future Appointments   Date Time Provider Wendie Escobar   9/20/2021  8:30 AM RAD [address] [facility]   9/21/2021  1:00 PM A3 NAT  [address] [facility]   9/28/2021 11:00 AM B3 NAT MED [address]   10/5/2021  1:00 PM B3 NAT MED [facility]   10/12/2021  1:00 PM F4 NAT  [address] [facility]   10/19/2021 11:00 AM C1 NAT MED [address]   10/26/2021 11:00 AM F4 NAT MED [facility]   11/2/2021 11:00 AM B3 NAT  [address] [facility]   11/9/2021 11:00 AM B3 NAT MED [facility]   11/16/2021 11:00 AM B3 NAT  [address] [facility]   2/21/2022 10:45 AM MD LING Luciano BS AMB   5/4/2022 10:30 AM MD FRED Gabriel BS AMB         Denise Glass RN  September 13, 2021

## 2021-09-13 NOTE — PROGRESS NOTES
[facility] at [address] [place] [address] Brody Deras, [place]: [phone]  F: [phone]    Reason for Visit:   Adeola Tinsley is a 78 y.o. female seen today in office for follow up of Right Breast Cancer on adjuvant weekly Taxol + Herceptin. Treatment History:   · Abnormal screening mammo 6/3/21: Bilateral digital screening mammography was performed and is interpreted in conjunction with a computer assisted detection (CAD) system. In the left breast, no suspicious masses or calcifications are identified. The right breast upper outer quadrant at 10:00 posterior depth 9 cm from the nipple there is a focal asymmetry  · Right Dx Mammo/US 6/7/21: Further evaluation was performed for a right breast focal asymmetry. In the right breast upper outer quadrant at 10:00 posterior depth there is an equal density irregular mass with indistinct margins measuring up to 1.7 cm. In the right breast at 10:00 10 cm from the nipple there is a 1.6 x 0.9 x 1.0 cm hypoechoic oval mass with indistinct margins, internal vascularity, and posterior acoustic enhancement  · Right Breast Biopsy 6/18/21: PATH - Invasive ductal carcinoma, [place] histologic grade 3. Largest glass slide measurement of invasive carcinoma: 6 mm   · ER/LA negative, HER2 FISH +  · Ki67 90%  · Right Lumpectomy 8/3/2: PATH - 20 mm IDC with negative LNs. Margins negative. · Adjuvant weekly Taxol + Herceptin 8/31/21 - Current     STAGE: Pathologic T1cN0, ER/LA negative, HER2+     History of Present Illness:   Adeola Tinsley is a 78 y.o. female seen today in office for follow up of right breast cancer post lumpectomy 8/3/21. She started adjuvant weekly Taxol + Herceptin on 8/31/21. She is here today for Day 15 Cycle 1 of adjuvant weekly Taxol + Herceptin. She reports that she feels well overall today. She is tolerating current regimen well overall thus far. She states that she is sleeping better with Ambien.  She states that she has had a mild headache daily for the past week or so. She took Tylenol which helped a little bit. She had some mild nausea but no vomiting. Anti-emetics are working per patient. She did not have any diarrhea after last treatment. Her appetite is good and energy levels are good. She denies fever, chills, mouth sores, cough, SOB, CP, constipation, and neuropathy. She denies pain today. CBC is pending. She is ready for treatment today. Her supportive  is here today.        Past Medical History:   Diagnosis Date    Arthritis     left ankle    Breast cancer ([address]     RIGHT    Chronic kidney disease     kidney stone    Chronic pain of left ankle 2017    Seeing Dr Krishna Ferguson    Fracture     left ankle    Ill-defined condition     PICC line for UTI - borderline sepsis    Osteopenia 2017      Past Surgical History:   Procedure Laterality Date    COLONOSCOPY N/A 2019    tubular adenoma, repeat 1 yr d/t poor prep - performed by Esther Huntley MD at [address] [place] [address];     COLONOSCOPY N/A 2020    normal - performed by Esther Huntley MD at [address] [place] [address] ENDOSCOPY    HX [address] [facility] [address] Left     surgery - has a plate a screws    HX BREAST LUMPECTOMY Right 8/3/2021    RIGHT BREAST LUMPECTOMY WITH ULTRASOUND AND RIGHT BREAST SENTINEL NODE BIOPSY AND PORTACATH INSERTION performed by Aniya Balderrama MD at [address] [place] [address] AMBULATORY OR    HX [address]      HX GI      COLONOSCOPY    HX OTHER SURGICAL Right 2020    growth removed from right eyelid    HX OTHER SURGICAL Right 2020    PICC line placed for IV abx, removed after completing course for pyelonephritis    HX POLYPECTOMY      HX TONSILLECTOMY      [place] BIOPSY OF BREAST, NEEDLE CORE Right       Social History     Tobacco Use    Smoking status: Former Smoker     Quit date:      Years since quittin.7    Smokeless tobacco: Never Used    Tobacco comment: was a causal smoker when smoker in college   Substance Use Topics    Alcohol use: Yes     Alcohol/week: 5.0 standard drinks     Types: 5 Glasses of wine per week      Family History   Problem Relation Age of Onset    Cancer Mother         Brain tumor    Other Father         ? lung problem    Cancer Sister         tumors of spine    Other Sister         kidney stones    Other Daughter         kidney stone    Other Maternal Aunt         kidney stones    No Known Problems Sister     No Known Problems Sister     Anesth Problems Neg Hx      Current Outpatient Medications   Medication Sig    zolpidem (AMBIEN) 5 mg tablet Take 1 Tablet by mouth nightly as needed for Sleep. Max Daily Amount: 5 mg.  lidocaine-prilocaine (EMLA) topical cream Apply  to affected area as needed for Pain.  CRANIAL PROSTHESIS misc Breast cancer for chemo/ alopecia needs wig    ondansetron (ZOFRAN ODT) 4 mg disintegrating tablet Take 1 Tablet by mouth every eight (8) hours as needed for Nausea or Vomiting.  prochlorperazine (Compazine) 5 mg tablet Take 1-2 Tablets by mouth every six (6) hours as needed for Nausea or Vomiting.  ondansetron (ZOFRAN ODT) 4 mg disintegrating tablet Take 1 Tablet by mouth every eight (8) hours as needed for Nausea or Vomiting.  cortisone acetate (CORTISONE PO) Take  by mouth. Injections every 3 months in her foot    DULoxetine (CYMBALTA) 20 mg capsule TAKE ONE CAPSULE BY MOUTH EVERY EVENING    alendronate (FOSAMAX) 70 mg tablet TAKE 1 TABLET BY MOUTH ONCE WEEKLY ON AN EMPTY STOMACH BEFORE BREAKFAST. REMAIN UPRIGHT FOR 30 MINUTES & TAKE WITH 8 OUNCES OF WATER    acetaminophen (TYLENOL) 500 mg tablet 1 or 2 every 6 hrs prn for headache     No current facility-administered medications for this visit. Allergies   Allergen Reactions    Adhesive Tape-Silicones Rash    Other Medication Hives     Cranberry or Probiotic, had severe itching and hives, not sure to which.  Has taken a probiotic recently without a reaction.  Oxycodone Itching      Review of Systems:  A complete review of systems was obtained, negative except as described above and as reported on ROS sheet scanned into system. Physical Exam:     Visit Vitals  BP (!) 147/83 (BP 1 Location: Left upper arm, BP Patient Position: Sitting)   Pulse 74   Temp 97.1 °F (36.2 °C) (Temporal)   Ht 5' 4\" (1.626 m)   Wt 150 lb 8 oz (68.3 kg)   SpO2 97%   BMI 25.83 kg/m²     ECOG PS: 0  General: No distress  Eyes: Anicteric sclerae  HENT: Atraumatic, wearing a mask  Neck: Supple  Respiratory:  normal respiratory effort  MS: Normal gait and station. Digits without clubbing or cyanosis. Skin: No rashes, ecchymoses, or petechiae. Normal temperature, turgor, and texture. Port site without redness/swelling  Psych: Alert, oriented, appropriate affect, normal judgment/insight  Breast: Not examined today. Last Visit: RIGHT lumpectomy scars healing well  Neuro: Non focal    Results:     Lab Results   Component Value Date/Time    WBC 3.5 (L) 09/13/2021 12:58 PM    HGB 11.8 09/13/2021 12:58 PM    HCT 35.1 09/13/2021 12:58 PM    PLATELET 850 27/87/2862 12:58 PM    .6 (H) 09/13/2021 12:58 PM    ABS. NEUTROPHILS 2.1 09/13/2021 12:58 PM     Lab Results   Component Value Date/Time    Sodium 140 08/31/2021 12:59 PM    Potassium 4.0 08/31/2021 12:59 PM    Chloride 108 08/31/2021 12:59 PM    CO2 25 08/31/2021 12:59 PM    Glucose 103 (H) 08/31/2021 12:59 PM    BUN 19 08/31/2021 12:59 PM    Creatinine 0.78 08/31/2021 12:59 PM    GFR est AA >60 08/31/2021 12:59 PM    GFR est non-AA >60 08/31/2021 12:59 PM    Calcium 9.7 08/31/2021 12:59 PM     Lab Results   Component Value Date/Time    Bilirubin, total 0.5 08/31/2021 12:59 PM    ALT (SGPT) 27 08/31/2021 12:59 PM    Alk.  phosphatase 89 08/31/2021 12:59 PM    Protein, total 6.7 08/31/2021 12:59 PM    Albumin 3.8 08/31/2021 12:59 PM    Globulin 2.9 08/31/2021 12:59 PM     6/18/21  FINAL PATHOLOGIC DIAGNOSIS   Breast, right, needle core biopsy:   Invasive ductal carcinoma, [place] histologic grade 3   Largest glass slide measurement of invasive carcinoma: 6 mm   ER/IA negative, HER2 FISH +    8/3/21  FINAL PATHOLOGIC DIAGNOSIS   1. Lymph node, right axillary sentinel lymph node #1, excision:   One lymph node negative for metastatic carcinoma. 2. Lymph node, right axillary sentinel lymph node #2, excision:   One lymph node negative for metastatic carcinoma. 3. Right breast, lumpectomy, excision:   Procedure: Excision (less than total mastectomy)   Specimen Laterality: Right   TUMOR   Histologic Type: Invasive carcinoma of no special type (ductal)   Glandular ([place]) / Tubular Differentiation: Score 3   Nuclear Pleomorphism: Score 3   Mitotic Rate: Score 3   Overall Grade: Grade 3   Tumor Size: 20 mm   Tumor Focality: Single focus of invasive carcinoma   Ductal Carcinoma in Situ (DCIS): Not identified   Tumor Extent   Lymphovascular Invasion: Not identified   Treatment Effect in the Breast: No known presurgical therapy   MARGINS   Invasive Carcinoma Margins: Uninvolved by invasive carcinoma   Distance from Closest Margin (Millimeters): Less than: 1 mm   Closest Margin(s): Anterior   LYMPH NODES   Regional Lymph Nodes: Uninvolved by tumor cells   Total Number of Lymph Nodes Examined: 2   Number of [place] Nodes Examined: 2   PATHOLOGIC STAGE CLASSIFICATION (pTNM, AJCC 8th Edition)   Primary Tumor (pT): pT1c N0(sn)   Breast biomarkers previously reported (M511935): ER negative, IA negative, HER-2/mili positive by FISH. Tumor blocks for potential additional studies: 3-B-D.   4. Right breast, lateral margin right breast, excision:   Benign fibroadipose tissue with minimal benign fibroglandular breast tissue. 5. Right breast, medial margin right breast, excision:   Benign fibroglandular breast tissue with epithelial cyst and usual ductal hyperplasia.    6. Right breast, inferior margin right breast, excision:   Benign fibroglandular breast tissue. 08/23/21    ECHO ADULT COMPLETE 08/23/2021 8/23/2021    Interpretation Summary  · LV: Estimated LVEF is 60 - 65%. Normal cavity size, wall thickness and systolic function (ejection fraction normal). Normal left ventricular strain. Global longitudinal strain is -15.9%. Mild (grade 1) left ventricular diastolic dysfunction. Signed by: Yenny Wilkinson MD on 8/23/2021  1:01 PM    Records reviewed and summarized above. Pathology report(s) reviewed above. Radiology report(s) reviewed above. Assessment:/PLAN     1) Stage 1 Breast Cancer ER- Her2+ post Lumpectomy 8/3/21  Records and history reviewed. Reviewed path and data. Discussed dx, stage and treatment of breast cancer. Post lumpectomy in August. Reviewed path in detail. Discussed adjuvant chemo and hormonal therapy options. Discussed no hormonal therapy options due to ER/VT negative. Discussed adjuvant Herceptin based chemo today ddAC-THP vs TCHP vs TH. Detailed discussion about benefit of Herceptin based therapy. Paient and family open to chemo and prefer least chemo. Decided on Taxol/Herceptin. Teaching and consent with Pharmacy. Port placed on 8/3/21. Pre chemo ECHO 8/23/21 stable with EF 60-65%. Referred to Rad/Onc - appt 9/20/21. Patient started weekly Taxol/Herceptin on 8/31/21. Patient is here today for Day 15 Cycle 1 of weekly Taxol/Herceptin. She tolerated first treatment well overall with mild diarrhea. She also states she \"crashed\" emotionally, couldn't sleep. On call MD sent script for Ambien - she took 1/2 pill which helped. She is doing Herceptin SQ for duration of treatment. She is clinically stable and doing well overall. Labs (CBC) reviewed today. Patient is ready for treatment today. Follow up in 1 week   Patient agrees with plan. 2) Arthritis/Hx of Kidney Stone/Osteopenia   Management per PCP.      3) Management of High Risk Medications - Chemotherapy  Toxicities include Grade 1 Nausea and Grade 1 Insomnia  Reviewed side effect management today. Continue Ambien as needed for sleep - she is only taking 1/2 tab. Labs (CBC) reviewed. ECHO reviewed. No dose adjustments needed today. Will monitor for side effects. 4) Psychosocial  Mood good, coping well. Her family is very supportive. SW/NN support as needed. Her  is here today. Call if questions. Follow up in 1 week in [facility] and 3 weeks in [facility]/office. This patient was seen in conjunction with Harini Lewis NP. I personally performed a face to face diagnostic evaluation on this patient. I personally reviewed the history and performed the key points on the exam.   I personally reviewed all points in the assessment and created treatment plan with the patient. Specifically, pt seen by me today  Feels fine overall. Tolerating chemo with manageable side effects. Had some insomnia. Will take ambien prn. Ready for chemo and will continue with same dosing today. Labs personally reviewed today  Proceed with chemo as ordered. I appreciate the opportunity to participate in Ms. Varinder Kowalski's care.     Signed By: Dung Mcdonough DO

## 2021-09-13 NOTE — PROGRESS NOTES
Boom Reyes is a 78 y.o. female  Chief Complaint   Patient presents with    Chemotherapy      Right Breast Cancer      1. Have you been to the ER, urgent care clinic since your last visit? Hospitalized since your last visit? No.    2. Have you seen or consulted any other health care providers outside of the [address] [place] [address] since your last visit? Include any pap smears or colon screening.  No.

## 2021-09-13 NOTE — TELEPHONE ENCOUNTER
Incoming call from [facility] in [place], [place]. Patient's [place] Cooling Cap has become pierced during treatment and is leaking cooling gel onto patient. Patient will contact Chari at [facility] for replacement. Terra Pike with office small cap/over cap for loan during treatment today. Notified Flory Rodriguez via  at [phone] that patient will need replacement cap. Update to Provider.

## 2021-09-15 RX ORDER — ALBUTEROL SULFATE 0.83 MG/ML
2.5 SOLUTION RESPIRATORY (INHALATION) AS NEEDED
Status: CANCELLED
Start: 2021-09-28

## 2021-09-15 RX ORDER — SODIUM CHLORIDE 0.9 % (FLUSH) 0.9 %
10 SYRINGE (ML) INJECTION AS NEEDED
Status: CANCELLED | OUTPATIENT
Start: 2021-09-28

## 2021-09-15 RX ORDER — ACETAMINOPHEN 325 MG/1
650 TABLET ORAL AS NEEDED
Status: CANCELLED
Start: 2021-09-28

## 2021-09-15 RX ORDER — SODIUM CHLORIDE 9 MG/ML
25 INJECTION, SOLUTION INTRAVENOUS CONTINUOUS
Status: CANCELLED | OUTPATIENT
Start: 2021-09-28

## 2021-09-15 RX ORDER — ALBUTEROL SULFATE 0.83 MG/ML
2.5 SOLUTION RESPIRATORY (INHALATION) AS NEEDED
Status: CANCELLED
Start: 2021-10-05

## 2021-09-15 RX ORDER — EPINEPHRINE 1 MG/ML
0.3 INJECTION, SOLUTION, CONCENTRATE INTRAVENOUS AS NEEDED
Status: CANCELLED | OUTPATIENT
Start: 2021-09-21

## 2021-09-15 RX ORDER — SODIUM CHLORIDE 0.9 % (FLUSH) 0.9 %
10 SYRINGE (ML) INJECTION AS NEEDED
Status: CANCELLED | OUTPATIENT
Start: 2021-10-05

## 2021-09-15 RX ORDER — DEXAMETHASONE SODIUM PHOSPHATE 4 MG/ML
10 INJECTION, SOLUTION INTRA-ARTICULAR; INTRALESIONAL; INTRAMUSCULAR; INTRAVENOUS; SOFT TISSUE ONCE
Status: CANCELLED | OUTPATIENT
Start: 2021-09-28 | End: 2021-09-28

## 2021-09-15 RX ORDER — DIPHENHYDRAMINE HYDROCHLORIDE 50 MG/ML
25 INJECTION, SOLUTION INTRAMUSCULAR; INTRAVENOUS AS NEEDED
Status: CANCELLED
Start: 2021-09-21

## 2021-09-15 RX ORDER — HYDROCORTISONE SODIUM SUCCINATE 100 MG/2ML
100 INJECTION, POWDER, FOR SOLUTION INTRAMUSCULAR; INTRAVENOUS AS NEEDED
Status: CANCELLED | OUTPATIENT
Start: 2021-10-05

## 2021-09-15 RX ORDER — DIPHENHYDRAMINE HYDROCHLORIDE 50 MG/ML
50 INJECTION, SOLUTION INTRAMUSCULAR; INTRAVENOUS ONCE
Status: CANCELLED
Start: 2021-10-05 | End: 2021-10-05

## 2021-09-15 RX ORDER — ACETAMINOPHEN 325 MG/1
650 TABLET ORAL AS NEEDED
Status: CANCELLED
Start: 2021-09-21

## 2021-09-15 RX ORDER — HEPARIN 100 UNIT/ML
300-500 SYRINGE INTRAVENOUS AS NEEDED
Status: CANCELLED
Start: 2021-09-28

## 2021-09-15 RX ORDER — ACETAMINOPHEN 325 MG/1
650 TABLET ORAL AS NEEDED
Status: CANCELLED
Start: 2021-10-05

## 2021-09-15 RX ORDER — EPINEPHRINE 1 MG/ML
0.3 INJECTION, SOLUTION, CONCENTRATE INTRAVENOUS AS NEEDED
Status: CANCELLED | OUTPATIENT
Start: 2021-09-28

## 2021-09-15 RX ORDER — ALBUTEROL SULFATE 0.83 MG/ML
2.5 SOLUTION RESPIRATORY (INHALATION) AS NEEDED
Status: CANCELLED
Start: 2021-09-21

## 2021-09-15 RX ORDER — HYDROCORTISONE SODIUM SUCCINATE 100 MG/2ML
100 INJECTION, POWDER, FOR SOLUTION INTRAMUSCULAR; INTRAVENOUS AS NEEDED
Status: CANCELLED | OUTPATIENT
Start: 2021-09-28

## 2021-09-15 RX ORDER — SODIUM CHLORIDE 9 MG/ML
10 INJECTION INTRAMUSCULAR; INTRAVENOUS; SUBCUTANEOUS AS NEEDED
Status: CANCELLED | OUTPATIENT
Start: 2021-09-28

## 2021-09-15 RX ORDER — DEXAMETHASONE SODIUM PHOSPHATE 4 MG/ML
10 INJECTION, SOLUTION INTRA-ARTICULAR; INTRALESIONAL; INTRAMUSCULAR; INTRAVENOUS; SOFT TISSUE ONCE
Status: CANCELLED | OUTPATIENT
Start: 2021-10-05 | End: 2021-10-05

## 2021-09-15 RX ORDER — DIPHENHYDRAMINE HYDROCHLORIDE 50 MG/ML
25 INJECTION, SOLUTION INTRAMUSCULAR; INTRAVENOUS AS NEEDED
Status: CANCELLED
Start: 2021-10-05

## 2021-09-15 RX ORDER — HYDROCORTISONE SODIUM SUCCINATE 100 MG/2ML
100 INJECTION, POWDER, FOR SOLUTION INTRAMUSCULAR; INTRAVENOUS AS NEEDED
Status: CANCELLED | OUTPATIENT
Start: 2021-09-21

## 2021-09-15 RX ORDER — SODIUM CHLORIDE 9 MG/ML
10 INJECTION INTRAMUSCULAR; INTRAVENOUS; SUBCUTANEOUS AS NEEDED
Status: CANCELLED | OUTPATIENT
Start: 2021-10-05

## 2021-09-15 RX ORDER — SODIUM CHLORIDE 9 MG/ML
25 INJECTION, SOLUTION INTRAVENOUS CONTINUOUS
Status: CANCELLED | OUTPATIENT
Start: 2021-10-05

## 2021-09-15 RX ORDER — ONDANSETRON 2 MG/ML
8 INJECTION INTRAMUSCULAR; INTRAVENOUS AS NEEDED
Status: CANCELLED | OUTPATIENT
Start: 2021-10-05

## 2021-09-15 RX ORDER — DIPHENHYDRAMINE HYDROCHLORIDE 50 MG/ML
50 INJECTION, SOLUTION INTRAMUSCULAR; INTRAVENOUS AS NEEDED
Status: CANCELLED
Start: 2021-09-28

## 2021-09-15 RX ORDER — DIPHENHYDRAMINE HYDROCHLORIDE 50 MG/ML
50 INJECTION, SOLUTION INTRAMUSCULAR; INTRAVENOUS AS NEEDED
Status: CANCELLED
Start: 2021-10-05

## 2021-09-15 RX ORDER — DIPHENHYDRAMINE HYDROCHLORIDE 50 MG/ML
50 INJECTION, SOLUTION INTRAMUSCULAR; INTRAVENOUS ONCE
Status: CANCELLED
Start: 2021-09-28 | End: 2021-09-28

## 2021-09-15 RX ORDER — HEPARIN 100 UNIT/ML
300-500 SYRINGE INTRAVENOUS AS NEEDED
Status: CANCELLED
Start: 2021-10-05

## 2021-09-15 RX ORDER — DIPHENHYDRAMINE HYDROCHLORIDE 50 MG/ML
25 INJECTION, SOLUTION INTRAMUSCULAR; INTRAVENOUS AS NEEDED
Status: CANCELLED
Start: 2021-09-28

## 2021-09-15 RX ORDER — DIPHENHYDRAMINE HYDROCHLORIDE 50 MG/ML
50 INJECTION, SOLUTION INTRAMUSCULAR; INTRAVENOUS AS NEEDED
Status: CANCELLED
Start: 2021-09-21

## 2021-09-15 RX ORDER — ONDANSETRON 2 MG/ML
8 INJECTION INTRAMUSCULAR; INTRAVENOUS AS NEEDED
Status: CANCELLED | OUTPATIENT
Start: 2021-09-28

## 2021-09-15 RX ORDER — EPINEPHRINE 1 MG/ML
0.3 INJECTION, SOLUTION, CONCENTRATE INTRAVENOUS AS NEEDED
Status: CANCELLED | OUTPATIENT
Start: 2021-10-05

## 2021-09-15 RX ORDER — ONDANSETRON 2 MG/ML
8 INJECTION INTRAMUSCULAR; INTRAVENOUS AS NEEDED
Status: CANCELLED | OUTPATIENT
Start: 2021-09-21

## 2021-09-20 ENCOUNTER — HOSPITAL ENCOUNTER (OUTPATIENT)
Dept: RADIATION THERAPY | Age: 71
Discharge: HOME | End: 2021-09-20

## 2021-09-21 ENCOUNTER — HOSPITAL ENCOUNTER (OUTPATIENT)
Dept: INFUSION THERAPY | Age: 71
Discharge: HOME | End: 2021-09-21
Payer: MEDICARE

## 2021-09-21 VITALS
WEIGHT: 151.6 LBS | HEART RATE: 73 BPM | BODY MASS INDEX: 25.88 KG/M2 | SYSTOLIC BLOOD PRESSURE: 135 MMHG | TEMPERATURE: 97.1 F | RESPIRATION RATE: 18 BRPM | DIASTOLIC BLOOD PRESSURE: 83 MMHG | HEIGHT: 64 IN

## 2021-09-21 DIAGNOSIS — C50.911 INFILTRATING DUCTAL CARCINOMA OF RIGHT BREAST (HCC): Primary | ICD-10-CM

## 2021-09-21 LAB
ALBUMIN SERPL-MCNC: 3.4 G/DL (ref 3.5–5)
ALBUMIN/GLOB SERPL: 1.1 {RATIO} (ref 1.1–2.2)
ALP SERPL-CCNC: 58 U/L (ref 45–117)
ALT SERPL-CCNC: 25 U/L (ref 12–78)
ANION GAP SERPL CALC-SCNC: 6 MMOL/L (ref 5–15)
AST SERPL-CCNC: 14 U/L (ref 15–37)
BASOPHILS # BLD: 0 K/UL (ref 0–0.1)
BASOPHILS NFR BLD: 1 % (ref 0–1)
BILIRUB SERPL-MCNC: 0.3 MG/DL (ref 0.2–1)
BUN SERPL-MCNC: 23 MG/DL (ref 6–20)
BUN/CREAT SERPL: 32 (ref 12–20)
CALCIUM SERPL-MCNC: 8.9 MG/DL (ref 8.5–10.1)
CHLORIDE SERPL-SCNC: 110 MMOL/L (ref 97–108)
CO2 SERPL-SCNC: 23 MMOL/L (ref 21–32)
CREAT SERPL-MCNC: 0.71 MG/DL (ref 0.55–1.02)
DIFFERENTIAL METHOD BLD: ABNORMAL
EOSINOPHIL # BLD: 0.1 K/UL (ref 0–0.4)
EOSINOPHIL NFR BLD: 2 % (ref 0–7)
ERYTHROCYTE [DISTWIDTH] IN BLOOD BY AUTOMATED COUNT: 12.3 % (ref 11.5–14.5)
GLOBULIN SER CALC-MCNC: 3 G/DL (ref 2–4)
GLUCOSE SERPL-MCNC: 85 MG/DL (ref 65–100)
HCT VFR BLD AUTO: 32.7 % (ref 35–47)
HGB BLD-MCNC: 11 G/DL (ref 11.5–16)
IMM GRANULOCYTES # BLD AUTO: 0 K/UL (ref 0–0.04)
IMM GRANULOCYTES NFR BLD AUTO: 1 % (ref 0–0.5)
LYMPHOCYTES # BLD: 1.2 K/UL (ref 0.8–3.5)
LYMPHOCYTES NFR BLD: 33 % (ref 12–49)
MCH RBC QN AUTO: 34.3 PG (ref 26–34)
MCHC RBC AUTO-ENTMCNC: 33.6 G/DL (ref 30–36.5)
MCV RBC AUTO: 101.9 FL (ref 80–99)
MONOCYTES # BLD: 0.3 K/UL (ref 0–1)
MONOCYTES NFR BLD: 7 % (ref 5–13)
NEUTS SEG # BLD: 2 K/UL (ref 1.8–8)
NEUTS SEG NFR BLD: 56 % (ref 32–75)
NRBC # BLD: 0 K/UL (ref 0–0.01)
NRBC BLD-RTO: 0 PER 100 WBC
PLATELET # BLD AUTO: 235 K/UL (ref 150–400)
PMV BLD AUTO: 11.1 FL (ref 8.9–12.9)
POTASSIUM SERPL-SCNC: 3.9 MMOL/L (ref 3.5–5.1)
PROT SERPL-MCNC: 6.4 G/DL (ref 6.4–8.2)
RBC # BLD AUTO: 3.21 M/UL (ref 3.8–5.2)
SODIUM SERPL-SCNC: 139 MMOL/L (ref 136–145)
WBC # BLD AUTO: 3.5 K/UL (ref 3.6–11)

## 2021-09-21 PROCEDURE — 74011250636 HC RX REV CODE- 250/636: Performed by: INTERNAL MEDICINE

## 2021-09-21 PROCEDURE — 96375 TX/PRO/DX INJ NEW DRUG ADDON: CPT

## 2021-09-21 PROCEDURE — 74011000250 HC RX REV CODE- 250: Performed by: INTERNAL MEDICINE

## 2021-09-21 PROCEDURE — 96401 CHEMO ANTI-NEOPL SQ/IM: CPT

## 2021-09-21 PROCEDURE — 96413 CHEMO IV INFUSION 1 HR: CPT

## 2021-09-21 PROCEDURE — 77030012965 HC NDL HUBR BBMI -A

## 2021-09-21 PROCEDURE — 36415 COLL VENOUS BLD VENIPUNCTURE: CPT

## 2021-09-21 PROCEDURE — 80053 COMPREHEN METABOLIC PANEL: CPT

## 2021-09-21 PROCEDURE — 85025 COMPLETE CBC W/AUTO DIFF WBC: CPT

## 2021-09-21 RX ORDER — SODIUM CHLORIDE 9 MG/ML
25 INJECTION, SOLUTION INTRAVENOUS CONTINUOUS
Status: DISCONTINUED | OUTPATIENT
Start: 2021-09-21 | End: 2021-09-22 | Stop reason: HOSPADM

## 2021-09-21 RX ORDER — SODIUM CHLORIDE 0.9 % (FLUSH) 0.9 %
10 SYRINGE (ML) INJECTION AS NEEDED
Status: DISCONTINUED | OUTPATIENT
Start: 2021-09-21 | End: 2021-09-22 | Stop reason: HOSPADM

## 2021-09-21 RX ORDER — DIPHENHYDRAMINE HYDROCHLORIDE 50 MG/ML
50 INJECTION, SOLUTION INTRAMUSCULAR; INTRAVENOUS ONCE
Status: COMPLETED | OUTPATIENT
Start: 2021-09-21 | End: 2021-09-21

## 2021-09-21 RX ORDER — DEXAMETHASONE SODIUM PHOSPHATE 4 MG/ML
10 INJECTION, SOLUTION INTRA-ARTICULAR; INTRALESIONAL; INTRAMUSCULAR; INTRAVENOUS; SOFT TISSUE ONCE
Status: COMPLETED | OUTPATIENT
Start: 2021-09-21 | End: 2021-09-21

## 2021-09-21 RX ORDER — HEPARIN 100 UNIT/ML
300-500 SYRINGE INTRAVENOUS AS NEEDED
Status: DISCONTINUED | OUTPATIENT
Start: 2021-09-21 | End: 2021-09-22 | Stop reason: HOSPADM

## 2021-09-21 RX ORDER — SODIUM CHLORIDE 9 MG/ML
10 INJECTION INTRAMUSCULAR; INTRAVENOUS; SUBCUTANEOUS AS NEEDED
Status: DISCONTINUED | OUTPATIENT
Start: 2021-09-21 | End: 2021-09-22 | Stop reason: HOSPADM

## 2021-09-21 RX ADMIN — DEXAMETHASONE SODIUM PHOSPHATE 10 MG: 4 INJECTION, SOLUTION INTRAMUSCULAR; INTRAVENOUS at 15:27

## 2021-09-21 RX ADMIN — DIPHENHYDRAMINE HYDROCHLORIDE 50 MG: 50 INJECTION INTRAMUSCULAR; INTRAVENOUS at 15:32

## 2021-09-21 RX ADMIN — TRASTUZUMAB AND HYALURONIDASE-OYSK 600 MG: 600; 10000 INJECTION, SOLUTION SUBCUTANEOUS at 15:05

## 2021-09-21 RX ADMIN — SODIUM CHLORIDE 25 ML/HR: 900 INJECTION, SOLUTION INTRAVENOUS at 15:25

## 2021-09-21 RX ADMIN — SODIUM CHLORIDE 10 ML: 9 INJECTION INTRAMUSCULAR; INTRAVENOUS; SUBCUTANEOUS at 17:20

## 2021-09-21 RX ADMIN — HEPARIN 500 UNITS: 100 SYRINGE at 17:20

## 2021-09-21 RX ADMIN — PACLITAXEL 139 MG: 6 INJECTION, SOLUTION INTRAVENOUS at 16:13

## 2021-09-21 RX ADMIN — FAMOTIDINE 20 MG: 10 INJECTION INTRAVENOUS at 15:29

## 2021-09-21 NOTE — PROGRESS NOTES
[facility] Chemo Progress Note    Date: September 21, 2021      1255: Ms. Reagan Bullock Arrived to [facility] for  C2D1 Hylecta + Taxol ([place] Cold Capping) ambulatory in stable condition. Assessment was completed. Port accessed with positive blood return. Labs drawn and sent for processing. Patient denies any symptoms of COVID-19, including SOB, coughing, fever, or generally not feeling well. Patient denies any recent exposure to COVID-19. Patient denies any recent contact with family or friends that have a pending COVID-19 test.    1400: Labs reviewed. Criteria for treatment was met. Chemotherapy Flowsheet 9/21/2021   Cycle C2D1   Date 9/21/2021   Drug / Regimen Hylecta/Taxol   Pre Meds Given   Notes Given       Ms. Kowalski's vitals were reviewed. Patient Vitals for the past 12 hrs:   Temp Pulse Resp BP   09/21/21 1257 97.1 °F (36.2 °C) 73 18 135/83       Lab results were obtained and reviewed. Recent Results (from the past 12 hour(s))   CBC WITH AUTOMATED DIFF    Collection Time: 09/21/21  1:05 PM   Result Value Ref Range    WBC 3.5 (L) 3.6 - 11.0 K/uL    RBC 3.21 (L) 3.80 - 5.20 M/uL    HGB 11.0 (L) 11.5 - 16.0 g/dL    HCT 32.7 (L) 35.0 - 47.0 %    .9 (H) 80.0 - 99.0 FL    MCH 34.3 (H) 26.0 - 34.0 PG    MCHC 33.6 30.0 - 36.5 g/dL    RDW 12.3 11.5 - 14.5 %    PLATELET 689 084 - 598 K/uL    MPV 11.1 8.9 - 12.9 FL    NRBC 0.0 0  WBC    ABSOLUTE NRBC 0.00 0.00 - 0.01 K/uL    NEUTROPHILS 56 32 - 75 %    LYMPHOCYTES 33 12 - 49 %    MONOCYTES 7 5 - 13 %    EOSINOPHILS 2 0 - 7 %    BASOPHILS 1 0 - 1 %    IMMATURE GRANULOCYTES 1 (H) 0.0 - 0.5 %    ABS. NEUTROPHILS 2.0 1.8 - 8.0 K/UL    ABS. LYMPHOCYTES 1.2 0.8 - 3.5 K/UL    ABS. MONOCYTES 0.3 0.0 - 1.0 K/UL    ABS. EOSINOPHILS 0.1 0.0 - 0.4 K/UL    ABS. BASOPHILS 0.0 0.0 - 0.1 K/UL    ABS. IMM.  GRANS. 0.0 0.00 - 0.04 K/UL    DF AUTOMATED     METABOLIC PANEL, COMPREHENSIVE    Collection Time: 09/21/21  1:05 PM   Result Value Ref Range    Sodium 139 136 - 145 mmol/L Potassium 3.9 3.5 - 5.1 mmol/L    Chloride 110 (H) 97 - 108 mmol/L    CO2 23 21 - 32 mmol/L    Anion gap 6 5 - 15 mmol/L    Glucose 85 65 - 100 mg/dL    BUN 23 (H) 6 - 20 MG/DL    Creatinine 0.71 0.55 - 1.02 MG/DL    BUN/Creatinine ratio 32 (H) 12 - 20      GFR est AA >60 >60 ml/min/1.73m2    GFR est non-AA >60 >60 ml/min/1.73m2    Calcium 8.9 8.5 - 10.1 MG/DL    Bilirubin, total 0.3 0.2 - 1.0 MG/DL    ALT (SGPT) 25 12 - 78 U/L    AST (SGOT) 14 (L) 15 - 37 U/L    Alk. phosphatase 58 45 - 117 U/L    Protein, total 6.4 6.4 - 8.2 g/dL    Albumin 3.4 (L) 3.5 - 5.0 g/dL    Globulin 3.0 2.0 - 4.0 g/dL    A-G Ratio 1.1 1.1 - 2.2         Pre-medications  were administered as ordered and chemotherapy was initiated.   Medications Administered     0.9% sodium chloride infusion     Admin Date  09/21/2021 Action  New Bag Dose  25 mL/hr Rate  25 mL/hr Route  IntraVENous Administered By  Radha El          0.9% sodium chloride injection 10 mL     Admin Date  09/21/2021 Action  Given Dose  10 mL Route  IntraVENous Administered By  Radha El          dexamethasone (DECADRON) 4 mg/mL injection 10 mg     Admin Date  09/21/2021 Action  Given Dose  10 mg Route  IntraVENous Administered By  Radha El          diphenhydrAMINE (BENADRYL) injection 50 mg     Admin Date  09/21/2021 Action  Given Dose  50 mg Route  IntraVENous Administered By  Radha El          famotidine (PF) (PEPCID) 20 mg in 0.9% sodium chloride 10 mL injection     Admin Date  09/21/2021 Action  Given Dose  20 mg Route  IntraVENous Administered By  Radha El          heparin (porcine) pf 300-500 Units     Admin Date  09/21/2021 Action  Given Dose  500 Units Route  InterCATHeter Administered By  Radha El          PACLitaxeL (TAXOL) 139 mg in 0.9% sodium chloride 250 mL, overfill volume 25 mL chemo infusion     Admin Date  09/21/2021 Action  New Bag Dose  139 mg Rate  298.2 mL/hr Route  IntraVENous Administered By  Kori Suazo, April          trastuzumab-hyaluronidase-[facility]k [facility]) injection 600 mg     Admin Date  09/21/2021 Action  Given Dose  600 mg Route  SubCUTAneous Administered By  Robert Dupont given SQ to right thigh    1815: Patient tolerated treatment well. Port maintained positive blood return throughout treatment. Port flushed, heparinized and de accessed per protocol. Patient was discharged in stable condition. Patient is aware of next scheduled [facility] appointment on 9/28/21.     Future Appointments   Date Time Provider Wendie Escobar   9/28/2021 11:00 AM B3 NAT MED [facility]   9/28/2021 11:15 AM Martha Moses  N [facility] BS AMB   10/5/2021  1:00 PM B3 NAT  [address] [facility]   10/12/2021  1:00 PM F4 NAT MED [address] [place] [address]   10/19/2021 11:00 AM C1 NAT MED [address] [place] [address]   10/26/2021 11:00 AM F4 NAT MED [address] [place] [address]   11/2/2021 11:00 AM B3 NAT  [address] [facility]   11/9/2021 11:00 AM B3 NAT  [address] [facility]   11/16/2021 11:00 AM B3 NAT  [facility] [address] [facility]   2/21/2022 10:45 AM Loki Jose MD [facility] BS AMB   5/4/2022 10:30 AM Cecily Stover MD [facility] BS AMB         Juaquin Hewitt RN  September 21, 2021

## 2021-09-22 ENCOUNTER — NURSE NAVIGATOR (OUTPATIENT)
Dept: CASE MANAGEMENT | Age: 71
End: 2021-09-22

## 2021-09-22 NOTE — PROGRESS NOTES
[employer]  Breast Navigator Encounter    Name:    Kolby Escobar  Age:    78 y.o. Diagnosis:    RIGHT breast cancer    Interdisciplinary Team:  Med-Onc:    Dr. Fredi Dockery  Surg-Onc:    Dr. Suzan Beckham:    Plastics:    :    Rodney Chowdhury LCSW  Nurse Navigator:  Randy Monzon RN, BSN, [facility]      Encounter type:  []Patient Initiated  [x]Navigator Follow-up []Pre-op  []Post-op  []Check-in Prior to First Treatment []Treatment Modality Change  []Survivorship Transition []Other:       Narrative:    Called to follow-up with patient after a few chemotherapy doses. She was not available, so I L/M for her to call me back at her convenience.           Randy Monzon RN, BSN, [facility]  Oncology Breast Navigator     [employer]  [address] [place] [address] 22.  W: [phone]  F: [phone]  [email or web address]  Good Help to Those in [facility]

## 2021-09-28 ENCOUNTER — HOSPITAL ENCOUNTER (OUTPATIENT)
Dept: INFUSION THERAPY | Age: 71
Discharge: HOME | End: 2021-09-28
Payer: MEDICARE

## 2021-09-28 ENCOUNTER — OFFICE VISIT (OUTPATIENT)
Dept: ONCOLOGY | Age: 71
End: 2021-09-28
Payer: MEDICARE

## 2021-09-28 VITALS
RESPIRATION RATE: 18 BRPM | DIASTOLIC BLOOD PRESSURE: 95 MMHG | SYSTOLIC BLOOD PRESSURE: 176 MMHG | BODY MASS INDEX: 26.02 KG/M2 | TEMPERATURE: 96.7 F | HEART RATE: 83 BPM | HEIGHT: 64 IN

## 2021-09-28 VITALS
HEIGHT: 64 IN | SYSTOLIC BLOOD PRESSURE: 152 MMHG | WEIGHT: 151.5 LBS | TEMPERATURE: 96.7 F | DIASTOLIC BLOOD PRESSURE: 86 MMHG | BODY MASS INDEX: 25.86 KG/M2 | OXYGEN SATURATION: 98 % | HEART RATE: 86 BPM

## 2021-09-28 DIAGNOSIS — R45.86 EMOTIONAL LABILITY: ICD-10-CM

## 2021-09-28 DIAGNOSIS — C50.911 INFILTRATING DUCTAL CARCINOMA OF RIGHT BREAST (HCC): Primary | ICD-10-CM

## 2021-09-28 DIAGNOSIS — G47.00 INSOMNIA, UNSPECIFIED TYPE: ICD-10-CM

## 2021-09-28 DIAGNOSIS — Z79.899 ENCOUNTER FOR MONITORING CARDIOTOXIC DRUG THERAPY: ICD-10-CM

## 2021-09-28 DIAGNOSIS — Z51.81 ENCOUNTER FOR MONITORING CARDIOTOXIC DRUG THERAPY: ICD-10-CM

## 2021-09-28 DIAGNOSIS — Z98.890 HISTORY OF LUMPECTOMY OF RIGHT BREAST: ICD-10-CM

## 2021-09-28 DIAGNOSIS — T45.1X5A CHEMOTHERAPY-INDUCED NAUSEA: ICD-10-CM

## 2021-09-28 DIAGNOSIS — M19.90 ARTHRITIS: ICD-10-CM

## 2021-09-28 DIAGNOSIS — Z87.442 HISTORY OF KIDNEY STONES: ICD-10-CM

## 2021-09-28 DIAGNOSIS — M85.89 OSTEOPENIA OF MULTIPLE SITES: ICD-10-CM

## 2021-09-28 DIAGNOSIS — Z09 CHEMOTHERAPY FOLLOW-UP EXAMINATION: ICD-10-CM

## 2021-09-28 DIAGNOSIS — R11.0 CHEMOTHERAPY-INDUCED NAUSEA: ICD-10-CM

## 2021-09-28 LAB
BASOPHILS # BLD: 0 K/UL (ref 0–0.1)
BASOPHILS NFR BLD: 1 % (ref 0–1)
DIFFERENTIAL METHOD BLD: ABNORMAL
EOSINOPHIL # BLD: 0.1 K/UL (ref 0–0.4)
EOSINOPHIL NFR BLD: 2 % (ref 0–7)
ERYTHROCYTE [DISTWIDTH] IN BLOOD BY AUTOMATED COUNT: 12.5 % (ref 11.5–14.5)
HCT VFR BLD AUTO: 32.7 % (ref 35–47)
HGB BLD-MCNC: 11 G/DL (ref 11.5–16)
IMM GRANULOCYTES # BLD AUTO: 0 K/UL (ref 0–0.04)
IMM GRANULOCYTES NFR BLD AUTO: 0 % (ref 0–0.5)
LYMPHOCYTES # BLD: 1.2 K/UL (ref 0.8–3.5)
LYMPHOCYTES NFR BLD: 31 % (ref 12–49)
MCH RBC QN AUTO: 34.1 PG (ref 26–34)
MCHC RBC AUTO-ENTMCNC: 33.6 G/DL (ref 30–36.5)
MCV RBC AUTO: 101.2 FL (ref 80–99)
MONOCYTES # BLD: 0.2 K/UL (ref 0–1)
MONOCYTES NFR BLD: 6 % (ref 5–13)
NEUTS SEG # BLD: 2.4 K/UL (ref 1.8–8)
NEUTS SEG NFR BLD: 60 % (ref 32–75)
NRBC # BLD: 0 K/UL (ref 0–0.01)
NRBC BLD-RTO: 0 PER 100 WBC
PLATELET # BLD AUTO: 188 K/UL (ref 150–400)
PMV BLD AUTO: 11.2 FL (ref 8.9–12.9)
RBC # BLD AUTO: 3.23 M/UL (ref 3.8–5.2)
WBC # BLD AUTO: 3.8 K/UL (ref 3.6–11)

## 2021-09-28 PROCEDURE — G8399 PT W/DXA RESULTS DOCUMENT: HCPCS | Performed by: INTERNAL MEDICINE

## 2021-09-28 PROCEDURE — G9899 SCRN MAM PERF RSLTS DOC: HCPCS | Performed by: INTERNAL MEDICINE

## 2021-09-28 PROCEDURE — 1101F PT FALLS ASSESS-DOCD LE1/YR: CPT | Performed by: INTERNAL MEDICINE

## 2021-09-28 PROCEDURE — 96375 TX/PRO/DX INJ NEW DRUG ADDON: CPT

## 2021-09-28 PROCEDURE — 74011250636 HC RX REV CODE- 250/636: Performed by: INTERNAL MEDICINE

## 2021-09-28 PROCEDURE — 74011250636 HC RX REV CODE- 250/636: Performed by: NURSE PRACTITIONER

## 2021-09-28 PROCEDURE — 1090F PRES/ABSN URINE INCON ASSESS: CPT | Performed by: INTERNAL MEDICINE

## 2021-09-28 PROCEDURE — 36415 COLL VENOUS BLD VENIPUNCTURE: CPT

## 2021-09-28 PROCEDURE — 85025 COMPLETE CBC W/AUTO DIFF WBC: CPT

## 2021-09-28 PROCEDURE — 99214 OFFICE O/P EST MOD 30 MIN: CPT | Performed by: INTERNAL MEDICINE

## 2021-09-28 PROCEDURE — G8427 DOCREV CUR MEDS BY ELIG CLIN: HCPCS | Performed by: INTERNAL MEDICINE

## 2021-09-28 PROCEDURE — 96413 CHEMO IV INFUSION 1 HR: CPT

## 2021-09-28 PROCEDURE — 74011000250 HC RX REV CODE- 250: Performed by: INTERNAL MEDICINE

## 2021-09-28 PROCEDURE — G8536 NO DOC ELDER MAL SCRN: HCPCS | Performed by: INTERNAL MEDICINE

## 2021-09-28 PROCEDURE — G8510 SCR DEP NEG, NO PLAN REQD: HCPCS | Performed by: INTERNAL MEDICINE

## 2021-09-28 PROCEDURE — 3017F COLORECTAL CA SCREEN DOC REV: CPT | Performed by: INTERNAL MEDICINE

## 2021-09-28 PROCEDURE — 77030012965 HC NDL HUBR BBMI -A

## 2021-09-28 PROCEDURE — G8419 CALC BMI OUT NRM PARAM NOF/U: HCPCS | Performed by: INTERNAL MEDICINE

## 2021-09-28 RX ORDER — DEXAMETHASONE SODIUM PHOSPHATE 10 MG/ML
10 INJECTION INTRAMUSCULAR; INTRAVENOUS ONCE
Status: DISCONTINUED | OUTPATIENT
Start: 2021-09-28 | End: 2021-09-28

## 2021-09-28 RX ORDER — DEXAMETHASONE SODIUM PHOSPHATE 4 MG/ML
4 INJECTION, SOLUTION INTRA-ARTICULAR; INTRALESIONAL; INTRAMUSCULAR; INTRAVENOUS; SOFT TISSUE ONCE
Status: COMPLETED | OUTPATIENT
Start: 2021-09-28 | End: 2021-09-28

## 2021-09-28 RX ORDER — SODIUM CHLORIDE 0.9 % (FLUSH) 0.9 %
10 SYRINGE (ML) INJECTION AS NEEDED
Status: DISPENSED | OUTPATIENT
Start: 2021-09-28 | End: 2021-09-28

## 2021-09-28 RX ORDER — HEPARIN 100 UNIT/ML
300-500 SYRINGE INTRAVENOUS AS NEEDED
Status: ACTIVE | OUTPATIENT
Start: 2021-09-28 | End: 2021-09-28

## 2021-09-28 RX ORDER — SODIUM CHLORIDE 9 MG/ML
25 INJECTION, SOLUTION INTRAVENOUS CONTINUOUS
Status: DISPENSED | OUTPATIENT
Start: 2021-09-28 | End: 2021-09-28

## 2021-09-28 RX ORDER — DIPHENHYDRAMINE HYDROCHLORIDE 50 MG/ML
50 INJECTION, SOLUTION INTRAMUSCULAR; INTRAVENOUS ONCE
Status: COMPLETED | OUTPATIENT
Start: 2021-09-28 | End: 2021-09-28

## 2021-09-28 RX ORDER — ZOLPIDEM TARTRATE 5 MG/1
5 TABLET ORAL
Qty: 15 TABLET | Refills: 0 | Status: SHIPPED | OUTPATIENT
Start: 2021-09-28 | End: 2021-10-22

## 2021-09-28 RX ADMIN — DEXAMETHASONE SODIUM PHOSPHATE 4 MG: 4 INJECTION, SOLUTION INTRA-ARTICULAR; INTRALESIONAL; INTRAMUSCULAR; INTRAVENOUS; SOFT TISSUE at 14:15

## 2021-09-28 RX ADMIN — PACLITAXEL 139 MG: 6 INJECTION, SOLUTION INTRAVENOUS at 15:21

## 2021-09-28 RX ADMIN — SODIUM CHLORIDE 25 ML/HR: 900 INJECTION, SOLUTION INTRAVENOUS at 14:10

## 2021-09-28 RX ADMIN — Medication 10 ML: at 17:35

## 2021-09-28 RX ADMIN — HEPARIN 500 UNITS: 100 SYRINGE at 17:35

## 2021-09-28 RX ADMIN — FAMOTIDINE 20 MG: 10 INJECTION INTRAVENOUS at 14:15

## 2021-09-28 RX ADMIN — DIPHENHYDRAMINE HYDROCHLORIDE 50 MG: 50 INJECTION INTRAMUSCULAR; INTRAVENOUS at 14:15

## 2021-09-28 NOTE — PROGRESS NOTES
Boom Reyes is a 78 y.o. female  Chief Complaint   Patient presents with    Chemotherapy    Breast Cancer     1. Have you been to the ER, urgent care clinic since your last visit? Hospitalized since your last visit? No.     2. Have you seen or consulted any other health care providers outside of the [address] [place] [address] since your last visit? Include any pap smears or colon screening. No.    Patient states she has been feel very emotional and then it stops after a while.

## 2021-09-28 NOTE — PROGRESS NOTES
\[facility] [place] [facility]\"" Chemo Progress Note    1050 Ms. Kowalski Arrived to [facility] for Taxol (C2D8) ambulatory in stable condition. Assessment was completed, no acute issues at this time, no new complaints voiced. Port accessed with positive blood return. Labs drawn and sent for processing. Port flushed, curos cap applied to end clave. Patient left for medical oncology appointment. 1240 Patient returned to [facility]. Labs WDL for treatment. Chemotherapy Flowsheet 9/28/2021   Cycle C2D8   Date 9/28/2021   Drug / Regimen Taxol   Pre Meds given   Notes given     Ms. Kowalski's vitals were reviewed. Patient Vitals for the past 12 hrs:   Temp Pulse Resp BP   09/28/21 1735  83  (!) 176/95   09/28/21 1723    (!) 191/108   09/28/21 1058 (!) 96.7 °F (35.9 °C) 79 18 (!) 152/86   09/28/21 1050 (!) 96.7 °F (35.9 °C)  18      Lab results were obtained and reviewed. Recent Results (from the past 12 hour(s))   CBC WITH AUTOMATED DIFF    Collection Time: 09/28/21 11:08 AM   Result Value Ref Range    WBC 3.8 3.6 - 11.0 K/uL    RBC 3.23 (L) 3.80 - 5.20 M/uL    HGB 11.0 (L) 11.5 - 16.0 g/dL    HCT 32.7 (L) 35.0 - 47.0 %    .2 (H) 80.0 - 99.0 FL    MCH 34.1 (H) 26.0 - 34.0 PG    MCHC 33.6 30.0 - 36.5 g/dL    RDW 12.5 11.5 - 14.5 %    PLATELET 339 020 - 285 K/uL    MPV 11.2 8.9 - 12.9 FL    NRBC 0.0 0  WBC    ABSOLUTE NRBC 0.00 0.00 - 0.01 K/uL    NEUTROPHILS 60 32 - 75 %    LYMPHOCYTES 31 12 - 49 %    MONOCYTES 6 5 - 13 %    EOSINOPHILS 2 0 - 7 %    BASOPHILS 1 0 - 1 %    IMMATURE GRANULOCYTES 0 0.0 - 0.5 %    ABS. NEUTROPHILS 2.4 1.8 - 8.0 K/UL    ABS. LYMPHOCYTES 1.2 0.8 - 3.5 K/UL    ABS. MONOCYTES 0.2 0.0 - 1.0 K/UL    ABS. EOSINOPHILS 0.1 0.0 - 0.4 K/UL    ABS. BASOPHILS 0.0 0.0 - 0.1 K/UL    ABS. IMM. GRANS. 0.0 0.00 - 0.04 K/UL    DF AUTOMATED       Pre-medications  were administered as ordered and chemotherapy was initiated.   Medications Administered     0.9% sodium chloride infusion     Admin Date  09/28/2021 Action  New Bag Dose  25 mL/hr Rate  25 mL/hr Route  IntraVENous Administered By  Chico Evans RN          dexamethasone (DECADRON) 4 mg/mL injection 4 mg     Admin Date  09/28/2021 Action  Given Dose  4 mg Route  IntraVENous Administered By  Chico Evans RN          diphenhydrAMINE (BENADRYL) injection 50 mg     Admin Date  09/28/2021 Action  Given Dose  50 mg Route  IntraVENous Administered By  Chico Evans RN          famotidine (PF) (PEPCID) 20 mg in 0.9% sodium chloride 10 mL injection     Admin Date  09/28/2021 Action  Given Dose  20 mg Route  IntraVENous Administered By  Chico Evans RN          heparin (porcine) pf 300-500 Units     Admin Date  09/28/2021 Action  Given Dose  500 Units Route  InterCATHeter Administered By  Chico Evans RN          PACLitaxeL (TAXOL) 139 mg in 0.9% sodium chloride 250 mL, overfill volume 25 mL chemo infusion     Admin Date  09/28/2021 Action  New Bag Dose  139 mg Rate  298.2 mL/hr Route  IntraVENous Administered By  Cullen Purdy RN          sodium chloride (NS) flush 10 mL     Admin Date  09/28/2021 Action  Given Dose  10 mL Route  IntraVENous Administered By  Chico Evans RN              3562 Patient tolerated treatment well. Port maintained positive blood return throughout treatment. Port flushed, heparinized and de accessed per protocol. Patient was discharged from [facility] in stable condition. Patient aware of next appointment. Patient is aware of next appointment.      Future Appointments   Date Time Provider Wendie Escobar   10/5/2021  1:00 PM B3 [facility] [address] H   10/12/2021  1:00 PM F4 [facility] [address] H   10/12/2021  1:15 PM Ash Magana, ALTHEA [address] BS AMB   10/19/2021 11:00 AM C1 [facility] [address] H   10/26/2021 11:00 AM F4 [facility] [address]   11/2/2021 11:00 AM B3 [facility] [address] H   11/9/2021 11:00 AM B3 NAT MED [facility] H   11/16/2021 11:00 AM B3 NAT MED [address] 2/21/2022 10:45 AM Shikha Keyes MD [facility] BS AMB   5/4/2022 10:30 AM Jenifer Quintanilla [facility]         Brenda Sy RN  September 28, 2021  \

## 2021-09-28 NOTE — PROGRESS NOTES
[facility] [place] [address], [place]: [phone]  F: [phone]    Reason for Visit:   William Solorio is a 78 y.o. female seen today in office for follow up of Right Breast Cancer on adjuvant weekly Taxol + Herceptin. Treatment History:   · Abnormal screening mammo 6/3/21: Bilateral digital screening mammography was performed and is interpreted in conjunction with a computer assisted detection (CAD) system. In the left breast, no suspicious masses or calcifications are identified. The right breast upper outer quadrant at 10:00 posterior depth 9 cm from the nipple there is a focal asymmetry  · Right Dx Mammo/US 6/7/21: Further evaluation was performed for a right breast focal asymmetry. In the right breast upper outer quadrant at 10:00 posterior depth there is an equal density irregular mass with indistinct margins measuring up to 1.7 cm. In the right breast at 10:00 10 cm from the nipple there is a 1.6 x 0.9 x 1.0 cm hypoechoic oval mass with indistinct margins, internal vascularity, and posterior acoustic enhancement  · Right Breast Biopsy 6/18/21: PATH - Invasive ductal carcinoma, [place] histologic grade 3. Largest glass slide measurement of invasive carcinoma: 6 mm   · ER/ME negative, HER2 FISH +  · Ki67 90%  · Right Lumpectomy 8/3/2: PATH - 20 mm IDC with negative LNs. Margins negative. · Adjuvant weekly Taxol + Herceptin 8/31/21 - Current     STAGE: Pathologic T1cN0, ER/ME negative, HER2+     History of Present Illness:   William Solorio is a 78 y.o. female seen today in office for follow up of right breast cancer post lumpectomy 8/3/21. She started adjuvant weekly Taxol + Herceptin on 8/31/21. She is here today for Day 8 Cycle 2 (Week 5) of adjuvant weekly Taxol + Herceptin. She reports that she feels well overall today. She is tolerating current regimen well overall thus far.  She continues to have emotional lability and trouble sleeping. She states that Ambien is helping with sleep. She continues to have occasional mild headaches. She takes Tylenol/Aleve which helped a little bit. She had some very mild nausea but no vomiting. Anti-emetics are working per patient. She did not have any diarrhea after last treatment. Her appetite is good and energy levels are good. She denies fever, chills, mouth sores, cough, SOB, CP, constipation, and neuropathy. She denies pain today. CBC is stable/good today She is ready for treatment today. Her supportive  is here today.      Past Medical History:   Diagnosis Date    Arthritis     left ankle    Breast cancer ([address]     RIGHT    Chronic kidney disease     kidney stone    Chronic pain of left ankle 2017    Seeing Dr Jodie Warren    Fracture     left ankle    Ill-defined condition     PICC line for UTI - borderline sepsis    Osteopenia 2017      Past Surgical History:   Procedure Laterality Date    COLONOSCOPY N/A 2019    tubular adenoma, repeat 1 yr d/t poor prep - performed by Pankaj Shane MD at [facility] ENDOSCOPY;     COLONOSCOPY N/A 2020    normal - performed by Pankaj Shane MD at [facility] ENDOSCOPY    HX [address] Left     surgery - has a plate a screws    HX BREAST LUMPECTOMY Right 8/3/2021    RIGHT BREAST LUMPECTOMY WITH ULTRASOUND AND RIGHT BREAST SENTINEL NODE BIOPSY AND PORTACATH INSERTION performed by Jh Napier MD at [facility] AMBULATORY OR    HX [address]      HX GI      COLONOSCOPY    HX OTHER SURGICAL Right 2020    growth removed from right eyelid    HX OTHER SURGICAL Right 2020    PICC line placed for IV abx, removed after completing course for pyelonephritis    HX POLYPECTOMY      HX TONSILLECTOMY      AR BIOPSY OF BREAST, NEEDLE CORE Right       Social History     Tobacco Use    Smoking status: Former Smoker     Quit date:      Years since quittin.7    Smokeless tobacco: Never Used    Tobacco comment: was a causal smoker when smoker in college   Substance Use Topics    Alcohol use: Yes     Alcohol/week: 5.0 standard drinks     Types: 5 Glasses of wine per week      Family History   Problem Relation Age of Onset    Cancer Mother         Brain tumor    Other Father         ? lung problem    Cancer Sister         tumors of spine    Other Sister         kidney stones    Other Daughter         kidney stone    Other Maternal Aunt         kidney stones    No Known Problems Sister     No Known Problems Sister     Anesth Problems Neg Hx      Current Outpatient Medications   Medication Sig    zolpidem (AMBIEN) 5 mg tablet Take 1 Tablet by mouth nightly as needed for Sleep. Max Daily Amount: 5 mg.  lidocaine-prilocaine (EMLA) topical cream Apply  to affected area as needed for Pain.  CRANIAL PROSTHESIS misc Breast cancer for chemo/ alopecia needs wig    ondansetron (ZOFRAN ODT) 4 mg disintegrating tablet Take 1 Tablet by mouth every eight (8) hours as needed for Nausea or Vomiting.  prochlorperazine (Compazine) 5 mg tablet Take 1-2 Tablets by mouth every six (6) hours as needed for Nausea or Vomiting.  ondansetron (ZOFRAN ODT) 4 mg disintegrating tablet Take 1 Tablet by mouth every eight (8) hours as needed for Nausea or Vomiting.  cortisone acetate (CORTISONE PO) Take  by mouth. Injections every 3 months in her foot    DULoxetine (CYMBALTA) 20 mg capsule TAKE ONE CAPSULE BY MOUTH EVERY EVENING    alendronate (FOSAMAX) 70 mg tablet TAKE 1 TABLET BY MOUTH ONCE WEEKLY ON AN EMPTY STOMACH BEFORE BREAKFAST. REMAIN UPRIGHT FOR 30 MINUTES & TAKE WITH 8 OUNCES OF WATER    acetaminophen (TYLENOL) 500 mg tablet 1 or 2 every 6 hrs prn for headache     No current facility-administered medications for this visit.      Facility-Administered Medications Ordered in Other Visits   Medication Dose Route Frequency    0.9% sodium chloride infusion  25 mL/hr IntraVENous CONTINUOUS    sodium chloride (NS) flush 10 mL  10 mL IntraVENous PRN    heparin (porcine) pf 300-500 Units  300-500 Units InterCATHeter PRN    dexamethasone (PF) (DECADRON) 10 mg/mL injection 10 mg  10 mg IntraVENous ONCE    diphenhydrAMINE (BENADRYL) injection 50 mg  50 mg IntraVENous ONCE    famotidine (PF) (PEPCID) 20 mg in 0.9% sodium chloride 10 mL injection  20 mg IntraVENous ONCE    PACLitaxeL (TAXOL) 139 mg in 0.9% sodium chloride 250 mL, overfill volume 25 mL chemo infusion  80 mg/m2 (Treatment Plan Recorded) IntraVENous ONCE      Allergies   Allergen Reactions    Adhesive Tape-Silicones Rash    Other Medication Hives     Cranberry or Probiotic, had severe itching and hives, not sure to which. Has taken a probiotic recently without a reaction.  Oxycodone Itching      Review of Systems:  A complete review of systems was obtained, negative except as described above and as reported on ROS sheet scanned into system. Physical Exam:     Visit Vitals  BP (!) 152/86 (BP 1 Location: Left upper arm, BP Patient Position: Sitting)   Pulse 86   Temp (!) 96.7 °F (35.9 °C) (Temporal)   Ht 5' 4\" (1.626 m)   Wt 151 lb 8 oz (68.7 kg)   SpO2 98%   BMI 26.00 kg/m²     ECOG PS: 0  General: No distress  Eyes: Anicteric sclerae  HENT: Atraumatic, wearing a mask  Neck: Supple  Respiratory:  normal respiratory effort  MS: Normal gait and station. Digits without clubbing or cyanosis. Skin: No rashes, ecchymoses, or petechiae. Normal temperature, turgor, and texture. Port site without redness/swelling  Psych: Alert, oriented, appropriate affect, normal judgment/insight  Neuro: Non focal    Results:     Lab Results   Component Value Date/Time    WBC 3.8 09/28/2021 11:08 AM    HGB 11.0 (L) 09/28/2021 11:08 AM    HCT 32.7 (L) 09/28/2021 11:08 AM    PLATELET 296 11/47/5472 11:08 AM    .2 (H) 09/28/2021 11:08 AM    ABS.  NEUTROPHILS 2.4 09/28/2021 11:08 AM     Lab Results   Component Value Date/Time    Sodium 139 09/21/2021 01:05 PM    Potassium 3.9 09/21/2021 01:05 PM    Chloride 110 (H) 09/21/2021 01:05 PM    CO2 23 09/21/2021 01:05 PM    Glucose 85 09/21/2021 01:05 PM    BUN 23 (H) 09/21/2021 01:05 PM    Creatinine 0.71 09/21/2021 01:05 PM    GFR est AA >60 09/21/2021 01:05 PM    GFR est non-AA >60 09/21/2021 01:05 PM    Calcium 8.9 09/21/2021 01:05 PM     Lab Results   Component Value Date/Time    Bilirubin, total 0.3 09/21/2021 01:05 PM    ALT (SGPT) 25 09/21/2021 01:05 PM    Alk. phosphatase 58 09/21/2021 01:05 PM    Protein, total 6.4 09/21/2021 01:05 PM    Albumin 3.4 (L) 09/21/2021 01:05 PM    Globulin 3.0 09/21/2021 01:05 PM     6/18/21  FINAL PATHOLOGIC DIAGNOSIS   Breast, right, needle core biopsy:   Invasive ductal carcinoma, Ish histologic grade 3   Largest glass slide measurement of invasive carcinoma: 6 mm   ER/ME negative, HER2 FISH +    8/3/21  FINAL PATHOLOGIC DIAGNOSIS   1. Lymph node, right axillary sentinel lymph node #1, excision:   One lymph node negative for metastatic carcinoma. 2. Lymph node, right axillary sentinel lymph node #2, excision:   One lymph node negative for metastatic carcinoma. 3. Right breast, lumpectomy, excision:   Procedure: Excision (less than total mastectomy)   Specimen Laterality: Right   TUMOR   Histologic Type: Invasive carcinoma of no special type (ductal)   Glandular (Acinar) / Tubular Differentiation: Score 3   Nuclear Pleomorphism: Score 3   Mitotic Rate: Score 3   Overall Grade: Grade 3   Tumor Size: 20 mm   Tumor Focality: Single focus of invasive carcinoma   Ductal Carcinoma in Situ (DCIS): Not identified   Tumor Extent   Lymphovascular Invasion: Not identified   Treatment Effect in the Breast: No known presurgical therapy   MARGINS   Invasive Carcinoma Margins: Uninvolved by invasive carcinoma   Distance from Closest Margin (Millimeters): Less than: 1 mm   Closest Margin(s):  Anterior   LYMPH NODES   Regional Lymph Nodes: Uninvolved by tumor cells   Total Number of Lymph Nodes Examined: 2   Number of [place] Nodes Examined: 2   PATHOLOGIC STAGE CLASSIFICATION (pTNM, AJCC 8th Edition)   Primary Tumor (pT): pT1c N0(sn)   Breast biomarkers previously reported (P659616): ER negative, MS negative, HER-2/mili positive by FISH. Tumor blocks for potential additional studies: 3-B-D.   4. Right breast, lateral margin right breast, excision:   Benign fibroadipose tissue with minimal benign fibroglandular breast tissue. 5. Right breast, medial margin right breast, excision:   Benign fibroglandular breast tissue with epithelial cyst and usual ductal hyperplasia. 6. Right breast, inferior margin right breast, excision:   Benign fibroglandular breast tissue. 08/23/21    ECHO ADULT COMPLETE 08/23/2021 8/23/2021    Interpretation Summary  · LV: Estimated LVEF is 60 - 65%. Normal cavity size, wall thickness and systolic function (ejection fraction normal). Normal left ventricular strain. Global longitudinal strain is -15.9%. Mild (grade 1) left ventricular diastolic dysfunction. Signed by: Kirti Arauz MD on 8/23/2021  1:01 PM    Records reviewed and summarized above. Pathology report(s) reviewed above. Radiology report(s) reviewed above. Assessment:/PLAN     1) Stage 1 Breast Cancer ER- Her2+ post Lumpectomy 8/3/21  Records and history reviewed. Reviewed path and data. Discussed dx, stage and treatment of breast cancer. Post lumpectomy in August. Reviewed path in detail. Discussed adjuvant chemo and hormonal therapy options. Discussed no hormonal therapy options due to ER/MS negative. Discussed adjuvant Herceptin based chemo today ddAC-THP vs TCHP vs TH. Detailed discussion about benefit of Herceptin based therapy. Paient and family open to chemo and prefer least chemo. Decided on Taxol/Herceptin. Teaching and consent with Pharmacy. Port placed on 8/3/21. Pre chemo ECHO 8/23/21 stable with EF 60-65%.   Referred to Rad/Onc - appt 9/20/21. Patient started weekly Taxol/Herceptin on 8/31/21. Patient is here today for Day 8 Cycle 2 (Week 5) of weekly Taxol/Herceptin. She is tolerating treatment well overall with some emotional lability and insomnia. Discussed side effect management today - meditation, listening calming music, walk around the block, vs anti-anxiety med. Patient is going to try these first but will let us know if she wants script for Ativan. She is taking Ambien as needed for sleep - refilled today. She is doing Herceptin SQ for duration of treatment. She is clinically stable and doing well overall. Labs (CBC) reviewed today. Patient is ready for treatment today. ECHO due again by 11/23/21. Follow up weekly in [facility]. Follow up in 2 weeks in [facility]/office. Patient agrees with plan. 2) Arthritis/Hx of Kidney Stone/Osteopenia   Management per PCP. 3) Management of High Risk Medications - Chemotherapy  Toxicities include Grade 1 Nausea and Grade 1 Insomnia/Emotional Lability   Reviewed side effect management today - recommended meditation, listening calming music, walk around the block, vs anti-anxiety med. Patient is going to try these first but will let us know if she wants script for Ativan. Continue Ambien as needed for sleep - refilled today. Labs (CBC) reviewed. Pre chemo ECHO reviewed. No dose adjustments needed today. Will monitor for side effects. ECHO due again by 11/23/21. 4) Psychosocial  Mood good, coping well. Her family is very supportive. SW/NN support as needed. Her  is here today. Call if questions. Follow up in 1 week in [facility] and 2 weeks in [facility]/office. This patient was seen in conjunction with Najma Kenney NP. I personally performed a face to face diagnostic evaluation on this patient.    I personally reviewed the history and performed the key points on the exam.   I personally reviewed all points in the assessment and created treatment plan with the patient. Specifically, pt seen by me today  Feels fine overall. Tolerating chemo with manageable side effects. Has emotional lability that lasts a day. Abiel Nina works for sleep. Ready for chemo and will continue with same dosing today. Will lower dex dose today. ? Steroid sensitivity. Labs personally reviewed today  Proceed with chemo as ordered. I appreciate the opportunity to participate in Ms. Zayra Kowalski's care.     Signed By: Devaughn Murguia, DO

## 2021-10-01 ENCOUNTER — TELEPHONE (OUTPATIENT)
Dept: ONCOLOGY | Age: 71
End: 2021-10-01

## 2021-10-01 NOTE — TELEPHONE ENCOUNTER
Pt called in regards to upcoming infusion appointment. Pt stated at her last visit she was on campus for hours waiting for her infusion due to the pharmacy renovation. She was wondering, if possible, could she have her blood work done in the AM then return later in the day for her infusion? Pt states she called the infusion office and has not received a CB. Please advise and let pt know.      CB #: [phone]

## 2021-10-05 ENCOUNTER — HOSPITAL ENCOUNTER (OUTPATIENT)
Dept: INFUSION THERAPY | Age: 71
Discharge: HOME | End: 2021-10-05
Payer: MEDICARE

## 2021-10-05 VITALS
BODY MASS INDEX: 26.12 KG/M2 | WEIGHT: 153 LBS | TEMPERATURE: 97.6 F | HEIGHT: 64 IN | HEART RATE: 79 BPM | RESPIRATION RATE: 16 BRPM | DIASTOLIC BLOOD PRESSURE: 93 MMHG | SYSTOLIC BLOOD PRESSURE: 168 MMHG

## 2021-10-05 DIAGNOSIS — C50.911 INFILTRATING DUCTAL CARCINOMA OF RIGHT BREAST (HCC): Primary | ICD-10-CM

## 2021-10-05 LAB
BASOPHILS # BLD: 0 K/UL (ref 0–0.1)
BASOPHILS NFR BLD: 1 % (ref 0–1)
DIFFERENTIAL METHOD BLD: ABNORMAL
EOSINOPHIL # BLD: 0.1 K/UL (ref 0–0.4)
EOSINOPHIL NFR BLD: 2 % (ref 0–7)
ERYTHROCYTE [DISTWIDTH] IN BLOOD BY AUTOMATED COUNT: 12.9 % (ref 11.5–14.5)
HCT VFR BLD AUTO: 31.6 % (ref 35–47)
HGB BLD-MCNC: 10.4 G/DL (ref 11.5–16)
IMM GRANULOCYTES # BLD AUTO: 0 K/UL (ref 0–0.04)
IMM GRANULOCYTES NFR BLD AUTO: 0 % (ref 0–0.5)
LYMPHOCYTES # BLD: 1.1 K/UL (ref 0.8–3.5)
LYMPHOCYTES NFR BLD: 43 % (ref 12–49)
MCH RBC QN AUTO: 33.8 PG (ref 26–34)
MCHC RBC AUTO-ENTMCNC: 32.9 G/DL (ref 30–36.5)
MCV RBC AUTO: 102.6 FL (ref 80–99)
MONOCYTES # BLD: 0.1 K/UL (ref 0–1)
MONOCYTES NFR BLD: 5 % (ref 5–13)
NEUTS SEG # BLD: 1.2 K/UL (ref 1.8–8)
NEUTS SEG NFR BLD: 49 % (ref 32–75)
NRBC # BLD: 0 K/UL (ref 0–0.01)
NRBC BLD-RTO: 0 PER 100 WBC
PLATELET # BLD AUTO: 187 K/UL (ref 150–400)
PMV BLD AUTO: 10.9 FL (ref 8.9–12.9)
RBC # BLD AUTO: 3.08 M/UL (ref 3.8–5.2)
WBC # BLD AUTO: 2.6 K/UL (ref 3.6–11)

## 2021-10-05 PROCEDURE — 96375 TX/PRO/DX INJ NEW DRUG ADDON: CPT

## 2021-10-05 PROCEDURE — 74011250636 HC RX REV CODE- 250/636: Performed by: INTERNAL MEDICINE

## 2021-10-05 PROCEDURE — 36415 COLL VENOUS BLD VENIPUNCTURE: CPT

## 2021-10-05 PROCEDURE — 85025 COMPLETE CBC W/AUTO DIFF WBC: CPT

## 2021-10-05 PROCEDURE — 77030012965 HC NDL HUBR BBMI -A

## 2021-10-05 PROCEDURE — 96413 CHEMO IV INFUSION 1 HR: CPT

## 2021-10-05 PROCEDURE — 74011000250 HC RX REV CODE- 250: Performed by: INTERNAL MEDICINE

## 2021-10-05 RX ORDER — ONDANSETRON 2 MG/ML
8 INJECTION INTRAMUSCULAR; INTRAVENOUS AS NEEDED
Status: CANCELLED | OUTPATIENT
Start: 2021-10-26

## 2021-10-05 RX ORDER — EPINEPHRINE 1 MG/ML
0.3 INJECTION, SOLUTION, CONCENTRATE INTRAVENOUS AS NEEDED
Status: CANCELLED | OUTPATIENT
Start: 2021-10-19

## 2021-10-05 RX ORDER — HYDROCORTISONE SODIUM SUCCINATE 100 MG/2ML
100 INJECTION, POWDER, FOR SOLUTION INTRAMUSCULAR; INTRAVENOUS AS NEEDED
Status: CANCELLED | OUTPATIENT
Start: 2021-10-19

## 2021-10-05 RX ORDER — HEPARIN 100 UNIT/ML
300-500 SYRINGE INTRAVENOUS AS NEEDED
Status: ACTIVE | OUTPATIENT
Start: 2021-10-05 | End: 2021-10-05

## 2021-10-05 RX ORDER — DIPHENHYDRAMINE HYDROCHLORIDE 50 MG/ML
25 INJECTION, SOLUTION INTRAMUSCULAR; INTRAVENOUS AS NEEDED
Status: CANCELLED
Start: 2021-10-26

## 2021-10-05 RX ORDER — EPINEPHRINE 1 MG/ML
0.3 INJECTION, SOLUTION, CONCENTRATE INTRAVENOUS AS NEEDED
Status: CANCELLED | OUTPATIENT
Start: 2021-10-26

## 2021-10-05 RX ORDER — DEXAMETHASONE SODIUM PHOSPHATE 4 MG/ML
4 INJECTION, SOLUTION INTRA-ARTICULAR; INTRALESIONAL; INTRAMUSCULAR; INTRAVENOUS; SOFT TISSUE ONCE
Status: CANCELLED | OUTPATIENT
Start: 2021-10-26 | End: 2021-10-26

## 2021-10-05 RX ORDER — HYDROCORTISONE SODIUM SUCCINATE 100 MG/2ML
100 INJECTION, POWDER, FOR SOLUTION INTRAMUSCULAR; INTRAVENOUS AS NEEDED
Status: CANCELLED | OUTPATIENT
Start: 2021-10-12

## 2021-10-05 RX ORDER — DIPHENHYDRAMINE HYDROCHLORIDE 50 MG/ML
25 INJECTION, SOLUTION INTRAMUSCULAR; INTRAVENOUS AS NEEDED
Status: CANCELLED
Start: 2021-10-19

## 2021-10-05 RX ORDER — ONDANSETRON 2 MG/ML
8 INJECTION INTRAMUSCULAR; INTRAVENOUS AS NEEDED
Status: CANCELLED | OUTPATIENT
Start: 2021-10-12

## 2021-10-05 RX ORDER — SODIUM CHLORIDE 9 MG/ML
25 INJECTION, SOLUTION INTRAVENOUS CONTINUOUS
Status: CANCELLED | OUTPATIENT
Start: 2021-10-26

## 2021-10-05 RX ORDER — DIPHENHYDRAMINE HYDROCHLORIDE 50 MG/ML
50 INJECTION, SOLUTION INTRAMUSCULAR; INTRAVENOUS AS NEEDED
Status: CANCELLED
Start: 2021-10-12

## 2021-10-05 RX ORDER — HEPARIN 100 UNIT/ML
300-500 SYRINGE INTRAVENOUS AS NEEDED
Status: CANCELLED
Start: 2021-10-19

## 2021-10-05 RX ORDER — SODIUM CHLORIDE 9 MG/ML
10 INJECTION INTRAMUSCULAR; INTRAVENOUS; SUBCUTANEOUS AS NEEDED
Status: CANCELLED | OUTPATIENT
Start: 2021-10-26

## 2021-10-05 RX ORDER — SODIUM CHLORIDE 0.9 % (FLUSH) 0.9 %
10 SYRINGE (ML) INJECTION AS NEEDED
Status: DISPENSED | OUTPATIENT
Start: 2021-10-05 | End: 2021-10-05

## 2021-10-05 RX ORDER — DIPHENHYDRAMINE HYDROCHLORIDE 50 MG/ML
50 INJECTION, SOLUTION INTRAMUSCULAR; INTRAVENOUS ONCE
Status: CANCELLED
Start: 2021-10-26 | End: 2021-10-26

## 2021-10-05 RX ORDER — SODIUM CHLORIDE 0.9 % (FLUSH) 0.9 %
10 SYRINGE (ML) INJECTION AS NEEDED
Status: CANCELLED | OUTPATIENT
Start: 2021-10-26

## 2021-10-05 RX ORDER — SODIUM CHLORIDE 0.9 % (FLUSH) 0.9 %
10 SYRINGE (ML) INJECTION AS NEEDED
Status: CANCELLED | OUTPATIENT
Start: 2021-10-19

## 2021-10-05 RX ORDER — SODIUM CHLORIDE 9 MG/ML
10 INJECTION INTRAMUSCULAR; INTRAVENOUS; SUBCUTANEOUS AS NEEDED
Status: ACTIVE | OUTPATIENT
Start: 2021-10-05 | End: 2021-10-05

## 2021-10-05 RX ORDER — ACETAMINOPHEN 325 MG/1
650 TABLET ORAL AS NEEDED
Status: CANCELLED
Start: 2021-10-26

## 2021-10-05 RX ORDER — DIPHENHYDRAMINE HYDROCHLORIDE 50 MG/ML
50 INJECTION, SOLUTION INTRAMUSCULAR; INTRAVENOUS AS NEEDED
Status: CANCELLED
Start: 2021-10-19

## 2021-10-05 RX ORDER — SODIUM CHLORIDE 9 MG/ML
10 INJECTION INTRAMUSCULAR; INTRAVENOUS; SUBCUTANEOUS AS NEEDED
Status: CANCELLED | OUTPATIENT
Start: 2021-10-12

## 2021-10-05 RX ORDER — DEXAMETHASONE SODIUM PHOSPHATE 4 MG/ML
4 INJECTION, SOLUTION INTRA-ARTICULAR; INTRALESIONAL; INTRAMUSCULAR; INTRAVENOUS; SOFT TISSUE ONCE
Status: CANCELLED | OUTPATIENT
Start: 2021-10-05 | End: 2021-10-05

## 2021-10-05 RX ORDER — DIPHENHYDRAMINE HYDROCHLORIDE 50 MG/ML
50 INJECTION, SOLUTION INTRAMUSCULAR; INTRAVENOUS ONCE
Status: CANCELLED
Start: 2021-10-19 | End: 2021-10-19

## 2021-10-05 RX ORDER — DEXAMETHASONE SODIUM PHOSPHATE 4 MG/ML
4 INJECTION, SOLUTION INTRA-ARTICULAR; INTRALESIONAL; INTRAMUSCULAR; INTRAVENOUS; SOFT TISSUE ONCE
Status: CANCELLED | OUTPATIENT
Start: 2021-10-19 | End: 2021-10-19

## 2021-10-05 RX ORDER — SODIUM CHLORIDE 9 MG/ML
25 INJECTION, SOLUTION INTRAVENOUS CONTINUOUS
Status: DISPENSED | OUTPATIENT
Start: 2021-10-05 | End: 2021-10-05

## 2021-10-05 RX ORDER — ONDANSETRON 2 MG/ML
8 INJECTION INTRAMUSCULAR; INTRAVENOUS AS NEEDED
Status: CANCELLED | OUTPATIENT
Start: 2021-10-19

## 2021-10-05 RX ORDER — HYDROCORTISONE SODIUM SUCCINATE 100 MG/2ML
100 INJECTION, POWDER, FOR SOLUTION INTRAMUSCULAR; INTRAVENOUS AS NEEDED
Status: CANCELLED | OUTPATIENT
Start: 2021-10-26

## 2021-10-05 RX ORDER — ALBUTEROL SULFATE 0.83 MG/ML
2.5 SOLUTION RESPIRATORY (INHALATION) AS NEEDED
Status: CANCELLED
Start: 2021-10-26

## 2021-10-05 RX ORDER — ALBUTEROL SULFATE 0.83 MG/ML
2.5 SOLUTION RESPIRATORY (INHALATION) AS NEEDED
Status: CANCELLED
Start: 2021-10-19

## 2021-10-05 RX ORDER — DIPHENHYDRAMINE HYDROCHLORIDE 50 MG/ML
25 INJECTION, SOLUTION INTRAMUSCULAR; INTRAVENOUS AS NEEDED
Status: CANCELLED
Start: 2021-10-12

## 2021-10-05 RX ORDER — ACETAMINOPHEN 325 MG/1
650 TABLET ORAL AS NEEDED
Status: CANCELLED
Start: 2021-10-19

## 2021-10-05 RX ORDER — DIPHENHYDRAMINE HYDROCHLORIDE 50 MG/ML
50 INJECTION, SOLUTION INTRAMUSCULAR; INTRAVENOUS ONCE
Status: COMPLETED | OUTPATIENT
Start: 2021-10-05 | End: 2021-10-05

## 2021-10-05 RX ORDER — SODIUM CHLORIDE 9 MG/ML
25 INJECTION, SOLUTION INTRAVENOUS CONTINUOUS
Status: CANCELLED | OUTPATIENT
Start: 2021-10-19

## 2021-10-05 RX ORDER — EPINEPHRINE 1 MG/ML
0.3 INJECTION, SOLUTION, CONCENTRATE INTRAVENOUS AS NEEDED
Status: CANCELLED | OUTPATIENT
Start: 2021-10-12

## 2021-10-05 RX ORDER — HEPARIN 100 UNIT/ML
300-500 SYRINGE INTRAVENOUS AS NEEDED
Status: CANCELLED
Start: 2021-10-26

## 2021-10-05 RX ORDER — DEXAMETHASONE SODIUM PHOSPHATE 4 MG/ML
4 INJECTION, SOLUTION INTRA-ARTICULAR; INTRALESIONAL; INTRAMUSCULAR; INTRAVENOUS; SOFT TISSUE ONCE
Status: COMPLETED | OUTPATIENT
Start: 2021-10-05 | End: 2021-10-05

## 2021-10-05 RX ORDER — ACETAMINOPHEN 325 MG/1
650 TABLET ORAL AS NEEDED
Status: CANCELLED
Start: 2021-10-12

## 2021-10-05 RX ORDER — DIPHENHYDRAMINE HYDROCHLORIDE 50 MG/ML
50 INJECTION, SOLUTION INTRAMUSCULAR; INTRAVENOUS AS NEEDED
Status: CANCELLED
Start: 2021-10-26

## 2021-10-05 RX ORDER — ALBUTEROL SULFATE 0.83 MG/ML
2.5 SOLUTION RESPIRATORY (INHALATION) AS NEEDED
Status: CANCELLED
Start: 2021-10-12

## 2021-10-05 RX ORDER — SODIUM CHLORIDE 9 MG/ML
10 INJECTION INTRAMUSCULAR; INTRAVENOUS; SUBCUTANEOUS AS NEEDED
Status: CANCELLED | OUTPATIENT
Start: 2021-10-19

## 2021-10-05 RX ADMIN — DIPHENHYDRAMINE HYDROCHLORIDE 50 MG: 50 INJECTION INTRAMUSCULAR; INTRAVENOUS at 15:00

## 2021-10-05 RX ADMIN — SODIUM CHLORIDE 10 ML: 9 INJECTION INTRAMUSCULAR; INTRAVENOUS; SUBCUTANEOUS at 14:10

## 2021-10-05 RX ADMIN — PACLITAXEL 139 MG: 6 INJECTION, SOLUTION INTRAVENOUS at 15:30

## 2021-10-05 RX ADMIN — FAMOTIDINE 20 MG: 10 INJECTION INTRAVENOUS at 14:54

## 2021-10-05 RX ADMIN — SODIUM CHLORIDE 25 ML/HR: 900 INJECTION, SOLUTION INTRAVENOUS at 14:53

## 2021-10-05 RX ADMIN — HEPARIN 500 UNITS: 100 SYRINGE at 17:45

## 2021-10-05 RX ADMIN — DEXAMETHASONE SODIUM PHOSPHATE 4 MG: 4 INJECTION, SOLUTION INTRA-ARTICULAR; INTRALESIONAL; INTRAMUSCULAR; INTRAVENOUS; SOFT TISSUE at 14:57

## 2021-10-05 RX ADMIN — Medication 10 ML: at 14:10

## 2021-10-05 NOTE — PROGRESS NOTES
[facility]patient [facility]fusion [facility] - Chemotherapy Progress Note    8815 Pt admit to [facility] for Taxol/C2D15 ambulatory in stable condition. Assessment completed. No new concerns voiced. Labs drawn peripherally and sent for processing. Pt will return to [facility] once labs result and meds are ordered. Patient denies SOB, fever, cough, general not feeling well. Patient denies recent exposure to someone who has tested positive for COVID-19.  Patient  denies having contact with anyone who has a pending COVID test.     1300 Return to [facility] for 1300 appointment; awaiting meds to arrive from [address] [place] [address]    Visit Vitals  BP (!) 146/87   Pulse 79   Temp 97.6 °F (36.4 °C)   Resp 18   Ht 5' 4\" (1.626 m)   Wt 69.4 kg (153 lb)   Breastfeeding No   BMI 26.26 kg/m²       Medications Administered     0.9% sodium chloride infusion     Admin Date  10/05/2021 Action  New Bag Dose  25 mL/hr Rate  25 mL/hr Route  IntraVENous Administered By  Gorge Umanzor RN          0.9% sodium chloride injection 10 mL     Admin Date  10/05/2021 Action  Given Dose  10 mL Route  IntraVENous Administered By  Gorge Umanzor RN          dexamethasone (DECADRON) 4 mg/mL injection 4 mg     Admin Date  10/05/2021 Action  Given Dose  4 mg Route  IntraVENous Administered By  Gorge Umanzor RN          diphenhydrAMINE (BENADRYL) injection 50 mg     Admin Date  10/05/2021 Action  Given Dose  50 mg Route  IntraVENous Administered By  Gorge Umanzor RN          famotidine (PF) (PEPCID) 20 mg in 0.9% sodium chloride 10 mL injection     Admin Date  10/05/2021 Action  Given Dose  20 mg Route  IntraVENous Administered By  Gorge Umanzor RN          heparin (porcine) pf 300-500 Units     Admin Date  10/05/2021 Action  Given Dose  500 Units Route  InterCATHeter Administered By  Gorge Umanzor RN          PACLitaxeL (TAXOL) 139 mg in 0.9% sodium chloride 250 mL, overfill volume 25 mL chemo infusion     Admin Date  10/05/2021 Action  New Bag Dose  139 mg Rate  298.2 mL/hr Route  IntraVENous Administered By  Jenni Guardado RN          sodium chloride (NS) flush 10 mL     Admin Date  10/05/2021 Action  Given Dose  10 mL Route  IntraVENous Administered By  Jenni Guardado, ANGEL                  4642 Pt tolerated treatment well. Port maintained positive blood return throughout treatment, flushed with positive blood return at conclusion, heparinized and de-accessed. Pt remained in [facility] x1 hour for post-infusion cooling using cold cap; remaining stay uneventful. D/c home ambulatory in no distress. Pt aware of next [facility] appointment scheduled for 10/12/2021. Recent Results (from the past 12 hour(s))   CBC WITH AUTOMATED DIFF    Collection Time: 10/05/21 11:06 AM   Result Value Ref Range    WBC 2.6 (L) 3.6 - 11.0 K/uL    RBC 3.08 (L) 3.80 - 5.20 M/uL    HGB 10.4 (L) 11.5 - 16.0 g/dL    HCT 31.6 (L) 35.0 - 47.0 %    .6 (H) 80.0 - 99.0 FL    MCH 33.8 26.0 - 34.0 PG    MCHC 32.9 30.0 - 36.5 g/dL    RDW 12.9 11.5 - 14.5 %    PLATELET 482 429 - 463 K/uL    MPV 10.9 8.9 - 12.9 FL    NRBC 0.0 0  WBC    ABSOLUTE NRBC 0.00 0.00 - 0.01 K/uL    NEUTROPHILS 49 32 - 75 %    LYMPHOCYTES 43 12 - 49 %    MONOCYTES 5 5 - 13 %    EOSINOPHILS 2 0 - 7 %    BASOPHILS 1 0 - 1 %    IMMATURE GRANULOCYTES 0 0.0 - 0.5 %    ABS. NEUTROPHILS 1.2 (L) 1.8 - 8.0 K/UL    ABS. LYMPHOCYTES 1.1 0.8 - 3.5 K/UL    ABS. MONOCYTES 0.1 0.0 - 1.0 K/UL    ABS. EOSINOPHILS 0.1 0.0 - 0.4 K/UL    ABS. BASOPHILS 0.0 0.0 - 0.1 K/UL    ABS. IMM.  GRANS. 0.0 0.00 - 0.04 K/UL    DF AUTOMATED

## 2021-10-11 ENCOUNTER — HOSPITAL ENCOUNTER (OUTPATIENT)
Dept: INFUSION THERAPY | Age: 71
Discharge: HOME | End: 2021-10-11
Payer: MEDICARE

## 2021-10-11 ENCOUNTER — OFFICE VISIT (OUTPATIENT)
Dept: ONCOLOGY | Age: 71
End: 2021-10-11
Payer: MEDICARE

## 2021-10-11 VITALS
WEIGHT: 150.2 LBS | DIASTOLIC BLOOD PRESSURE: 84 MMHG | TEMPERATURE: 96.6 F | HEART RATE: 96 BPM | SYSTOLIC BLOOD PRESSURE: 137 MMHG | HEIGHT: 64 IN | OXYGEN SATURATION: 98 % | BODY MASS INDEX: 25.64 KG/M2

## 2021-10-11 VITALS
HEART RATE: 79 BPM | OXYGEN SATURATION: 99 % | RESPIRATION RATE: 18 BRPM | SYSTOLIC BLOOD PRESSURE: 140 MMHG | TEMPERATURE: 96.9 F | DIASTOLIC BLOOD PRESSURE: 90 MMHG

## 2021-10-11 DIAGNOSIS — K52.1 CHEMOTHERAPY INDUCED DIARRHEA: ICD-10-CM

## 2021-10-11 DIAGNOSIS — Z51.81 ENCOUNTER FOR MONITORING CARDIOTOXIC DRUG THERAPY: ICD-10-CM

## 2021-10-11 DIAGNOSIS — F41.8 ANXIETY ABOUT HEALTH: ICD-10-CM

## 2021-10-11 DIAGNOSIS — M19.90 ARTHRITIS: ICD-10-CM

## 2021-10-11 DIAGNOSIS — R11.0 CHEMOTHERAPY-INDUCED NAUSEA: ICD-10-CM

## 2021-10-11 DIAGNOSIS — R45.86 EMOTIONAL LABILITY: ICD-10-CM

## 2021-10-11 DIAGNOSIS — C50.911 INFILTRATING DUCTAL CARCINOMA OF RIGHT BREAST (HCC): Primary | ICD-10-CM

## 2021-10-11 DIAGNOSIS — G47.00 INSOMNIA, UNSPECIFIED TYPE: ICD-10-CM

## 2021-10-11 DIAGNOSIS — Z98.890 HISTORY OF LUMPECTOMY OF RIGHT BREAST: ICD-10-CM

## 2021-10-11 DIAGNOSIS — Z09 CHEMOTHERAPY FOLLOW-UP EXAMINATION: ICD-10-CM

## 2021-10-11 DIAGNOSIS — T45.1X5A CHEMOTHERAPY INDUCED DIARRHEA: ICD-10-CM

## 2021-10-11 DIAGNOSIS — T45.1X5A CHEMOTHERAPY-INDUCED NAUSEA: ICD-10-CM

## 2021-10-11 DIAGNOSIS — Z79.899 ENCOUNTER FOR MONITORING CARDIOTOXIC DRUG THERAPY: ICD-10-CM

## 2021-10-11 DIAGNOSIS — M85.89 OSTEOPENIA OF MULTIPLE SITES: ICD-10-CM

## 2021-10-11 DIAGNOSIS — Z87.442 HISTORY OF KIDNEY STONES: ICD-10-CM

## 2021-10-11 LAB
ALBUMIN SERPL-MCNC: 3.5 G/DL (ref 3.5–5)
ALBUMIN/GLOB SERPL: 1.3 {RATIO} (ref 1.1–2.2)
ALP SERPL-CCNC: 67 U/L (ref 45–117)
ALT SERPL-CCNC: 28 U/L (ref 12–78)
ANION GAP SERPL CALC-SCNC: 5 MMOL/L (ref 5–15)
AST SERPL-CCNC: 13 U/L (ref 15–37)
BASOPHILS # BLD: 0 K/UL (ref 0–0.1)
BASOPHILS NFR BLD: 1 % (ref 0–1)
BILIRUB SERPL-MCNC: 0.6 MG/DL (ref 0.2–1)
BUN SERPL-MCNC: 25 MG/DL (ref 6–20)
BUN/CREAT SERPL: 29 (ref 12–20)
CALCIUM SERPL-MCNC: 9.7 MG/DL (ref 8.5–10.1)
CHLORIDE SERPL-SCNC: 108 MMOL/L (ref 97–108)
CO2 SERPL-SCNC: 26 MMOL/L (ref 21–32)
CREAT SERPL-MCNC: 0.86 MG/DL (ref 0.55–1.02)
DIFFERENTIAL METHOD BLD: ABNORMAL
EOSINOPHIL # BLD: 0 K/UL (ref 0–0.4)
EOSINOPHIL NFR BLD: 2 % (ref 0–7)
ERYTHROCYTE [DISTWIDTH] IN BLOOD BY AUTOMATED COUNT: 13.3 % (ref 11.5–14.5)
GLOBULIN SER CALC-MCNC: 2.7 G/DL (ref 2–4)
GLUCOSE SERPL-MCNC: 90 MG/DL (ref 65–100)
HCT VFR BLD AUTO: 32.3 % (ref 35–47)
HGB BLD-MCNC: 10.9 G/DL (ref 11.5–16)
IMM GRANULOCYTES # BLD AUTO: 0 K/UL (ref 0–0.04)
IMM GRANULOCYTES NFR BLD AUTO: 1 % (ref 0–0.5)
LYMPHOCYTES # BLD: 0.9 K/UL (ref 0.8–3.5)
LYMPHOCYTES NFR BLD: 46 % (ref 12–49)
MCH RBC QN AUTO: 34.6 PG (ref 26–34)
MCHC RBC AUTO-ENTMCNC: 33.7 G/DL (ref 30–36.5)
MCV RBC AUTO: 102.5 FL (ref 80–99)
MONOCYTES # BLD: 0.1 K/UL (ref 0–1)
MONOCYTES NFR BLD: 6 % (ref 5–13)
NEUTS SEG # BLD: 1 K/UL (ref 1.8–8)
NEUTS SEG NFR BLD: 44 % (ref 32–75)
NRBC # BLD: 0 K/UL (ref 0–0.01)
NRBC BLD-RTO: 0 PER 100 WBC
PLATELET # BLD AUTO: 209 K/UL (ref 150–400)
PMV BLD AUTO: 10.8 FL (ref 8.9–12.9)
POTASSIUM SERPL-SCNC: 4.7 MMOL/L (ref 3.5–5.1)
PROT SERPL-MCNC: 6.2 G/DL (ref 6.4–8.2)
RBC # BLD AUTO: 3.15 M/UL (ref 3.8–5.2)
RBC MORPH BLD: ABNORMAL
SODIUM SERPL-SCNC: 139 MMOL/L (ref 136–145)
WBC # BLD AUTO: 2 K/UL (ref 3.6–11)

## 2021-10-11 PROCEDURE — 36415 COLL VENOUS BLD VENIPUNCTURE: CPT

## 2021-10-11 PROCEDURE — 3017F COLORECTAL CA SCREEN DOC REV: CPT | Performed by: INTERNAL MEDICINE

## 2021-10-11 PROCEDURE — 99215 OFFICE O/P EST HI 40 MIN: CPT | Performed by: INTERNAL MEDICINE

## 2021-10-11 PROCEDURE — G8399 PT W/DXA RESULTS DOCUMENT: HCPCS | Performed by: INTERNAL MEDICINE

## 2021-10-11 PROCEDURE — 1090F PRES/ABSN URINE INCON ASSESS: CPT | Performed by: INTERNAL MEDICINE

## 2021-10-11 PROCEDURE — 80053 COMPREHEN METABOLIC PANEL: CPT

## 2021-10-11 PROCEDURE — 1101F PT FALLS ASSESS-DOCD LE1/YR: CPT | Performed by: INTERNAL MEDICINE

## 2021-10-11 PROCEDURE — G9899 SCRN MAM PERF RSLTS DOC: HCPCS | Performed by: INTERNAL MEDICINE

## 2021-10-11 PROCEDURE — G8427 DOCREV CUR MEDS BY ELIG CLIN: HCPCS | Performed by: INTERNAL MEDICINE

## 2021-10-11 PROCEDURE — G8510 SCR DEP NEG, NO PLAN REQD: HCPCS | Performed by: INTERNAL MEDICINE

## 2021-10-11 PROCEDURE — G8536 NO DOC ELDER MAL SCRN: HCPCS | Performed by: INTERNAL MEDICINE

## 2021-10-11 PROCEDURE — G8419 CALC BMI OUT NRM PARAM NOF/U: HCPCS | Performed by: INTERNAL MEDICINE

## 2021-10-11 PROCEDURE — 85025 COMPLETE CBC W/AUTO DIFF WBC: CPT

## 2021-10-11 RX ORDER — LORAZEPAM 0.5 MG/1
0.5 TABLET ORAL
Qty: 90 TABLET | Refills: 0 | Status: SHIPPED | OUTPATIENT
Start: 2021-10-11 | End: 2022-05-04

## 2021-10-11 NOTE — PROGRESS NOTES
Neida Schirmer is a 78 y.o. female. Chief Complaint   Patient presents with    Chemotherapy    Breast Cancer     1. Have you been to the ER, urgent care clinic since your last visit? Hospitalized since your last visit? No.  2. Have you seen or consulted any other health care providers outside of the [address] [place] [address] since your last visit? Include any pap smears or colon screening.  No.

## 2021-10-11 NOTE — PROGRESS NOTES
[facility] Lab Visit:        6517:  Pt arrived ambulatory to [facility] in stable condition, for lab draw. Labs drawn peripherally from Right arm and sent for processing. Pt tolerated well. Visit Vitals  BP (!) 140/90 (BP 1 Location: Left upper arm, BP Patient Position: Sitting)   Pulse 79   Temp 96.9 °F (36.1 °C)   Resp 18   SpO2 99%       1045: No new concerns voiced. D/cd home ambulatory in no distress. Pt aware of next [facility] appointment scheduled. Labs available in CC once resulted.

## 2021-10-11 NOTE — PROGRESS NOTES
[facility] [place] at [address], [address] [place] [address] Road, [facility]: [phone]  F: [phone]    Reason for Visit:   Mady Rey is a 78 y.o. female seen today in office for follow up of Right Breast Cancer on adjuvant weekly Taxol + Herceptin. Treatment History:   · Abnormal screening mammo 6/3/21: Bilateral digital screening mammography was performed and is interpreted in conjunction with a computer assisted detection (CAD) system. In the left breast, no suspicious masses or calcifications are identified. The right breast upper outer quadrant at 10:00 posterior depth 9 cm from the nipple there is a focal asymmetry  · Right Dx Mammo/US 6/7/21: Further evaluation was performed for a right breast focal asymmetry. In the right breast upper outer quadrant at 10:00 posterior depth there is an equal density irregular mass with indistinct margins measuring up to 1.7 cm. In the right breast at 10:00 10 cm from the nipple there is a 1.6 x 0.9 x 1.0 cm hypoechoic oval mass with indistinct margins, internal vascularity, and posterior acoustic enhancement  · Right Breast Biopsy 6/18/21: PATH - Invasive ductal carcinoma, [place] histologic grade 3. Largest glass slide measurement of invasive carcinoma: 6 mm   · ER/WA negative, HER2 FISH +  · Ki67 90%  · Right Lumpectomy 8/3/2: PATH - 20 mm IDC with negative LNs. Margins negative. · Adjuvant weekly Taxol + Herceptin 8/31/21 - Current     STAGE: Pathologic T1cN0, ER/WA negative, HER2+     History of Present Illness:   Mady Rey is a 78 y.o. female seen today in office for follow up of right breast cancer post lumpectomy 8/3/21. She started adjuvant weekly Taxol + Herceptin on 8/31/21. She is here today for Day 1 Cycle 3 (Week 7) of adjuvant weekly Taxol + Herceptin. She will have treatment tomorrow, labs today. She reports that she feels well overall today. She is tolerating current regimen well overall thus far.  She continues to have emotional lability and trouble sleeping. We decreased dose of steroids and it did not help much. She states that Ambien is helping with sleep. She continues to have occasional mild headaches. She takes Tylenol/Aleve which helped a little bit. She continues to have some very mild nausea but no vomiting. Anti-emetics help. She did not have any diarrhea after last treatment but she has some bleeding when wiping. Her appetite is \"too good\" and energy levels are good. She denies fever, chills, mouth sores, cough, SOB, CP, constipation, and neuropathy. She denies pain today. CBC and CMP are still pending today She is ready for treatment tomorrow. Her supportive  is here today.      Past Medical History:   Diagnosis Date    Arthritis     left ankle    Breast cancer ([address] 2021    RIGHT    Chronic kidney disease     kidney stone    Chronic pain of left ankle 2/1/2017    Seeing Dr Lamona Angelucci    Fracture     left ankle    Ill-defined condition     PICC line for UTI - borderline sepsis    Osteopenia 2/1/2017      Past Surgical History:   Procedure Laterality Date    COLONOSCOPY N/A 2/8/2019    tubular adenoma, repeat 1 yr d/t poor prep - performed by Kristen Diaz MD at [facility] ENDOSCOPY;     COLONOSCOPY N/A 5/14/2020    normal - performed by Kristen Diaz MD at [facility] ENDOSCOPY    HX [address] [facility] [address] Left 2005    surgery - has a plate a screws    HX BREAST LUMPECTOMY Right 8/3/2021    RIGHT BREAST LUMPECTOMY WITH ULTRASOUND AND RIGHT BREAST SENTINEL NODE BIOPSY AND PORTACATH INSERTION performed by Geoff Hill MD at [facility] AMBULATORY OR    HX [address]  2015    HX GI      COLONOSCOPY    HX OTHER SURGICAL Right 01/2020    growth removed from right eyelid    HX OTHER SURGICAL Right 02/18/2020    PICC line placed for IV abx, removed after completing course for pyelonephritis    HX POLYPECTOMY      HX TONSILLECTOMY  1956    RI BIOPSY OF BREAST, NEEDLE CORE Right       Social History     Tobacco Use    Smoking status: Former Smoker     Quit date:      Years since quittin.8    Smokeless tobacco: Never Used    Tobacco comment: was a causal smoker when smoker in college   Substance Use Topics    Alcohol use: Yes     Alcohol/week: 5.0 standard drinks     Types: 5 Glasses of wine per week      Family History   Problem Relation Age of Onset    Cancer Mother         Brain tumor    Other Father         ? lung problem    Cancer Sister         tumors of spine    Other Sister         kidney stones    Other Daughter         kidney stone    Other Maternal Aunt         kidney stones    No Known Problems Sister     No Known Problems Sister     Anesth Problems Neg Hx      Current Outpatient Medications   Medication Sig    LORazepam (ATIVAN) 0.5 mg tablet Take 1 Tablet by mouth three (3) times daily as needed for Anxiety. Max Daily Amount: 1.5 mg.    zolpidem (AMBIEN) 5 mg tablet Take 1 Tablet by mouth nightly as needed for Sleep. Max Daily Amount: 5 mg.  lidocaine-prilocaine (EMLA) topical cream Apply  to affected area as needed for Pain.  CRANIAL PROSTHESIS misc Breast cancer for chemo/ alopecia needs wig    ondansetron (ZOFRAN ODT) 4 mg disintegrating tablet Take 1 Tablet by mouth every eight (8) hours as needed for Nausea or Vomiting.  prochlorperazine (Compazine) 5 mg tablet Take 1-2 Tablets by mouth every six (6) hours as needed for Nausea or Vomiting.  ondansetron (ZOFRAN ODT) 4 mg disintegrating tablet Take 1 Tablet by mouth every eight (8) hours as needed for Nausea or Vomiting.  cortisone acetate (CORTISONE PO) Take  by mouth. Injections every 3 months in her foot    DULoxetine (CYMBALTA) 20 mg capsule TAKE ONE CAPSULE BY MOUTH EVERY EVENING    alendronate (FOSAMAX) 70 mg tablet TAKE 1 TABLET BY MOUTH ONCE WEEKLY ON AN EMPTY STOMACH BEFORE BREAKFAST.  REMAIN UPRIGHT FOR 30 MINUTES & TAKE WITH 8 OUNCES OF WATER    acetaminophen (TYLENOL) 500 mg tablet 1 or 2 every 6 hrs prn for headache     No current facility-administered medications for this visit. Allergies   Allergen Reactions    Adhesive Tape-Silicones Rash    Other Medication Hives     Cranberry or Probiotic, had severe itching and hives, not sure to which. Has taken a probiotic recently without a reaction.  Oxycodone Itching      Review of Systems:  A complete review of systems was obtained, negative except as described above and as reported on ROS sheet scanned into system. Physical Exam:     Visit Vitals  /84 (BP 1 Location: Left upper arm, BP Patient Position: Sitting)   Pulse 96   Temp (!) 96.6 °F (35.9 °C) (Temporal)   Ht 5' 4\" (1.626 m)   Wt 150 lb 3.2 oz (68.1 kg)   SpO2 98%   BMI 25.78 kg/m²     ECOG PS: 0  General: No distress  Eyes: Anicteric sclerae  HENT: Atraumatic, wearing a mask  Neck: Supple  Respiratory:  normal respiratory effort  MS: Normal gait and station. Digits without clubbing or cyanosis. Skin: No rashes, ecchymoses, or petechiae. Normal temperature, turgor, and texture. Port site without redness/swelling  Psych: Alert, oriented, appropriate affect, normal judgment/insight  Neuro: Non focal    Results:     Lab Results   Component Value Date/Time    WBC 2.0 (L) 10/11/2021 10:47 AM    HGB 10.9 (L) 10/11/2021 10:47 AM    HCT 32.3 (L) 10/11/2021 10:47 AM    PLATELET 991 17/62/9700 10:47 AM    .5 (H) 10/11/2021 10:47 AM    ABS.  NEUTROPHILS 1.0 (L) 10/11/2021 10:47 AM     Lab Results   Component Value Date/Time    Sodium 139 10/11/2021 10:47 AM    Potassium 4.7 10/11/2021 10:47 AM    Chloride 108 10/11/2021 10:47 AM    CO2 26 10/11/2021 10:47 AM    Glucose 90 10/11/2021 10:47 AM    BUN 25 (H) 10/11/2021 10:47 AM    Creatinine 0.86 10/11/2021 10:47 AM    GFR est AA >60 10/11/2021 10:47 AM    GFR est non-AA >60 10/11/2021 10:47 AM    Calcium 9.7 10/11/2021 10:47 AM     Lab Results   Component Value Date/Time    Bilirubin, total 0.6 10/11/2021 10:47 AM    ALT (SGPT) 28 10/11/2021 10:47 AM    Alk. phosphatase 67 10/11/2021 10:47 AM    Protein, total 6.2 (L) 10/11/2021 10:47 AM    Albumin 3.5 10/11/2021 10:47 AM    Globulin 2.7 10/11/2021 10:47 AM     6/18/21  FINAL PATHOLOGIC DIAGNOSIS   Breast, right, needle core biopsy:   Invasive ductal carcinoma, [place] histologic grade 3   Largest glass slide measurement of invasive carcinoma: 6 mm   ER/MD negative, HER2 FISH +    8/3/21  FINAL PATHOLOGIC DIAGNOSIS   1. Lymph node, right axillary sentinel lymph node #1, excision:   One lymph node negative for metastatic carcinoma. 2. Lymph node, right axillary sentinel lymph node #2, excision:   One lymph node negative for metastatic carcinoma. 3. Right breast, lumpectomy, excision:   Procedure: Excision (less than total mastectomy)   Specimen Laterality: Right   TUMOR   Histologic Type: Invasive carcinoma of no special type (ductal)   Glandular (Acinar) / Tubular Differentiation: Score 3   Nuclear Pleomorphism: Score 3   Mitotic Rate: Score 3   Overall Grade: Grade 3   Tumor Size: 20 mm   Tumor Focality: Single focus of invasive carcinoma   Ductal Carcinoma in Situ (DCIS): Not identified   Tumor Extent   Lymphovascular Invasion: Not identified   Treatment Effect in the Breast: No known presurgical therapy   MARGINS   Invasive Carcinoma Margins: Uninvolved by invasive carcinoma   Distance from Closest Margin (Millimeters): Less than: 1 mm   Closest Margin(s): Anterior   LYMPH NODES   Regional Lymph Nodes: Uninvolved by tumor cells   Total Number of Lymph Nodes Examined: 2   Number of [place] Nodes Examined: 2   PATHOLOGIC STAGE CLASSIFICATION (pTNM, AJCC 8th Edition)   Primary Tumor (pT): pT1c N0(sn)   Breast biomarkers previously reported ([id number]): ER negative, MD negative, HER-2/mili positive by FISH.    Tumor blocks for potential additional studies: 3-B-D.   4. Right breast, lateral margin right breast, excision:   Benign fibroadipose tissue with minimal benign fibroglandular breast tissue. 5. Right breast, medial margin right breast, excision:   Benign fibroglandular breast tissue with epithelial cyst and usual ductal hyperplasia. 6. Right breast, inferior margin right breast, excision:   Benign fibroglandular breast tissue. 08/23/21    ECHO ADULT COMPLETE 08/23/2021 8/23/2021    Interpretation Summary  · LV: Estimated LVEF is 60 - 65%. Normal cavity size, wall thickness and systolic function (ejection fraction normal). Normal left ventricular strain. Global longitudinal strain is -15.9%. Mild (grade 1) left ventricular diastolic dysfunction. Signed by: Milli Conteh MD on 8/23/2021  1:01 PM    Records reviewed and summarized above. Pathology report(s) reviewed above. Radiology report(s) reviewed above. Assessment:/PLAN     1) Stage 1 Breast Cancer ER- Her2+ post Lumpectomy 8/3/21  Records and history reviewed. Reviewed path and data. Discussed dx, stage and treatment of breast cancer. Post lumpectomy in August. Reviewed path in detail. Discussed adjuvant chemo and hormonal therapy options. Discussed no hormonal therapy options due to ER/MD negative. Discussed adjuvant Herceptin based chemo today ddAC-THP vs TCHP vs TH. Detailed discussion about benefit of Herceptin based therapy. Paient and family open to chemo and prefer least chemo. Decided on Taxol/Herceptin. Teaching and consent with Pharmacy. Port placed on 8/3/21. Pre chemo ECHO 8/23/21 stable with EF 60-65%. Referred to Rad/Onc - appt 9/20/21. Patient started weekly Taxol/Herceptin on 8/31/21. Patient will have Day 1 Cycle 3 (Week 7) of weekly Taxol/Herceptin tomorrow. She is tolerating treatment well overall with some emotional lability and insomnia. Discussed side effect management today - meditation, listening calming music, walk around the block, vs anti-anxiety med.   Patient continues to be emotional on chemo, script for Ativan 0.5 mg TID PRN to try. She is taking Ambien as needed for sleep which does help. She is doing Herceptin SQ for duration of treatment. She is clinically stable and doing well overall. Labs (CBC and ) reviewed today. Patient is ready for treatment tomorrow. ECHO due again by 11/23/21. Follow up weekly in [facility]. Follow up in 2 weeks in [facility]/office. Patient agrees with plan. 2) Arthritis/Hx of Kidney Stone/Osteopenia   Management per PCP. 3) Management of High Risk Medications - Chemotherapy  Toxicities include Grade 1 Nausea and Grade 2 Insomnia/Emotional Lability   Reviewed side effect management today - recommended meditation, listening calming music, walk around the block, vs anti-anxiety med. Patient continues to be emotional on chemo, script for Ativan 0.5 mg TID PRN to try. Continue Ambien as needed for sleep. Labs (CBC and CMP) reviewed today. No dose adjustments needed. Will monitor for side effects. ECHO due again by 11/23/21. 4) Psychosocial  Mood good, coping well. Her family is very supportive. SW/NN support as needed. Her  is here today. Call if questions. Follow up in 1 week in [facility] and 2 weeks in [facility]/office. This patient was seen in conjunction with Key Mandujano NP. I personally performed a face to face diagnostic evaluation on this patient. I personally reviewed the history and performed the key points on the exam.   I personally reviewed all points in the assessment and created treatment plan with the patient. Specifically, pt seen by me today  Feels fine overall. Tolerating chemo with manageable side effects. Still having anxiety/ insomnia. Ab Elliott helps. Adding ativan prn today. Discussed addictive nature of these drugs/ with intention to get off once treatment is done. Ready for chemo and will continue with same dosing    Labs personally reviewed today  Proceed with chemo as ordered.      I appreciate the opportunity to participate in Ms. Bindu Debora Kowalski's care.     Signed By: Manda Torres, DO

## 2021-10-12 ENCOUNTER — HOSPITAL ENCOUNTER (OUTPATIENT)
Dept: INFUSION THERAPY | Age: 71
Discharge: HOME | End: 2021-10-12
Payer: MEDICARE

## 2021-10-12 VITALS
HEART RATE: 73 BPM | RESPIRATION RATE: 16 BRPM | TEMPERATURE: 97.1 F | SYSTOLIC BLOOD PRESSURE: 172 MMHG | WEIGHT: 150.2 LBS | HEIGHT: 64 IN | BODY MASS INDEX: 25.64 KG/M2 | DIASTOLIC BLOOD PRESSURE: 96 MMHG

## 2021-10-12 DIAGNOSIS — C50.911 INFILTRATING DUCTAL CARCINOMA OF RIGHT BREAST (HCC): Primary | ICD-10-CM

## 2021-10-12 PROCEDURE — 96401 CHEMO ANTI-NEOPL SQ/IM: CPT

## 2021-10-12 PROCEDURE — 96375 TX/PRO/DX INJ NEW DRUG ADDON: CPT

## 2021-10-12 PROCEDURE — 96413 CHEMO IV INFUSION 1 HR: CPT

## 2021-10-12 PROCEDURE — 74011250636 HC RX REV CODE- 250/636: Performed by: INTERNAL MEDICINE

## 2021-10-12 PROCEDURE — 77030012965 HC NDL HUBR BBMI -A

## 2021-10-12 RX ORDER — SODIUM CHLORIDE 0.9 % (FLUSH) 0.9 %
10 SYRINGE (ML) INJECTION AS NEEDED
Status: DISPENSED | OUTPATIENT
Start: 2021-10-12 | End: 2021-10-12

## 2021-10-12 RX ORDER — HEPARIN 100 UNIT/ML
300-500 SYRINGE INTRAVENOUS AS NEEDED
Status: ACTIVE | OUTPATIENT
Start: 2021-10-12 | End: 2021-10-12

## 2021-10-12 RX ORDER — SODIUM CHLORIDE 9 MG/ML
25 INJECTION, SOLUTION INTRAVENOUS CONTINUOUS
Status: DISPENSED | OUTPATIENT
Start: 2021-10-12 | End: 2021-10-12

## 2021-10-12 RX ORDER — DEXAMETHASONE SODIUM PHOSPHATE 4 MG/ML
4 INJECTION, SOLUTION INTRA-ARTICULAR; INTRALESIONAL; INTRAMUSCULAR; INTRAVENOUS; SOFT TISSUE ONCE
Status: COMPLETED | OUTPATIENT
Start: 2021-10-12 | End: 2021-10-12

## 2021-10-12 RX ORDER — DIPHENHYDRAMINE HYDROCHLORIDE 50 MG/ML
50 INJECTION, SOLUTION INTRAMUSCULAR; INTRAVENOUS ONCE
Status: COMPLETED | OUTPATIENT
Start: 2021-10-12 | End: 2021-10-12

## 2021-10-12 RX ADMIN — Medication 10 ML: at 14:30

## 2021-10-12 RX ADMIN — Medication 10 ML: at 10:00

## 2021-10-12 RX ADMIN — FAMOTIDINE 20 MG: 10 INJECTION INTRAVENOUS at 12:32

## 2021-10-12 RX ADMIN — SODIUM CHLORIDE 25 ML/HR: 900 INJECTION, SOLUTION INTRAVENOUS at 12:27

## 2021-10-12 RX ADMIN — TRASTUZUMAB AND HYALURONIDASE-OYSK 600 MG: 600; 10000 INJECTION, SOLUTION SUBCUTANEOUS at 12:16

## 2021-10-12 RX ADMIN — PACLITAXEL 139 MG: 6 INJECTION, SOLUTION INTRAVENOUS at 13:00

## 2021-10-12 RX ADMIN — DEXAMETHASONE SODIUM PHOSPHATE 4 MG: 4 INJECTION, SOLUTION INTRA-ARTICULAR; INTRALESIONAL; INTRAMUSCULAR; INTRAVENOUS; SOFT TISSUE at 12:31

## 2021-10-12 RX ADMIN — Medication 10 ML: at 10:01

## 2021-10-12 RX ADMIN — HEPARIN 500 UNITS: 100 SYRINGE at 14:30

## 2021-10-12 RX ADMIN — DIPHENHYDRAMINE HYDROCHLORIDE 50 MG: 50 INJECTION INTRAMUSCULAR; INTRAVENOUS at 12:29

## 2021-10-12 NOTE — PROGRESS NOTES
[facility] [place] Chemo Progress Note    1000 Ms. Qiana Alfredo Arrived to [facility] for Taxol/Hylecta (C3D1) ambulatory in stable condition. Assessment was completed, no acute issues at this time, no new complaints voiced. Port accessed with positive blood return. Labs drawn 10/11/21 and WDL for treatment. Medication ordered from pharmacy. Chemotherapy Flowsheet 10/12/2021   Cycle C3D1   Date 10/12/2021   Drug / Regimen Taxol/Hylecta   Pre Meds given   Notes given     Ms. Kowalski's vitals were reviewed. Patient Vitals for the past 12 hrs:   Temp Pulse Resp BP   10/12/21 1435  73  (!) 172/96   10/12/21 0958 97.1 °F (36.2 °C) 91 16 132/83     Pre-medications  were administered as ordered and chemotherapy was initiated.   Medications Administered     0.9% sodium chloride infusion     Admin Date  10/12/2021 Action  New Bag Dose  25 mL/hr Rate  25 mL/hr Route  IntraVENous Administered By  Jaime Badillo RN          dexamethasone (DECADRON) 4 mg/mL injection 4 mg     Admin Date  10/12/2021 Action  Given Dose  4 mg Route  IntraVENous Administered By  Jaime Badillo RN          diphenhydrAMINE (BENADRYL) injection 50 mg     Admin Date  10/12/2021 Action  Given Dose  50 mg Route  IntraVENous Administered By  Jaime Badillo RN          famotidine (PF) (PEPCID) 20 mg in 0.9% sodium chloride 10 mL injection     Admin Date  10/12/2021 Action  Given Dose  20 mg Route  IntraVENous Administered By  Jaime Badillo RN          heparin (porcine) pf 300-500 Units     Admin Date  10/12/2021 Action  Given Dose  500 Units Route  InterCATHeter Administered By  Jaime Badillo RN          PACLitaxeL (TAXOL) 139 mg in 0.9% sodium chloride 250 mL, overfill volume 25 mL chemo infusion     Admin Date  10/12/2021 Action  New Bag Dose  139 mg Rate  298.2 mL/hr Route  IntraVENous Administered By  Jaime Badillo RN          sodium chloride (NS) flush 10 mL     Admin Date  10/12/2021 Action  Given Dose  10 mL Route  IntraVENous Administered By  Jaime Badillo RN Admin Date  10/12/2021 Action  Given Dose  10 mL Route  IntraVENous Administered By  Kale Herman RN           Admin Date  10/12/2021 Action  Given Dose  10 mL Route  IntraVENous Administered By  Kale Herman RN          trastuzumab-hyaluronidase-oysk (HERCEPTIN HYLECTA) injection 600 mg     Admin Date  10/12/2021 Action  Given Dose  600 mg Route  SubCUTAneous Administered By  Kale Herman RN              1500 Patient tolerated treatment well. Port maintained positive blood return throughout treatment. Port flushed, heparinized and de-accessed per protocol. Patient was discharged from [facility] in stable condition. Patient aware of next appointment.      Future Appointments   Date Time Provider Wendie Luz   10/18/2021 10:45 AM H3 NAT LAB [facility]   10/19/2021 11:00 AM C1 NAT MED TX [facility]   10/25/2021 10:45 AM H3 NAT LAB [facility]   10/25/2021 10:45 AM Malinda Carter,  [address] BS AMB   10/26/2021 11:00 AM F4 NAT MED TX [facility]   11/1/2021 10:30 AM H3 NAT LAB [facility]   11/2/2021 11:00 AM B3 NAT MED TX [facility]   11/8/2021 10:30 AM H3 NAT LAB [facility]   11/9/2021 11:00 AM B3 NAT MED TX [facility]   11/15/2021 10:45 AM H3 NAT LAB [facility]   11/16/2021 11:00 AM B3 NAT  [facility]   2/21/2022 10:45 AM Carlos Michaels MD [facility] BS AMB   5/4/2022 10:30 AM MD Kris Squires BS AMB         Nini Munguia RN  October 12, 2021

## 2021-10-18 ENCOUNTER — HOSPITAL ENCOUNTER (OUTPATIENT)
Dept: INFUSION THERAPY | Age: 71
Discharge: HOME | End: 2021-10-18
Payer: MEDICARE

## 2021-10-18 VITALS
RESPIRATION RATE: 18 BRPM | HEART RATE: 89 BPM | SYSTOLIC BLOOD PRESSURE: 149 MMHG | DIASTOLIC BLOOD PRESSURE: 79 MMHG | TEMPERATURE: 96.9 F

## 2021-10-18 LAB
BASOPHILS # BLD: 0 K/UL (ref 0–0.1)
BASOPHILS NFR BLD: 1 % (ref 0–1)
DIFFERENTIAL METHOD BLD: ABNORMAL
EOSINOPHIL # BLD: 0 K/UL (ref 0–0.4)
EOSINOPHIL NFR BLD: 1 % (ref 0–7)
ERYTHROCYTE [DISTWIDTH] IN BLOOD BY AUTOMATED COUNT: 13.6 % (ref 11.5–14.5)
HCT VFR BLD AUTO: 31.7 % (ref 35–47)
HGB BLD-MCNC: 10.4 G/DL (ref 11.5–16)
IMM GRANULOCYTES # BLD AUTO: 0 K/UL (ref 0–0.04)
IMM GRANULOCYTES NFR BLD AUTO: 1 % (ref 0–0.5)
LYMPHOCYTES # BLD: 1.1 K/UL (ref 0.8–3.5)
LYMPHOCYTES NFR BLD: 35 % (ref 12–49)
MCH RBC QN AUTO: 34.1 PG (ref 26–34)
MCHC RBC AUTO-ENTMCNC: 32.8 G/DL (ref 30–36.5)
MCV RBC AUTO: 103.9 FL (ref 80–99)
MONOCYTES # BLD: 0.1 K/UL (ref 0–1)
MONOCYTES NFR BLD: 4 % (ref 5–13)
NEUTS SEG # BLD: 1.8 K/UL (ref 1.8–8)
NEUTS SEG NFR BLD: 58 % (ref 32–75)
NRBC # BLD: 0 K/UL (ref 0–0.01)
NRBC BLD-RTO: 0 PER 100 WBC
PLATELET # BLD AUTO: 214 K/UL (ref 150–400)
PMV BLD AUTO: 10.7 FL (ref 8.9–12.9)
RBC # BLD AUTO: 3.05 M/UL (ref 3.8–5.2)
WBC # BLD AUTO: 3.2 K/UL (ref 3.6–11)

## 2021-10-18 PROCEDURE — 36415 COLL VENOUS BLD VENIPUNCTURE: CPT

## 2021-10-18 PROCEDURE — 85025 COMPLETE CBC W/AUTO DIFF WBC: CPT

## 2021-10-18 NOTE — PROGRESS NOTES
[facility] Short Visit Note:    1000:  Pt arrived ambulatory and in no distress for Peripheral lab draw for planned chemo tomorrow  and tolerated well. Labs drawn from right [facility] and sent for processing  D/Cd from [facility] ambulatory and in no distress. Next visit 10/19/21.     Visit Vitals  BP (!) 149/79 (BP 1 Location: Right arm, BP Patient Position: Sitting)   Pulse 89   Temp 96.9 °F (36.1 °C)   Resp 18

## 2021-10-19 ENCOUNTER — HOSPITAL ENCOUNTER (OUTPATIENT)
Dept: INFUSION THERAPY | Age: 71
Discharge: HOME | End: 2021-10-19
Payer: MEDICARE

## 2021-10-19 VITALS
RESPIRATION RATE: 16 BRPM | TEMPERATURE: 97 F | SYSTOLIC BLOOD PRESSURE: 171 MMHG | BODY MASS INDEX: 26.5 KG/M2 | WEIGHT: 155.2 LBS | HEIGHT: 64 IN | DIASTOLIC BLOOD PRESSURE: 89 MMHG | HEART RATE: 71 BPM

## 2021-10-19 DIAGNOSIS — C50.911 INFILTRATING DUCTAL CARCINOMA OF RIGHT BREAST (HCC): Primary | ICD-10-CM

## 2021-10-19 PROCEDURE — 74011250636 HC RX REV CODE- 250/636: Performed by: INTERNAL MEDICINE

## 2021-10-19 PROCEDURE — 96375 TX/PRO/DX INJ NEW DRUG ADDON: CPT

## 2021-10-19 PROCEDURE — 77030012965 HC NDL HUBR BBMI -A

## 2021-10-19 PROCEDURE — 96413 CHEMO IV INFUSION 1 HR: CPT

## 2021-10-19 RX ORDER — DIPHENHYDRAMINE HYDROCHLORIDE 50 MG/ML
50 INJECTION, SOLUTION INTRAMUSCULAR; INTRAVENOUS ONCE
Status: COMPLETED | OUTPATIENT
Start: 2021-10-19 | End: 2021-10-19

## 2021-10-19 RX ORDER — SODIUM CHLORIDE 9 MG/ML
10 INJECTION INTRAMUSCULAR; INTRAVENOUS; SUBCUTANEOUS AS NEEDED
Status: ACTIVE | OUTPATIENT
Start: 2021-10-19 | End: 2021-10-19

## 2021-10-19 RX ORDER — HEPARIN 100 UNIT/ML
300-500 SYRINGE INTRAVENOUS AS NEEDED
Status: ACTIVE | OUTPATIENT
Start: 2021-10-19 | End: 2021-10-19

## 2021-10-19 RX ORDER — SODIUM CHLORIDE 9 MG/ML
25 INJECTION, SOLUTION INTRAVENOUS CONTINUOUS
Status: DISPENSED | OUTPATIENT
Start: 2021-10-19 | End: 2021-10-19

## 2021-10-19 RX ORDER — DEXAMETHASONE SODIUM PHOSPHATE 4 MG/ML
4 INJECTION, SOLUTION INTRA-ARTICULAR; INTRALESIONAL; INTRAMUSCULAR; INTRAVENOUS; SOFT TISSUE ONCE
Status: COMPLETED | OUTPATIENT
Start: 2021-10-19 | End: 2021-10-19

## 2021-10-19 RX ORDER — SODIUM CHLORIDE 0.9 % (FLUSH) 0.9 %
10 SYRINGE (ML) INJECTION AS NEEDED
Status: DISPENSED | OUTPATIENT
Start: 2021-10-19 | End: 2021-10-19

## 2021-10-19 RX ADMIN — Medication 10 ML: at 15:15

## 2021-10-19 RX ADMIN — Medication 10 ML: at 11:20

## 2021-10-19 RX ADMIN — PACLITAXEL 139 MG: 6 INJECTION, SOLUTION INTRAVENOUS at 12:55

## 2021-10-19 RX ADMIN — DEXAMETHASONE SODIUM PHOSPHATE 4 MG: 4 INJECTION, SOLUTION INTRA-ARTICULAR; INTRALESIONAL; INTRAMUSCULAR; INTRAVENOUS; SOFT TISSUE at 12:09

## 2021-10-19 RX ADMIN — DIPHENHYDRAMINE HYDROCHLORIDE 50 MG: 50 INJECTION INTRAMUSCULAR; INTRAVENOUS at 12:08

## 2021-10-19 RX ADMIN — SODIUM CHLORIDE 25 ML/HR: 900 INJECTION, SOLUTION INTRAVENOUS at 11:52

## 2021-10-19 RX ADMIN — HEPARIN 500 UNITS: 100 SYRINGE at 15:15

## 2021-10-19 RX ADMIN — FAMOTIDINE 20 MG: 10 INJECTION INTRAVENOUS at 12:06

## 2021-10-19 NOTE — PROGRESS NOTES
[facility] Chemo Progress Note    Date: October 19, 2021    1100 Ms. Kowalski Arrived to [address] [place] [address] for Taxol (C3D8) ambulatory in stable condition. Assessment was completed, no acute issues at this time, no new complaints voiced. Port accessed with positive blood return. Labs WDL. Medication ordered from pharmacy. Chemotherapy Flowsheet 10/19/2021   Cycle C3D8   Date 10/19/2021   Drug / Regimen Taxol   Pre Meds given   Notes given     Ms. Kowalski's vitals were reviewed. Patient Vitals for the past 12 hrs:   Temp Pulse Resp BP   10/19/21 1515  71  (!) 171/89   10/19/21 1500  82  (!) 178/104   10/19/21 1100 97 °F (36.1 °C) 82 16 (!) 145/85   Pre-medications  were administered as ordered and chemotherapy was initiated.   Medications Administered     0.9% sodium chloride infusion     Admin Date  10/19/2021 Action  New Bag Dose  25 mL/hr Rate  25 mL/hr Route  IntraVENous Administered By  Kale Herman RN          dexamethasone (DECADRON) 4 mg/mL injection 4 mg     Admin Date  10/19/2021 Action  Given Dose  4 mg Route  IntraVENous Administered By  Kale Herman RN          diphenhydrAMINE (BENADRYL) injection 50 mg     Admin Date  10/19/2021 Action  Given Dose  50 mg Route  IntraVENous Administered By  Kale Herman RN          famotidine (PF) (PEPCID) 20 mg in 0.9% sodium chloride 10 mL injection     Admin Date  10/19/2021 Action  Given Dose  20 mg Route  IntraVENous Administered By  Kale Herman RN          heparin (porcine) pf 300-500 Units     Admin Date  10/19/2021 Action  Given Dose  500 Units Route  InterCATHeter Administered By  Kale Herman RN          PACLitaxeL (TAXOL) 139 mg in 0.9% sodium chloride 250 mL, overfill volume 25 mL chemo infusion     Admin Date  10/19/2021 Action  New Bag Dose  139 mg Rate  298.2 mL/hr Route  IntraVENous Administered By  Kale Herman RN          sodium chloride (NS) flush 10 mL     Admin Date  10/19/2021 Action  Given Dose  10 mL Route  IntraVENous Administered By  Debby Lang Aditya Rogers RN           Admin Date  10/19/2021 Action  Given Dose  10 mL Route  IntraVENous Administered By  Corrinne Haring, ANGEL              1196 Patient tolerated treatment well. Port maintained positive blood return throughout treatment. Port flushed, heparinized and de accessed per protocol. Patient was discharged from [facility] in stable condition. Patient aware of next appointment.      Future Appointments   Date Time Provider Wendie Luz   10/25/2021 10:45 AM H3 NAT LAB [facility]   10/25/2021 10:45 AM Ashtyn Phan  [address] BS AMB   10/26/2021 11:00 AM F4 NAT MED TX [facility]   11/1/2021 10:30 AM H3 NAT LAB [facility]   11/2/2021 11:00 AM B3 NAT MED TX [facility]   11/8/2021 10:30 AM H3 NAT LAB [facility]   11/9/2021 11:00 AM B3 NAT MED TX [facility]   11/15/2021 10:45 AM H3 NAT LAB [facility]   11/16/2021 11:00 AM B3 NAT  [address] [facility]   2/21/2022 10:45 AM Kristine Santana MD [facility] BS AMB   5/4/2022 10:30 AM Katherine Thomas MD [facility] BS AMB         Aditya Munguia, ANGEL  October 19, 2021

## 2021-10-22 DIAGNOSIS — C50.911 INFILTRATING DUCTAL CARCINOMA OF RIGHT BREAST (HCC): ICD-10-CM

## 2021-10-22 DIAGNOSIS — G47.00 INSOMNIA, UNSPECIFIED TYPE: ICD-10-CM

## 2021-10-22 RX ORDER — ZOLPIDEM TARTRATE 5 MG/1
TABLET ORAL
Qty: 15 TABLET | Refills: 0 | Status: SHIPPED | OUTPATIENT
Start: 2021-10-22 | End: 2021-11-12

## 2021-10-25 ENCOUNTER — HOSPITAL ENCOUNTER (OUTPATIENT)
Dept: INFUSION THERAPY | Age: 71
Discharge: HOME | End: 2021-10-25
Payer: MEDICARE

## 2021-10-25 VITALS
SYSTOLIC BLOOD PRESSURE: 135 MMHG | HEART RATE: 87 BPM | RESPIRATION RATE: 18 BRPM | TEMPERATURE: 97 F | DIASTOLIC BLOOD PRESSURE: 85 MMHG | OXYGEN SATURATION: 100 %

## 2021-10-25 LAB
BASOPHILS # BLD: 0 K/UL (ref 0–0.1)
BASOPHILS NFR BLD: 0 % (ref 0–1)
DIFFERENTIAL METHOD BLD: ABNORMAL
EOSINOPHIL # BLD: 0 K/UL (ref 0–0.4)
EOSINOPHIL NFR BLD: 2 % (ref 0–7)
ERYTHROCYTE [DISTWIDTH] IN BLOOD BY AUTOMATED COUNT: 14.4 % (ref 11.5–14.5)
HCT VFR BLD AUTO: 31.4 % (ref 35–47)
HGB BLD-MCNC: 10.6 G/DL (ref 11.5–16)
IMM GRANULOCYTES # BLD AUTO: 0 K/UL (ref 0–0.04)
IMM GRANULOCYTES NFR BLD AUTO: 0 % (ref 0–0.5)
LYMPHOCYTES # BLD: 1.1 K/UL (ref 0.8–3.5)
LYMPHOCYTES NFR BLD: 45 % (ref 12–49)
MCH RBC QN AUTO: 34.9 PG (ref 26–34)
MCHC RBC AUTO-ENTMCNC: 33.8 G/DL (ref 30–36.5)
MCV RBC AUTO: 103.3 FL (ref 80–99)
MONOCYTES # BLD: 0.2 K/UL (ref 0–1)
MONOCYTES NFR BLD: 7 % (ref 5–13)
NEUTS SEG # BLD: 1.1 K/UL (ref 1.8–8)
NEUTS SEG NFR BLD: 46 % (ref 32–75)
NRBC # BLD: 0 K/UL (ref 0–0.01)
NRBC BLD-RTO: 0 PER 100 WBC
PLATELET # BLD AUTO: 211 K/UL (ref 150–400)
PMV BLD AUTO: 10.9 FL (ref 8.9–12.9)
RBC # BLD AUTO: 3.04 M/UL (ref 3.8–5.2)
WBC # BLD AUTO: 2.3 K/UL (ref 3.6–11)

## 2021-10-25 PROCEDURE — 85025 COMPLETE CBC W/AUTO DIFF WBC: CPT

## 2021-10-25 PROCEDURE — 36415 COLL VENOUS BLD VENIPUNCTURE: CPT

## 2021-10-25 NOTE — PROGRESS NOTES
[facility] at [address], [address] [place] [address] Road, [place]: [phone]  F: [phone]    Reason for Visit:   Faustino Torres is a 78 y.o. female seen today in office for follow up of Right Breast Cancer on adjuvant weekly Taxol + Herceptin. Treatment History:   · Abnormal screening mammo 6/3/21: Bilateral digital screening mammography was performed and is interpreted in conjunction with a computer assisted detection (CAD) system. In the left breast, no suspicious masses or calcifications are identified. The right breast upper outer quadrant at 10:00 posterior depth 9 cm from the nipple there is a focal asymmetry  · Right Dx Mammo/US 6/7/21: Further evaluation was performed for a right breast focal asymmetry. In the right breast upper outer quadrant at 10:00 posterior depth there is an equal density irregular mass with indistinct margins measuring up to 1.7 cm. In the right breast at 10:00 10 cm from the nipple there is a 1.6 x 0.9 x 1.0 cm hypoechoic oval mass with indistinct margins, internal vascularity, and posterior acoustic enhancement  · Right Breast Biopsy 6/18/21: PATH - Invasive ductal carcinoma, Ish histologic grade 3. Largest glass slide measurement of invasive carcinoma: 6 mm   · ER/NV negative, HER2 FISH +  · Ki67 90%  · Right Lumpectomy 8/3/2: PATH - 20 mm IDC with negative LNs. Margins negative. · Adjuvant weekly Taxol + Herceptin 8/31/21 - Current     STAGE: Pathologic T1cN0, ER/NV negative, HER2+     History of Present Illness:   Faustino Torres is a 78 y.o. female seen today in office for follow up of right breast cancer post lumpectomy 8/3/21. She started adjuvant weekly Taxol + Herceptin on 8/31/21. She is here today for Day 15 Cycle 3 (Week 9) of adjuvant weekly Taxol + Herceptin. She reports that she feels well overall today. She is tolerating current regimen well overall.  She continues to have emotional lability but much better with addition of Ativan. Sleeping is better with Ambien PRN. She complains of dull headache 7/10 on pain scale. She takes Aleve as needed which does help. She continues to have some very mild nausea but no vomiting. Anti-emetics help. She did not have any diarrhea after last treatment but continues to have some mild bleeding when wiping. Her appetite is good and energy levels are good. She denies fever, chills, mouth sores, cough, SOB, CP, constipation, and neuropathy. She denies pain today. CBC is stable/good from yesterday. Her supportive  is here today.      Past Medical History:   Diagnosis Date    Arthritis     left ankle    Breast cancer ([address]     RIGHT    Chronic kidney disease     kidney stone    Chronic pain of left ankle 2017    Seeing Dr Saima Dunne    Fracture     left ankle    Ill-defined condition     PICC line for UTI - borderline sepsis    Osteopenia 2017      Past Surgical History:   Procedure Laterality Date    COLONOSCOPY N/A 2019    tubular adenoma, repeat 1 yr d/t poor prep - performed by Edna Velasquez MD at [facility] ENDOSCOPY;     COLONOSCOPY N/A 2020    normal - performed by Edna Velasquez MD at [facility] ENDOSCOPY    HX [address] Left     surgery - has a plate a screws    HX BREAST LUMPECTOMY Right 8/3/2021    RIGHT BREAST LUMPECTOMY WITH ULTRASOUND AND RIGHT BREAST SENTINEL NODE BIOPSY AND PORTACATH INSERTION performed by Moise Diego MD at [facility] AMBULATORY OR    HX [address]      HX GI      COLONOSCOPY    HX OTHER SURGICAL Right 2020    growth removed from right eyelid    HX OTHER SURGICAL Right 2020    PICC line placed for IV abx, removed after completing course for pyelonephritis    HX POLYPECTOMY      HX TONSILLECTOMY      [place] BIOPSY OF BREAST, NEEDLE CORE Right       Social History     Tobacco Use    Smoking status: Former Smoker     Quit date:      Years since quittin.8  Smokeless tobacco: Never Used    Tobacco comment: was a causal smoker when smoker in college   Substance Use Topics    Alcohol use: Yes     Alcohol/week: 5.0 standard drinks     Types: 5 Glasses of wine per week      Family History   Problem Relation Age of Onset    Cancer Mother         Brain tumor    Other Father         ? lung problem    Cancer Sister         tumors of spine    Other Sister         kidney stones    Other Daughter         kidney stone    Other Maternal Aunt         kidney stones    No Known Problems Sister     No Known Problems Sister     Anesth Problems Neg Hx      Current Outpatient Medications   Medication Sig    zolpidem (AMBIEN) 5 mg tablet TAKE ONE TABLET BY MOUTH AT BEDTIME AS NEEDED FOR SLEEP    LORazepam (ATIVAN) 0.5 mg tablet Take 1 Tablet by mouth three (3) times daily as needed for Anxiety. Max Daily Amount: 1.5 mg.    lidocaine-prilocaine (EMLA) topical cream Apply  to affected area as needed for Pain.  CRANIAL PROSTHESIS misc Breast cancer for chemo/ alopecia needs wig    ondansetron (ZOFRAN ODT) 4 mg disintegrating tablet Take 1 Tablet by mouth every eight (8) hours as needed for Nausea or Vomiting.  prochlorperazine (Compazine) 5 mg tablet Take 1-2 Tablets by mouth every six (6) hours as needed for Nausea or Vomiting.  ondansetron (ZOFRAN ODT) 4 mg disintegrating tablet Take 1 Tablet by mouth every eight (8) hours as needed for Nausea or Vomiting.  cortisone acetate (CORTISONE PO) Take  by mouth. Injections every 3 months in her foot    DULoxetine (CYMBALTA) 20 mg capsule TAKE ONE CAPSULE BY MOUTH EVERY EVENING    alendronate (FOSAMAX) 70 mg tablet TAKE 1 TABLET BY MOUTH ONCE WEEKLY ON AN EMPTY STOMACH BEFORE BREAKFAST. REMAIN UPRIGHT FOR 30 MINUTES & TAKE WITH 8 OUNCES OF WATER    acetaminophen (TYLENOL) 500 mg tablet 1 or 2 every 6 hrs prn for headache     No current facility-administered medications for this visit.      Facility-Administered Medications Ordered in Other Visits   Medication Dose Route Frequency    0.9% sodium chloride infusion  25 mL/hr IntraVENous CONTINUOUS    dexamethasone (DECADRON) 4 mg/mL injection 4 mg  4 mg IntraVENous ONCE    diphenhydrAMINE (BENADRYL) injection 50 mg  50 mg IntraVENous ONCE    famotidine (PF) (PEPCID) 20 mg in 0.9% sodium chloride 10 mL injection  20 mg IntraVENous ONCE    PACLitaxeL (TAXOL) 139 mg in 0.9% sodium chloride 250 mL, overfill volume 25 mL chemo infusion  80 mg/m2 (Treatment Plan Recorded) IntraVENous ONCE    sodium chloride (NS) flush 10 mL  10 mL IntraVENous PRN    0.9% sodium chloride injection 10 mL  10 mL IntraVENous PRN    heparin (porcine) pf 300-500 Units  300-500 Units InterCATHeter PRN      Allergies   Allergen Reactions    Adhesive Tape-Silicones Rash    Other Medication Hives     Cranberry or Probiotic, had severe itching and hives, not sure to which. Has taken a probiotic recently without a reaction.  Oxycodone Itching      Review of Systems:  A complete review of systems was obtained, negative except as described above and as reported on ROS sheet scanned into system. Physical Exam:     Visit Vitals  /73 (BP 1 Location: Left upper arm, BP Patient Position: Sitting)   Pulse 82   Temp 96.9 °F (36.1 °C) (Temporal)   Ht 5' 4\" (1.626 m)   Wt 153 lb (69.4 kg)   SpO2 96%   BMI 26.26 kg/m²     ECOG PS: 0  General: No distress  Eyes: Anicteric sclerae  HENT: Atraumatic, wearing a mask  Neck: Supple  Respiratory:  normal respiratory effort  MS: Normal gait and station. Digits without clubbing or cyanosis. Skin: No rashes, ecchymoses, or petechiae. Normal temperature, turgor, and texture.  Port site without redness/swelling  Psych: Alert, oriented, appropriate affect, normal judgment/insight  Breast: Right breast lumpectomy well healed with some scar tissue, right breast not examined today  Neuro: Non focal    Results:     Lab Results   Component Value Date/Time    WBC 2.3 (L) 10/25/2021 11:03 AM    HGB 10.6 (L) 10/25/2021 11:03 AM    HCT 31.4 (L) 10/25/2021 11:03 AM    PLATELET 884 19/66/0847 11:03 AM    .3 (H) 10/25/2021 11:03 AM    ABS. NEUTROPHILS 1.1 (L) 10/25/2021 11:03 AM     Lab Results   Component Value Date/Time    Sodium 139 10/11/2021 10:47 AM    Potassium 4.7 10/11/2021 10:47 AM    Chloride 108 10/11/2021 10:47 AM    CO2 26 10/11/2021 10:47 AM    Glucose 90 10/11/2021 10:47 AM    BUN 25 (H) 10/11/2021 10:47 AM    Creatinine 0.86 10/11/2021 10:47 AM    GFR est AA >60 10/11/2021 10:47 AM    GFR est non-AA >60 10/11/2021 10:47 AM    Calcium 9.7 10/11/2021 10:47 AM     Lab Results   Component Value Date/Time    Bilirubin, total 0.6 10/11/2021 10:47 AM    ALT (SGPT) 28 10/11/2021 10:47 AM    Alk. phosphatase 67 10/11/2021 10:47 AM    Protein, total 6.2 (L) 10/11/2021 10:47 AM    Albumin 3.5 10/11/2021 10:47 AM    Globulin 2.7 10/11/2021 10:47 AM     6/18/21  FINAL PATHOLOGIC DIAGNOSIS   Breast, right, needle core biopsy:   Invasive ductal carcinoma, [place] histologic grade 3   Largest glass slide measurement of invasive carcinoma: 6 mm   ER/IA negative, HER2 FISH +    8/3/21  FINAL PATHOLOGIC DIAGNOSIS   1. Lymph node, right axillary sentinel lymph node #1, excision:   One lymph node negative for metastatic carcinoma. 2. Lymph node, right axillary sentinel lymph node #2, excision:   One lymph node negative for metastatic carcinoma. 3. Right breast, lumpectomy, excision:   Procedure: Excision (less than total mastectomy)   Specimen Laterality: Right   TUMOR   Histologic Type:  Invasive carcinoma of no special type (ductal)   Glandular (Acinar) / Tubular Differentiation: Score 3   Nuclear Pleomorphism: Score 3   Mitotic Rate: Score 3   Overall Grade: Grade 3   Tumor Size: 20 mm   Tumor Focality: Single focus of invasive carcinoma   Ductal Carcinoma in Situ (DCIS): Not identified   Tumor Extent   Lymphovascular Invasion: Not identified   Treatment Effect in the Breast: No known presurgical therapy   MARGINS   Invasive Carcinoma Margins: Uninvolved by invasive carcinoma   Distance from Closest Margin (Millimeters): Less than: 1 mm   Closest Margin(s): Anterior   LYMPH NODES   Regional Lymph Nodes: Uninvolved by tumor cells   Total Number of Lymph Nodes Examined: 2   Number of [place] Nodes Examined: 2   PATHOLOGIC STAGE CLASSIFICATION (pTNM, AJCC 8th Edition)   Primary Tumor (pT): pT1c N0(sn)   Breast biomarkers previously reported (B202611): ER negative, TX negative, HER-2/mili positive by FISH. Tumor blocks for potential additional studies: 3-B-D.   4. Right breast, lateral margin right breast, excision:   Benign fibroadipose tissue with minimal benign fibroglandular breast tissue. 5. Right breast, medial margin right breast, excision:   Benign fibroglandular breast tissue with epithelial cyst and usual ductal hyperplasia. 6. Right breast, inferior margin right breast, excision:   Benign fibroglandular breast tissue. 08/23/21    ECHO ADULT COMPLETE 08/23/2021 8/23/2021    Interpretation Summary  · LV: Estimated LVEF is 60 - 65%. Normal cavity size, wall thickness and systolic function (ejection fraction normal). Normal left ventricular strain. Global longitudinal strain is -15.9%. Mild (grade 1) left ventricular diastolic dysfunction. Signed by: Ulysses Heck, MD on 8/23/2021  1:01 PM    Records reviewed and summarized above. Pathology report(s) reviewed above. Radiology report(s) reviewed above. Assessment:/PLAN     1) Stage 1 Breast Cancer ER- Her2+ post Lumpectomy 8/3/21  Records and history reviewed. Reviewed path and data. Discussed dx, stage and treatment of breast cancer. Post lumpectomy in August. Reviewed path in detail. Discussed adjuvant chemo and hormonal therapy options. Discussed no hormonal therapy options due to ER/TX negative. Discussed adjuvant Herceptin based chemo today ddAC-THP vs TCHP vs TH.    Detailed discussion about benefit of Herceptin based therapy. Paient and family open to chemo and prefer least chemo. Decided on Taxol/Herceptin. Teaching and consent with Pharmacy. Port placed on 8/3/21. Pre chemo ECHO 8/23/21 stable with EF 60-65%. Referred to Rad/Onc - appt 9/20/21. Patient started weekly Taxol/Herceptin on 8/31/21. Patient will have Day 1 Cycle 3 (Week 7) of weekly Taxol/Herceptin tomorrow. She is tolerating treatment well overall with some emotional lability and insomnia. Discussed side effect management today - meditation, listening calming music, walk around the block. Patient continues to be emotional on chemo but much better with addition of Ativan 0.5 mg TID PRN. She is taking Ambien as needed for sleep which does help. She is doing Herceptin SQ for duration of treatment. She is clinically stable and doing well overall. Labs (CBC) from yesterday reviewed. Patient is ready for treatment today. ECHO due again by 11/23/21. Follow up weekly in [facility]. Follow up in 2 weeks in [facility]/office. Patient agrees with plan. 2) Arthritis/Hx of Kidney Stone/Osteopenia   Management per PCP. 3) Management of High Risk Medications - Chemotherapy  Toxicities include Grade 1 Nausea and Grade 2 Insomnia/Emotional Lability   Reviewed side effect management today - recommended meditation, listening calming music, walk around the block, vs anti-anxiety med. Patient continues to be emotional on chemo but much better overall with additional of Ativan 0.5 mg TID PRN. Continue Ambien as needed for sleep. Labs (CBC) from yesterday reviewed. No dose adjustments needed. Will monitor for side effects. ECHO due again by 11/23/21. 4) Psychosocial  Mood good, coping well. Her family is very supportive. SW/NN support as needed. Her  is here today. Call if questions. Follow up in 1 week in [facility] and 2 weeks in [facility]/office.    This patient was seen in conjunction with Antonia Boss Hussein Santo NP. I personally performed a face to face diagnostic evaluation on this patient. I personally reviewed the history and performed the key points on the exam.   I personally reviewed all points in the assessment and created treatment plan with the patient. Specifically, pt seen by me today  Feels fine overall. Tolerating chemo with manageable side effects. Better anxiety/ insomnia with ativan. Ready for chemo and will continue with same dosing    Labs personally reviewed today  Proceed with treatment as ordered. I appreciate the opportunity to participate in Ms. Kayce Quach Meghana's care.     Signed By: Scott Giles DO

## 2021-10-25 NOTE — PROGRESS NOTES
[facility] Lab Visit:      8102:  Pt arrived ambulatory to [facility] in stable condition, for lab draw. Labs drawn peripherally from Left  AC arm and sent for processing. Visit Vitals  /85 (BP 1 Location: Left upper arm, BP Patient Position: Sitting)   Pulse 87   Temp 97 °F (36.1 °C)   Resp 18   SpO2 100%       1100: No new concerns voiced. D/cd home ambulatory in no distress. Pt aware of next [facility] appointment scheduled. Labs available in CC once resulted.

## 2021-10-26 ENCOUNTER — OFFICE VISIT (OUTPATIENT)
Dept: ONCOLOGY | Age: 71
End: 2021-10-26
Payer: MEDICARE

## 2021-10-26 ENCOUNTER — HOSPITAL ENCOUNTER (OUTPATIENT)
Dept: INFUSION THERAPY | Age: 71
Discharge: HOME | End: 2021-10-26
Payer: MEDICARE

## 2021-10-26 VITALS
DIASTOLIC BLOOD PRESSURE: 73 MMHG | HEIGHT: 64 IN | OXYGEN SATURATION: 96 % | BODY MASS INDEX: 26.12 KG/M2 | TEMPERATURE: 96.9 F | HEART RATE: 82 BPM | SYSTOLIC BLOOD PRESSURE: 105 MMHG | WEIGHT: 153 LBS

## 2021-10-26 VITALS
SYSTOLIC BLOOD PRESSURE: 154 MMHG | RESPIRATION RATE: 17 BRPM | HEART RATE: 73 BPM | DIASTOLIC BLOOD PRESSURE: 98 MMHG | TEMPERATURE: 96.9 F

## 2021-10-26 DIAGNOSIS — Z87.442 HISTORY OF KIDNEY STONES: ICD-10-CM

## 2021-10-26 DIAGNOSIS — M19.90 ARTHRITIS: ICD-10-CM

## 2021-10-26 DIAGNOSIS — Z09 CHEMOTHERAPY FOLLOW-UP EXAMINATION: ICD-10-CM

## 2021-10-26 DIAGNOSIS — Z98.890 HISTORY OF LUMPECTOMY OF RIGHT BREAST: ICD-10-CM

## 2021-10-26 DIAGNOSIS — C50.911 INFILTRATING DUCTAL CARCINOMA OF RIGHT BREAST (HCC): Primary | ICD-10-CM

## 2021-10-26 DIAGNOSIS — R11.0 CHEMOTHERAPY-INDUCED NAUSEA: ICD-10-CM

## 2021-10-26 DIAGNOSIS — M85.89 OSTEOPENIA OF MULTIPLE SITES: ICD-10-CM

## 2021-10-26 DIAGNOSIS — R45.86 EMOTIONAL LABILITY: ICD-10-CM

## 2021-10-26 DIAGNOSIS — Z51.81 ENCOUNTER FOR MONITORING CARDIOTOXIC DRUG THERAPY: ICD-10-CM

## 2021-10-26 DIAGNOSIS — Z79.899 ENCOUNTER FOR MONITORING CARDIOTOXIC DRUG THERAPY: ICD-10-CM

## 2021-10-26 DIAGNOSIS — T45.1X5A CHEMOTHERAPY-INDUCED NAUSEA: ICD-10-CM

## 2021-10-26 PROCEDURE — G8536 NO DOC ELDER MAL SCRN: HCPCS | Performed by: INTERNAL MEDICINE

## 2021-10-26 PROCEDURE — G8427 DOCREV CUR MEDS BY ELIG CLIN: HCPCS | Performed by: INTERNAL MEDICINE

## 2021-10-26 PROCEDURE — 99214 OFFICE O/P EST MOD 30 MIN: CPT | Performed by: INTERNAL MEDICINE

## 2021-10-26 PROCEDURE — 74011250636 HC RX REV CODE- 250/636: Performed by: INTERNAL MEDICINE

## 2021-10-26 PROCEDURE — 74011000250 HC RX REV CODE- 250: Performed by: INTERNAL MEDICINE

## 2021-10-26 PROCEDURE — G8399 PT W/DXA RESULTS DOCUMENT: HCPCS | Performed by: INTERNAL MEDICINE

## 2021-10-26 PROCEDURE — 96413 CHEMO IV INFUSION 1 HR: CPT

## 2021-10-26 PROCEDURE — G9899 SCRN MAM PERF RSLTS DOC: HCPCS | Performed by: INTERNAL MEDICINE

## 2021-10-26 PROCEDURE — G8419 CALC BMI OUT NRM PARAM NOF/U: HCPCS | Performed by: INTERNAL MEDICINE

## 2021-10-26 PROCEDURE — 1101F PT FALLS ASSESS-DOCD LE1/YR: CPT | Performed by: INTERNAL MEDICINE

## 2021-10-26 PROCEDURE — 3017F COLORECTAL CA SCREEN DOC REV: CPT | Performed by: INTERNAL MEDICINE

## 2021-10-26 PROCEDURE — 77030012965 HC NDL HUBR BBMI -A

## 2021-10-26 PROCEDURE — 1090F PRES/ABSN URINE INCON ASSESS: CPT | Performed by: INTERNAL MEDICINE

## 2021-10-26 PROCEDURE — G8432 DEP SCR NOT DOC, RNG: HCPCS | Performed by: INTERNAL MEDICINE

## 2021-10-26 PROCEDURE — 96375 TX/PRO/DX INJ NEW DRUG ADDON: CPT

## 2021-10-26 RX ORDER — HEPARIN 100 UNIT/ML
300-500 SYRINGE INTRAVENOUS AS NEEDED
Status: CANCELLED
Start: 2021-11-16

## 2021-10-26 RX ORDER — DIPHENHYDRAMINE HYDROCHLORIDE 50 MG/ML
50 INJECTION, SOLUTION INTRAMUSCULAR; INTRAVENOUS ONCE
Status: COMPLETED | OUTPATIENT
Start: 2021-10-26 | End: 2021-10-26

## 2021-10-26 RX ORDER — SODIUM CHLORIDE 9 MG/ML
25 INJECTION, SOLUTION INTRAVENOUS CONTINUOUS
Status: CANCELLED | OUTPATIENT
Start: 2021-11-09

## 2021-10-26 RX ORDER — HEPARIN 100 UNIT/ML
300-500 SYRINGE INTRAVENOUS AS NEEDED
Status: ACTIVE | OUTPATIENT
Start: 2021-10-26 | End: 2021-10-26

## 2021-10-26 RX ORDER — EPINEPHRINE 1 MG/ML
0.3 INJECTION, SOLUTION, CONCENTRATE INTRAVENOUS AS NEEDED
Status: CANCELLED | OUTPATIENT
Start: 2021-11-09

## 2021-10-26 RX ORDER — HYDROCORTISONE SODIUM SUCCINATE 100 MG/2ML
100 INJECTION, POWDER, FOR SOLUTION INTRAMUSCULAR; INTRAVENOUS AS NEEDED
Status: CANCELLED | OUTPATIENT
Start: 2021-11-09

## 2021-10-26 RX ORDER — SODIUM CHLORIDE 9 MG/ML
25 INJECTION, SOLUTION INTRAVENOUS CONTINUOUS
Status: DISPENSED | OUTPATIENT
Start: 2021-10-26 | End: 2021-10-26

## 2021-10-26 RX ORDER — DIPHENHYDRAMINE HYDROCHLORIDE 50 MG/ML
50 INJECTION, SOLUTION INTRAMUSCULAR; INTRAVENOUS ONCE
Status: CANCELLED
Start: 2021-11-09 | End: 2021-11-09

## 2021-10-26 RX ORDER — SODIUM CHLORIDE 9 MG/ML
10 INJECTION INTRAMUSCULAR; INTRAVENOUS; SUBCUTANEOUS AS NEEDED
Status: CANCELLED | OUTPATIENT
Start: 2021-11-16

## 2021-10-26 RX ORDER — DIPHENHYDRAMINE HYDROCHLORIDE 50 MG/ML
50 INJECTION, SOLUTION INTRAMUSCULAR; INTRAVENOUS AS NEEDED
Status: CANCELLED
Start: 2021-11-16

## 2021-10-26 RX ORDER — ALENDRONATE SODIUM 70 MG/1
TABLET ORAL
Qty: 12 TABLET | Refills: 1 | Status: SHIPPED | OUTPATIENT
Start: 2021-10-26 | End: 2022-04-12

## 2021-10-26 RX ORDER — ONDANSETRON 2 MG/ML
8 INJECTION INTRAMUSCULAR; INTRAVENOUS AS NEEDED
Status: CANCELLED | OUTPATIENT
Start: 2021-11-16

## 2021-10-26 RX ORDER — SODIUM CHLORIDE 9 MG/ML
10 INJECTION INTRAMUSCULAR; INTRAVENOUS; SUBCUTANEOUS AS NEEDED
Status: CANCELLED | OUTPATIENT
Start: 2021-11-02

## 2021-10-26 RX ORDER — DIPHENHYDRAMINE HYDROCHLORIDE 50 MG/ML
25 INJECTION, SOLUTION INTRAMUSCULAR; INTRAVENOUS AS NEEDED
Status: CANCELLED
Start: 2021-11-02

## 2021-10-26 RX ORDER — DIPHENHYDRAMINE HYDROCHLORIDE 50 MG/ML
50 INJECTION, SOLUTION INTRAMUSCULAR; INTRAVENOUS ONCE
Status: CANCELLED
Start: 2021-11-16 | End: 2021-11-16

## 2021-10-26 RX ORDER — SODIUM CHLORIDE 9 MG/ML
10 INJECTION INTRAMUSCULAR; INTRAVENOUS; SUBCUTANEOUS AS NEEDED
Status: CANCELLED | OUTPATIENT
Start: 2021-11-09

## 2021-10-26 RX ORDER — SODIUM CHLORIDE 0.9 % (FLUSH) 0.9 %
10 SYRINGE (ML) INJECTION AS NEEDED
Status: DISPENSED | OUTPATIENT
Start: 2021-10-26 | End: 2021-10-26

## 2021-10-26 RX ORDER — SODIUM CHLORIDE 0.9 % (FLUSH) 0.9 %
10 SYRINGE (ML) INJECTION AS NEEDED
Status: CANCELLED | OUTPATIENT
Start: 2021-11-09

## 2021-10-26 RX ORDER — ALBUTEROL SULFATE 0.83 MG/ML
2.5 SOLUTION RESPIRATORY (INHALATION) AS NEEDED
Status: CANCELLED
Start: 2021-11-16

## 2021-10-26 RX ORDER — EPINEPHRINE 1 MG/ML
0.3 INJECTION, SOLUTION, CONCENTRATE INTRAVENOUS AS NEEDED
Status: CANCELLED | OUTPATIENT
Start: 2021-11-02

## 2021-10-26 RX ORDER — ALBUTEROL SULFATE 0.83 MG/ML
2.5 SOLUTION RESPIRATORY (INHALATION) AS NEEDED
Status: CANCELLED
Start: 2021-11-02

## 2021-10-26 RX ORDER — SODIUM CHLORIDE 0.9 % (FLUSH) 0.9 %
10 SYRINGE (ML) INJECTION AS NEEDED
Status: CANCELLED | OUTPATIENT
Start: 2021-11-16

## 2021-10-26 RX ORDER — ONDANSETRON 2 MG/ML
8 INJECTION INTRAMUSCULAR; INTRAVENOUS AS NEEDED
Status: CANCELLED | OUTPATIENT
Start: 2021-11-09

## 2021-10-26 RX ORDER — EPINEPHRINE 1 MG/ML
0.3 INJECTION, SOLUTION, CONCENTRATE INTRAVENOUS AS NEEDED
Status: CANCELLED | OUTPATIENT
Start: 2021-11-16

## 2021-10-26 RX ORDER — HYDROCORTISONE SODIUM SUCCINATE 100 MG/2ML
100 INJECTION, POWDER, FOR SOLUTION INTRAMUSCULAR; INTRAVENOUS AS NEEDED
Status: CANCELLED | OUTPATIENT
Start: 2021-11-16

## 2021-10-26 RX ORDER — ACETAMINOPHEN 325 MG/1
650 TABLET ORAL AS NEEDED
Status: CANCELLED
Start: 2021-11-02

## 2021-10-26 RX ORDER — SODIUM CHLORIDE 9 MG/ML
10 INJECTION INTRAMUSCULAR; INTRAVENOUS; SUBCUTANEOUS AS NEEDED
Status: ACTIVE | OUTPATIENT
Start: 2021-10-26 | End: 2021-10-26

## 2021-10-26 RX ORDER — DIPHENHYDRAMINE HYDROCHLORIDE 50 MG/ML
25 INJECTION, SOLUTION INTRAMUSCULAR; INTRAVENOUS AS NEEDED
Status: CANCELLED
Start: 2021-11-16

## 2021-10-26 RX ORDER — DEXAMETHASONE SODIUM PHOSPHATE 4 MG/ML
4 INJECTION, SOLUTION INTRA-ARTICULAR; INTRALESIONAL; INTRAMUSCULAR; INTRAVENOUS; SOFT TISSUE ONCE
Status: CANCELLED | OUTPATIENT
Start: 2021-11-16 | End: 2021-11-16

## 2021-10-26 RX ORDER — HEPARIN 100 UNIT/ML
300-500 SYRINGE INTRAVENOUS AS NEEDED
Status: CANCELLED
Start: 2021-11-09

## 2021-10-26 RX ORDER — SODIUM CHLORIDE 9 MG/ML
25 INJECTION, SOLUTION INTRAVENOUS CONTINUOUS
Status: CANCELLED | OUTPATIENT
Start: 2021-11-16

## 2021-10-26 RX ORDER — DIPHENHYDRAMINE HYDROCHLORIDE 50 MG/ML
50 INJECTION, SOLUTION INTRAMUSCULAR; INTRAVENOUS AS NEEDED
Status: CANCELLED
Start: 2021-11-02

## 2021-10-26 RX ORDER — ACETAMINOPHEN 325 MG/1
650 TABLET ORAL AS NEEDED
Status: CANCELLED
Start: 2021-11-16

## 2021-10-26 RX ORDER — ONDANSETRON 2 MG/ML
8 INJECTION INTRAMUSCULAR; INTRAVENOUS AS NEEDED
Status: CANCELLED | OUTPATIENT
Start: 2021-11-02

## 2021-10-26 RX ORDER — DIPHENHYDRAMINE HYDROCHLORIDE 50 MG/ML
50 INJECTION, SOLUTION INTRAMUSCULAR; INTRAVENOUS AS NEEDED
Status: CANCELLED
Start: 2021-11-09

## 2021-10-26 RX ORDER — ALBUTEROL SULFATE 0.83 MG/ML
2.5 SOLUTION RESPIRATORY (INHALATION) AS NEEDED
Status: CANCELLED
Start: 2021-11-09

## 2021-10-26 RX ORDER — DEXAMETHASONE SODIUM PHOSPHATE 4 MG/ML
4 INJECTION, SOLUTION INTRA-ARTICULAR; INTRALESIONAL; INTRAMUSCULAR; INTRAVENOUS; SOFT TISSUE ONCE
Status: COMPLETED | OUTPATIENT
Start: 2021-10-26 | End: 2021-10-26

## 2021-10-26 RX ORDER — ACETAMINOPHEN 325 MG/1
650 TABLET ORAL AS NEEDED
Status: CANCELLED
Start: 2021-11-09

## 2021-10-26 RX ORDER — DIPHENHYDRAMINE HYDROCHLORIDE 50 MG/ML
25 INJECTION, SOLUTION INTRAMUSCULAR; INTRAVENOUS AS NEEDED
Status: CANCELLED
Start: 2021-11-09

## 2021-10-26 RX ORDER — DEXAMETHASONE SODIUM PHOSPHATE 4 MG/ML
4 INJECTION, SOLUTION INTRA-ARTICULAR; INTRALESIONAL; INTRAMUSCULAR; INTRAVENOUS; SOFT TISSUE ONCE
Status: CANCELLED | OUTPATIENT
Start: 2021-11-09 | End: 2021-11-09

## 2021-10-26 RX ORDER — HYDROCORTISONE SODIUM SUCCINATE 100 MG/2ML
100 INJECTION, POWDER, FOR SOLUTION INTRAMUSCULAR; INTRAVENOUS AS NEEDED
Status: CANCELLED | OUTPATIENT
Start: 2021-11-02

## 2021-10-26 RX ADMIN — FAMOTIDINE 20 MG: 10 INJECTION INTRAVENOUS at 12:07

## 2021-10-26 RX ADMIN — HEPARIN 500 UNITS: 100 SYRINGE at 13:43

## 2021-10-26 RX ADMIN — SODIUM CHLORIDE 10 ML: 9 INJECTION INTRAMUSCULAR; INTRAVENOUS; SUBCUTANEOUS at 13:43

## 2021-10-26 RX ADMIN — SODIUM CHLORIDE 25 ML/HR: 900 INJECTION, SOLUTION INTRAVENOUS at 12:05

## 2021-10-26 RX ADMIN — PACLITAXEL 139 MG: 6 INJECTION, SOLUTION INTRAVENOUS at 12:38

## 2021-10-26 RX ADMIN — DIPHENHYDRAMINE HYDROCHLORIDE 50 MG: 50 INJECTION, SOLUTION INTRAMUSCULAR; INTRAVENOUS at 12:10

## 2021-10-26 RX ADMIN — DEXAMETHASONE SODIUM PHOSPHATE 4 MG: 4 INJECTION, SOLUTION INTRA-ARTICULAR; INTRALESIONAL; INTRAMUSCULAR; INTRAVENOUS; SOFT TISSUE at 12:05

## 2021-10-26 NOTE — PROGRESS NOTES
[facility] Chemo Progress Note    Date: 2021    Name: Mady Rey    MRN: [id number]         : 1950    1041 Ms. Kowalski Arrived to [facility] for D15 C3 Paclitaxel (Cold Cap) ambulatory in stable condition. Assessment was completed, no acute issues at this time, no new complaints voiced. Port accessed with positive blood return. Labs drawn yesterday and were reviewed     Chemotherapy Flowsheet 10/26/2021   Cycle C3 D 15   Date 10/26/2021   Drug / Regimen Taxol   Pre Meds given   Notes given         Patient Denies SOB, fever, cough, general not feeling well. Patient denies recent exposure to someone who has tested positive for COVID-19. Patient denies having contact with anyone who has a pending COVID test.      Ms. Rayshawn Lopes vitals were reviewed. Patient Vitals for the past 12 hrs:   Temp Pulse Resp BP   10/26/21 1441  73  (!) 154/98   10/26/21 1042 96.9 °F (36.1 °C)  17          Lab results were obtained and reviewed. No results found for this or any previous visit (from the past 12 hour(s)). Pre-medications  were administered as ordered and chemotherapy was initiated.   Medications Administered     0.9% sodium chloride infusion     Admin Date  10/26/2021 Action  New Bag Dose  25 mL/hr Rate  25 mL/hr Route  IntraVENous Administered By  [place] ANGEL Grijalva          0.9% sodium chloride injection 10 mL     Admin Date  10/26/2021 Action  Given Dose  10 mL Route  IntraVENous Administered By  [place] ANGEL Grijalva          dexamethasone (DECADRON) 4 mg/mL injection 4 mg     Admin Date  10/26/2021 Action  Given Dose  4 mg Route  IntraVENous Administered By  [place] ANGEL Grijalva          diphenhydrAMINE (BENADRYL) injection 50 mg     Admin Date  10/26/2021 Action  Given Dose  50 mg Route  IntraVENous Administered By  [place] ANGEL Grijalva          famotidine (PF) (PEPCID) 20 mg in 0.9% sodium chloride 10 mL injection     Admin Date  10/26/2021 Action  Given Dose  20 mg Route  IntraVENous Administered By  Ava Grijalva RN          heparin (porcine) pf 300-500 Units     Admin Date  10/26/2021 Action  Given Dose  500 Units Route  InterCATHeter Administered By  Ava Grijalva RN          PACLitaxeL (TAXOL) 139 mg in 0.9% sodium chloride 250 mL, overfill volume 25 mL chemo infusion     Admin Date  10/26/2021 Action  New Bag Dose  139 mg Rate  298.2 mL/hr Route  IntraVENous Administered By  Faustina Gardiner 3 Patient tolerated treatment well. Port maintained positive blood return throughout treatment. Port flushed, heparinized and de accessed per protocol. Patient was discharged from [facility] in stable condition. Patient aware of next appointment.      Future Appointments   Date Time Provider Wendie Escobar   11/1/2021 10:30 AM H3 NAT LAB [facility]   11/2/2021 11:00 AM B3 NAT MED [address] [place] [address]   11/8/2021 10:30 AM H3 ANT LAB [facility]   11/9/2021 11:00 AM B3 NAT MED [address] [place] [address]   11/9/2021 11:15 AM Andres Fontaine  [address] BS AMB   11/15/2021 10:45 AM H3 NAT LAB [facility]   11/16/2021 11:00 AM B3 NAT MED TX [facility]   2/21/2022 10:45 AM Nubia Ramachandran MD [facility] BS AMB   5/4/2022 10:30 AM Oliver Porter MD [facility] BS AMB         Nette Conrad RN  October 26, 2021

## 2021-10-26 NOTE — PROGRESS NOTES
Willis Serra is a 78 y.o. female  Chief Complaint   Patient presents with    Chemotherapy    Breast Cancer   1. Have you been to the ER, urgent care clinic since your last visit? Hospitalized since your last visit? No.    2. Have you seen or consulted any other health care providers outside of the [address] [place] [address] since your last visit? Include any pap smears or colon screening.  No.

## 2021-11-01 ENCOUNTER — HOSPITAL ENCOUNTER (OUTPATIENT)
Dept: INFUSION THERAPY | Age: 71
Discharge: HOME | End: 2021-11-01
Payer: MEDICARE

## 2021-11-01 VITALS
TEMPERATURE: 97 F | HEART RATE: 83 BPM | RESPIRATION RATE: 18 BRPM | DIASTOLIC BLOOD PRESSURE: 97 MMHG | SYSTOLIC BLOOD PRESSURE: 156 MMHG

## 2021-11-01 LAB
ALBUMIN SERPL-MCNC: 3.7 G/DL (ref 3.5–5)
ALBUMIN/GLOB SERPL: 1.4 {RATIO} (ref 1.1–2.2)
ALP SERPL-CCNC: 78 U/L (ref 45–117)
ALT SERPL-CCNC: 28 U/L (ref 12–78)
ANION GAP SERPL CALC-SCNC: 8 MMOL/L (ref 5–15)
AST SERPL-CCNC: 12 U/L (ref 15–37)
BASOPHILS # BLD: 0 K/UL (ref 0–0.1)
BASOPHILS NFR BLD: 1 % (ref 0–1)
BILIRUB SERPL-MCNC: 0.5 MG/DL (ref 0.2–1)
BUN SERPL-MCNC: 21 MG/DL (ref 6–20)
BUN/CREAT SERPL: 31 (ref 12–20)
CALCIUM SERPL-MCNC: 9.6 MG/DL (ref 8.5–10.1)
CHLORIDE SERPL-SCNC: 107 MMOL/L (ref 97–108)
CO2 SERPL-SCNC: 25 MMOL/L (ref 21–32)
CREAT SERPL-MCNC: 0.67 MG/DL (ref 0.55–1.02)
DIFFERENTIAL METHOD BLD: ABNORMAL
EOSINOPHIL # BLD: 0 K/UL (ref 0–0.4)
EOSINOPHIL NFR BLD: 1 % (ref 0–7)
ERYTHROCYTE [DISTWIDTH] IN BLOOD BY AUTOMATED COUNT: 14.7 % (ref 11.5–14.5)
GLOBULIN SER CALC-MCNC: 2.6 G/DL (ref 2–4)
GLUCOSE SERPL-MCNC: 93 MG/DL (ref 65–100)
HCT VFR BLD AUTO: 31.7 % (ref 35–47)
HGB BLD-MCNC: 10.7 G/DL (ref 11.5–16)
IMM GRANULOCYTES # BLD AUTO: 0 K/UL (ref 0–0.04)
IMM GRANULOCYTES NFR BLD AUTO: 1 % (ref 0–0.5)
LYMPHOCYTES # BLD: 1 K/UL (ref 0.8–3.5)
LYMPHOCYTES NFR BLD: 43 % (ref 12–49)
MCH RBC QN AUTO: 35.1 PG (ref 26–34)
MCHC RBC AUTO-ENTMCNC: 33.8 G/DL (ref 30–36.5)
MCV RBC AUTO: 103.9 FL (ref 80–99)
MONOCYTES # BLD: 0.1 K/UL (ref 0–1)
MONOCYTES NFR BLD: 6 % (ref 5–13)
NEUTS SEG # BLD: 1 K/UL (ref 1.8–8)
NEUTS SEG NFR BLD: 48 % (ref 32–75)
NRBC # BLD: 0 K/UL (ref 0–0.01)
NRBC BLD-RTO: 0 PER 100 WBC
PLATELET # BLD AUTO: 209 K/UL (ref 150–400)
PMV BLD AUTO: 10.7 FL (ref 8.9–12.9)
POTASSIUM SERPL-SCNC: 4.4 MMOL/L (ref 3.5–5.1)
PROT SERPL-MCNC: 6.3 G/DL (ref 6.4–8.2)
RBC # BLD AUTO: 3.05 M/UL (ref 3.8–5.2)
SODIUM SERPL-SCNC: 140 MMOL/L (ref 136–145)
WBC # BLD AUTO: 2.2 K/UL (ref 3.6–11)

## 2021-11-01 PROCEDURE — 36415 COLL VENOUS BLD VENIPUNCTURE: CPT

## 2021-11-01 PROCEDURE — 85025 COMPLETE CBC W/AUTO DIFF WBC: CPT

## 2021-11-01 PROCEDURE — 80053 COMPREHEN METABOLIC PANEL: CPT

## 2021-11-01 NOTE — PROGRESS NOTES
[facility] Lab Visit:    1314:  Pt arrived ambulatory and in no distress for pre-chemo labs, CBC and CMP drawn peripherally and sent for processing  Departed [facility] ambulatory and in no distress. Visit Vitals  BP (!) 156/97 (BP 1 Location: Right arm)   Pulse 83   Temp 97 °F (36.1 °C)   Resp 18       Recent Results (from the past 24 hour(s))   CBC WITH AUTOMATED DIFF    Collection Time: 11/01/21 10:38 AM   Result Value Ref Range    WBC 2.2 (L) 3.6 - 11.0 K/uL    RBC 3.05 (L) 3.80 - 5.20 M/uL    HGB 10.7 (L) 11.5 - 16.0 g/dL    HCT 31.7 (L) 35.0 - 47.0 %    .9 (H) 80.0 - 99.0 FL    MCH 35.1 (H) 26.0 - 34.0 PG    MCHC 33.8 30.0 - 36.5 g/dL    RDW 14.7 (H) 11.5 - 14.5 %    PLATELET 500 724 - 121 K/uL    MPV 10.7 8.9 - 12.9 FL    NRBC 0.0 0  WBC    ABSOLUTE NRBC 0.00 0.00 - 0.01 K/uL    NEUTROPHILS 48 32 - 75 %    LYMPHOCYTES 43 12 - 49 %    MONOCYTES 6 5 - 13 %    EOSINOPHILS 1 0 - 7 %    BASOPHILS 1 0 - 1 %    IMMATURE GRANULOCYTES 1 (H) 0.0 - 0.5 %    ABS. NEUTROPHILS 1.0 (L) 1.8 - 8.0 K/UL    ABS. LYMPHOCYTES 1.0 0.8 - 3.5 K/UL    ABS. MONOCYTES 0.1 0.0 - 1.0 K/UL    ABS. EOSINOPHILS 0.0 0.0 - 0.4 K/UL    ABS. BASOPHILS 0.0 0.0 - 0.1 K/UL    ABS. IMM. GRANS. 0.0 0.00 - 0.04 K/UL    DF AUTOMATED     METABOLIC PANEL, COMPREHENSIVE    Collection Time: 11/01/21 10:38 AM   Result Value Ref Range    Sodium 140 136 - 145 mmol/L    Potassium 4.4 3.5 - 5.1 mmol/L    Chloride 107 97 - 108 mmol/L    CO2 25 21 - 32 mmol/L    Anion gap 8 5 - 15 mmol/L    Glucose 93 65 - 100 mg/dL    BUN 21 (H) 6 - 20 MG/DL    Creatinine 0.67 0.55 - 1.02 MG/DL    BUN/Creatinine ratio 31 (H) 12 - 20      GFR est AA >60 >60 ml/min/1.73m2    GFR est non-AA >60 >60 ml/min/1.73m2    Calcium 9.6 8.5 - 10.1 MG/DL    Bilirubin, total 0.5 0.2 - 1.0 MG/DL    ALT (SGPT) 28 12 - 78 U/L    AST (SGOT) 12 (L) 15 - 37 U/L    Alk.  phosphatase 78 45 - 117 U/L    Protein, total 6.3 (L) 6.4 - 8.2 g/dL    Albumin 3.7 3.5 - 5.0 g/dL    Globulin 2.6 2.0 - 4.0 g/dL    A-G Ratio 1.4 1.1 - 2.2

## 2021-11-02 ENCOUNTER — HOSPITAL ENCOUNTER (OUTPATIENT)
Dept: INFUSION THERAPY | Age: 71
Discharge: HOME | End: 2021-11-02
Payer: MEDICARE

## 2021-11-02 VITALS
HEART RATE: 71 BPM | SYSTOLIC BLOOD PRESSURE: 168 MMHG | TEMPERATURE: 96.9 F | WEIGHT: 152.6 LBS | DIASTOLIC BLOOD PRESSURE: 88 MMHG | RESPIRATION RATE: 16 BRPM | HEIGHT: 64 IN | BODY MASS INDEX: 26.05 KG/M2

## 2021-11-02 DIAGNOSIS — C50.911 INFILTRATING DUCTAL CARCINOMA OF RIGHT BREAST (HCC): Primary | ICD-10-CM

## 2021-11-02 PROCEDURE — 96375 TX/PRO/DX INJ NEW DRUG ADDON: CPT

## 2021-11-02 PROCEDURE — 74011250636 HC RX REV CODE- 250/636: Performed by: INTERNAL MEDICINE

## 2021-11-02 PROCEDURE — 96401 CHEMO ANTI-NEOPL SQ/IM: CPT

## 2021-11-02 PROCEDURE — 96413 CHEMO IV INFUSION 1 HR: CPT

## 2021-11-02 PROCEDURE — 74011000250 HC RX REV CODE- 250: Performed by: INTERNAL MEDICINE

## 2021-11-02 PROCEDURE — 77030012965 HC NDL HUBR BBMI -A

## 2021-11-02 RX ORDER — DEXAMETHASONE SODIUM PHOSPHATE 4 MG/ML
4 INJECTION, SOLUTION INTRA-ARTICULAR; INTRALESIONAL; INTRAMUSCULAR; INTRAVENOUS; SOFT TISSUE ONCE
Status: COMPLETED | OUTPATIENT
Start: 2021-11-02 | End: 2021-11-02

## 2021-11-02 RX ORDER — SODIUM CHLORIDE 9 MG/ML
25 INJECTION, SOLUTION INTRAVENOUS CONTINUOUS
Status: DISPENSED | OUTPATIENT
Start: 2021-11-02 | End: 2021-11-02

## 2021-11-02 RX ORDER — HEPARIN 100 UNIT/ML
300-500 SYRINGE INTRAVENOUS AS NEEDED
Status: ACTIVE | OUTPATIENT
Start: 2021-11-02 | End: 2021-11-02

## 2021-11-02 RX ORDER — SODIUM CHLORIDE 0.9 % (FLUSH) 0.9 %
10 SYRINGE (ML) INJECTION AS NEEDED
Status: DISPENSED | OUTPATIENT
Start: 2021-11-02 | End: 2021-11-02

## 2021-11-02 RX ORDER — DIPHENHYDRAMINE HYDROCHLORIDE 50 MG/ML
50 INJECTION, SOLUTION INTRAMUSCULAR; INTRAVENOUS ONCE
Status: COMPLETED | OUTPATIENT
Start: 2021-11-02 | End: 2021-11-02

## 2021-11-02 RX ADMIN — DEXAMETHASONE SODIUM PHOSPHATE 4 MG: 4 INJECTION, SOLUTION INTRA-ARTICULAR; INTRALESIONAL; INTRAMUSCULAR; INTRAVENOUS; SOFT TISSUE at 12:15

## 2021-11-02 RX ADMIN — Medication 10 ML: at 14:15

## 2021-11-02 RX ADMIN — TRASTUZUMAB AND HYALURONIDASE-OYSK 600 MG: 600; 10000 INJECTION, SOLUTION SUBCUTANEOUS at 12:04

## 2021-11-02 RX ADMIN — FAMOTIDINE 20 MG: 10 INJECTION, SOLUTION INTRAVENOUS at 12:15

## 2021-11-02 RX ADMIN — PACLITAXEL 139 MG: 6 INJECTION, SOLUTION INTRAVENOUS at 12:52

## 2021-11-02 RX ADMIN — DIPHENHYDRAMINE HYDROCHLORIDE 50 MG: 50 INJECTION INTRAMUSCULAR; INTRAVENOUS at 12:15

## 2021-11-02 RX ADMIN — HEPARIN 500 UNITS: 100 SYRINGE at 14:15

## 2021-11-02 RX ADMIN — SODIUM CHLORIDE 25 ML/HR: 900 INJECTION, SOLUTION INTRAVENOUS at 12:52

## 2021-11-02 NOTE — PROGRESS NOTES
[facility] Chemo Progress Note    1100 Ms. Angel Solorzano Arrived to [facility] for Taxol/Hylecta (C4D1) ambulatory in stable condition. Assessment was completed, no acute issues at this time, no new complaints voiced. Port accessed with positive blood return. Labs reviewed from yesterday and WDL for treatment. Medication ordered from pharmacy. Chemotherapy Flowsheet 11/2/2021   Cycle C4D1   Date 11/2/2021   Drug / Regimen Taxol/Hylecta   Pre Meds given   Notes given     Ms. Kowalski's vitals were reviewed. Patient Vitals for the past 12 hrs:   Temp Pulse Resp BP   11/02/21 1457  71  (!) 168/88   11/02/21 1455  93  113/63   11/02/21 1108 96.9 °F (36.1 °C) 86 16 (!) 151/80       Injection given in R thigh SQ  Pre-medications  were administered as ordered and chemotherapy was initiated.   Medications Administered     0.9% sodium chloride infusion     Admin Date  11/02/2021 Action  New Bag Dose  25 mL/hr Rate  25 mL/hr Route  IntraVENous Administered By  Leticia Dao RN          dexamethasone (DECADRON) 4 mg/mL injection 4 mg     Admin Date  11/02/2021 Action  Given Dose  4 mg Route  IntraVENous Administered By  Leticia Dao RN          diphenhydrAMINE (BENADRYL) injection 50 mg     Admin Date  11/02/2021 Action  Given Dose  50 mg Route  IntraVENous Administered By  Leticia Dao RN          famotidine (PF) (PEPCID) 20 mg in 0.9% sodium chloride 10 mL injection     Admin Date  11/02/2021 Action  Given Dose  20 mg Route  IntraVENous Administered By  Leticia Dao RN          heparin (porcine) pf 300-500 Units     Admin Date  11/02/2021 Action  Given Dose  500 Units Route  InterCATHeter Administered By  Leticia Dao RN          PACLitaxeL (TAXOL) 139 mg in 0.9% sodium chloride 250 mL, overfill volume 25 mL chemo infusion     Admin Date  11/02/2021 Action  New Bag Dose  139 mg Rate  298.2 mL/hr Route  IntraVENous Administered By  Leticia Dao RN          sodium chloride (NS) flush 10 mL     Admin Date  11/02/2021 Action  Given Dose  10 mL Route  IntraVENous Administered By  Mykel Gomez RN          trastuzumab-hyaluronidase-oysk (HERCEPTIN HYLECTA) injection 600 mg     Admin Date  11/02/2021 Action  Given Dose  600 mg Route  SubCUTAneous Administered By  Mykel Gomez RN              1500 Patient tolerated treatment well. Port maintained positive blood return throughout treatment. Port flushed, heparinized and de accessed per protocol. Patient was discharged from [facility] in stable condition. Patient aware of next appointment.      Future Appointments   Date Time Provider Wendie Escobar   11/8/2021 10:30 AM H3 NAT LAB [facility]    11/9/2021 11:00 AM B3 NAT MED [facility]   11/9/2021 11:15 AM Nya Dsouza  [address] BS AMB   11/15/2021 10:45 [facility]    11/16/2021 11:00 AM B3 NAT  [facility]   2/21/2022 10:45 AM Shikha Keyes MD [facility] BS AMB   5/4/2022 10:30 AM Jenifer Quintanilla MD [facility] BS AMB         Albin Munguia RN  November 2, 2021

## 2021-11-08 ENCOUNTER — HOSPITAL ENCOUNTER (OUTPATIENT)
Dept: INFUSION THERAPY | Age: 71
Discharge: HOME | End: 2021-11-08
Payer: MEDICARE

## 2021-11-08 VITALS
DIASTOLIC BLOOD PRESSURE: 58 MMHG | RESPIRATION RATE: 18 BRPM | SYSTOLIC BLOOD PRESSURE: 121 MMHG | TEMPERATURE: 96.8 F | HEART RATE: 84 BPM

## 2021-11-08 LAB
BASOPHILS # BLD: 0 K/UL (ref 0–0.1)
BASOPHILS NFR BLD: 1 % (ref 0–1)
DIFFERENTIAL METHOD BLD: ABNORMAL
EOSINOPHIL # BLD: 0.1 K/UL (ref 0–0.4)
EOSINOPHIL NFR BLD: 2 % (ref 0–7)
ERYTHROCYTE [DISTWIDTH] IN BLOOD BY AUTOMATED COUNT: 15.2 % (ref 11.5–14.5)
HCT VFR BLD AUTO: 31.2 % (ref 35–47)
HGB BLD-MCNC: 10.2 G/DL (ref 11.5–16)
IMM GRANULOCYTES # BLD AUTO: 0 K/UL (ref 0–0.04)
IMM GRANULOCYTES NFR BLD AUTO: 1 % (ref 0–0.5)
LYMPHOCYTES # BLD: 1 K/UL (ref 0.8–3.5)
LYMPHOCYTES NFR BLD: 39 % (ref 12–49)
MCH RBC QN AUTO: 34.9 PG (ref 26–34)
MCHC RBC AUTO-ENTMCNC: 32.7 G/DL (ref 30–36.5)
MCV RBC AUTO: 106.8 FL (ref 80–99)
MONOCYTES # BLD: 0.2 K/UL (ref 0–1)
MONOCYTES NFR BLD: 6 % (ref 5–13)
NEUTS SEG # BLD: 1.4 K/UL (ref 1.8–8)
NEUTS SEG NFR BLD: 51 % (ref 32–75)
NRBC # BLD: 0 K/UL (ref 0–0.01)
NRBC BLD-RTO: 0 PER 100 WBC
PLATELET # BLD AUTO: 255 K/UL (ref 150–400)
PMV BLD AUTO: 11.1 FL (ref 8.9–12.9)
RBC # BLD AUTO: 2.92 M/UL (ref 3.8–5.2)
WBC # BLD AUTO: 2.6 K/UL (ref 3.6–11)

## 2021-11-08 PROCEDURE — 36415 COLL VENOUS BLD VENIPUNCTURE: CPT

## 2021-11-08 PROCEDURE — 85025 COMPLETE CBC W/AUTO DIFF WBC: CPT

## 2021-11-08 NOTE — PROGRESS NOTES
[facility] Progress Note    Date: 2021    Name: Zia Bruno    MRN: [id number]         : 1950      1030:  Pt arrived ambulatory and in no distress, for lab visit. Labs drawn peripherally and sent for processing. Patient tolerated well. Visit Vitals  BP (!) 121/58 (BP 1 Location: Left arm, BP Patient Position: At rest)   Pulse 84   Temp 96.8 °F (36 °C)   Resp 18     Departed OPI ambulatory and in no distress.     Future Appointments   Date Time Provider Wendie Coburni   2021 11:00 AM B3 NAT MED [facility]   2021 11:15 AM Devin Boles  [address] BS AMB   11/15/2021 10:45 AM H3 NAT LAB [facility]   2021 11:00 AM B3 NAT MED TX [facility]   2021 10:45 AM H3 NAT LAB [facility]   2021 11:30 AM H2 NAT FASTRACK [facility]   2021 10:45 AM H3 NAT LAB [facility]   2021 11:00 AM G2 NAT FASTRACK [facility]   1/3/2022 10:30 AM H3 NAT LAB [facility]   2022 11:00 AM H1 NAT FASTRACK [facility]   2022 10:30 AM H3 NAT LAB [facility]   2022 11:00 AM H1 NAT FASTRACK [facility]   2022 10:45 AM Doretha Jiménez MD [facility] BS AMB   2022 10:30 AM MD [facility] [place] BS AMB       Natalie Claudio RN  2021

## 2021-11-09 ENCOUNTER — OFFICE VISIT (OUTPATIENT)
Dept: ONCOLOGY | Age: 71
End: 2021-11-09
Payer: MEDICARE

## 2021-11-09 ENCOUNTER — HOSPITAL ENCOUNTER (OUTPATIENT)
Dept: INFUSION THERAPY | Age: 71
Discharge: HOME | End: 2021-11-09
Payer: MEDICARE

## 2021-11-09 VITALS
WEIGHT: 153.9 LBS | TEMPERATURE: 96.8 F | HEART RATE: 88 BPM | BODY MASS INDEX: 26.27 KG/M2 | OXYGEN SATURATION: 98 % | SYSTOLIC BLOOD PRESSURE: 124 MMHG | DIASTOLIC BLOOD PRESSURE: 79 MMHG | HEIGHT: 64 IN

## 2021-11-09 VITALS
TEMPERATURE: 96.8 F | HEART RATE: 74 BPM | DIASTOLIC BLOOD PRESSURE: 97 MMHG | BODY MASS INDEX: 26.27 KG/M2 | WEIGHT: 153.9 LBS | SYSTOLIC BLOOD PRESSURE: 158 MMHG | RESPIRATION RATE: 16 BRPM | HEIGHT: 64 IN

## 2021-11-09 DIAGNOSIS — Z09 CHEMOTHERAPY FOLLOW-UP EXAMINATION: ICD-10-CM

## 2021-11-09 DIAGNOSIS — C50.911 INFILTRATING DUCTAL CARCINOMA OF RIGHT BREAST (HCC): Primary | ICD-10-CM

## 2021-11-09 DIAGNOSIS — Z98.890 HISTORY OF LUMPECTOMY OF RIGHT BREAST: ICD-10-CM

## 2021-11-09 DIAGNOSIS — F41.9 ANXIETY: ICD-10-CM

## 2021-11-09 DIAGNOSIS — Z51.81 ENCOUNTER FOR MONITORING CARDIOTOXIC DRUG THERAPY: ICD-10-CM

## 2021-11-09 DIAGNOSIS — Z79.899 ENCOUNTER FOR MONITORING CARDIOTOXIC DRUG THERAPY: ICD-10-CM

## 2021-11-09 PROCEDURE — 99214 OFFICE O/P EST MOD 30 MIN: CPT | Performed by: INTERNAL MEDICINE

## 2021-11-09 PROCEDURE — 1101F PT FALLS ASSESS-DOCD LE1/YR: CPT | Performed by: INTERNAL MEDICINE

## 2021-11-09 PROCEDURE — 96375 TX/PRO/DX INJ NEW DRUG ADDON: CPT

## 2021-11-09 PROCEDURE — G8399 PT W/DXA RESULTS DOCUMENT: HCPCS | Performed by: INTERNAL MEDICINE

## 2021-11-09 PROCEDURE — G9899 SCRN MAM PERF RSLTS DOC: HCPCS | Performed by: INTERNAL MEDICINE

## 2021-11-09 PROCEDURE — G8510 SCR DEP NEG, NO PLAN REQD: HCPCS | Performed by: INTERNAL MEDICINE

## 2021-11-09 PROCEDURE — 1090F PRES/ABSN URINE INCON ASSESS: CPT | Performed by: INTERNAL MEDICINE

## 2021-11-09 PROCEDURE — 74011250636 HC RX REV CODE- 250/636: Performed by: INTERNAL MEDICINE

## 2021-11-09 PROCEDURE — 3017F COLORECTAL CA SCREEN DOC REV: CPT | Performed by: INTERNAL MEDICINE

## 2021-11-09 PROCEDURE — G8427 DOCREV CUR MEDS BY ELIG CLIN: HCPCS | Performed by: INTERNAL MEDICINE

## 2021-11-09 PROCEDURE — 77030012965 HC NDL HUBR BBMI -A

## 2021-11-09 PROCEDURE — 74011000250 HC RX REV CODE- 250: Performed by: INTERNAL MEDICINE

## 2021-11-09 PROCEDURE — 96413 CHEMO IV INFUSION 1 HR: CPT

## 2021-11-09 PROCEDURE — G8419 CALC BMI OUT NRM PARAM NOF/U: HCPCS | Performed by: INTERNAL MEDICINE

## 2021-11-09 PROCEDURE — G8536 NO DOC ELDER MAL SCRN: HCPCS | Performed by: INTERNAL MEDICINE

## 2021-11-09 RX ORDER — DEXAMETHASONE SODIUM PHOSPHATE 4 MG/ML
4 INJECTION, SOLUTION INTRA-ARTICULAR; INTRALESIONAL; INTRAMUSCULAR; INTRAVENOUS; SOFT TISSUE ONCE
Status: COMPLETED | OUTPATIENT
Start: 2021-11-09 | End: 2021-11-09

## 2021-11-09 RX ORDER — HEPARIN 100 UNIT/ML
300-500 SYRINGE INTRAVENOUS AS NEEDED
Status: ACTIVE | OUTPATIENT
Start: 2021-11-09 | End: 2021-11-09

## 2021-11-09 RX ORDER — SODIUM CHLORIDE 9 MG/ML
25 INJECTION, SOLUTION INTRAVENOUS CONTINUOUS
Status: DISPENSED | OUTPATIENT
Start: 2021-11-09 | End: 2021-11-09

## 2021-11-09 RX ORDER — DIPHENHYDRAMINE HYDROCHLORIDE 50 MG/ML
50 INJECTION, SOLUTION INTRAMUSCULAR; INTRAVENOUS ONCE
Status: COMPLETED | OUTPATIENT
Start: 2021-11-09 | End: 2021-11-09

## 2021-11-09 RX ORDER — SODIUM CHLORIDE 9 MG/ML
10 INJECTION INTRAMUSCULAR; INTRAVENOUS; SUBCUTANEOUS AS NEEDED
Status: ACTIVE | OUTPATIENT
Start: 2021-11-09 | End: 2021-11-09

## 2021-11-09 RX ORDER — SODIUM CHLORIDE 0.9 % (FLUSH) 0.9 %
10 SYRINGE (ML) INJECTION AS NEEDED
Status: DISPENSED | OUTPATIENT
Start: 2021-11-09 | End: 2021-11-09

## 2021-11-09 RX ADMIN — PACLITAXEL 139 MG: 6 INJECTION, SOLUTION INTRAVENOUS at 13:14

## 2021-11-09 RX ADMIN — SODIUM CHLORIDE 10 ML: 9 INJECTION INTRAMUSCULAR; INTRAVENOUS; SUBCUTANEOUS at 10:55

## 2021-11-09 RX ADMIN — Medication 500 UNITS: at 15:15

## 2021-11-09 RX ADMIN — Medication 10 ML: at 10:55

## 2021-11-09 RX ADMIN — SODIUM CHLORIDE 25 ML/HR: 9 INJECTION, SOLUTION INTRAVENOUS at 12:39

## 2021-11-09 RX ADMIN — FAMOTIDINE 20 MG: 10 INJECTION, SOLUTION INTRAVENOUS at 12:40

## 2021-11-09 RX ADMIN — DEXAMETHASONE SODIUM PHOSPHATE 4 MG: 4 INJECTION, SOLUTION INTRA-ARTICULAR; INTRALESIONAL; INTRAMUSCULAR; INTRAVENOUS; SOFT TISSUE at 12:44

## 2021-11-09 RX ADMIN — Medication 10 ML: at 15:15

## 2021-11-09 RX ADMIN — DIPHENHYDRAMINE HYDROCHLORIDE 50 MG: 50 INJECTION INTRAMUSCULAR; INTRAVENOUS at 12:48

## 2021-11-09 NOTE — PROGRESS NOTES
[facility] at [address] [place] [address] [address], [place]: [phone]  F: [phone]    Reason for Visit:   Dena Goyal is a 78 y.o. female seen today in office for follow up of Right Breast Cancer on adjuvant weekly Taxol + Herceptin. Treatment History:   · Abnormal screening mammo 6/3/21: Bilateral digital screening mammography was performed and is interpreted in conjunction with a computer assisted detection (CAD) system. In the left breast, no suspicious masses or calcifications are identified. The right breast upper outer quadrant at 10:00 posterior depth 9 cm from the nipple there is a focal asymmetry  · Right Dx Mammo/US 6/7/21: Further evaluation was performed for a right breast focal asymmetry. In the right breast upper outer quadrant at 10:00 posterior depth there is an equal density irregular mass with indistinct margins measuring up to 1.7 cm. In the right breast at 10:00 10 cm from the nipple there is a 1.6 x 0.9 x 1.0 cm hypoechoic oval mass with indistinct margins, internal vascularity, and posterior acoustic enhancement  · Right Breast Biopsy 6/18/21: PATH - Invasive ductal carcinoma, [place] histologic grade 3. Largest glass slide measurement of invasive carcinoma: 6 mm   · ER/SC negative, HER2 FISH +  · Ki67 90%  · Right Lumpectomy 8/3/2: PATH - 20 mm IDC with negative LNs. Margins negative. · Adjuvant weekly Taxol + Herceptin 8/31/21 - Current     STAGE: Pathologic T1cN0, ER/SC negative, HER2+     History of Present Illness:   Dena Goyal is a 78 y.o. female seen today in office for follow up of right breast cancer post lumpectomy 8/3/21. She started adjuvant weekly Taxol + Herceptin on 8/31/21. On adjuvant weekly Taxol + Herceptin. She reports that she feels well overall today. She is tolerating current regimen well overall. She continues to have emotional lability but much better with addition of Ativan.    Sleeping is better with Ambien PRN. CBC is stable/good from yesterday. Her supportive  is here today. No fevers/ chills/ chest pain/ SOB/ nausea/ vomiting/diarrhea/ pain/fatigue      Past Medical History:   Diagnosis Date    Arthritis     left ankle    Breast cancer ([address]     RIGHT    Chronic kidney disease     kidney stone    Chronic pain of left ankle 2017    Seeing Dr Rajesh Palencia    Fracture     left ankle    Ill-defined condition     PICC line for UTI - borderline sepsis    Osteopenia 2017      Past Surgical History:   Procedure Laterality Date    COLONOSCOPY N/A 2019    tubular adenoma, repeat 1 yr d/t poor prep - performed by Kwabena Castillo MD at [address] [facility];     COLONOSCOPY N/A 2020    normal - performed by Kwabena Castillo MD at [address] [facility] [address] Left     surgery - has a plate a screws    HX BREAST LUMPECTOMY Right 8/3/2021    RIGHT BREAST LUMPECTOMY WITH ULTRASOUND AND RIGHT BREAST SENTINEL NODE BIOPSY AND PORTACATH INSERTION performed by Darío Mcneill MD at [address] [place] [address] OR     [address]      HX GI      COLONOSCOPY    HX OTHER SURGICAL Right 2020    growth removed from right eyelid    HX OTHER SURGICAL Right 2020    PICC line placed for IV abx, removed after completing course for pyelonephritis    HX POLYPECTOMY      HX TONSILLECTOMY      ME BIOPSY OF BREAST, NEEDLE CORE Right       Social History     Tobacco Use    Smoking status: Former Smoker     Quit date:      Years since quittin.8    Smokeless tobacco: Never Used    Tobacco comment: was a causal smoker when smoker in college   Substance Use Topics    Alcohol use:  Yes     Alcohol/week: 5.0 standard drinks     Types: 5 Glasses of wine per week      Family History   Problem Relation Age of Onset    Cancer Mother         Brain tumor    Other Father         ? lung problem    Cancer Sister         tumors of spine    Other Sister         kidney stones    Other Daughter         kidney stone    Other Maternal Aunt         kidney stones    No Known Problems Sister     No Known Problems Sister     Anesth Problems Neg Hx      Current Outpatient Medications   Medication Sig    alendronate (FOSAMAX) 70 mg tablet TAKE ONE TABLET BY MOUTH ONCE WEEKLY ON AN EMPTY STOMACH BEFORE BREAKFAST. REMAIN UPRIGHT FOR 30 MINUTES AND TAKE WITH 8 OUNCES OF WATER.  zolpidem (AMBIEN) 5 mg tablet TAKE ONE TABLET BY MOUTH AT BEDTIME AS NEEDED FOR SLEEP    LORazepam (ATIVAN) 0.5 mg tablet Take 1 Tablet by mouth three (3) times daily as needed for Anxiety. Max Daily Amount: 1.5 mg.    lidocaine-prilocaine (EMLA) topical cream Apply  to affected area as needed for Pain.  CRANIAL PROSTHESIS misc Breast cancer for chemo/ alopecia needs wig    ondansetron (ZOFRAN ODT) 4 mg disintegrating tablet Take 1 Tablet by mouth every eight (8) hours as needed for Nausea or Vomiting.  prochlorperazine (Compazine) 5 mg tablet Take 1-2 Tablets by mouth every six (6) hours as needed for Nausea or Vomiting.  ondansetron (ZOFRAN ODT) 4 mg disintegrating tablet Take 1 Tablet by mouth every eight (8) hours as needed for Nausea or Vomiting.  cortisone acetate (CORTISONE PO) Take  by mouth. Injections every 3 months in her foot    DULoxetine (CYMBALTA) 20 mg capsule TAKE ONE CAPSULE BY MOUTH EVERY EVENING    acetaminophen (TYLENOL) 500 mg tablet 1 or 2 every 6 hrs prn for headache     No current facility-administered medications for this visit. Allergies   Allergen Reactions    Adhesive Tape-Silicones Rash    Other Medication Hives     Cranberry or Probiotic, had severe itching and hives, not sure to which. Has taken a probiotic recently without a reaction.  Oxycodone Itching      Review of Systems:  A complete review of systems was obtained, negative except as described above and as reported on ROS sheet scanned into system. Physical Exam:     Visit Vitals  /79 (BP 1 Location: Left upper arm, BP Patient Position: Sitting)   Pulse 88   Temp 96.8 °F (36 °C) (Temporal)   Ht 5' 4\" (1.626 m)   Wt 153 lb 14.4 oz (69.8 kg)   SpO2 98%   BMI 26.42 kg/m²     ECOG PS: 0  General: No distress  Eyes: Anicteric sclerae  HENT: Atraumatic, wearing a mask  Neck: Supple  Respiratory:  normal respiratory effort, lungs clear  CV regular  GI soft NT  Ext no edema  MS: Normal gait and station. Digits without clubbing or cyanosis. Skin: No rashes, ecchymoses, or petechiae. Normal temperature, turgor, and texture. Psych: Alert, oriented, appropriate affect, normal judgment/insight  Neuro: Non focal    Results:     Lab Results   Component Value Date/Time    WBC 2.6 (L) 11/08/2021 10:38 AM    HGB 10.2 (L) 11/08/2021 10:38 AM    HCT 31.2 (L) 11/08/2021 10:38 AM    PLATELET 870 40/92/8832 10:38 AM    .8 (H) 11/08/2021 10:38 AM    ABS. NEUTROPHILS 1.4 (L) 11/08/2021 10:38 AM     Lab Results   Component Value Date/Time    Sodium 140 11/01/2021 10:38 AM    Potassium 4.4 11/01/2021 10:38 AM    Chloride 107 11/01/2021 10:38 AM    CO2 25 11/01/2021 10:38 AM    Glucose 93 11/01/2021 10:38 AM    BUN 21 (H) 11/01/2021 10:38 AM    Creatinine 0.67 11/01/2021 10:38 AM    GFR est AA >60 11/01/2021 10:38 AM    GFR est non-AA >60 11/01/2021 10:38 AM    Calcium 9.6 11/01/2021 10:38 AM     Lab Results   Component Value Date/Time    Bilirubin, total 0.5 11/01/2021 10:38 AM    ALT (SGPT) 28 11/01/2021 10:38 AM    Alk. phosphatase 78 11/01/2021 10:38 AM    Protein, total 6.3 (L) 11/01/2021 10:38 AM    Albumin 3.7 11/01/2021 10:38 AM    Globulin 2.6 11/01/2021 10:38 AM     6/18/21  FINAL PATHOLOGIC DIAGNOSIS   Breast, right, needle core biopsy:   Invasive ductal carcinoma, [place] histologic grade 3   Largest glass slide measurement of invasive carcinoma: 6 mm   ER/MN negative, HER2 FISH +    8/3/21  FINAL PATHOLOGIC DIAGNOSIS   1.  Lymph node, right axillary sentinel lymph node #1, excision:   One lymph node negative for metastatic carcinoma. 2. Lymph node, right axillary sentinel lymph node #2, excision:   One lymph node negative for metastatic carcinoma. 3. Right breast, lumpectomy, excision:   Procedure: Excision (less than total mastectomy)   Specimen Laterality: Right   TUMOR   Histologic Type: Invasive carcinoma of no special type (ductal)   Glandular (Acinar) / Tubular Differentiation: Score 3   Nuclear Pleomorphism: Score 3   Mitotic Rate: Score 3   Overall Grade: Grade 3   Tumor Size: 20 mm   Tumor Focality: Single focus of invasive carcinoma   Ductal Carcinoma in Situ (DCIS): Not identified   Tumor Extent   Lymphovascular Invasion: Not identified   Treatment Effect in the Breast: No known presurgical therapy   MARGINS   Invasive Carcinoma Margins: Uninvolved by invasive carcinoma   Distance from Closest Margin (Millimeters): Less than: 1 mm   Closest Margin(s): Anterior   LYMPH NODES   Regional Lymph Nodes: Uninvolved by tumor cells   Total Number of Lymph Nodes Examined: 2   Number of [place] Nodes Examined: 2   PATHOLOGIC STAGE CLASSIFICATION (pTNM, AJCC 8th Edition)   Primary Tumor (pT): pT1c N0(sn)   Breast biomarkers previously reported (H725320): ER negative, MN negative, HER-2/mili positive by FISH. Tumor blocks for potential additional studies: 3-B-D.   4. Right breast, lateral margin right breast, excision:   Benign fibroadipose tissue with minimal benign fibroglandular breast tissue. 5. Right breast, medial margin right breast, excision:   Benign fibroglandular breast tissue with epithelial cyst and usual ductal hyperplasia. 6. Right breast, inferior margin right breast, excision:   Benign fibroglandular breast tissue. 08/23/21    ECHO ADULT COMPLETE 08/23/2021 8/23/2021    Interpretation Summary  · LV: Estimated LVEF is 60 - 65%. Normal cavity size, wall thickness and systolic function (ejection fraction normal).  Normal left ventricular strain. Global longitudinal strain is -15.9%. Mild (grade 1) left ventricular diastolic dysfunction. Signed by: Nora Perez MD on 8/23/2021  1:01 PM    Records reviewed and summarized above. Pathology report(s) reviewed above. Radiology report(s) reviewed above. Assessment:/PLAN     1) Stage 1 Breast Cancer ER- Her2+ post Lumpectomy 8/3/21  Post lumpectomy in August. Reviewed path in detail. Discussed no hormonal therapy options due to ER/SC negative. Port placed on 8/3/21. Pre chemo ECHO 8/23/21 stable with EF 60-65%. Referred to Rad/Onc - appt 9/20/21. Patient started weekly Taxol/Herceptin on 8/31/21. Here for weekly Taxol/Herceptin    She is tolerating treatment well overall with some emotional lability and insomnia. doing Herceptin SQ for duration of treatment. She is clinically stable and doing well overall. Labs (CBC) from yesterday reviewed. Patient is ready for treatment today. ECHO due again by 11/23/21. Ordered. Follow up weekly in [facility]. Follow up in 2 weeks in [facility]/office. Patient agrees with plan. 2) Arthritis/Hx of Kidney Stone/Osteopenia/anxiety. Management per PCP. 3) Management of High Risk Medications - Chemotherapy  Toxicities include Grade 1 Nausea and Grade 2 Insomnia/Emotional Lability   Reviewed side effect management today - recommended meditation, listening calming music, walk around the block, vs anti-anxiety med. Patient continues to be emotional on chemo but much better overall with additional of Ativan 0.5 mg TID PRN. Continue Ambien as needed for sleep. Labs (CBC) from yesterday reviewed. No dose adjustments needed. Will monitor for side effects. ECHO due again by 11/23/21. Ordered. 4) anxiety about health. Ativan prn while on chemo. 5) Psychosocial  Mood good, coping well. Her family is very supportive. SW/NN support as needed. Her  is here today. Call if questions.   Follow up in 1 week in [facility] and 4 weeks in [facility]/office. I appreciate the opportunity to participate in Ms. Fried Jovi Meghana's care.     Signed By: Matt Mayfield, DO

## 2021-11-09 NOTE — PROGRESS NOTES
Zia Bruno is a 78 y.o. female  Chief Complaint   Patient presents with    Chemotherapy    Breast Cancer     1. Have you been to the ER, urgent care clinic since your last visit? Hospitalized since your last visit? No.    2. Have you seen or consulted any other health care providers outside of the [address] [place] [address] since your last visit? Include any pap smears or colon screening.  No.

## 2021-11-09 NOTE — PROGRESS NOTES
[facility] Chemo Progress Note    Date: 2021    Name: Martha Hager    MRN: [id number]         : 1950    1055 Ms. Kowalski Arrived to [facility] for C4D8 Taxol/Cold Cap ambulatory in stable condition. Assessment was completed, no acute issues at this time, no new complaints voiced. Port accessed with positive blood return. Labs drawn and sent for processing. Went to provider appointment with Medical Oncology, patient returned to [facility], Normal Saline started at [facility]. Chemotherapy Flowsheet 2021   Cycle C4D1   Date 2021   Drug / Regimen Taxol/Hylecta   Pre Meds given   Notes given         Patient denies SOB, fever, cough, general not feeling well. Patient denies recent exposure to someone who has tested positive for COVID-19. Patient denies having contact with anyone who has a pending COVID test.      Ms. Bobby Banuelos vitals were reviewed. Patient Vitals for the past 12 hrs:   Temp Pulse Resp BP   21 1515  74 16 (!) 158/97   21 1134 96.8 °F (36 °C) 85 16 (!) 133/90         Lab results were obtained and reviewed. No results found for this or any previous visit (from the past 12 hour(s)). Pre-medications  were administered as ordered and chemotherapy was initiated.   Medications Administered     0.9% sodium chloride infusion     Admin Date  2021 Action  New Bag Dose  25 mL/hr Rate  25 mL/hr Route  IntraVENous Administered By  Jacquelyn Grande, RN          0.9% sodium chloride injection 10 mL     Admin Date  2021 Action  Given Dose  10 mL Route  IntraVENous Administered By  Jacquelyn Grande RN          dexamethasone (DECADRON) 4 mg/mL injection 4 mg     Admin Date  2021 Action  Given Dose  4 mg Route  IntraVENous Administered By  Jacquelyn Grande, RN          diphenhydrAMINE (BENADRYL) injection 50 mg     Admin Date  2021 Action  Given Dose  50 mg Route  IntraVENous Administered By  Jacquelyn Grande RN          famotidine (PF) (PEPCID) 20 mg in 0.9% sodium chloride 10 mL injection     Admin Date  11/09/2021 Action  Given Dose  20 mg Route  IntraVENous Administered By  Mary Jo Palomino RN          heparin (porcine) pf 300-500 Units     Admin Date  11/09/2021 Action  Given Dose  500 Units Route  InterCATHeter Administered By  Mary Jo Palomino RN          PACLitaxeL (TAXOL) 139 mg in 0.9% sodium chloride 250 mL, overfill volume 25 mL chemo infusion     Admin Date  11/09/2021 Action  New Bag Dose  139 mg Rate  298.2 mL/hr Route  IntraVENous Administered By  Mary Jo Palomino RN          sodium chloride (NS) flush 10 mL     Admin Date  11/09/2021 Action  Given Dose  10 mL Route  IntraVENous Administered By  Mary Jo Palomino RN           Admin Date  11/09/2021 Action  Given Dose  10 mL Route  IntraVENous Administered By  Mary Jo Palomino RN                  [id number] Patient tolerated treatment well. Port maintained positive blood return throughout treatment. Port flushed, heparinized and de accessed per protocol. Patient was discharged from [facility] in stable condition. Patient aware of next appointment.      Future Appointments   Date Time Provider Wendie Escobar   11/15/2021 10:45 AM H3 NAT LAB [facility]   11/16/2021 11:00 AM B3 NAT MED TX [facility]   11/22/2021 10:45 AM H3 NAT LAB [facility]   11/23/2021 11:30 AM H2 NAT FASTRACK [facility]   12/13/2021 10:45 AM H3 NAT LAB [facility]   12/14/2021 11:00 AM G2 NAT FASTRACK [facility]   1/3/2022 10:30 AM H3 NAT LAB [facility]   1/4/2022 11:00 AM H1 NAT [facility]STRACK [facility]   1/24/2022 10:30 AM H3 NAT LAB [facility]   1/25/2022 11:00 AM H1 NAT [facility]STRACK [facility]   2/21/2022 10:45 AM Alisha Zimmerman MD [facility] BS AMB   5/4/2022 10:30 AM Stephenie Mutton, MD FRED Oakes, RN  November 9, 2021

## 2021-11-11 DIAGNOSIS — G47.00 INSOMNIA, UNSPECIFIED TYPE: ICD-10-CM

## 2021-11-11 DIAGNOSIS — C50.911 INFILTRATING DUCTAL CARCINOMA OF RIGHT BREAST (HCC): ICD-10-CM

## 2021-11-12 ENCOUNTER — TELEPHONE (OUTPATIENT)
Dept: ONCOLOGY | Age: 71
End: 2021-11-12

## 2021-11-12 RX ORDER — ZOLPIDEM TARTRATE 5 MG/1
TABLET ORAL
Qty: 15 TABLET | Refills: 0 | Status: SHIPPED | OUTPATIENT
Start: 2021-11-12 | End: 2022-05-04

## 2021-11-15 ENCOUNTER — HOSPITAL ENCOUNTER (OUTPATIENT)
Dept: INFUSION THERAPY | Age: 71
Discharge: HOME | End: 2021-11-15
Payer: MEDICARE

## 2021-11-15 VITALS
SYSTOLIC BLOOD PRESSURE: 146 MMHG | OXYGEN SATURATION: 100 % | RESPIRATION RATE: 18 BRPM | DIASTOLIC BLOOD PRESSURE: 84 MMHG | TEMPERATURE: 96.8 F | HEART RATE: 77 BPM

## 2021-11-15 LAB
BASOPHILS # BLD: 0 K/UL (ref 0–0.1)
BASOPHILS NFR BLD: 0 % (ref 0–1)
DIFFERENTIAL METHOD BLD: ABNORMAL
EOSINOPHIL # BLD: 0 K/UL (ref 0–0.4)
EOSINOPHIL NFR BLD: 2 % (ref 0–7)
ERYTHROCYTE [DISTWIDTH] IN BLOOD BY AUTOMATED COUNT: 15.5 % (ref 11.5–14.5)
HCT VFR BLD AUTO: 29.7 % (ref 35–47)
HGB BLD-MCNC: 9.9 G/DL (ref 11.5–16)
IMM GRANULOCYTES # BLD AUTO: 0 K/UL (ref 0–0.04)
IMM GRANULOCYTES NFR BLD AUTO: 0 % (ref 0–0.5)
LYMPHOCYTES # BLD: 0.8 K/UL (ref 0.8–3.5)
LYMPHOCYTES NFR BLD: 35 % (ref 12–49)
MCH RBC QN AUTO: 35.1 PG (ref 26–34)
MCHC RBC AUTO-ENTMCNC: 33.3 G/DL (ref 30–36.5)
MCV RBC AUTO: 105.3 FL (ref 80–99)
MONOCYTES # BLD: 0.1 K/UL (ref 0–1)
MONOCYTES NFR BLD: 6 % (ref 5–13)
NEUTS SEG # BLD: 1.4 K/UL (ref 1.8–8)
NEUTS SEG NFR BLD: 57 % (ref 32–75)
NRBC # BLD: 0 K/UL (ref 0–0.01)
NRBC BLD-RTO: 0 PER 100 WBC
PLATELET # BLD AUTO: 214 K/UL (ref 150–400)
PMV BLD AUTO: 10.8 FL (ref 8.9–12.9)
RBC # BLD AUTO: 2.82 M/UL (ref 3.8–5.2)
RBC MORPH BLD: ABNORMAL
RBC MORPH BLD: ABNORMAL
WBC # BLD AUTO: 2.3 K/UL (ref 3.6–11)

## 2021-11-15 PROCEDURE — 85025 COMPLETE CBC W/AUTO DIFF WBC: CPT

## 2021-11-15 PROCEDURE — 36415 COLL VENOUS BLD VENIPUNCTURE: CPT

## 2021-11-15 NOTE — PROGRESS NOTES
[facility] Lab Visit:        8064:  Pt arrived ambulatory to [facility] in stable condition, for lab draw. Labs drawn peripherally from Right AC arm and sent for processing. Pt tolerated well. Visit Vitals  BP (!) 146/84 (BP 1 Location: Right upper arm, BP Patient Position: Sitting)   Pulse 77   Temp 96.8 °F (36 °C)   Resp 18   SpO2 100%       1100: No new concerns voiced. D/cd home ambulatory in no distress. Pt aware of next [facility] appointment scheduled. Labs available in CC once resulted.

## 2021-11-16 ENCOUNTER — HOSPITAL ENCOUNTER (OUTPATIENT)
Dept: INFUSION THERAPY | Age: 71
Discharge: HOME | End: 2021-11-16
Payer: MEDICARE

## 2021-11-16 VITALS
HEIGHT: 64 IN | BODY MASS INDEX: 26.6 KG/M2 | WEIGHT: 155.8 LBS | SYSTOLIC BLOOD PRESSURE: 178 MMHG | RESPIRATION RATE: 18 BRPM | HEART RATE: 74 BPM | TEMPERATURE: 97.6 F | DIASTOLIC BLOOD PRESSURE: 90 MMHG

## 2021-11-16 DIAGNOSIS — C50.911 INFILTRATING DUCTAL CARCINOMA OF RIGHT BREAST (HCC): Primary | ICD-10-CM

## 2021-11-16 PROCEDURE — 74011000250 HC RX REV CODE- 250: Performed by: INTERNAL MEDICINE

## 2021-11-16 PROCEDURE — 74011250636 HC RX REV CODE- 250/636: Performed by: INTERNAL MEDICINE

## 2021-11-16 PROCEDURE — 96413 CHEMO IV INFUSION 1 HR: CPT

## 2021-11-16 PROCEDURE — 77030012965 HC NDL HUBR BBMI -A

## 2021-11-16 PROCEDURE — 96375 TX/PRO/DX INJ NEW DRUG ADDON: CPT

## 2021-11-16 RX ORDER — ACETAMINOPHEN 325 MG/1
650 TABLET ORAL
Status: CANCELLED | OUTPATIENT
Start: 2021-11-23

## 2021-11-16 RX ORDER — ONDANSETRON 2 MG/ML
8 INJECTION INTRAMUSCULAR; INTRAVENOUS AS NEEDED
Status: CANCELLED | OUTPATIENT
Start: 2021-11-23

## 2021-11-16 RX ORDER — SODIUM CHLORIDE 0.9 % (FLUSH) 0.9 %
10 SYRINGE (ML) INJECTION AS NEEDED
Status: CANCELLED | OUTPATIENT
Start: 2021-11-23

## 2021-11-16 RX ORDER — SODIUM CHLORIDE 9 MG/ML
25 INJECTION, SOLUTION INTRAVENOUS CONTINUOUS
Status: CANCELLED | OUTPATIENT
Start: 2021-11-23

## 2021-11-16 RX ORDER — HEPARIN 100 UNIT/ML
300-500 SYRINGE INTRAVENOUS AS NEEDED
Status: CANCELLED
Start: 2021-11-23

## 2021-11-16 RX ORDER — HYDROCORTISONE SODIUM SUCCINATE 100 MG/2ML
100 INJECTION, POWDER, FOR SOLUTION INTRAMUSCULAR; INTRAVENOUS AS NEEDED
Status: CANCELLED | OUTPATIENT
Start: 2021-11-23

## 2021-11-16 RX ORDER — EPINEPHRINE 1 MG/ML
0.3 INJECTION, SOLUTION, CONCENTRATE INTRAVENOUS AS NEEDED
Status: CANCELLED | OUTPATIENT
Start: 2021-11-23

## 2021-11-16 RX ORDER — DIPHENHYDRAMINE HYDROCHLORIDE 50 MG/ML
50 INJECTION, SOLUTION INTRAMUSCULAR; INTRAVENOUS
Status: CANCELLED | OUTPATIENT
Start: 2021-11-23

## 2021-11-16 RX ORDER — DEXAMETHASONE SODIUM PHOSPHATE 4 MG/ML
4 INJECTION, SOLUTION INTRA-ARTICULAR; INTRALESIONAL; INTRAMUSCULAR; INTRAVENOUS; SOFT TISSUE ONCE
Status: COMPLETED | OUTPATIENT
Start: 2021-11-16 | End: 2021-11-16

## 2021-11-16 RX ORDER — DIPHENHYDRAMINE HYDROCHLORIDE 50 MG/ML
50 INJECTION, SOLUTION INTRAMUSCULAR; INTRAVENOUS ONCE
Status: COMPLETED | OUTPATIENT
Start: 2021-11-16 | End: 2021-11-16

## 2021-11-16 RX ORDER — SODIUM CHLORIDE 9 MG/ML
25 INJECTION, SOLUTION INTRAVENOUS CONTINUOUS
Status: DISPENSED | OUTPATIENT
Start: 2021-11-16 | End: 2021-11-16

## 2021-11-16 RX ORDER — DIPHENHYDRAMINE HYDROCHLORIDE 50 MG/ML
25 INJECTION, SOLUTION INTRAMUSCULAR; INTRAVENOUS AS NEEDED
Status: CANCELLED
Start: 2021-11-23

## 2021-11-16 RX ORDER — SODIUM CHLORIDE 9 MG/ML
10 INJECTION INTRAMUSCULAR; INTRAVENOUS; SUBCUTANEOUS AS NEEDED
Status: CANCELLED | OUTPATIENT
Start: 2021-11-23

## 2021-11-16 RX ORDER — SODIUM CHLORIDE 9 MG/ML
10 INJECTION INTRAMUSCULAR; INTRAVENOUS; SUBCUTANEOUS AS NEEDED
Status: DISPENSED | OUTPATIENT
Start: 2021-11-16 | End: 2021-11-16

## 2021-11-16 RX ORDER — ALBUTEROL SULFATE 0.83 MG/ML
2.5 SOLUTION RESPIRATORY (INHALATION) AS NEEDED
Status: CANCELLED
Start: 2021-11-23

## 2021-11-16 RX ORDER — HEPARIN 100 UNIT/ML
300-500 SYRINGE INTRAVENOUS AS NEEDED
Status: ACTIVE | OUTPATIENT
Start: 2021-11-16 | End: 2021-11-16

## 2021-11-16 RX ORDER — DIPHENHYDRAMINE HYDROCHLORIDE 50 MG/ML
50 INJECTION, SOLUTION INTRAMUSCULAR; INTRAVENOUS AS NEEDED
Status: CANCELLED
Start: 2021-11-23

## 2021-11-16 RX ORDER — SODIUM CHLORIDE 0.9 % (FLUSH) 0.9 %
10 SYRINGE (ML) INJECTION AS NEEDED
Status: DISPENSED | OUTPATIENT
Start: 2021-11-16 | End: 2021-11-16

## 2021-11-16 RX ORDER — ACETAMINOPHEN 325 MG/1
650 TABLET ORAL AS NEEDED
Status: CANCELLED
Start: 2021-11-23

## 2021-11-16 RX ADMIN — Medication 10 ML: at 11:00

## 2021-11-16 RX ADMIN — DEXAMETHASONE SODIUM PHOSPHATE 4 MG: 4 INJECTION, SOLUTION INTRA-ARTICULAR; INTRALESIONAL; INTRAMUSCULAR; INTRAVENOUS; SOFT TISSUE at 12:47

## 2021-11-16 RX ADMIN — DIPHENHYDRAMINE HYDROCHLORIDE 50 MG: 50 INJECTION INTRAMUSCULAR; INTRAVENOUS at 12:50

## 2021-11-16 RX ADMIN — SODIUM CHLORIDE 25 ML/HR: 900 INJECTION, SOLUTION INTRAVENOUS at 12:42

## 2021-11-16 RX ADMIN — PACLITAXEL 139 MG: 6 INJECTION, SOLUTION INTRAVENOUS at 13:16

## 2021-11-16 RX ADMIN — SODIUM CHLORIDE 10 ML: 9 INJECTION INTRAMUSCULAR; INTRAVENOUS; SUBCUTANEOUS at 11:00

## 2021-11-16 RX ADMIN — FAMOTIDINE 20 MG: 10 INJECTION INTRAVENOUS at 12:43

## 2021-11-16 NOTE — PROGRESS NOTES
[facility] Chemo Progress Note    Date: 2021    Name: Tiago Murphy    MRN: [id number]         : 1950    1100 Ms. Kowalski Arrived to [facility] for C4D15 Taxol/Cold Cap ambulatory in stable condition. Assessment was completed, no acute issues at this time, no new complaints voiced. Port accessed with positive blood return. Labs drawn and sent for processing. Normal Saline started at [facility]. Chemotherapy Flowsheet 2021   Cycle C4D15   Date 2021   Drug / Regimen Taxol   Pre Meds given   Notes given         Patient denies SOB, fever, cough, general not feeling well. Patient denies recent exposure to someone who has tested positive for COVID-19. Patient denies having contact with anyone who has a pending COVID test.      Ms. Ruben Wilkerson vitals were reviewed. Patient Vitals for the past 12 hrs:   Temp Pulse Resp BP   21 1056 97.6 °F (36.4 °C) 86 18 129/79         Lab results were obtained and reviewed. No results found for this or any previous visit (from the past 12 hour(s)). Pre-medications  were administered as ordered and chemotherapy was initiated.   Medications Administered     0.9% sodium chloride infusion     Admin Date  2021 Action  New Bag Dose  25 mL/hr Rate  25 mL/hr Route  IntraVENous Administered By  Cecily Hurst RN          0.9% sodium chloride injection 10 mL     Admin Date  2021 Action  Given Dose  10 mL Route  IntraVENous Administered By  Cecily Hurst RN          dexamethasone (DECADRON) 4 mg/mL injection 4 mg     Admin Date  2021 Action  Given Dose  4 mg Route  IntraVENous Administered By  Cecily Hurst RN          diphenhydrAMINE (BENADRYL) injection 50 mg     Admin Date  2021 Action  Given Dose  50 mg Route  IntraVENous Administered By  Cecily Hurst RN          famotidine (PF) (PEPCID) 20 mg in 0.9% sodium chloride 10 mL injection     Admin Date  2021 Action  Given Dose  20 mg Route  IntraVENous Administered By  David Perez Delonte Mckeon RN          PACLitaxeL (TAXOL) 139 mg in 0.9% sodium chloride 250 mL, overfill volume 25 mL chemo infusion     Admin Date  11/16/2021 Action  New Bag Dose  139 mg Rate  298.2 mL/hr Route  IntraVENous Administered By  Harsh Aldana RN          sodium chloride (NS) flush 10 mL     Admin Date  11/16/2021 Action  Given Dose  10 mL Route  IntraVENous Administered By  Harsh Aldana RN                  [id number] Patient tolerated treatment well. Port maintained positive blood return throughout treatment. Port flushed, heparinized and de accessed per protocol. Patient was discharged from [place] in stable condition. Patient aware of next appointment.      Future Appointments   Date Time Provider Wendie Luz   11/22/2021 10:45 AM H3 NAT LAB [facility]   11/23/2021 11:30 AM H2 NAT FASTRACK [facility]   12/13/2021 10:45 AM H3 NAT LAB [facility]   12/14/2021 11:00 AM G2 NTA FASTRACK [facility]   1/3/2022 10:30 AM H3 NAT LAB [facility]   1/4/2022 11:00 AM H1 NAT FASTRACK [facility]   1/24/2022 10:30 AM H3 NAT LAB [facility]   1/25/2022 11:00 AM H1 NAT FASTRACK [facility]   2/21/2022 10:45 AM Eleanor Cheng MD [facility] BS AMB   5/4/2022 10:30 AM Kyler Smith MD [facility] BS AMB         Ida Jin RN  November 16, 2021

## 2021-11-22 ENCOUNTER — HOSPITAL ENCOUNTER (OUTPATIENT)
Dept: RADIATION THERAPY | Age: 71
Discharge: HOME | End: 2021-11-22

## 2021-11-23 ENCOUNTER — OFFICE VISIT (OUTPATIENT)
Dept: ONCOLOGY | Age: 71
End: 2021-11-23
Payer: MEDICARE

## 2021-11-23 ENCOUNTER — HOSPITAL ENCOUNTER (OUTPATIENT)
Dept: INFUSION THERAPY | Age: 71
Discharge: HOME | End: 2021-11-23
Payer: MEDICARE

## 2021-11-23 VITALS
DIASTOLIC BLOOD PRESSURE: 82 MMHG | TEMPERATURE: 97.1 F | HEART RATE: 78 BPM | BODY MASS INDEX: 26.6 KG/M2 | OXYGEN SATURATION: 98 % | HEIGHT: 64 IN | SYSTOLIC BLOOD PRESSURE: 161 MMHG | WEIGHT: 155.8 LBS

## 2021-11-23 VITALS
SYSTOLIC BLOOD PRESSURE: 161 MMHG | DIASTOLIC BLOOD PRESSURE: 82 MMHG | RESPIRATION RATE: 18 BRPM | HEART RATE: 72 BPM | TEMPERATURE: 97.1 F

## 2021-11-23 DIAGNOSIS — M19.90 ARTHRITIS: ICD-10-CM

## 2021-11-23 DIAGNOSIS — G47.00 INSOMNIA, UNSPECIFIED TYPE: ICD-10-CM

## 2021-11-23 DIAGNOSIS — Z51.81 ENCOUNTER FOR MONITORING CARDIOTOXIC DRUG THERAPY: ICD-10-CM

## 2021-11-23 DIAGNOSIS — C50.911 INFILTRATING DUCTAL CARCINOMA OF RIGHT BREAST (HCC): Primary | ICD-10-CM

## 2021-11-23 DIAGNOSIS — Z87.442 HISTORY OF KIDNEY STONES: ICD-10-CM

## 2021-11-23 DIAGNOSIS — Z98.890 HISTORY OF LUMPECTOMY OF RIGHT BREAST: ICD-10-CM

## 2021-11-23 DIAGNOSIS — M85.89 OSTEOPENIA OF MULTIPLE SITES: ICD-10-CM

## 2021-11-23 DIAGNOSIS — Z95.828 PORT-A-CATH IN PLACE: ICD-10-CM

## 2021-11-23 DIAGNOSIS — Z79.899 ENCOUNTER FOR MONITORING CARDIOTOXIC DRUG THERAPY: ICD-10-CM

## 2021-11-23 DIAGNOSIS — F41.8 ANXIETY ABOUT HEALTH: ICD-10-CM

## 2021-11-23 LAB
ALBUMIN SERPL-MCNC: 3.6 G/DL (ref 3.5–5)
ALBUMIN/GLOB SERPL: 1.2 {RATIO} (ref 1.1–2.2)
ALP SERPL-CCNC: 68 U/L (ref 45–117)
ALT SERPL-CCNC: 34 U/L (ref 12–78)
ANION GAP SERPL CALC-SCNC: 3 MMOL/L (ref 5–15)
AST SERPL-CCNC: 13 U/L (ref 15–37)
BASOPHILS # BLD: 0 K/UL (ref 0–0.1)
BASOPHILS NFR BLD: 1 % (ref 0–1)
BILIRUB SERPL-MCNC: 0.4 MG/DL (ref 0.2–1)
BUN SERPL-MCNC: 17 MG/DL (ref 6–20)
BUN/CREAT SERPL: 25 (ref 12–20)
CALCIUM SERPL-MCNC: 9.4 MG/DL (ref 8.5–10.1)
CHLORIDE SERPL-SCNC: 108 MMOL/L (ref 97–108)
CO2 SERPL-SCNC: 27 MMOL/L (ref 21–32)
CREAT SERPL-MCNC: 0.68 MG/DL (ref 0.55–1.02)
DIFFERENTIAL METHOD BLD: ABNORMAL
EOSINOPHIL # BLD: 0.1 K/UL (ref 0–0.4)
EOSINOPHIL NFR BLD: 3 % (ref 0–7)
ERYTHROCYTE [DISTWIDTH] IN BLOOD BY AUTOMATED COUNT: 15 % (ref 11.5–14.5)
GLOBULIN SER CALC-MCNC: 2.9 G/DL (ref 2–4)
GLUCOSE SERPL-MCNC: 82 MG/DL (ref 65–100)
HCT VFR BLD AUTO: 31 % (ref 35–47)
HGB BLD-MCNC: 10.4 G/DL (ref 11.5–16)
IMM GRANULOCYTES # BLD AUTO: 0 K/UL (ref 0–0.04)
IMM GRANULOCYTES NFR BLD AUTO: 1 % (ref 0–0.5)
LYMPHOCYTES # BLD: 1 K/UL (ref 0.8–3.5)
LYMPHOCYTES NFR BLD: 32 % (ref 12–49)
MCH RBC QN AUTO: 36.4 PG (ref 26–34)
MCHC RBC AUTO-ENTMCNC: 33.5 G/DL (ref 30–36.5)
MCV RBC AUTO: 108.4 FL (ref 80–99)
MONOCYTES # BLD: 0.2 K/UL (ref 0–1)
MONOCYTES NFR BLD: 7 % (ref 5–13)
NEUTS SEG # BLD: 1.7 K/UL (ref 1.8–8)
NEUTS SEG NFR BLD: 56 % (ref 32–75)
NRBC # BLD: 0 K/UL (ref 0–0.01)
NRBC BLD-RTO: 0 PER 100 WBC
PLATELET # BLD AUTO: 225 K/UL (ref 150–400)
PMV BLD AUTO: 11.1 FL (ref 8.9–12.9)
POTASSIUM SERPL-SCNC: 3.9 MMOL/L (ref 3.5–5.1)
PROT SERPL-MCNC: 6.5 G/DL (ref 6.4–8.2)
RBC # BLD AUTO: 2.86 M/UL (ref 3.8–5.2)
RBC MORPH BLD: ABNORMAL
SODIUM SERPL-SCNC: 138 MMOL/L (ref 136–145)
WBC # BLD AUTO: 3 K/UL (ref 3.6–11)

## 2021-11-23 PROCEDURE — 96401 CHEMO ANTI-NEOPL SQ/IM: CPT

## 2021-11-23 PROCEDURE — G8419 CALC BMI OUT NRM PARAM NOF/U: HCPCS | Performed by: INTERNAL MEDICINE

## 2021-11-23 PROCEDURE — 99214 OFFICE O/P EST MOD 30 MIN: CPT | Performed by: INTERNAL MEDICINE

## 2021-11-23 PROCEDURE — G8399 PT W/DXA RESULTS DOCUMENT: HCPCS | Performed by: INTERNAL MEDICINE

## 2021-11-23 PROCEDURE — 3017F COLORECTAL CA SCREEN DOC REV: CPT | Performed by: INTERNAL MEDICINE

## 2021-11-23 PROCEDURE — 1090F PRES/ABSN URINE INCON ASSESS: CPT | Performed by: INTERNAL MEDICINE

## 2021-11-23 PROCEDURE — G8510 SCR DEP NEG, NO PLAN REQD: HCPCS | Performed by: INTERNAL MEDICINE

## 2021-11-23 PROCEDURE — 85025 COMPLETE CBC W/AUTO DIFF WBC: CPT

## 2021-11-23 PROCEDURE — 36415 COLL VENOUS BLD VENIPUNCTURE: CPT

## 2021-11-23 PROCEDURE — G8427 DOCREV CUR MEDS BY ELIG CLIN: HCPCS | Performed by: INTERNAL MEDICINE

## 2021-11-23 PROCEDURE — 80053 COMPREHEN METABOLIC PANEL: CPT

## 2021-11-23 PROCEDURE — 1101F PT FALLS ASSESS-DOCD LE1/YR: CPT | Performed by: INTERNAL MEDICINE

## 2021-11-23 PROCEDURE — G9899 SCRN MAM PERF RSLTS DOC: HCPCS | Performed by: INTERNAL MEDICINE

## 2021-11-23 PROCEDURE — 74011250636 HC RX REV CODE- 250/636: Performed by: INTERNAL MEDICINE

## 2021-11-23 PROCEDURE — G8536 NO DOC ELDER MAL SCRN: HCPCS | Performed by: INTERNAL MEDICINE

## 2021-11-23 RX ADMIN — TRASTUZUMAB AND HYALURONIDASE-OYSK 600 MG: 600; 10000 INJECTION, SOLUTION SUBCUTANEOUS at 13:04

## 2021-11-23 NOTE — PROGRESS NOTES
Tiago Murphy is a 78 y.o. female  Chief Complaint   Patient presents with    Chemotherapy    Breast Cancer     1. Have you been to the ER, urgent care clinic since your last visit? Hospitalized since your last visit? No.    2. Have you seen or consulted any other health care providers outside of the [address] [place] [address] since your last visit? Include any pap smears or colon screening.  No.

## 2021-11-23 NOTE — PROGRESS NOTES
[facility] Chemo Progress Note    Date: 2021    Name: Tari Hannah    MRN: [id number]         : 1950    1125 Ms. Harlan Rothman Arrived to [place] for Cycle 5 Herceptin Hylecta ambulatory in stable condition. Assessment was completed, no acute issues at this time, no new complaints voiced. Labs drawn peripherally and sent for processing. Went to provider appointment with Medical Oncology. Chemotherapy Flowsheet 2021   Cycle C5   Date 2021   Drug / Regimen Herceptin Hylecta SQ   Pre Meds -   Notes given         Patient denies SOB, fever, cough, general not feeling well. Patient denies recent exposure to someone who has tested positive for COVID-19. Patient denies having contact with anyone who has a pending COVID test.      Ms. Alonso Paredes vitals were reviewed. Patient Vitals for the past 12 hrs:   Temp Pulse Resp BP   21 1125 97.1 °F (36.2 °C) 72 18 (!) 161/82         Lab results were obtained and reviewed. Recent Results (from the past 12 hour(s))   CBC WITH AUTOMATED DIFF    Collection Time: 21 11:32 AM   Result Value Ref Range    WBC 3.0 (L) 3.6 - 11.0 K/uL    RBC 2.86 (L) 3.80 - 5.20 M/uL    HGB 10.4 (L) 11.5 - 16.0 g/dL    HCT 31.0 (L) 35.0 - 47.0 %    .4 (H) 80.0 - 99.0 FL    MCH 36.4 (H) 26.0 - 34.0 PG    MCHC 33.5 30.0 - 36.5 g/dL    RDW 15.0 (H) 11.5 - 14.5 %    PLATELET 916 892 - 642 K/uL    MPV 11.1 8.9 - 12.9 FL    NRBC 0.0 0  WBC    ABSOLUTE NRBC 0.00 0.00 - 0.01 K/uL    NEUTROPHILS 56 32 - 75 %    LYMPHOCYTES 32 12 - 49 %    MONOCYTES 7 5 - 13 %    EOSINOPHILS 3 0 - 7 %    BASOPHILS 1 0 - 1 %    IMMATURE GRANULOCYTES 1 (H) 0.0 - 0.5 %    ABS. NEUTROPHILS 1.7 (L) 1.8 - 8.0 K/UL    ABS. LYMPHOCYTES 1.0 0.8 - 3.5 K/UL    ABS. MONOCYTES 0.2 0.0 - 1.0 K/UL    ABS. EOSINOPHILS 0.1 0.0 - 0.4 K/UL    ABS. BASOPHILS 0.0 0.0 - 0.1 K/UL    ABS. IMM.  GRANS. 0.0 0.00 - 0.04 K/UL    DF SMEAR SCANNED      RBC COMMENTS MACROCYTOSIS  2+       METABOLIC PANEL, COMPREHENSIVE    Collection Time: 11/23/21 11:32 AM   Result Value Ref Range    Sodium 138 136 - 145 mmol/L    Potassium 3.9 3.5 - 5.1 mmol/L    Chloride 108 97 - 108 mmol/L    CO2 27 21 - 32 mmol/L    Anion gap 3 (L) 5 - 15 mmol/L    Glucose 82 65 - 100 mg/dL    BUN 17 6 - 20 MG/DL    Creatinine 0.68 0.55 - 1.02 MG/DL    BUN/Creatinine ratio 25 (H) 12 - 20      GFR est AA >60 >60 ml/min/1.73m2    GFR est non-AA >60 >60 ml/min/1.73m2    Calcium 9.4 8.5 - 10.1 MG/DL    Bilirubin, total 0.4 0.2 - 1.0 MG/DL    ALT (SGPT) 34 12 - 78 U/L    AST (SGOT) 13 (L) 15 - 37 U/L    Alk. phosphatase 68 45 - 117 U/L    Protein, total 6.5 6.4 - 8.2 g/dL    Albumin 3.6 3.5 - 5.0 g/dL    Globulin 2.9 2.0 - 4.0 g/dL    A-G Ratio 1.2 1.1 - 2.2         Pre-medications  were administered as ordered and chemotherapy was initiated. Medications Administered     trastuzumab-hyaluronidase-oysk (HERCEPTIN HYLECTA) injection 600 mg     Admin Date  11/23/2021 Action  Given Dose  600 mg Route  SubCUTAneous Administered By  Jennie Zuluaga, RN                  1141 Patient tolerated treatment well. Patient was discharged from [facility] in stable condition. Patient aware of next appointment.      Future Appointments   Date Time Provider Wendie Escobar   12/3/2021  1:00 PM ECHO LAB 2 [facility] PSYCHIATRIC [place] Laila Robbins [address]   12/14/2021 11:00 AM G2 NAT FASTRACK [facility]   1/4/2022 11:00 AM H1 NAT FASTRACK [facility]   1/4/2022 11:15 AM Hannah Moore  [address] BS AMB   1/25/2022 11:00 AM H1 NAT FASTRACK [facility]   2/21/2022 10:45 AM Su Mancini MD [facility] BS AMB   5/4/2022 10:30 AM MD FRED Gunderson BS DELIA Underwood, ANGEL  November 23, 2021

## 2021-11-23 NOTE — PROGRESS NOTES
[facility] [place] at [address], [address] [place] [address] Road, [facility] [phone]  F: [phone]    Reason for Visit:   Emani Conrad is a 78 y.o. female seen today in office for follow up of Right Breast Cancer. Treatment History:   · Abnormal screening mammo 6/3/21: Bilateral digital screening mammography was performed and is interpreted in conjunction with a computer assisted detection (CAD) system. In the left breast, no suspicious masses or calcifications are identified. The right breast upper outer quadrant at 10:00 posterior depth 9 cm from the nipple there is a focal asymmetry  · Right Dx Mammo/US 6/7/21: Further evaluation was performed for a right breast focal asymmetry. In the right breast upper outer quadrant at 10:00 posterior depth there is an equal density irregular mass with indistinct margins measuring up to 1.7 cm. In the right breast at 10:00 10 cm from the nipple there is a 1.6 x 0.9 x 1.0 cm hypoechoic oval mass with indistinct margins, internal vascularity, and posterior acoustic enhancement  · Right Breast Biopsy 6/18/21: PATH - Invasive ductal carcinoma, Ish histologic grade 3. Largest glass slide measurement of invasive carcinoma: 6 mm   · ER/MD negative, HER2 FISH +  · Ki67 90%  · Right Lumpectomy 8/3/2: PATH - 20 mm IDC with negative LNs. Margins negative. · Adjuvant weekly Taxol + Herceptin 8/31/21 - 11/16/21  · Maintenance subQ Herceptin 11/23/21 -     STAGE: Pathologic T1cN0, ER/MD negative, HER2+     History of Present Illness:   Emani Conrad is a 78 y.o. female seen today in office for follow up of right breast cancer post lumpectomy 8/3/21. She started adjuvant weekly Taxol + Herceptin on 8/31/21 and completed 12 weeks on 11/16/21. She is here today for Cycle 5 (first cycle of maintenance subQ Herceptin). She reports that she feels well overall today.  She continues to have emotional lability but much better with addition of Ativan. Sleeping is better with Ambien PRN. Her appetite is good. She denies fever, chills, mouth sores, cough, SOB, CP, nausea, vomiting, diarrhea, and constipation. She denies pain today. CBC and CMP are still pending today. She wants treatment today. Her supportive  is here today. Past Medical History:   Diagnosis Date    Arthritis     left ankle    Breast cancer ([address]     RIGHT    Chronic kidney disease     kidney stone    Chronic pain of left ankle 2017    Seeing Dr Mccormack Level    Fracture     left ankle    Ill-defined condition     PICC line for UTI - borderline sepsis    Osteopenia 2017      Past Surgical History:   Procedure Laterality Date    COLONOSCOPY N/A 2019    tubular adenoma, repeat 1 yr d/t poor prep - performed [facility] Caleb [facility];     COLONOSCOPY N/A 2020    normal - performed by Caleb Rodriguez MD at [facility] ENDOSCOPY    HX [address] Left     surgery - has a plate a screws    HX BREAST LUMPECTOMY Right 8/3/2021    RIGHT BREAST LUMPECTOMY WITH ULTRASOUND AND RIGHT BREAST SENTINEL NODE BIOPSY AND PORTACATH INSERTION performed by Salvatore Ocampo MD at [facility] AMBULATORY OR    HX [address]      HX GI      COLONOSCOPY    HX OTHER SURGICAL Right 2020    growth removed from right eyelid    HX OTHER SURGICAL Right 2020    PICC line placed for IV abx, removed after completing course for pyelonephritis    HX POLYPECTOMY      HX TONSILLECTOMY      NV BIOPSY OF BREAST, NEEDLE CORE Right       Social History     Tobacco Use    Smoking status: Former Smoker     Quit date:      Years since quittin.9    Smokeless tobacco: Never Used    Tobacco comment: was a causal smoker when smoker in college   Substance Use Topics    Alcohol use:  Yes     Alcohol/week: 5.0 standard drinks     Types: 5 Glasses of wine per week      Family History   Problem Relation Age of Onset  Cancer Mother         Brain tumor    Other Father         ? lung problem    Cancer Sister         tumors of spine    Other Sister         kidney stones    Other Daughter         kidney stone    Other Maternal Aunt         kidney stones    No Known Problems Sister     No Known Problems Sister     Anesth Problems Neg Hx      Current Outpatient Medications   Medication Sig    zolpidem (AMBIEN) 5 mg tablet TAKE ONE TABLET BY MOUTH  AT BEDTIME AS NEEDED FOR SLEEP    alendronate (FOSAMAX) 70 mg tablet TAKE ONE TABLET BY MOUTH ONCE WEEKLY ON AN EMPTY STOMACH BEFORE BREAKFAST. REMAIN UPRIGHT FOR 30 MINUTES AND TAKE WITH 8 OUNCES OF WATER.  LORazepam (ATIVAN) 0.5 mg tablet Take 1 Tablet by mouth three (3) times daily as needed for Anxiety. Max Daily Amount: 1.5 mg.    lidocaine-prilocaine (EMLA) topical cream Apply  to affected area as needed for Pain.  CRANIAL PROSTHESIS misc Breast cancer for chemo/ alopecia needs wig    ondansetron (ZOFRAN ODT) 4 mg disintegrating tablet Take 1 Tablet by mouth every eight (8) hours as needed for Nausea or Vomiting.  prochlorperazine (Compazine) 5 mg tablet Take 1-2 Tablets by mouth every six (6) hours as needed for Nausea or Vomiting.  ondansetron (ZOFRAN ODT) 4 mg disintegrating tablet Take 1 Tablet by mouth every eight (8) hours as needed for Nausea or Vomiting.  cortisone acetate (CORTISONE PO) Take  by mouth. Injections every 3 months in her foot    DULoxetine (CYMBALTA) 20 mg capsule TAKE ONE CAPSULE BY MOUTH EVERY EVENING    acetaminophen (TYLENOL) 500 mg tablet 1 or 2 every 6 hrs prn for headache     No current facility-administered medications for this visit. Allergies   Allergen Reactions    Adhesive Tape-Silicones Rash    Other Medication Hives     Cranberry or Probiotic, had severe itching and hives, not sure to which. Has taken a probiotic recently without a reaction.     Oxycodone Itching      Review of Systems:  A complete review of systems was obtained, negative except as described above and as reported on ROS sheet scanned into system. Physical Exam:     Visit Vitals  BP (!) 161/82 (BP 1 Location: Left upper arm, BP Patient Position: Sitting)   Pulse 78   Temp 97.1 °F (36.2 °C) (Temporal)   Ht 5' 4\" (1.626 m)   Wt 155 lb 12.8 oz (70.7 kg)   SpO2 98%   BMI 26.74 kg/m²     ECOG PS: 0  General: No distress  Eyes: Anicteric sclerae  HENT: Atraumatic, wearing a mask  Neck: Supple  Respiratory:  normal respiratory effort, lungs clear  CV regular  GI soft NT  Ext no edema  MS: Normal gait and station. Digits without clubbing or cyanosis. Skin: No rashes, ecchymoses, or petechiae. Normal temperature, turgor, and texture. Psych: Alert, oriented, appropriate affect, normal judgment/insight  Neuro: Non focal    Results:     Lab Results   Component Value Date/Time    WBC 3.0 (L) 11/23/2021 11:32 AM    HGB 10.4 (L) 11/23/2021 11:32 AM    HCT 31.0 (L) 11/23/2021 11:32 AM    PLATELET 504 32/40/4922 11:32 AM    .4 (H) 11/23/2021 11:32 AM    ABS. NEUTROPHILS 1.7 (L) 11/23/2021 11:32 AM     Lab Results   Component Value Date/Time    Sodium 138 11/23/2021 11:32 AM    Potassium 3.9 11/23/2021 11:32 AM    Chloride 108 11/23/2021 11:32 AM    CO2 27 11/23/2021 11:32 AM    Glucose 82 11/23/2021 11:32 AM    BUN 17 11/23/2021 11:32 AM    Creatinine 0.68 11/23/2021 11:32 AM    GFR est AA >60 11/23/2021 11:32 AM    GFR est non-AA >60 11/23/2021 11:32 AM    Calcium 9.4 11/23/2021 11:32 AM     Lab Results   Component Value Date/Time    Bilirubin, total 0.4 11/23/2021 11:32 AM    ALT (SGPT) 34 11/23/2021 11:32 AM    Alk.  phosphatase 68 11/23/2021 11:32 AM    Protein, total 6.5 11/23/2021 11:32 AM    Albumin 3.6 11/23/2021 11:32 AM    Globulin 2.9 11/23/2021 11:32 AM     6/18/21  FINAL PATHOLOGIC DIAGNOSIS   Breast, right, needle core biopsy:   Invasive ductal carcinoma, Ish histologic grade 3   Largest glass slide measurement of invasive carcinoma: 6 mm   ER/CO negative, HER2 FISH +    8/3/21  FINAL PATHOLOGIC DIAGNOSIS   1. Lymph node, right axillary sentinel lymph node #1, excision:   One lymph node negative for metastatic carcinoma. 2. Lymph node, right axillary sentinel lymph node #2, excision:   One lymph node negative for metastatic carcinoma. 3. Right breast, lumpectomy, excision:   Procedure: Excision (less than total mastectomy)   Specimen Laterality: Right   TUMOR   Histologic Type: Invasive carcinoma of no special type (ductal)   Glandular (Acinar) / Tubular Differentiation: Score 3   Nuclear Pleomorphism: Score 3   Mitotic Rate: Score 3   Overall Grade: Grade 3   Tumor Size: 20 mm   Tumor Focality: Single focus of invasive carcinoma   Ductal Carcinoma in Situ (DCIS): Not identified   Tumor Extent   Lymphovascular Invasion: Not identified   Treatment Effect in the Breast: No known presurgical therapy   MARGINS   Invasive Carcinoma Margins: Uninvolved by invasive carcinoma   Distance from Closest Margin (Millimeters): Less than: 1 mm   Closest Margin(s): Anterior   LYMPH NODES   Regional Lymph Nodes: Uninvolved by tumor cells   Total Number of Lymph Nodes Examined: 2   Number of [place] Nodes Examined: 2   PATHOLOGIC STAGE CLASSIFICATION (pTNM, AJCC 8th Edition)   Primary Tumor (pT): pT1c N0(sn)   Breast biomarkers previously reported (E250226): ER negative, CO negative, HER-2/mili positive by FISH. Tumor blocks for potential additional studies: 3-B-D.   4. Right breast, lateral margin right breast, excision:   Benign fibroadipose tissue with minimal benign fibroglandular breast tissue. 5. Right breast, medial margin right breast, excision:   Benign fibroglandular breast tissue with epithelial cyst and usual ductal hyperplasia. 6. Right breast, inferior margin right breast, excision:   Benign fibroglandular breast tissue. 08/23/21    ECHO ADULT COMPLETE 08/23/2021 8/23/2021    Interpretation Summary  · LV: Estimated LVEF is 60 - 65%. Normal cavity size, wall thickness and systolic function (ejection fraction normal). Normal left ventricular strain. Global longitudinal strain is -15.9%. Mild (grade 1) left ventricular diastolic dysfunction. Signed by: Whit Laguna MD on 8/23/2021  1:01 PM    Records reviewed and summarized above. Pathology report(s) reviewed above. Radiology report(s) reviewed above. Assessment:/PLAN     1) Stage 1 Breast Cancer ER- Her2+ post Lumpectomy 8/3/21  Post lumpectomy in August. Reviewed path in detail. Discussed no hormonal therapy options due to ER/TN negative. Port placed on 8/3/21. Pre chemo ECHO 8/23/21 stable with EF 60-65%. Patient started weekly Taxol/Herceptin on 8/31/21. She completed 12 weeks of Taxol/Herceptin on 11/16/21. She is here today for Cycle 5 (first dose of maintenance subQ Herceptin). She is tolerating treatment well overall with some emotional lability and insomnia. She is clinically stable and doing well overall. Labs (CBC and CMP) reviewed today. Patient is ready for treatment today. She has seen Rad/Onc and will start XRT in early January. Will refer back to Breast Surgery for port removal.   Follow up ECHO scheduled for 12/3/21. Follow up in 3 weeks in [facility]. Follow up in 6 weeks in [facility]/office. Patient agrees with plan. 2) Arthritis/Hx of Kidney Stone/Osteopenia  Management per PCP. 3) Management of High Risk Medications - Herceptin  Toxicities include Grade 1 Nausea and Grade 2 Insomnia/Emotional Lability   Reviewed side effect management today - recommended meditation, listening calming music, walk around the block, vs anti-anxiety med. Patient continues to have emotional lability but much better overall with additional of Ativan 0.5 mg TID PRN. She has not needed Ativan in the past week or so. Continue Ambien as needed for sleep. Labs (CBC and CMP) reviewed. Follow up ECHO scheduled for 12/3/21. No dose adjustments needed.   Will monitor for side effects. 4) Anxiety about Health  Ativan PRN while on chemo. 5) Psychosocial  Mood good, coping well. Her family is very supportive. SW/NN support as needed. Her  is here today. Call if questions. Follow up in 3 weeks in [facility] and 6 weeks in [facility]/office. This patient was seen in conjunction with Ruben Beckman NP. I personally performed a face to face diagnostic evaluation on this patient. I personally reviewed the history and performed the key points on the exam.   I personally reviewed all points in the assessment and created treatment plan with the patient. Specifically, pt seen by me today  Doing well overall. Tolerating therapy well. Continue with herceptin and ECHO monitoring. For radiation. ER/ME negative so no hormonal therapy . Continue surveillance    I appreciate the opportunity to participate in Ms. Garret Kowalski's care.     Signed By: Geoff Hendrickson DO

## 2021-12-03 ENCOUNTER — HOSPITAL ENCOUNTER (OUTPATIENT)
Dept: NON INVASIVE DIAGNOSTICS | Age: 71
Discharge: HOME | End: 2021-12-03
Attending: INTERNAL MEDICINE
Payer: MEDICARE

## 2021-12-03 VITALS — BODY MASS INDEX: 26.46 KG/M2 | WEIGHT: 155 LBS | HEIGHT: 64 IN

## 2021-12-03 DIAGNOSIS — Z51.81 ENCOUNTER FOR MONITORING CARDIOTOXIC DRUG THERAPY: ICD-10-CM

## 2021-12-03 DIAGNOSIS — F41.9 ANXIETY: ICD-10-CM

## 2021-12-03 DIAGNOSIS — C50.911 INFILTRATING DUCTAL CARCINOMA OF RIGHT BREAST (HCC): ICD-10-CM

## 2021-12-03 DIAGNOSIS — Z09 CHEMOTHERAPY FOLLOW-UP EXAMINATION: ICD-10-CM

## 2021-12-03 DIAGNOSIS — Z79.899 ENCOUNTER FOR MONITORING CARDIOTOXIC DRUG THERAPY: ICD-10-CM

## 2021-12-03 DIAGNOSIS — Z98.890 HISTORY OF LUMPECTOMY OF RIGHT BREAST: ICD-10-CM

## 2021-12-03 PROCEDURE — 93306 TTE W/DOPPLER COMPLETE: CPT

## 2021-12-06 LAB
ECHO AO ROOT DIAM: 2.88 CM
ECHO AV PEAK GRADIENT: 13.14 MMHG
ECHO AV PEAK VELOCITY: 181.27 CM/S
ECHO EST RA PRESSURE: 3 MMHG
ECHO LA AREA 4C: 17.95 CM2
ECHO LA MAJOR AXIS: 2.99 CM
ECHO LA MINOR AXIS: 1.7 CM
ECHO LA VOL 2C: 41.02 ML (ref 22–52)
ECHO LA VOL 4C: 49.02 ML (ref 22–52)
ECHO LA VOL BP: 49.79 ML (ref 22–52)
ECHO LA VOL/BSA BIPLANE: 28.29 ML/M2 (ref 16–28)
ECHO LA VOLUME INDEX A2C: 23.31 ML/M2 (ref 16–28)
ECHO LA VOLUME INDEX A4C: 27.85 ML/M2 (ref 16–28)
ECHO LV GLOBAL LONGITUDINAL STRAIN (GLS): -15.6 PERCENT
ECHO LV INTERNAL DIMENSION DIASTOLIC: 4.19 CM (ref 3.9–5.3)
ECHO LV INTERNAL DIMENSION SYSTOLIC: 3.1 CM
ECHO LV IVSD: 0.74 CM (ref 0.6–0.9)
ECHO LV MASS 2D: 87.9 G (ref 67–162)
ECHO LV MASS INDEX 2D: 49.9 G/M2 (ref 43–95)
ECHO LV POSTERIOR WALL DIASTOLIC: 0.7 CM (ref 0.6–0.9)
ECHO MV A VELOCITY: 98.64 CM/S
ECHO MV E DECELERATION TIME (DT): 209.68 MS
ECHO MV E VELOCITY: 92.09 CM/S
ECHO MV E/A RATIO: 0.93
ECHO PV PEAK INSTANTANEOUS GRADIENT SYSTOLIC: 2.67 MMHG
ECHO RV INTERNAL DIMENSION: 3.19 CM
ECHO RV TAPSE: 3.28 CM (ref 1.5–2)
GLOBAL LONGITUDINAL STRAIN 2 CHAMBER: -19 PERCENT
GLOBAL LONGITUDINAL STRAIN 4 CHAMBER: -12.2 PERCENT
GLOBAL LONGITUDINAL STRAIN LONG AXIS: -15.7 PERCENT
LA VOL DISK BP: 46.11 ML (ref 22–52)

## 2021-12-06 NOTE — PROGRESS NOTES
NIKOLAY Verified. Called pt on work phone number listed. Patient was made aware of most recent ECHO report being stable/good. Patient was very appreciative over call!   Mark Lin

## 2021-12-07 RX ORDER — SODIUM CHLORIDE 0.9 % (FLUSH) 0.9 %
10 SYRINGE (ML) INJECTION AS NEEDED
Status: CANCELLED | OUTPATIENT
Start: 2021-12-14

## 2021-12-07 RX ORDER — ALBUTEROL SULFATE 0.83 MG/ML
2.5 SOLUTION RESPIRATORY (INHALATION) AS NEEDED
Status: CANCELLED
Start: 2021-12-14

## 2021-12-07 RX ORDER — HYDROCORTISONE SODIUM SUCCINATE 100 MG/2ML
100 INJECTION, POWDER, FOR SOLUTION INTRAMUSCULAR; INTRAVENOUS AS NEEDED
Status: CANCELLED | OUTPATIENT
Start: 2021-12-14

## 2021-12-07 RX ORDER — SODIUM CHLORIDE 9 MG/ML
10 INJECTION INTRAMUSCULAR; INTRAVENOUS; SUBCUTANEOUS AS NEEDED
Status: CANCELLED | OUTPATIENT
Start: 2021-12-14

## 2021-12-07 RX ORDER — EPINEPHRINE 1 MG/ML
0.3 INJECTION, SOLUTION, CONCENTRATE INTRAVENOUS AS NEEDED
Status: CANCELLED | OUTPATIENT
Start: 2021-12-14

## 2021-12-07 RX ORDER — DIPHENHYDRAMINE HYDROCHLORIDE 50 MG/ML
25 INJECTION, SOLUTION INTRAMUSCULAR; INTRAVENOUS AS NEEDED
Status: CANCELLED
Start: 2021-12-14

## 2021-12-07 RX ORDER — ONDANSETRON 2 MG/ML
8 INJECTION INTRAMUSCULAR; INTRAVENOUS AS NEEDED
Status: CANCELLED | OUTPATIENT
Start: 2021-12-14

## 2021-12-07 RX ORDER — ACETAMINOPHEN 325 MG/1
650 TABLET ORAL AS NEEDED
Status: CANCELLED
Start: 2021-12-14

## 2021-12-07 RX ORDER — DIPHENHYDRAMINE HYDROCHLORIDE 50 MG/ML
50 INJECTION, SOLUTION INTRAMUSCULAR; INTRAVENOUS AS NEEDED
Status: CANCELLED
Start: 2021-12-14

## 2021-12-07 RX ORDER — HEPARIN 100 UNIT/ML
300-500 SYRINGE INTRAVENOUS AS NEEDED
Status: CANCELLED
Start: 2021-12-14

## 2021-12-13 ENCOUNTER — APPOINTMENT (OUTPATIENT)
Dept: INFUSION THERAPY | Age: 71
End: 2021-12-13

## 2021-12-14 ENCOUNTER — HOSPITAL ENCOUNTER (OUTPATIENT)
Dept: INFUSION THERAPY | Age: 71
Discharge: HOME | End: 2021-12-14
Payer: MEDICARE

## 2021-12-14 VITALS
HEART RATE: 75 BPM | RESPIRATION RATE: 18 BRPM | TEMPERATURE: 98.8 F | DIASTOLIC BLOOD PRESSURE: 91 MMHG | SYSTOLIC BLOOD PRESSURE: 165 MMHG

## 2021-12-14 DIAGNOSIS — C50.911 INFILTRATING DUCTAL CARCINOMA OF RIGHT BREAST (HCC): Primary | ICD-10-CM

## 2021-12-14 PROCEDURE — 74011250636 HC RX REV CODE- 250/636: Performed by: INTERNAL MEDICINE

## 2021-12-14 PROCEDURE — 96401 CHEMO ANTI-NEOPL SQ/IM: CPT

## 2021-12-14 RX ORDER — SODIUM CHLORIDE 9 MG/ML
25 INJECTION, SOLUTION INTRAVENOUS CONTINUOUS
Status: DISPENSED | OUTPATIENT
Start: 2021-12-14 | End: 2021-12-14

## 2021-12-14 RX ORDER — DIPHENHYDRAMINE HYDROCHLORIDE 50 MG/ML
50 INJECTION, SOLUTION INTRAMUSCULAR; INTRAVENOUS
Status: DISCONTINUED | OUTPATIENT
Start: 2021-12-14 | End: 2021-12-15 | Stop reason: HOSPADM

## 2021-12-14 RX ORDER — ACETAMINOPHEN 325 MG/1
650 TABLET ORAL
Status: DISCONTINUED | OUTPATIENT
Start: 2021-12-14 | End: 2021-12-15 | Stop reason: HOSPADM

## 2021-12-14 RX ADMIN — TRASTUZUMAB AND HYALURONIDASE-OYSK 600 MG: 600; 10000 INJECTION, SOLUTION SUBCUTANEOUS at 11:07

## 2021-12-14 NOTE — PROGRESS NOTES
[facility] Chemotherapy Progress Note    Date: 2021    Name: Duc Wilkins    MRN: [id number]         : 1950      1020 Pt admit to [facility] for Herceptin SQ ambulatory in stable condition. Assessment completed. No new concerns voiced. No labs needed. Patient denies SOB, fever, cough, general not feeling well. Patient denies recent exposure to someone who has tested positive for COVID-19. Patient denies having contact with anyone who has a pending COVID test.             Chemotherapy Flowsheet 2021   Cycle C6   Date 2021   Drug / Regimen Herceptin SQ   Pre Meds -   Notes Right SQ given         Ms. Kowalski's vitals were reviewed. Patient Vitals for the past 12 hrs:   Temp Pulse Resp BP   21 1120  75  (!) 165/91   21 1020 98.8 °F (37.1 °C) 78 18 (!) 184/87         Pre-medications  were administered as ordered and chemotherapy was initiated. Medications Administered     trastuzumab-hyaluronidase-oysk (HERCEPTIN HYLECTA) injection 600 mg     Admin Date  2021 Action  Given Dose  600 mg Route  SubCUTAneous Administered By  Jason Wright, ANGEL                1120 Pt tolerated treatment well. D/c home ambulatory in no distress.      Future Appointments   Date Time Provider Wendie Escobar   2022 11:00 AM [facility]   2022 11:15 AM Norma Sun  [address] BS AMB   2022 11:00 AM H1 NAT FASTRAANAM [facility]   2022 10:45 AM Art Tsang MD [facility] BS AMB   2022 10:30 AM Kiara Moore MD [facility] BS AMB         Kendal Bentley, ANGEL  2021

## 2021-12-22 ENCOUNTER — NURSE NAVIGATOR (OUTPATIENT)
Dept: CASE MANAGEMENT | Age: 71
End: 2021-12-22

## 2021-12-22 NOTE — PROGRESS NOTES
[address]  Breast Navigator Encounter    Name:    Kavin Polanco  Age:    73 y.o. Diagnosis:    RIGHT breast cancer    Interdisciplinary Team:  Med-Onc:    Dr. Jason Parrish   Surg-Onc:    Dr. Deisy Feldman:      Plastics:    :    Norma Pop LCSW  Nurse Navigator:  Nyasia Mendoza RN, BSN, [facility]      Encounter type:  []Patient Initiated  [x]Navigator Follow-up []Pre-op  []Post-op  []Check-in Prior to First Treatment []Treatment Modality Change  []Survivorship Transition []Other:       Narrative:    Called to follow-up with patient S/P chemo. Is on maintenance Herceptin. Will start XRT in January. She was not available, so I left a voicemail for her.           Nyasia Mendoza RN, BSN, [facility]  Oncology Breast Navigator     [facility] [address]  [address] [place] [address] 22.  W: [phone]  F: [phone]  [email or web address]  Good Help to Those in  Yaneli Villalobos

## 2021-12-28 RX ORDER — HYDROCORTISONE SODIUM SUCCINATE 100 MG/2ML
100 INJECTION, POWDER, FOR SOLUTION INTRAMUSCULAR; INTRAVENOUS AS NEEDED
Status: CANCELLED | OUTPATIENT
Start: 2022-01-04

## 2021-12-28 RX ORDER — EPINEPHRINE 1 MG/ML
0.3 INJECTION, SOLUTION, CONCENTRATE INTRAVENOUS AS NEEDED
Status: CANCELLED | OUTPATIENT
Start: 2022-01-04

## 2021-12-28 RX ORDER — ALBUTEROL SULFATE 0.83 MG/ML
2.5 SOLUTION RESPIRATORY (INHALATION) AS NEEDED
Status: CANCELLED
Start: 2022-01-04

## 2021-12-28 RX ORDER — DIPHENHYDRAMINE HYDROCHLORIDE 50 MG/ML
50 INJECTION, SOLUTION INTRAMUSCULAR; INTRAVENOUS AS NEEDED
Status: CANCELLED
Start: 2022-01-04

## 2022-01-03 ENCOUNTER — APPOINTMENT (OUTPATIENT)
Dept: INFUSION THERAPY | Age: 72
End: 2022-01-03

## 2022-01-04 ENCOUNTER — HOSPITAL ENCOUNTER (OUTPATIENT)
Dept: INFUSION THERAPY | Age: 72
Discharge: HOME | End: 2022-01-04
Payer: MEDICARE

## 2022-01-04 ENCOUNTER — OFFICE VISIT (OUTPATIENT)
Dept: ONCOLOGY | Age: 72
End: 2022-01-04
Payer: MEDICARE

## 2022-01-04 VITALS
BODY MASS INDEX: 26.77 KG/M2 | DIASTOLIC BLOOD PRESSURE: 106 MMHG | WEIGHT: 156.8 LBS | TEMPERATURE: 96.7 F | HEART RATE: 83 BPM | HEIGHT: 64 IN | SYSTOLIC BLOOD PRESSURE: 159 MMHG | OXYGEN SATURATION: 96 %

## 2022-01-04 VITALS
RESPIRATION RATE: 18 BRPM | TEMPERATURE: 96.7 F | DIASTOLIC BLOOD PRESSURE: 90 MMHG | HEART RATE: 80 BPM | SYSTOLIC BLOOD PRESSURE: 162 MMHG

## 2022-01-04 DIAGNOSIS — Z87.442 HISTORY OF KIDNEY STONES: ICD-10-CM

## 2022-01-04 DIAGNOSIS — M85.89 OSTEOPENIA OF MULTIPLE SITES: ICD-10-CM

## 2022-01-04 DIAGNOSIS — Z98.890 HISTORY OF LUMPECTOMY OF RIGHT BREAST: Primary | ICD-10-CM

## 2022-01-04 DIAGNOSIS — C50.911 INFILTRATING DUCTAL CARCINOMA OF RIGHT BREAST (HCC): Primary | ICD-10-CM

## 2022-01-04 DIAGNOSIS — Z51.81 ENCOUNTER FOR MONITORING CARDIOTOXIC DRUG THERAPY: ICD-10-CM

## 2022-01-04 DIAGNOSIS — M19.90 ARTHRITIS: ICD-10-CM

## 2022-01-04 DIAGNOSIS — Z79.899 ENCOUNTER FOR MONITORING CARDIOTOXIC DRUG THERAPY: ICD-10-CM

## 2022-01-04 DIAGNOSIS — C50.911 INFILTRATING DUCTAL CARCINOMA OF RIGHT BREAST (HCC): ICD-10-CM

## 2022-01-04 DIAGNOSIS — F41.8 ANXIETY ABOUT HEALTH: ICD-10-CM

## 2022-01-04 LAB
ALBUMIN SERPL-MCNC: 3.7 G/DL (ref 3.5–5)
ALBUMIN/GLOB SERPL: 1.3 {RATIO} (ref 1.1–2.2)
ALP SERPL-CCNC: 74 U/L (ref 45–117)
ALT SERPL-CCNC: 26 U/L (ref 12–78)
ANION GAP SERPL CALC-SCNC: 5 MMOL/L (ref 5–15)
AST SERPL-CCNC: 15 U/L (ref 15–37)
BASOPHILS # BLD: 0 K/UL (ref 0–0.1)
BASOPHILS NFR BLD: 1 % (ref 0–1)
BILIRUB SERPL-MCNC: 0.4 MG/DL (ref 0.2–1)
BUN SERPL-MCNC: 24 MG/DL (ref 6–20)
BUN/CREAT SERPL: 35 (ref 12–20)
CALCIUM SERPL-MCNC: 9.7 MG/DL (ref 8.5–10.1)
CHLORIDE SERPL-SCNC: 106 MMOL/L (ref 97–108)
CO2 SERPL-SCNC: 27 MMOL/L (ref 21–32)
CREAT SERPL-MCNC: 0.69 MG/DL (ref 0.55–1.02)
DIFFERENTIAL METHOD BLD: ABNORMAL
EOSINOPHIL # BLD: 0.1 K/UL (ref 0–0.4)
EOSINOPHIL NFR BLD: 2 % (ref 0–7)
ERYTHROCYTE [DISTWIDTH] IN BLOOD BY AUTOMATED COUNT: 11.4 % (ref 11.5–14.5)
GLOBULIN SER CALC-MCNC: 2.8 G/DL (ref 2–4)
GLUCOSE SERPL-MCNC: 87 MG/DL (ref 65–100)
HCT VFR BLD AUTO: 36.1 % (ref 35–47)
HGB BLD-MCNC: 12.2 G/DL (ref 11.5–16)
IMM GRANULOCYTES # BLD AUTO: 0 K/UL (ref 0–0.04)
IMM GRANULOCYTES NFR BLD AUTO: 0 % (ref 0–0.5)
LYMPHOCYTES # BLD: 1.1 K/UL (ref 0.8–3.5)
LYMPHOCYTES NFR BLD: 27 % (ref 12–49)
MCH RBC QN AUTO: 35.1 PG (ref 26–34)
MCHC RBC AUTO-ENTMCNC: 33.8 G/DL (ref 30–36.5)
MCV RBC AUTO: 103.7 FL (ref 80–99)
MONOCYTES # BLD: 0.3 K/UL (ref 0–1)
MONOCYTES NFR BLD: 7 % (ref 5–13)
NEUTS SEG # BLD: 2.6 K/UL (ref 1.8–8)
NEUTS SEG NFR BLD: 63 % (ref 32–75)
NRBC # BLD: 0 K/UL (ref 0–0.01)
NRBC BLD-RTO: 0 PER 100 WBC
PLATELET # BLD AUTO: 178 K/UL (ref 150–400)
PMV BLD AUTO: 10.9 FL (ref 8.9–12.9)
POTASSIUM SERPL-SCNC: 4.4 MMOL/L (ref 3.5–5.1)
PROT SERPL-MCNC: 6.5 G/DL (ref 6.4–8.2)
RBC # BLD AUTO: 3.48 M/UL (ref 3.8–5.2)
SODIUM SERPL-SCNC: 138 MMOL/L (ref 136–145)
WBC # BLD AUTO: 4.2 K/UL (ref 3.6–11)

## 2022-01-04 PROCEDURE — 3017F COLORECTAL CA SCREEN DOC REV: CPT | Performed by: INTERNAL MEDICINE

## 2022-01-04 PROCEDURE — 74011250636 HC RX REV CODE- 250/636: Performed by: INTERNAL MEDICINE

## 2022-01-04 PROCEDURE — G8419 CALC BMI OUT NRM PARAM NOF/U: HCPCS | Performed by: INTERNAL MEDICINE

## 2022-01-04 PROCEDURE — 99214 OFFICE O/P EST MOD 30 MIN: CPT | Performed by: INTERNAL MEDICINE

## 2022-01-04 PROCEDURE — 1090F PRES/ABSN URINE INCON ASSESS: CPT | Performed by: INTERNAL MEDICINE

## 2022-01-04 PROCEDURE — 80053 COMPREHEN METABOLIC PANEL: CPT

## 2022-01-04 PROCEDURE — G8510 SCR DEP NEG, NO PLAN REQD: HCPCS | Performed by: INTERNAL MEDICINE

## 2022-01-04 PROCEDURE — 36591 DRAW BLOOD OFF VENOUS DEVICE: CPT

## 2022-01-04 PROCEDURE — 1101F PT FALLS ASSESS-DOCD LE1/YR: CPT | Performed by: INTERNAL MEDICINE

## 2022-01-04 PROCEDURE — G8399 PT W/DXA RESULTS DOCUMENT: HCPCS | Performed by: INTERNAL MEDICINE

## 2022-01-04 PROCEDURE — G8536 NO DOC ELDER MAL SCRN: HCPCS | Performed by: INTERNAL MEDICINE

## 2022-01-04 PROCEDURE — 85025 COMPLETE CBC W/AUTO DIFF WBC: CPT

## 2022-01-04 PROCEDURE — 77030012965 HC NDL HUBR BBMI -A

## 2022-01-04 PROCEDURE — G8427 DOCREV CUR MEDS BY ELIG CLIN: HCPCS | Performed by: INTERNAL MEDICINE

## 2022-01-04 PROCEDURE — 96402 CHEMO HORMON ANTINEOPL SQ/IM: CPT

## 2022-01-04 PROCEDURE — G9899 SCRN MAM PERF RSLTS DOC: HCPCS | Performed by: INTERNAL MEDICINE

## 2022-01-04 RX ORDER — HEPARIN 100 UNIT/ML
300-500 SYRINGE INTRAVENOUS AS NEEDED
Status: ACTIVE | OUTPATIENT
Start: 2022-01-04 | End: 2022-01-04

## 2022-01-04 RX ORDER — DIPHENHYDRAMINE HYDROCHLORIDE 50 MG/ML
25 INJECTION, SOLUTION INTRAMUSCULAR; INTRAVENOUS AS NEEDED
Status: ACTIVE | OUTPATIENT
Start: 2022-01-04 | End: 2022-01-04

## 2022-01-04 RX ORDER — SODIUM CHLORIDE 0.9 % (FLUSH) 0.9 %
10 SYRINGE (ML) INJECTION AS NEEDED
Status: DISPENSED | OUTPATIENT
Start: 2022-01-04 | End: 2022-01-04

## 2022-01-04 RX ORDER — ONDANSETRON 2 MG/ML
8 INJECTION INTRAMUSCULAR; INTRAVENOUS AS NEEDED
Status: ACTIVE | OUTPATIENT
Start: 2022-01-04 | End: 2022-01-04

## 2022-01-04 RX ORDER — ACETAMINOPHEN 325 MG/1
650 TABLET ORAL
Status: DISCONTINUED | OUTPATIENT
Start: 2022-01-04 | End: 2022-01-05 | Stop reason: HOSPADM

## 2022-01-04 RX ORDER — SODIUM CHLORIDE 9 MG/ML
10 INJECTION INTRAMUSCULAR; INTRAVENOUS; SUBCUTANEOUS AS NEEDED
Status: ACTIVE | OUTPATIENT
Start: 2022-01-04 | End: 2022-01-04

## 2022-01-04 RX ORDER — SODIUM CHLORIDE 9 MG/ML
25 INJECTION, SOLUTION INTRAVENOUS CONTINUOUS
Status: DISPENSED | OUTPATIENT
Start: 2022-01-04 | End: 2022-01-04

## 2022-01-04 RX ORDER — ACETAMINOPHEN 325 MG/1
650 TABLET ORAL AS NEEDED
Status: ACTIVE | OUTPATIENT
Start: 2022-01-04 | End: 2022-01-04

## 2022-01-04 RX ORDER — DIPHENHYDRAMINE HYDROCHLORIDE 50 MG/ML
50 INJECTION, SOLUTION INTRAMUSCULAR; INTRAVENOUS
Status: DISCONTINUED | OUTPATIENT
Start: 2022-01-04 | End: 2022-01-05 | Stop reason: HOSPADM

## 2022-01-04 RX ADMIN — TRASTUZUMAB AND HYALURONIDASE-OYSK 600 MG: 600; 10000 INJECTION, SOLUTION SUBCUTANEOUS at 11:24

## 2022-01-04 NOTE — PROGRESS NOTES
Dony Duncan is a 73 y.o. female  Chief Complaint   Patient presents with    Chemotherapy    Breast Cancer     1. Have you been to the ER, urgent care clinic since your last visit? Hospitalized since your last visit? No.    2. Have you seen or consulted any other health care providers outside of the [address] [place] [address] since your last visit? Include any pap smears or colon screening.  No.

## 2022-01-04 NOTE — PROGRESS NOTES
[facility] Chemotherapy Progress Note    Date: 2022    Name: Arian Kimr    MRN: [id number]         : 1950      1000 Pt admit to [facility] for Herceptin ambulatory in stable condition. Assessment completed. No new concerns voiced. Port with positive blood return. Labs drawn and sent for processing. Patient denies SOB, fever, cough, general not feeling well. Patient denies recent exposure to someone who has tested positive for COVID-19. Patient denies having contact with anyone who has a pending COVID test.           Chemotherapy Flowsheet 2022   Cycle C7   Date 2022   Drug / Regimen Herceptin SQ   Pre Meds -   Notes Left SQ given         Ms. Kowalski's vitals were reviewed. Patient Vitals for the past 12 hrs:   Temp Pulse Resp BP   22 1013 (!) 96.7 °F (35.9 °C) 80 18 (!) 162/90         Lab results were obtained and reviewed. Recent Results (from the past 12 hour(s))   CBC WITH AUTOMATED DIFF    Collection Time: 22  9:55 AM   Result Value Ref Range    WBC 4.2 3.6 - 11.0 K/uL    RBC 3.48 (L) 3.80 - 5.20 M/uL    HGB 12.2 11.5 - 16.0 g/dL    HCT 36.1 35.0 - 47.0 %    .7 (H) 80.0 - 99.0 FL    MCH 35.1 (H) 26.0 - 34.0 PG    MCHC 33.8 30.0 - 36.5 g/dL    RDW 11.4 (L) 11.5 - 14.5 %    PLATELET 756 986 - 514 K/uL    MPV 10.9 8.9 - 12.9 FL    NRBC 0.0 0  WBC    ABSOLUTE NRBC 0.00 0.00 - 0.01 K/uL    NEUTROPHILS 63 32 - 75 %    LYMPHOCYTES 27 12 - 49 %    MONOCYTES 7 5 - 13 %    EOSINOPHILS 2 0 - 7 %    BASOPHILS 1 0 - 1 %    IMMATURE GRANULOCYTES 0 0.0 - 0.5 %    ABS. NEUTROPHILS 2.6 1.8 - 8.0 K/UL    ABS. LYMPHOCYTES 1.1 0.8 - 3.5 K/UL    ABS. MONOCYTES 0.3 0.0 - 1.0 K/UL    ABS. EOSINOPHILS 0.1 0.0 - 0.4 K/UL    ABS. BASOPHILS 0.0 0.0 - 0.1 K/UL    ABS. IMM.  GRANS. 0.0 0.00 - 0.04 K/UL    DF AUTOMATED     METABOLIC PANEL, COMPREHENSIVE    Collection Time: 22  9:55 AM   Result Value Ref Range    Sodium 138 136 - 145 mmol/L    Potassium 4.4 3.5 - 5.1 mmol/L    Chloride 106 97 - 108 mmol/L    CO2 27 21 - 32 mmol/L    Anion gap 5 5 - 15 mmol/L    Glucose 87 65 - 100 mg/dL    BUN 24 (H) 6 - 20 MG/DL    Creatinine 0.69 0.55 - 1.02 MG/DL    BUN/Creatinine ratio 35 (H) 12 - 20      GFR est AA >60 >60 ml/min/1.73m2    GFR est non-AA >60 >60 ml/min/1.73m2    Calcium 9.7 8.5 - 10.1 MG/DL    Bilirubin, total 0.4 0.2 - 1.0 MG/DL    ALT (SGPT) 26 12 - 78 U/L    AST (SGOT) 15 15 - 37 U/L    Alk. phosphatase 74 45 - 117 U/L    Protein, total 6.5 6.4 - 8.2 g/dL    Albumin 3.7 3.5 - 5.0 g/dL    Globulin 2.8 2.0 - 4.0 g/dL    A-G Ratio 1.3 1.1 - 2.2         Pre-medications  were administered as ordered and chemotherapy was initiated. Medications Administered     trastuzumab-hyaluronidase-oysk (HERCEPTIN HYLECTA) injection 600 mg     Admin Date  01/04/2022 Action  Given Dose  600 mg Route  SubCUTAneous Administered By  Zabrina Youssef, RN                1140 Pt tolerated treatment well. Port maintained positive blood return throughout treatment. Flushed, heparinized and de-accessed per protocol. D/c home ambulatory in no distress.      Future Appointments   Date Time Provider Wendie Escobar   1/25/2022 11:00 AM H1 NAT [facility]STRACK [facility]   2/15/2022 11:30 AM H2 NAT FASTRACK [facility]   2/15/2022 11:45 AM Donna Watson  [address] BS AMB   2/21/2022 10:45 AM [facility]   5/4/2022 10:30 AM MD FRED Khan BS AMB         Usha Blanc RN  January 4, 2022

## 2022-01-04 NOTE — PROGRESS NOTES
[facility] at [address], [address] [place] [address] Road, [place]: [phone]  F: [phone]    Reason for Visit:   Wetzel Heimlich is a 73 y.o. female seen today in office for follow up of Right Breast Cancer. Treatment History:   · Abnormal screening mammo 6/3/21: Bilateral digital screening mammography was performed and is interpreted in conjunction with a computer assisted detection (CAD) system. In the left breast, no suspicious masses or calcifications are identified. The right breast upper outer quadrant at 10:00 posterior depth 9 cm from the nipple there is a focal asymmetry  · Right Dx Mammo/US 6/7/21: Further evaluation was performed for a right breast focal asymmetry. In the right breast upper outer quadrant at 10:00 posterior depth there is an equal density irregular mass with indistinct margins measuring up to 1.7 cm. In the right breast at 10:00 10 cm from the nipple there is a 1.6 x 0.9 x 1.0 cm hypoechoic oval mass with indistinct margins, internal vascularity, and posterior acoustic enhancement  · Right Breast Biopsy 6/18/21: PATH - Invasive ductal carcinoma, [place] histologic grade 3. Largest glass slide measurement of invasive carcinoma: 6 mm   · ER/IN negative, HER2 FISH +  · Ki67 90%  · Right Lumpectomy 8/3/2: PATH - 20 mm IDC with negative LNs. Margins negative. · Adjuvant weekly Taxol + Herceptin 8/31/21 - 11/16/21  · Maintenance subQ Herceptin 11/23/21 - Current    STAGE: Pathologic T1cN0, ER/IN negative, HER2+     History of Present Illness:   Wetzel Heimlich is a 73 y.o. female seen today in office for follow up of right breast cancer post lumpectomy 8/3/21. She started adjuvant weekly Taxol + Herceptin on 8/31/21 and completed 12 weeks on 11/16/21. She is here today for Cycle 7 (third cycle of maintenance subQ Herceptin). She reports that she feels well overall today.  She is tolerating maintenance Herceptin well overall without side effects. Her appetite and energy levels are good. She denies fever, chills, mouth sores, cough, SOB, CP, nausea, vomiting, diarrhea, and constipation. She denies pain today. CBC and CMP are still pending today. She wants treatment today. She had radiation simulation yesterday and will start XRT in the next week or two with Dr. Deondre Keith. Her supportive  is here today. Past Medical History:   Diagnosis Date    Arthritis     left ankle    Breast cancer ([address]     RIGHT    Chronic kidney disease     kidney stone    Chronic pain of left ankle 2017    Seeing Dr Aleks Oconnor    Fracture     left ankle    Ill-defined condition     PICC line for UTI - borderline sepsis    Osteopenia 2017      Past Surgical History:   Procedure Laterality Date    COLONOSCOPY N/A 2019    tubular adenoma, repeat 1 yr d/t poor prep - performed [facility] Gera Bishop [facility] [place];     COLONOSCOPY N/A 2020    normal - performed by Gera Bishop MD at [facility] ENDOSCOPY    HX [id number] Left     surgery - has a plate a screws    HX BREAST LUMPECTOMY Right 8/3/2021    RIGHT BREAST LUMPECTOMY WITH ULTRASOUND AND RIGHT BREAST SENTINEL NODE BIOPSY AND PORTACATH INSERTION performed by Dominguez Brambila MD at [facility] AMBULATORY OR    HX [address]      HX GI      COLONOSCOPY    HX OTHER SURGICAL Right 2020    growth removed from right eyelid    HX OTHER SURGICAL Right 2020    PICC line placed for IV abx, removed after completing course for pyelonephritis    HX POLYPECTOMY      HX TONSILLECTOMY      [place] BIOPSY OF BREAST, NEEDLE CORE Right       Social History     Tobacco Use    Smoking status: Former Smoker     Quit date:      Years since quittin.0    Smokeless tobacco: Never Used    Tobacco comment: was a causal smoker when smoker in college   Substance Use Topics    Alcohol use:  Yes     Alcohol/week: 5.0 standard drinks Types: 5 Glasses of wine per week      Family History   Problem Relation Age of Onset    Cancer Mother         Brain tumor    Other Father         ? lung problem    Cancer Sister         tumors of spine    Other Sister         kidney stones    Other Daughter         kidney stone    Other Maternal Aunt         kidney stones    No Known Problems Sister     No Known Problems Sister     Anesth Problems Neg Hx      Current Outpatient Medications   Medication Sig    zolpidem (AMBIEN) 5 mg tablet TAKE ONE TABLET BY MOUTH  AT BEDTIME AS NEEDED FOR SLEEP    alendronate (FOSAMAX) 70 mg tablet TAKE ONE TABLET BY MOUTH ONCE WEEKLY ON AN EMPTY STOMACH BEFORE BREAKFAST. REMAIN UPRIGHT FOR 30 MINUTES AND TAKE WITH 8 OUNCES OF WATER.  LORazepam (ATIVAN) 0.5 mg tablet Take 1 Tablet by mouth three (3) times daily as needed for Anxiety. Max Daily Amount: 1.5 mg.    lidocaine-prilocaine (EMLA) topical cream Apply  to affected area as needed for Pain.  CRANIAL PROSTHESIS misc Breast cancer for chemo/ alopecia needs wig    ondansetron (ZOFRAN ODT) 4 mg disintegrating tablet Take 1 Tablet by mouth every eight (8) hours as needed for Nausea or Vomiting.  prochlorperazine (Compazine) 5 mg tablet Take 1-2 Tablets by mouth every six (6) hours as needed for Nausea or Vomiting.  ondansetron (ZOFRAN ODT) 4 mg disintegrating tablet Take 1 Tablet by mouth every eight (8) hours as needed for Nausea or Vomiting.  cortisone acetate (CORTISONE PO) Take  by mouth. Injections every 3 months in her foot    DULoxetine (CYMBALTA) 20 mg capsule TAKE ONE CAPSULE BY MOUTH EVERY EVENING    acetaminophen (TYLENOL) 500 mg tablet 1 or 2 every 6 hrs prn for headache     No current facility-administered medications for this visit.      Facility-Administered Medications Ordered in Other Visits   Medication Dose Route Frequency    0.9% sodium chloride infusion  25 mL/hr IntraVENous CONTINUOUS    diphenhydrAMINE (BENADRYL) injection 50 mg  50 mg IntraVENous ONCE PRN    acetaminophen (TYLENOL) tablet 650 mg  650 mg Oral ONCE PRN    trastuzumab-hyaluronidase-oysk (HERCEPTIN HYLECTA) injection 600 mg  600 mg SubCUTAneous ONCE    sodium chloride (NS) flush 10 mL  10 mL IntraVENous PRN    0.9% sodium chloride injection 10 mL  10 mL IntraVENous PRN    heparin (porcine) pf 300-500 Units  300-500 Units InterCATHeter PRN    sodium chloride 0.9 % bolus infusion 500 mL  500 mL IntraVENous PRN    diphenhydrAMINE (BENADRYL) injection 25 mg  25 mg IntraVENous PRN    famotidine (PF) (PEPCID) 20 mg in 0.9% sodium chloride 10 mL injection  20 mg IntraVENous PRN    acetaminophen (TYLENOL) tablet 650 mg  650 mg Oral PRN    meperidine (DEMEROL) injection 25 mg  25 mg IntraVENous PRN    ondansetron (ZOFRAN) injection 8 mg  8 mg IntraVENous PRN      Allergies   Allergen Reactions    Adhesive Tape-Silicones Rash    Other Medication Hives     Cranberry or Probiotic, had severe itching and hives, not sure to which. Has taken a probiotic recently without a reaction.  Oxycodone Itching      Review of Systems:  A complete review of systems was obtained, negative except as described above and as reported on ROS sheet scanned into system. Physical Exam:     Visit Vitals  BP (!) 159/106 (BP 1 Location: Left upper arm, BP Patient Position: Sitting)   Pulse 83   Temp (!) 96.7 °F (35.9 °C) (Temporal)   Ht 5' 4\" (1.626 m)   Wt 156 lb 12.8 oz (71.1 kg)   SpO2 96%   BMI 26.91 kg/m²     ECOG PS: 0  General: No distress  Eyes: Anicteric sclerae  HENT: Atraumatic, wearing a mask  Neck: Supple  Respiratory:  normal respiratory effort, lungs clear  CV: Regular  GI: Soft NT  Ext: No edema  MS: Normal gait and station. Digits without clubbing or cyanosis. Skin: No rashes, ecchymoses, or petechiae. Normal temperature, turgor, and texture.    Psych: Alert, oriented, appropriate affect, normal judgment/insight  Neuro: Non focal    Results:     Lab Results   Component Value Date/Time    WBC 4.2 01/04/2022 09:55 AM    HGB 12.2 01/04/2022 09:55 AM    HCT 36.1 01/04/2022 09:55 AM    PLATELET 685 88/46/5390 09:55 AM    .7 (H) 01/04/2022 09:55 AM    ABS. NEUTROPHILS 2.6 01/04/2022 09:55 AM     Lab Results   Component Value Date/Time    Sodium 138 01/04/2022 09:55 AM    Potassium 4.4 01/04/2022 09:55 AM    Chloride 106 01/04/2022 09:55 AM    CO2 27 01/04/2022 09:55 AM    Glucose 87 01/04/2022 09:55 AM    BUN 24 (H) 01/04/2022 09:55 AM    Creatinine 0.69 01/04/2022 09:55 AM    GFR est AA >60 01/04/2022 09:55 AM    GFR est non-AA >60 01/04/2022 09:55 AM    Calcium 9.7 01/04/2022 09:55 AM     Lab Results   Component Value Date/Time    Bilirubin, total 0.4 01/04/2022 09:55 AM    ALT (SGPT) 26 01/04/2022 09:55 AM    Alk. phosphatase 74 01/04/2022 09:55 AM    Protein, total 6.5 01/04/2022 09:55 AM    Albumin 3.7 01/04/2022 09:55 AM    Globulin 2.8 01/04/2022 09:55 AM     6/18/21  FINAL PATHOLOGIC DIAGNOSIS   Breast, right, needle core biopsy:   Invasive ductal carcinoma, [place] histologic grade 3   Largest glass slide measurement of invasive carcinoma: 6 mm   ER/UT negative, HER2 FISH +    8/3/21  FINAL PATHOLOGIC DIAGNOSIS   1. Lymph node, right axillary sentinel lymph node #1, excision:   One lymph node negative for metastatic carcinoma. 2. Lymph node, right axillary sentinel lymph node #2, excision:   One lymph node negative for metastatic carcinoma. 3. Right breast, lumpectomy, excision:   Procedure: Excision (less than total mastectomy)   Specimen Laterality: Right   TUMOR   Histologic Type:  Invasive carcinoma of no special type (ductal)   Glandular (Acinar) / Tubular Differentiation: Score 3   Nuclear Pleomorphism: Score 3   Mitotic Rate: Score 3   Overall Grade: Grade 3   Tumor Size: 20 mm   Tumor Focality: Single focus of invasive carcinoma   Ductal Carcinoma in Situ (DCIS): Not identified   Tumor Extent   Lymphovascular Invasion: Not identified   Treatment Effect in the Breast: No known presurgical therapy   MARGINS   Invasive Carcinoma Margins: Uninvolved by invasive carcinoma   Distance from Closest Margin (Millimeters): Less than: 1 mm   Closest Margin(s): Anterior   LYMPH NODES   Regional Lymph Nodes: Uninvolved by tumor cells   Total Number of Lymph Nodes Examined: 2   Number of [place] Nodes Examined: 2   PATHOLOGIC STAGE CLASSIFICATION (pTNM, AJCC 8th Edition)   Primary Tumor (pT): pT1c N0(sn)   Breast biomarkers previously reported (F133002): ER negative, HI negative, HER-2/mili positive by FISH. Tumor blocks for potential additional studies: 3-B-D.   4. Right breast, lateral margin right breast, excision:   Benign fibroadipose tissue with minimal benign fibroglandular breast tissue. 5. Right breast, medial margin right breast, excision:   Benign fibroglandular breast tissue with epithelial cyst and usual ductal hyperplasia. 6. Right breast, inferior margin right breast, excision:   Benign fibroglandular breast tissue. 08/23/21    ECHO ADULT COMPLETE 08/23/2021 8/23/2021    Interpretation Summary  · LV: Estimated LVEF is 60 - 65%. Normal cavity size, wall thickness and systolic function (ejection fraction normal). Normal left ventricular strain. Global longitudinal strain is -15.9%. Mild (grade 1) left ventricular diastolic dysfunction. Signed by: Anyi Solitario MD on 8/23/2021  1:01 PM    12/03/21    ECHO ADULT COMPLETE 12/06/2021 12/6/2021    Interpretation Summary  · LV: Estimated LVEF is 60 - 65%. Normal cavity size, wall thickness, systolic function (ejection fraction normal) and diastolic function. Wall motion: normal.  · LV Strain: -16% unchanged from prior study. Signed by: Sreekanth Colby MD on 12/6/2021  2:36 PM    Records reviewed and summarized above. Pathology report(s) reviewed above. Radiology report(s) reviewed above.     Assessment:/PLAN     1) Stage 1 Breast Cancer ER- Her2+ post Lumpectomy 8/3/21  Post lumpectomy in August. Reviewed path in detail. Discussed no hormonal therapy options due to ER/SC negative. Port placed on 8/3/21. Pre chemo ECHO 8/23/21 stable with EF 60-65%. Follow up ECHO 12/3/21 stable with EF 60-65%. Patient started weekly Taxol/Herceptin on 8/31/21. She completed 12 weeks of Taxol/Herceptin on 11/16/21. She is here today for Cycle 7 (third dose of maintenance subQ Herceptin). She is tolerating treatment well overall without significant. She is clinically stable and doing well overall. Labs (CBC and CMP) reviewed today. Patient is ready for treatment today. She has seen Rad/Onc, Dr. Ava Apple. She had radiation simulation yesterday. She will start XRT in a week or two. Follow up in 3 weeks in [facility]. Follow up in 6 weeks in [facility]/office. Patient agrees with plan. 2) Arthritis/Hx of Kidney Stone/Osteopenia  Management per PCP. 3) Management of High Risk Medications - Herceptin  No significant toxicities noted. ECHO from 12/3/21 reviewed. Labs (CBC and CMP) reviewed. No dose adjustments needed. Will monitor for side effects. 4) Anxiety about Health  Ativan PRN while on chemo. 5) Psychosocial  Mood good, coping well. Her family is very supportive. SW/NN support as needed. Her  is here today. Call if questions. Follow up in 3 weeks in [facility] and 6 weeks in [facility]/office. I personally saw and evaluated the patient and performed the key components of medical decision making. The history, physical exam, and documentation were performed by Joe Ceron NP. I reviewed and verified the above documentation and modified it as needed  Specifically  Doing well overall. Tolerating therapy well. Continue with herceptin and ECHO monitoring. For radiation. ER/SC negative so no hormonal therapy . Continue surveillance    I appreciate the opportunity to participate in Ms. Shawn Kowalski's care.     Signed By: Rolan Turk DO

## 2022-01-12 ENCOUNTER — HOSPITAL ENCOUNTER (OUTPATIENT)
Dept: RADIATION THERAPY | Age: 72
Discharge: HOME | End: 2022-01-12

## 2022-01-13 ENCOUNTER — HOSPITAL ENCOUNTER (OUTPATIENT)
Dept: RADIATION THERAPY | Age: 72
Discharge: HOME | End: 2022-01-13

## 2022-01-14 ENCOUNTER — HOSPITAL ENCOUNTER (OUTPATIENT)
Dept: RADIATION THERAPY | Age: 72
Discharge: HOME | End: 2022-01-14

## 2022-01-17 ENCOUNTER — HOSPITAL ENCOUNTER (OUTPATIENT)
Dept: RADIATION THERAPY | Age: 72
Discharge: HOME | End: 2022-01-17

## 2022-01-17 RX ORDER — HYDROCORTISONE SODIUM SUCCINATE 100 MG/2ML
100 INJECTION, POWDER, FOR SOLUTION INTRAMUSCULAR; INTRAVENOUS AS NEEDED
Status: CANCELLED | OUTPATIENT
Start: 2022-01-25

## 2022-01-17 RX ORDER — DIPHENHYDRAMINE HYDROCHLORIDE 50 MG/ML
50 INJECTION, SOLUTION INTRAMUSCULAR; INTRAVENOUS AS NEEDED
Status: CANCELLED
Start: 2022-01-25

## 2022-01-17 RX ORDER — EPINEPHRINE 1 MG/ML
0.3 INJECTION, SOLUTION, CONCENTRATE INTRAVENOUS AS NEEDED
Status: CANCELLED | OUTPATIENT
Start: 2022-01-25

## 2022-01-17 RX ORDER — ONDANSETRON 2 MG/ML
8 INJECTION INTRAMUSCULAR; INTRAVENOUS AS NEEDED
Status: CANCELLED | OUTPATIENT
Start: 2022-01-25

## 2022-01-17 RX ORDER — DIPHENHYDRAMINE HYDROCHLORIDE 50 MG/ML
25 INJECTION, SOLUTION INTRAMUSCULAR; INTRAVENOUS AS NEEDED
Status: CANCELLED
Start: 2022-01-25

## 2022-01-17 RX ORDER — ALBUTEROL SULFATE 0.83 MG/ML
2.5 SOLUTION RESPIRATORY (INHALATION) AS NEEDED
Status: CANCELLED
Start: 2022-01-25

## 2022-01-17 RX ORDER — ACETAMINOPHEN 325 MG/1
650 TABLET ORAL AS NEEDED
Status: CANCELLED
Start: 2022-01-25

## 2022-01-18 ENCOUNTER — HOSPITAL ENCOUNTER (OUTPATIENT)
Dept: RADIATION THERAPY | Age: 72
Discharge: HOME | End: 2022-01-18

## 2022-01-19 ENCOUNTER — HOSPITAL ENCOUNTER (OUTPATIENT)
Dept: RADIATION THERAPY | Age: 72
Discharge: HOME | End: 2022-01-19

## 2022-01-20 ENCOUNTER — HOSPITAL ENCOUNTER (OUTPATIENT)
Dept: RADIATION THERAPY | Age: 72
Discharge: HOME | End: 2022-01-20

## 2022-01-21 ENCOUNTER — HOSPITAL ENCOUNTER (OUTPATIENT)
Dept: RADIATION THERAPY | Age: 72
Discharge: HOME | End: 2022-01-21

## 2022-01-24 ENCOUNTER — APPOINTMENT (OUTPATIENT)
Dept: INFUSION THERAPY | Age: 72
End: 2022-01-24

## 2022-01-25 ENCOUNTER — HOSPITAL ENCOUNTER (OUTPATIENT)
Dept: RADIATION THERAPY | Age: 72
Discharge: HOME | End: 2022-01-25

## 2022-01-25 ENCOUNTER — HOSPITAL ENCOUNTER (OUTPATIENT)
Dept: INFUSION THERAPY | Age: 72
Discharge: HOME | End: 2022-01-25
Payer: MEDICARE

## 2022-01-25 VITALS
TEMPERATURE: 97.7 F | DIASTOLIC BLOOD PRESSURE: 82 MMHG | HEART RATE: 70 BPM | RESPIRATION RATE: 16 BRPM | SYSTOLIC BLOOD PRESSURE: 128 MMHG

## 2022-01-25 DIAGNOSIS — C50.911 INFILTRATING DUCTAL CARCINOMA OF RIGHT BREAST (HCC): Primary | ICD-10-CM

## 2022-01-25 PROCEDURE — 96401 CHEMO ANTI-NEOPL SQ/IM: CPT

## 2022-01-25 PROCEDURE — 74011250636 HC RX REV CODE- 250/636: Performed by: INTERNAL MEDICINE

## 2022-01-25 RX ORDER — SODIUM CHLORIDE 0.9 % (FLUSH) 0.9 %
10 SYRINGE (ML) INJECTION AS NEEDED
Status: DISPENSED | OUTPATIENT
Start: 2022-01-25 | End: 2022-01-25

## 2022-01-25 RX ORDER — HEPARIN 100 UNIT/ML
300-500 SYRINGE INTRAVENOUS AS NEEDED
Status: ACTIVE | OUTPATIENT
Start: 2022-01-25 | End: 2022-01-25

## 2022-01-25 RX ORDER — SODIUM CHLORIDE 9 MG/ML
10 INJECTION INTRAMUSCULAR; INTRAVENOUS; SUBCUTANEOUS AS NEEDED
Status: ACTIVE | OUTPATIENT
Start: 2022-01-25 | End: 2022-01-25

## 2022-01-25 RX ORDER — ACETAMINOPHEN 325 MG/1
650 TABLET ORAL
Status: DISCONTINUED | OUTPATIENT
Start: 2022-01-25 | End: 2022-01-26 | Stop reason: HOSPADM

## 2022-01-25 RX ORDER — SODIUM CHLORIDE 9 MG/ML
25 INJECTION, SOLUTION INTRAVENOUS CONTINUOUS
Status: DISPENSED | OUTPATIENT
Start: 2022-01-25 | End: 2022-01-25

## 2022-01-25 RX ORDER — DIPHENHYDRAMINE HYDROCHLORIDE 50 MG/ML
50 INJECTION, SOLUTION INTRAMUSCULAR; INTRAVENOUS
Status: DISCONTINUED | OUTPATIENT
Start: 2022-01-25 | End: 2022-01-26 | Stop reason: HOSPADM

## 2022-01-25 RX ADMIN — TRASTUZUMAB AND HYALURONIDASE-OYSK 600 MG: 600; 10000 INJECTION, SOLUTION SUBCUTANEOUS at 11:29

## 2022-01-25 NOTE — PROGRESS NOTES
[facility] Chemotherapy Progress Note    Date: 2022    Name: Oscar Domingo    MRN: [id number]         : 1950      1040 Pt admit to [facility] for Herceptin SQ ambulatory in stable condition. Assessment completed. No new concerns voiced. NO labs needed. Patient denies SOB, fever, cough, general not feeling well. Patient denies recent exposure to someone who has tested positive for COVID-19. Patient denies having contact with anyone who has a pending COVID test.           Chemotherapy Flowsheet 2022   Cycle C8   Date 2022   Drug / Regimen Herceptin SQ   Pre Meds -   Notes Right SQ given         Ms. Kowalski's vitals were reviewed. Patient Vitals for the past 12 hrs:   Temp Pulse Resp BP   22 1042 97.7 °F (36.5 °C) 70 16 128/82       Pre-medications  were administered as ordered and chemotherapy was initiated. Medications Administered     trastuzumab-hyaluronidase-oysk (HERCEPTIN HYLECTA) injection 600 mg     Admin Date  2022 Action  Given Dose  600 mg Route  SubCUTAneous Administered By  Marga Portillo RN                1140 Pt tolerated treatment  Pt aware of next appointment.     Future Appointments   Date Time Provider Wendie Escobar   2022  9:30 AM RAD [address] [place]   2022  9:30 AM RAD [address] [place]   2022  9:30 AM RAD [address] [place]   2022  9:30 AM RAD [address] [place]   2022  9:30 AM RAD [address] [place]   2022  9:30 AM RAD [address] [place]   2/3/2022  9:30 AM RAD [address] [place]   2022  9:30 AM RAD [address] [place]   2022  9:30 AM RAD [address] [place]   2022  9:30 AM RAD ONC THERAPY [facility] [address] [place]   2022  9:30 AM RAD [address] [place]   2[facility]15[facility]2022 11:30 [facility] 2/15/2022 11:45 AM Hosea Knowles, ALTHEA [address] BS AMB   2/28/2022  1:45 PM Pete Rodgers MD [facility] BS AMB   3/8/2022 11:30 AM H2 NAT [facility]ACK [facility]   3/29/2022 11:30 AM H2 [facility]   4/19/2022 11:30 AM H2 NAT [facility]STRACK [facility]   5/4/2022 10:30 AM Reddy Hart MD [facility] BS [id number] Lanette Vera RN  January 25, 2022

## 2022-01-26 ENCOUNTER — HOSPITAL ENCOUNTER (OUTPATIENT)
Dept: RADIATION THERAPY | Age: 72
Discharge: HOME | End: 2022-01-26

## 2022-01-27 ENCOUNTER — HOSPITAL ENCOUNTER (OUTPATIENT)
Dept: RADIATION THERAPY | Age: 72
Discharge: HOME | End: 2022-01-27

## 2022-01-28 ENCOUNTER — HOSPITAL ENCOUNTER (OUTPATIENT)
Dept: RADIATION THERAPY | Age: 72
Discharge: HOME | End: 2022-01-28

## 2022-01-31 ENCOUNTER — HOSPITAL ENCOUNTER (OUTPATIENT)
Dept: RADIATION THERAPY | Age: 72
Discharge: HOME | End: 2022-01-31

## 2022-02-01 ENCOUNTER — HOSPITAL ENCOUNTER (OUTPATIENT)
Dept: RADIATION THERAPY | Age: 72
Discharge: HOME | End: 2022-02-01

## 2022-02-02 ENCOUNTER — HOSPITAL ENCOUNTER (OUTPATIENT)
Dept: RADIATION THERAPY | Age: 72
Discharge: HOME | End: 2022-02-02

## 2022-02-03 ENCOUNTER — HOSPITAL ENCOUNTER (OUTPATIENT)
Dept: RADIATION THERAPY | Age: 72
Discharge: HOME | End: 2022-02-03

## 2022-02-04 ENCOUNTER — HOSPITAL ENCOUNTER (OUTPATIENT)
Dept: RADIATION THERAPY | Age: 72
Discharge: HOME | End: 2022-02-04

## 2022-02-07 ENCOUNTER — HOSPITAL ENCOUNTER (OUTPATIENT)
Dept: RADIATION THERAPY | Age: 72
Discharge: HOME | End: 2022-02-07

## 2022-02-08 ENCOUNTER — HOSPITAL ENCOUNTER (OUTPATIENT)
Dept: RADIATION THERAPY | Age: 72
Discharge: HOME | End: 2022-02-08

## 2022-02-09 ENCOUNTER — HOSPITAL ENCOUNTER (OUTPATIENT)
Dept: RADIATION THERAPY | Age: 72
Discharge: HOME | End: 2022-02-09

## 2022-02-15 ENCOUNTER — HOSPITAL ENCOUNTER (OUTPATIENT)
Dept: INFUSION THERAPY | Age: 72
Discharge: HOME | End: 2022-02-15
Payer: MEDICARE

## 2022-02-15 ENCOUNTER — OFFICE VISIT (OUTPATIENT)
Dept: ONCOLOGY | Age: 72
End: 2022-02-15
Payer: MEDICARE

## 2022-02-15 VITALS
HEIGHT: 64 IN | SYSTOLIC BLOOD PRESSURE: 139 MMHG | OXYGEN SATURATION: 98 % | WEIGHT: 156.7 LBS | HEART RATE: 73 BPM | BODY MASS INDEX: 26.75 KG/M2 | DIASTOLIC BLOOD PRESSURE: 83 MMHG | TEMPERATURE: 98.4 F

## 2022-02-15 VITALS
DIASTOLIC BLOOD PRESSURE: 83 MMHG | SYSTOLIC BLOOD PRESSURE: 139 MMHG | RESPIRATION RATE: 18 BRPM | TEMPERATURE: 98.4 F | HEART RATE: 80 BPM

## 2022-02-15 DIAGNOSIS — Z51.81 ENCOUNTER FOR MONITORING CARDIOTOXIC DRUG THERAPY: ICD-10-CM

## 2022-02-15 DIAGNOSIS — C50.911 INFILTRATING DUCTAL CARCINOMA OF RIGHT BREAST (HCC): Primary | ICD-10-CM

## 2022-02-15 DIAGNOSIS — Z98.890 HISTORY OF LUMPECTOMY OF RIGHT BREAST: ICD-10-CM

## 2022-02-15 DIAGNOSIS — M85.89 OSTEOPENIA OF MULTIPLE SITES: ICD-10-CM

## 2022-02-15 DIAGNOSIS — Z87.442 HISTORY OF KIDNEY STONES: ICD-10-CM

## 2022-02-15 DIAGNOSIS — Z79.899 ENCOUNTER FOR MONITORING CARDIOTOXIC DRUG THERAPY: ICD-10-CM

## 2022-02-15 DIAGNOSIS — F41.8 ANXIETY ABOUT HEALTH: ICD-10-CM

## 2022-02-15 DIAGNOSIS — M19.90 ARTHRITIS: ICD-10-CM

## 2022-02-15 LAB
ALBUMIN SERPL-MCNC: 3.7 G/DL (ref 3.5–5)
ALBUMIN/GLOB SERPL: 1.3 {RATIO} (ref 1.1–2.2)
ALP SERPL-CCNC: 74 U/L (ref 45–117)
ALT SERPL-CCNC: 25 U/L (ref 12–78)
ANION GAP SERPL CALC-SCNC: 4 MMOL/L (ref 5–15)
AST SERPL-CCNC: 15 U/L (ref 15–37)
BASOPHILS # BLD: 0 K/UL (ref 0–0.1)
BASOPHILS NFR BLD: 1 % (ref 0–1)
BILIRUB SERPL-MCNC: 0.4 MG/DL (ref 0.2–1)
BUN SERPL-MCNC: 23 MG/DL (ref 6–20)
BUN/CREAT SERPL: 27 (ref 12–20)
CALCIUM SERPL-MCNC: 9.8 MG/DL (ref 8.5–10.1)
CHLORIDE SERPL-SCNC: 105 MMOL/L (ref 97–108)
CO2 SERPL-SCNC: 27 MMOL/L (ref 21–32)
CREAT SERPL-MCNC: 0.86 MG/DL (ref 0.55–1.02)
DIFFERENTIAL METHOD BLD: ABNORMAL
EOSINOPHIL # BLD: 0.1 K/UL (ref 0–0.4)
EOSINOPHIL NFR BLD: 2 % (ref 0–7)
ERYTHROCYTE [DISTWIDTH] IN BLOOD BY AUTOMATED COUNT: 11.3 % (ref 11.5–14.5)
GLOBULIN SER CALC-MCNC: 2.9 G/DL (ref 2–4)
GLUCOSE SERPL-MCNC: 136 MG/DL (ref 65–100)
HCT VFR BLD AUTO: 38.3 % (ref 35–47)
HGB BLD-MCNC: 12.9 G/DL (ref 11.5–16)
IMM GRANULOCYTES # BLD AUTO: 0 K/UL (ref 0–0.04)
IMM GRANULOCYTES NFR BLD AUTO: 0 % (ref 0–0.5)
LYMPHOCYTES # BLD: 0.9 K/UL (ref 0.8–3.5)
LYMPHOCYTES NFR BLD: 24 % (ref 12–49)
MCH RBC QN AUTO: 33.6 PG (ref 26–34)
MCHC RBC AUTO-ENTMCNC: 33.7 G/DL (ref 30–36.5)
MCV RBC AUTO: 99.7 FL (ref 80–99)
MONOCYTES # BLD: 0.3 K/UL (ref 0–1)
MONOCYTES NFR BLD: 7 % (ref 5–13)
NEUTS SEG # BLD: 2.5 K/UL (ref 1.8–8)
NEUTS SEG NFR BLD: 66 % (ref 32–75)
NRBC # BLD: 0 K/UL (ref 0–0.01)
NRBC BLD-RTO: 0 PER 100 WBC
PLATELET # BLD AUTO: 160 K/UL (ref 150–400)
PMV BLD AUTO: 10.7 FL (ref 8.9–12.9)
POTASSIUM SERPL-SCNC: 4.6 MMOL/L (ref 3.5–5.1)
PROT SERPL-MCNC: 6.6 G/DL (ref 6.4–8.2)
RBC # BLD AUTO: 3.84 M/UL (ref 3.8–5.2)
SODIUM SERPL-SCNC: 136 MMOL/L (ref 136–145)
WBC # BLD AUTO: 3.8 K/UL (ref 3.6–11)

## 2022-02-15 PROCEDURE — 85025 COMPLETE CBC W/AUTO DIFF WBC: CPT

## 2022-02-15 PROCEDURE — G8419 CALC BMI OUT NRM PARAM NOF/U: HCPCS | Performed by: INTERNAL MEDICINE

## 2022-02-15 PROCEDURE — 36415 COLL VENOUS BLD VENIPUNCTURE: CPT

## 2022-02-15 PROCEDURE — 74011250636 HC RX REV CODE- 250/636: Performed by: INTERNAL MEDICINE

## 2022-02-15 PROCEDURE — 96401 CHEMO ANTI-NEOPL SQ/IM: CPT

## 2022-02-15 PROCEDURE — 80053 COMPREHEN METABOLIC PANEL: CPT

## 2022-02-15 PROCEDURE — G8427 DOCREV CUR MEDS BY ELIG CLIN: HCPCS | Performed by: INTERNAL MEDICINE

## 2022-02-15 PROCEDURE — 1101F PT FALLS ASSESS-DOCD LE1/YR: CPT | Performed by: INTERNAL MEDICINE

## 2022-02-15 PROCEDURE — 1090F PRES/ABSN URINE INCON ASSESS: CPT | Performed by: INTERNAL MEDICINE

## 2022-02-15 PROCEDURE — G8536 NO DOC ELDER MAL SCRN: HCPCS | Performed by: INTERNAL MEDICINE

## 2022-02-15 PROCEDURE — G9899 SCRN MAM PERF RSLTS DOC: HCPCS | Performed by: INTERNAL MEDICINE

## 2022-02-15 PROCEDURE — G8399 PT W/DXA RESULTS DOCUMENT: HCPCS | Performed by: INTERNAL MEDICINE

## 2022-02-15 PROCEDURE — 3017F COLORECTAL CA SCREEN DOC REV: CPT | Performed by: INTERNAL MEDICINE

## 2022-02-15 PROCEDURE — 99214 OFFICE O/P EST MOD 30 MIN: CPT | Performed by: INTERNAL MEDICINE

## 2022-02-15 PROCEDURE — G8510 SCR DEP NEG, NO PLAN REQD: HCPCS | Performed by: INTERNAL MEDICINE

## 2022-02-15 PROCEDURE — 77030012965 HC NDL HUBR BBMI -A

## 2022-02-15 RX ORDER — ALBUTEROL SULFATE 0.83 MG/ML
2.5 SOLUTION RESPIRATORY (INHALATION) AS NEEDED
Status: DISCONTINUED | OUTPATIENT
Start: 2022-02-15 | End: 2022-02-16 | Stop reason: HOSPADM

## 2022-02-15 RX ORDER — ACETAMINOPHEN 325 MG/1
650 TABLET ORAL
Status: DISCONTINUED | OUTPATIENT
Start: 2022-02-15 | End: 2022-02-16 | Stop reason: HOSPADM

## 2022-02-15 RX ORDER — HEPARIN 100 UNIT/ML
300-500 SYRINGE INTRAVENOUS AS NEEDED
Status: DISCONTINUED | OUTPATIENT
Start: 2022-02-15 | End: 2022-02-16 | Stop reason: HOSPADM

## 2022-02-15 RX ORDER — SODIUM CHLORIDE 9 MG/ML
10 INJECTION INTRAMUSCULAR; INTRAVENOUS; SUBCUTANEOUS AS NEEDED
Status: DISCONTINUED | OUTPATIENT
Start: 2022-02-15 | End: 2022-02-16 | Stop reason: HOSPADM

## 2022-02-15 RX ORDER — DIPHENHYDRAMINE HYDROCHLORIDE 50 MG/ML
50 INJECTION, SOLUTION INTRAMUSCULAR; INTRAVENOUS
Status: DISCONTINUED | OUTPATIENT
Start: 2022-02-15 | End: 2022-02-16 | Stop reason: HOSPADM

## 2022-02-15 RX ORDER — EPINEPHRINE 1 MG/ML
0.3 INJECTION, SOLUTION, CONCENTRATE INTRAVENOUS AS NEEDED
Status: DISCONTINUED | OUTPATIENT
Start: 2022-02-15 | End: 2022-02-16 | Stop reason: HOSPADM

## 2022-02-15 RX ORDER — ACETAMINOPHEN 325 MG/1
650 TABLET ORAL AS NEEDED
Status: DISCONTINUED | OUTPATIENT
Start: 2022-02-15 | End: 2022-02-16 | Stop reason: HOSPADM

## 2022-02-15 RX ORDER — DIPHENHYDRAMINE HYDROCHLORIDE 50 MG/ML
25 INJECTION, SOLUTION INTRAMUSCULAR; INTRAVENOUS AS NEEDED
Status: DISCONTINUED | OUTPATIENT
Start: 2022-02-15 | End: 2022-02-16 | Stop reason: HOSPADM

## 2022-02-15 RX ORDER — DIPHENHYDRAMINE HYDROCHLORIDE 50 MG/ML
50 INJECTION, SOLUTION INTRAMUSCULAR; INTRAVENOUS AS NEEDED
Status: DISCONTINUED | OUTPATIENT
Start: 2022-02-15 | End: 2022-02-16 | Stop reason: HOSPADM

## 2022-02-15 RX ORDER — HYDROCORTISONE SODIUM SUCCINATE 100 MG/2ML
100 INJECTION, POWDER, FOR SOLUTION INTRAMUSCULAR; INTRAVENOUS AS NEEDED
Status: DISCONTINUED | OUTPATIENT
Start: 2022-02-15 | End: 2022-02-16 | Stop reason: HOSPADM

## 2022-02-15 RX ORDER — ONDANSETRON 2 MG/ML
8 INJECTION INTRAMUSCULAR; INTRAVENOUS AS NEEDED
Status: DISCONTINUED | OUTPATIENT
Start: 2022-02-15 | End: 2022-02-16 | Stop reason: HOSPADM

## 2022-02-15 RX ORDER — SODIUM CHLORIDE 0.9 % (FLUSH) 0.9 %
10 SYRINGE (ML) INJECTION AS NEEDED
Status: DISCONTINUED | OUTPATIENT
Start: 2022-02-15 | End: 2022-02-16 | Stop reason: HOSPADM

## 2022-02-15 RX ORDER — SODIUM CHLORIDE 9 MG/ML
25 INJECTION, SOLUTION INTRAVENOUS CONTINUOUS
Status: DISCONTINUED | OUTPATIENT
Start: 2022-02-15 | End: 2022-02-16 | Stop reason: HOSPADM

## 2022-02-15 RX ADMIN — TRASTUZUMAB AND HYALURONIDASE-OYSK 600 MG: 600; 10000 INJECTION, SOLUTION SUBCUTANEOUS at 12:46

## 2022-02-15 NOTE — PROGRESS NOTES
Susanne Tapia is a 73 y.o. female  Chief Complaint   Patient presents with    Chemotherapy    Breast Cancer     1. Have you been to the ER, urgent care clinic since your last visit? Hospitalized since your last visit? No.    2. Have you seen or consulted any other health care providers outside of the [address] [place] [address] since your last visit? Include any pap smears or colon screening.  No.

## 2022-02-15 NOTE — PROGRESS NOTES
[facility] [place] at [address], [address] [place] [address] Road, [place]: [phone]  F: [phone]    Reason for Visit:   Kandice Bustos is a 73 y.o. female seen today in office for follow up of Right Breast Cancer. Treatment History:   · Abnormal screening mammo 6/3/21: Bilateral digital screening mammography was performed and is interpreted in conjunction with a computer assisted detection (CAD) system. In the left breast, no suspicious masses or calcifications are identified. The right breast upper outer quadrant at 10:00 posterior depth 9 cm from the nipple there is a focal asymmetry  · Right Dx Mammo/US 6/7/21: Further evaluation was performed for a right breast focal asymmetry. In the right breast upper outer quadrant at 10:00 posterior depth there is an equal density irregular mass with indistinct margins measuring up to 1.7 cm. In the right breast at 10:00 10 cm from the nipple there is a 1.6 x 0.9 x 1.0 cm hypoechoic oval mass with indistinct margins, internal vascularity, and posterior acoustic enhancement  · Right Breast Biopsy 6/18/21: PATH - Invasive ductal carcinoma, [place] histologic grade 3. Largest glass slide measurement of invasive carcinoma: 6 mm   · ER/AR negative, HER2 FISH +  · Ki67 90%  · Right Lumpectomy 8/3/2: PATH - 20 mm IDC with negative LNs. Margins negative. · Adjuvant weekly Taxol + Herceptin 8/31/21 - 11/16/21  · Maintenance subQ Herceptin 11/23/21 - Current  · XRT with Dr. Ashley Guzmán 12/31/21 - 2/9/22    STAGE: Pathologic T1cN0, ER/AR negative, HER2+     History of Present Illness:   Kandice Bustos is a 73 y.o. female seen today in office for follow up of right breast cancer post lumpectomy 8/3/21. She started adjuvant weekly Taxol + Herceptin on 8/31/21 and completed 12 weeks on 11/16/21. She started XRT with Dr. Ashley Guzmán on 12/31/21 and finished on 2/9/22. She is here today for Cycle 9 (fifth cycle of maintenance subQ Herceptin).  She reports that she feels well overall today. She is tolerating maintenance Herceptin well overall without side effects. Her appetite and energy levels are good. She denies fever, chills, mouth sores, cough, SOB, CP, nausea, vomiting, diarrhea, and constipation. She denies pain today. CBC and CMP are still pending today. She wants treatment today. Her supportive  is here today. Past Medical History:   Diagnosis Date    Arthritis     left ankle    Breast cancer ([address]     RIGHT    Chronic kidney disease     kidney stone    Chronic pain of left ankle 2017    Seeing Dr Villarreal Nearing    Fracture     left ankle    Ill-defined condition     PICC line for UTI - borderline sepsis    Osteopenia 2017      Past Surgical History:   Procedure Laterality Date    COLONOSCOPY N/A 2019    tubular adenoma, repeat 1 yr d/t poor prep - performed [facility]essy Abbott [facility];     COLONOSCOPY N/A 2020    normal - performed by Yessy Abbott MD at [facility] ENDOSCOPY    HX [id number] [facility] [address] Left     surgery - has a plate a screws    HX BREAST LUMPECTOMY Right 8/3/2021    RIGHT BREAST LUMPECTOMY WITH ULTRASOUND AND RIGHT BREAST SENTINEL NODE BIOPSY AND PORTACATH INSERTION performed by Dennie Doctor, MD at [facility] AMBULATORY OR    HX [address]      HX GI      COLONOSCOPY    HX OTHER SURGICAL Right 2020    growth removed from right eyelid    HX OTHER SURGICAL Right 2020    PICC line placed for IV abx, removed after completing course for pyelonephritis    HX POLYPECTOMY      HX TONSILLECTOMY      LA BIOPSY OF BREAST, NEEDLE CORE Right       Social History     Tobacco Use    Smoking status: Former Smoker     Quit date:      Years since quittin.1    Smokeless tobacco: Never Used    Tobacco comment: was a causal smoker when smoker in college   Substance Use Topics    Alcohol use:  Yes     Alcohol/week: 5.0 standard drinks Types: 5 Glasses of wine per week      Family History   Problem Relation Age of Onset    Cancer Mother         Brain tumor    Other Father         ? lung problem    Cancer Sister         tumors of spine    Other Sister         kidney stones    Other Daughter         kidney stone    Other Maternal Aunt         kidney stones    No Known Problems Sister     No Known Problems Sister     Anesth Problems Neg Hx      Current Outpatient Medications   Medication Sig    zolpidem (AMBIEN) 5 mg tablet TAKE ONE TABLET BY MOUTH  AT BEDTIME AS NEEDED FOR SLEEP    alendronate (FOSAMAX) 70 mg tablet TAKE ONE TABLET BY MOUTH ONCE WEEKLY ON AN EMPTY STOMACH BEFORE BREAKFAST. REMAIN UPRIGHT FOR 30 MINUTES AND TAKE WITH 8 OUNCES OF WATER.  LORazepam (ATIVAN) 0.5 mg tablet Take 1 Tablet by mouth three (3) times daily as needed for Anxiety. Max Daily Amount: 1.5 mg.    lidocaine-prilocaine (EMLA) topical cream Apply  to affected area as needed for Pain.  CRANIAL PROSTHESIS misc Breast cancer for chemo/ alopecia needs wig    ondansetron (ZOFRAN ODT) 4 mg disintegrating tablet Take 1 Tablet by mouth every eight (8) hours as needed for Nausea or Vomiting.  prochlorperazine (Compazine) 5 mg tablet Take 1-2 Tablets by mouth every six (6) hours as needed for Nausea or Vomiting.  ondansetron (ZOFRAN ODT) 4 mg disintegrating tablet Take 1 Tablet by mouth every eight (8) hours as needed for Nausea or Vomiting.  cortisone acetate (CORTISONE PO) Take  by mouth. Injections every 3 months in her foot    DULoxetine (CYMBALTA) 20 mg capsule TAKE ONE CAPSULE BY MOUTH EVERY EVENING    acetaminophen (TYLENOL) 500 mg tablet 1 or 2 every 6 hrs prn for headache     No current facility-administered medications for this visit.      Facility-Administered Medications Ordered in Other Visits   Medication Dose Route Frequency    0.9% sodium chloride infusion  25 mL/hr IntraVENous CONTINUOUS    diphenhydrAMINE (BENADRYL) injection 50 mg  50 mg IntraVENous ONCE PRN    acetaminophen (TYLENOL) tablet 650 mg  650 mg Oral ONCE PRN    sodium chloride (NS) flush 10 mL  10 mL IntraVENous PRN    0.9% sodium chloride injection 10 mL  10 mL IntraVENous PRN    heparin (porcine) pf 300-500 Units  300-500 Units InterCATHeter PRN    sodium chloride 0.9 % bolus infusion 500 mL  500 mL IntraVENous PRN    diphenhydrAMINE (BENADRYL) injection 25 mg  25 mg IntraVENous PRN    famotidine (PF) (PEPCID) 20 mg in 0.9% sodium chloride 10 mL injection  20 mg IntraVENous PRN    acetaminophen (TYLENOL) tablet 650 mg  650 mg Oral PRN    meperidine (DEMEROL) injection 25 mg  25 mg IntraVENous PRN    ondansetron (ZOFRAN) injection 8 mg  8 mg IntraVENous PRN    sodium chloride 0.9 % bolus infusion 500 mL  500 mL IntraVENous PRN    EPINEPHrine HCl (PF) (ADRENALIN) 1 mg/mL (1 mL) injection 0.3 mg  0.3 mg SubCUTAneous PRN    hydrocortisone Sod Succ (PF) (SOLU-CORTEF) injection 100 mg  100 mg IntraVENous PRN    diphenhydrAMINE (BENADRYL) injection 50 mg  50 mg IntraVENous PRN    albuterol (PROVENTIL VENTOLIN) nebulizer solution 2.5 mg  2.5 mg Inhalation PRN      Allergies   Allergen Reactions    Adhesive Tape-Silicones Rash    Other Medication Hives     Cranberry or Probiotic, had severe itching and hives, not sure to which. Has taken a probiotic recently without a reaction.  Oxycodone Itching      Review of Systems:  A complete review of systems was obtained, negative except as described above and as reported on ROS sheet scanned into system.      Physical Exam:     Visit Vitals  /83 (BP 1 Location: Left upper arm, BP Patient Position: Sitting)   Pulse 73   Temp 98.4 °F (36.9 °C) (Temporal)   Ht 5' 4\" (1.626 m)   Wt 156 lb 11.2 oz (71.1 kg)   SpO2 98%   BMI 26.90 kg/m²     ECOG PS: 0  General: No distress  Eyes: Anicteric sclerae  HENT: Atraumatic, wearing a mask  Neck: Supple  Respiratory:  normal respiratory effort, lungs clear  CV: Regular  GI: Soft NT  Ext: No edema  MS: Normal gait and station. Digits without clubbing or cyanosis. Skin: No rashes, ecchymoses, or petechiae. Normal temperature, turgor, and texture. Psych: Alert, oriented, appropriate affect, normal judgment/insight  Neuro: Non focal    Results:     Lab Results   Component Value Date/Time    WBC 3.8 02/15/2022 11:43 AM    HGB 12.9 02/15/2022 11:43 AM    HCT 38.3 02/15/2022 11:43 AM    PLATELET 063 71/66/4620 11:43 AM    MCV 99.7 (H) 02/15/2022 11:43 AM    ABS. NEUTROPHILS 2.5 02/15/2022 11:43 AM     Lab Results   Component Value Date/Time    Sodium 136 02/15/2022 11:43 AM    Potassium 4.6 02/15/2022 11:43 AM    Chloride 105 02/15/2022 11:43 AM    CO2 27 02/15/2022 11:43 AM    Glucose 136 (H) 02/15/2022 11:43 AM    BUN 23 (H) 02/15/2022 11:43 AM    Creatinine 0.86 02/15/2022 11:43 AM    GFR est AA >60 02/15/2022 11:43 AM    GFR est non-AA >60 02/15/2022 11:43 AM    Calcium 9.8 02/15/2022 11:43 AM     Lab Results   Component Value Date/Time    Bilirubin, total 0.4 02/15/2022 11:43 AM    ALT (SGPT) 25 02/15/2022 11:43 AM    Alk. phosphatase 74 02/15/2022 11:43 AM    Protein, total 6.6 02/15/2022 11:43 AM    Albumin 3.7 02/15/2022 11:43 AM    Globulin 2.9 02/15/2022 11:43 AM     6/18/21  FINAL PATHOLOGIC DIAGNOSIS   Breast, right, needle core biopsy:   Invasive ductal carcinoma, Ish histologic grade 3   Largest glass slide measurement of invasive carcinoma: 6 mm   ER/SD negative, HER2 FISH +    8/3/21  FINAL PATHOLOGIC DIAGNOSIS   1. Lymph node, right axillary sentinel lymph node #1, excision:   One lymph node negative for metastatic carcinoma. 2. Lymph node, right axillary sentinel lymph node #2, excision:   One lymph node negative for metastatic carcinoma. 3. Right breast, lumpectomy, excision:   Procedure: Excision (less than total mastectomy)   Specimen Laterality: Right   TUMOR   Histologic Type:  Invasive carcinoma of no special type (ductal)   Glandular (Acinar) / Tubular Differentiation: Score 3   Nuclear Pleomorphism: Score 3   Mitotic Rate: Score 3   Overall Grade: Grade 3   Tumor Size: 20 mm   Tumor Focality: Single focus of invasive carcinoma   Ductal Carcinoma in Situ (DCIS): Not identified   Tumor Extent   Lymphovascular Invasion: Not identified   Treatment Effect in the Breast: No known presurgical therapy   MARGINS   Invasive Carcinoma Margins: Uninvolved by invasive carcinoma   Distance from Closest Margin (Millimeters): Less than: 1 mm   Closest Margin(s): Anterior   LYMPH NODES   Regional Lymph Nodes: Uninvolved by tumor cells   Total Number of Lymph Nodes Examined: 2   Number of [place] Nodes Examined: 2   PATHOLOGIC STAGE CLASSIFICATION (pTNM, AJCC 8th Edition)   Primary Tumor (pT): pT1c N0(sn)   Breast biomarkers previously reported ([id number]): ER negative, MI negative, HER-2/mili positive by FISH. Tumor blocks for potential additional studies: 3-B-D.   4. Right breast, lateral margin right breast, excision:   Benign fibroadipose tissue with minimal benign fibroglandular breast tissue. 5. Right breast, medial margin right breast, excision:   Benign fibroglandular breast tissue with epithelial cyst and usual ductal hyperplasia. 6. Right breast, inferior margin right breast, excision:   Benign fibroglandular breast tissue. 08/23/21    ECHO ADULT COMPLETE 08/23/2021 8/23/2021    Interpretation Summary  · LV: Estimated LVEF is 60 - 65%. Normal cavity size, wall thickness and systolic function (ejection fraction normal). Normal left ventricular strain. Global longitudinal strain is -15.9%. Mild (grade 1) left ventricular diastolic dysfunction. Signed by: David Peña MD on 8/23/2021  1:01 PM    12/03/21    ECHO ADULT COMPLETE 12/06/2021 12/6/2021    Interpretation Summary  · LV: Estimated LVEF is 60 - 65%. Normal cavity size, wall thickness, systolic function (ejection fraction normal) and diastolic function.  Wall motion: normal.  · LV Strain: -16% unchanged from prior study. Signed by: Kate Mccurdy MD on 12/6/2021  2:36 PM    Records reviewed and summarized above. Pathology report(s) reviewed above. Radiology report(s) reviewed above. Assessment:/PLAN     1) Stage 1 Breast Cancer ER- Her2+ post Lumpectomy 8/3/21  Post lumpectomy in August. Reviewed path in detail. Discussed no hormonal therapy options due to ER/CT negative. Port placed on 8/3/21. Pre chemo ECHO 8/23/21 stable with EF 60-65%. Follow up ECHO 12/3/21 stable with EF 60-65%. Patient started weekly Taxol/Herceptin on 8/31/21. She completed 12 weeks of Taxol/Herceptin on 11/16/21. She is here today for Cycle 9 (fifth dose of maintenance subQ Herceptin). She started XRT with Dr. Socorro Savage on 12/31/21 and finished on 2/9/22. She is tolerating treatment well overall without significant side effects. Will do bilateral mammogram in 8/22 - 6 months post XRT. She is clinically stable and doing well overall. Labs (CBC and CMP) reviewed today. Patient is ready for treatment today. Follow up in 3 weeks in [facility]. Follow up in 6 weeks in [facility]/office. Patient agrees with plan. 2) Arthritis/Hx of Kidney Stone/Osteopenia  Management per PCP. 3) Management of High Risk Medications - Herceptin  No significant toxicities noted. ECHO from 12/3/21 reviewed. Follow up ECHO ordered today. Labs (CBC and CMP) reviewed. No dose adjustments needed. Will monitor for side effects. 4) Anxiety about Health  Ativan PRN while on chemo. 5) Psychosocial  Mood good, coping well. Her family is very supportive. SW/NN support as needed. Her  is here today. Call if questions. Follow up in 3 weeks in [facility] and 6 weeks in [facility]/office. I personally saw and evaluated the patient and performed the key components of medical decision making. The history, physical exam, and documentation were performed by Jacquelyn Reynoso NP.    I reviewed and verified the above documentation and modified it as needed. Specifically  Doing well overall. Tolerating therapy well. Continue with herceptin and [address] lab monitoring. did radiation. ER/MS negative so no hormonal therapy . Continue surveillance    I appreciate the opportunity to participate in Ms. Catalina Kowalski's care.     Signed By: Tammy Davis DO

## 2022-02-15 NOTE — PROGRESS NOTES
[facility] [place] [facility] Progress Note    Date: February 15, 2022    Name: Elvis Miles    MRN: [id number]         : 1950    Ms. Kowalski Arrived ambulatory and in no distress for Herceptin SQ injection L leg. [facility] [place] [facility] COVID-19 SCREENING      The patient was asked the following questions and answered as documented below:    1. Do you have any symptoms of COVID-19? SOB, coughing, fever, or generally not feeling well? NO  2. Have you been exposed to COVID-19 recently? NO  3. Have you had any recent contact with family/friend that has a pending COVID test? NO      Follow Up: Proceed with treatment    Assessment was completed, no acute issues at this time, no new complaints voiced. Chemotherapy Flowsheet 2/15/2022   Cycle C9   Date 2/15/2022   Drug / Regimen Herceptin SQ   Pre Meds -   Notes Given L leg         Ms. Kowalski's vitals were reviewed. Patient Vitals for the past 12 hrs:   Temp Pulse Resp BP   02/15/22 1123 98.4 °F (36.9 °C) 80 18 139/83       Lines:   PICC Single Lumen  Right;Basilic (Active)       Venous Access Device 21 (Active)   Central Line Being Utilized No 02/15/22 1140   Criteria for Appropriate Use Irritant/vesicant medication 21 1100   Site Assessment Clean, dry, & intact 21 1100   Date of Last Dressing Change 02/15/22 02/15/22 1140   Dressing Status Clean, dry, & intact 02/15/22 1140   Dressing Type Bandaid;Gauze 21 1530   Action Taken Blood drawn 21 1100   Date Accessed (Medial Site) 02/15/22 02/15/22 1140   Access Time (Medial Site) 1130 02/15/22 1140   Access Needle Size (Site #1) 20 G 02/15/22 1140   Access Needle Length (Medial Site) 0.75 inches 02/15/22 1140   Positive Blood Return (Medial Site) Yes 02/15/22 1140   Action Taken (Medial Site) Blood drawn 02/15/22 1140   Positive Blood Return (Lateral Site) Yes 02/15/22 1140   Action Taken (Lateral Site) Flushed; De-accessed 02/15/22 1140   Alcohol Cap Used Yes 10/26/21 1000        Lab results were obtained and reviewed. Labs within parameter for treatment. Recent Results (from the past 12 hour(s))   CBC WITH AUTOMATED DIFF    Collection Time: 02/15/22 11:43 AM   Result Value Ref Range    WBC 3.8 3.6 - 11.0 K/uL    RBC 3.84 3.80 - 5.20 M/uL    HGB 12.9 11.5 - 16.0 g/dL    HCT 38.3 35.0 - 47.0 %    MCV 99.7 (H) 80.0 - 99.0 FL    MCH 33.6 26.0 - 34.0 PG    MCHC 33.7 30.0 - 36.5 g/dL    RDW 11.3 (L) 11.5 - 14.5 %    PLATELET 306 739 - 924 K/uL    MPV 10.7 8.9 - 12.9 FL    NRBC 0.0 0  WBC    ABSOLUTE NRBC 0.00 0.00 - 0.01 K/uL    NEUTROPHILS 66 32 - 75 %    LYMPHOCYTES 24 12 - 49 %    MONOCYTES 7 5 - 13 %    EOSINOPHILS 2 0 - 7 %    BASOPHILS 1 0 - 1 %    IMMATURE GRANULOCYTES 0 0.0 - 0.5 %    ABS. NEUTROPHILS 2.5 1.8 - 8.0 K/UL    ABS. LYMPHOCYTES 0.9 0.8 - 3.5 K/UL    ABS. MONOCYTES 0.3 0.0 - 1.0 K/UL    ABS. EOSINOPHILS 0.1 0.0 - 0.4 K/UL    ABS. BASOPHILS 0.0 0.0 - 0.1 K/UL    ABS. IMM. GRANS. 0.0 0.00 - 0.04 K/UL    DF AUTOMATED         Pre-medications  were administered as ordered and chemotherapy was initiated. Ms. Concepcion Stahl tolerated treatment well, port flushed, heparinized and de accessed, patient was discharged from [place] [id number] in stable condition at 1300.         Future Appointments   Date Time Provider Wendie Escobar   2/28/2022  1:45 PM Teo Briseno MD [facility] BS AMB   3/8/2022 11:30 AM H2 [facility]   3/29/2022 11:30 AM H2 NAT [facility]   4/19/2022 11:30 AM H2 [facility]   5/4/2022 10:30 AM MD FRED Mcgowan BS DELIA Mireles RN  February 15, 2022        Problem: Chemotherapy Treatment  Goal: *Chemotherapy regimen followed  Outcome: Progressing Towards Goal  Goal: *Hemodynamically stable  Outcome: Progressing Towards Goal  Goal: *Tolerating diet  Outcome: Progressing Towards Goal     Problem: Infection - Risk of, Central Venous Catheter-Associated Bloodstream Infection  Goal: *Absence of infection signs and symptoms  Outcome: Progressing Towards Goal     Problem: Knowledge Deficit  Goal: *Verbalizes understanding of procedures and medications  Outcome: Progressing Towards Goal     Problem: Patient Education:  Go to Education Activity  Goal: Patient/Family Education  Outcome: Progressing Towards Goal

## 2022-02-28 ENCOUNTER — OFFICE VISIT (OUTPATIENT)
Dept: SURGERY | Age: 72
End: 2022-02-28
Payer: MEDICARE

## 2022-02-28 VITALS
WEIGHT: 156 LBS | HEIGHT: 64 IN | DIASTOLIC BLOOD PRESSURE: 83 MMHG | BODY MASS INDEX: 26.63 KG/M2 | SYSTOLIC BLOOD PRESSURE: 139 MMHG

## 2022-02-28 DIAGNOSIS — C50.411 MALIGNANT NEOPLASM OF UPPER-OUTER QUADRANT OF RIGHT FEMALE BREAST, UNSPECIFIED ESTROGEN RECEPTOR STATUS (HCC): Primary | ICD-10-CM

## 2022-02-28 PROCEDURE — G8432 DEP SCR NOT DOC, RNG: HCPCS | Performed by: SURGERY

## 2022-02-28 PROCEDURE — 99214 OFFICE O/P EST MOD 30 MIN: CPT | Performed by: SURGERY

## 2022-02-28 PROCEDURE — G8419 CALC BMI OUT NRM PARAM NOF/U: HCPCS | Performed by: SURGERY

## 2022-02-28 PROCEDURE — G8536 NO DOC ELDER MAL SCRN: HCPCS | Performed by: SURGERY

## 2022-02-28 PROCEDURE — 1101F PT FALLS ASSESS-DOCD LE1/YR: CPT | Performed by: SURGERY

## 2022-02-28 PROCEDURE — G8427 DOCREV CUR MEDS BY ELIG CLIN: HCPCS | Performed by: SURGERY

## 2022-02-28 PROCEDURE — 3017F COLORECTAL CA SCREEN DOC REV: CPT | Performed by: SURGERY

## 2022-02-28 PROCEDURE — G9899 SCRN MAM PERF RSLTS DOC: HCPCS | Performed by: SURGERY

## 2022-02-28 PROCEDURE — G8399 PT W/DXA RESULTS DOCUMENT: HCPCS | Performed by: SURGERY

## 2022-02-28 PROCEDURE — 1090F PRES/ABSN URINE INCON ASSESS: CPT | Performed by: SURGERY

## 2022-02-28 NOTE — PROGRESS NOTES
HISTORY OF PRESENT ILLNESS  Toño Juan is a 73 y.o. female. HPI ESTABLISHED Patient here for follow up RIGHT breast cancer. Olya pain or changes to the breast since ending XRT. 6/18/21 - RIGHT IDC, ER 0, ND 0, HER2 equiv, FISH positive, ki-67 90%     Patient started weekly Taxol/Herceptin on 8/31/21. She completed 12 weeks of Taxol/Herceptin on 11/16/21. She was seen 2/15/21  for Cycle 9 (fifth dose of maintenance subQ Herceptin). She started XRT with Dr. Miracle Mckenzie on 12/31/21 and finished on 2/9/22. She is tolerating treatment well overall without significant side effects. Will do bilateral mammogram in 8/22 - 6 months post XRT. Family History: Mother, brain cancer  Sister - spinal cancer    Breast imaging-   MRI Results (most recent):  Results from Hospital Encounter encounter on 07/06/21    MRI BREAST BI W WO CONT    Narrative  EXAM:  MRI BREAST BI W WO CONT    INDICATION: New diagnosis of right breast carcinoma, evaluate extent of disease. COMPARISON: Breast imaging dating back to 5/16/2019. No comparison MRI. EXAM: MRI of right and left breast without and with IV contrast Gadavist .    TECHNIQUE: MRI breast without and with IV contrast. Bilateral breast MRI was  performed using a dedicated breast coil without compression with the patient in  the prone position. Precontrast T1-weighted images with fat suppression were  obtained followed by bolus injection of 7 mL of Gadavist . Postcontrast dynamic  and high-resolution images were acquired. Axial inversion recovery imaging was  also performed. The images were analyzed using CAD analysis, enhancement curves,  digital subtraction, and 2 and 3 dimensional reconstructions. FINDINGS:    Background parenchymal enhancement: Mild. Lymph nodes: No lymphadenopathy. Right breast: Irregular enhancing mass with washout kinetics is in the posterior  third at 9:00 and measures 1.8 x 1.5 x 1.1 cm.  Biopsy clip is at the medial  margin of the mass. No extension to skin or chest wall. Normal skin and nipple. There are foci in the remainder of the right breast. No other suspicious  morphology or enhancement to suggest multicentric disease. Left breast: There is no suspicious focus of morphology or temporal enhancement. There are foci. Normal skin and nipple. Miscellaneous: None. Impression  1. 1.8 cm biopsy-proven carcinoma in the right breast at 9:00. No multicentric  disease. 2. No lymphadenopathy. 3. No MRI evidence of malignancy in the left breast.    Assessment: ACR BI-RADS category  Right breast: BI-RADS Assessment Category 6: Known biopsy proven malignancy-  Appropriate action should be taken. Left breast: BI-RADS Assessment Category 2: Benign finding. Recommendation: Surgical and oncologic management. Based on imaging, patient is  a candidate for breast conservation therapy. A negative breast MRI examination speaks strongly against invasive cancer down  to a detection threshold of 3 to 5 mm but may not detect some lower grade or in  situ carcinomas. Therefore, routine clinical and mammographic followup are  recommended. A summary portfolio has been created in PACS. Review of Systems   All other systems reviewed and are negative. Physical Exam  Vitals and nursing note reviewed. Chest:   Breasts: Breasts are symmetrical.      Right: No inverted nipple, mass, nipple discharge, skin change, tenderness, axillary adenopathy or supraclavicular adenopathy. Left: No inverted nipple, mass, nipple discharge, skin change, tenderness, axillary adenopathy or supraclavicular adenopathy. Comments: Skin darkening right breast   Lymphadenopathy:      Cervical: No cervical adenopathy. Upper Body:      Right upper body: No supraclavicular, axillary or pectoral adenopathy. Left upper body: No supraclavicular, axillary or pectoral adenopathy.          ASSESSMENT and PLAN    ICD-10-CM ICD-9-CM    1. Malignant neoplasm of upper-outer quadrant of right female breast, unspecified estrogen receptor status ([facility] [address]  C50.411 174.4      - no evidence local recurrence  - mammograms august 2022  - port removal  30 minutes was spent with patient on counseling and coordination of care.

## 2022-03-01 RX ORDER — ONDANSETRON 2 MG/ML
8 INJECTION INTRAMUSCULAR; INTRAVENOUS AS NEEDED
Status: CANCELLED | OUTPATIENT
Start: 2022-03-08

## 2022-03-01 RX ORDER — DIPHENHYDRAMINE HYDROCHLORIDE 50 MG/ML
50 INJECTION, SOLUTION INTRAMUSCULAR; INTRAVENOUS AS NEEDED
Status: CANCELLED
Start: 2022-03-08

## 2022-03-01 RX ORDER — HYDROCORTISONE SODIUM SUCCINATE 100 MG/2ML
100 INJECTION, POWDER, FOR SOLUTION INTRAMUSCULAR; INTRAVENOUS AS NEEDED
Status: CANCELLED | OUTPATIENT
Start: 2022-03-08

## 2022-03-01 RX ORDER — EPINEPHRINE 1 MG/ML
0.3 INJECTION, SOLUTION, CONCENTRATE INTRAVENOUS AS NEEDED
Status: CANCELLED | OUTPATIENT
Start: 2022-03-08

## 2022-03-01 RX ORDER — ALBUTEROL SULFATE 0.83 MG/ML
2.5 SOLUTION RESPIRATORY (INHALATION) AS NEEDED
Status: CANCELLED
Start: 2022-03-08

## 2022-03-01 RX ORDER — DIPHENHYDRAMINE HYDROCHLORIDE 50 MG/ML
25 INJECTION, SOLUTION INTRAMUSCULAR; INTRAVENOUS AS NEEDED
Status: CANCELLED
Start: 2022-03-08

## 2022-03-01 RX ORDER — ACETAMINOPHEN 325 MG/1
650 TABLET ORAL AS NEEDED
Status: CANCELLED
Start: 2022-03-08

## 2022-03-08 ENCOUNTER — HOSPITAL ENCOUNTER (OUTPATIENT)
Dept: INFUSION THERAPY | Age: 72
Discharge: HOME | End: 2022-03-08
Payer: MEDICARE

## 2022-03-08 ENCOUNTER — HOSPITAL ENCOUNTER (OUTPATIENT)
Dept: NON INVASIVE DIAGNOSTICS | Age: 72
Discharge: HOME | End: 2022-03-08
Attending: NURSE PRACTITIONER
Payer: MEDICARE

## 2022-03-08 VITALS
TEMPERATURE: 97 F | WEIGHT: 156.68 LBS | DIASTOLIC BLOOD PRESSURE: 88 MMHG | OXYGEN SATURATION: 100 % | HEIGHT: 64 IN | RESPIRATION RATE: 18 BRPM | HEART RATE: 85 BPM | SYSTOLIC BLOOD PRESSURE: 139 MMHG | BODY MASS INDEX: 26.75 KG/M2

## 2022-03-08 VITALS — HEIGHT: 64 IN | BODY MASS INDEX: 26.63 KG/M2 | WEIGHT: 156 LBS

## 2022-03-08 DIAGNOSIS — C50.911 INFILTRATING DUCTAL CARCINOMA OF RIGHT BREAST (HCC): Primary | ICD-10-CM

## 2022-03-08 DIAGNOSIS — C50.911 INFILTRATING DUCTAL CARCINOMA OF RIGHT BREAST (HCC): ICD-10-CM

## 2022-03-08 DIAGNOSIS — Z79.899 ENCOUNTER FOR MONITORING CARDIOTOXIC DRUG THERAPY: ICD-10-CM

## 2022-03-08 DIAGNOSIS — Z51.81 ENCOUNTER FOR MONITORING CARDIOTOXIC DRUG THERAPY: ICD-10-CM

## 2022-03-08 LAB
ECHO AO ROOT DIAM: 2.8 CM
ECHO AO ROOT INDEX: 1.59 CM/M2
ECHO AV AREA PEAK VELOCITY: 1 CM2
ECHO AV AREA PEAK VELOCITY: 1 CM2
ECHO AV PEAK GRADIENT: 11 MMHG
ECHO AV PEAK VELOCITY: 1.7 M/S
ECHO AV VELOCITY RATIO: 0.59
ECHO EST RA PRESSURE: 5 MMHG
ECHO LA DIAMETER INDEX: 1.59 CM/M2
ECHO LA DIAMETER: 2.8 CM
ECHO LA TO AORTIC ROOT RATIO: 1
ECHO LV FRACTIONAL SHORTENING: 39 % (ref 28–44)
ECHO LV INTERNAL DIMENSION DIASTOLE INDEX: 2.33 CM/M2
ECHO LV INTERNAL DIMENSION DIASTOLIC: 4.1 CM (ref 3.9–5.3)
ECHO LV INTERNAL DIMENSION SYSTOLIC INDEX: 1.42 CM/M2
ECHO LV INTERNAL DIMENSION SYSTOLIC: 2.5 CM
ECHO LV IVSD: 1 CM (ref 0.6–0.9)
ECHO LV MASS 2D: 105.6 G (ref 67–162)
ECHO LV MASS INDEX 2D: 60 G/M2 (ref 43–95)
ECHO LV POSTERIOR WALL DIASTOLIC: 0.7 CM (ref 0.6–0.9)
ECHO LV RELATIVE WALL THICKNESS RATIO: 0.34
ECHO LVOT AREA: 1.5 CM2
ECHO LVOT DIAM: 1.4 CM
ECHO LVOT PEAK GRADIENT: 4 MMHG
ECHO LVOT PEAK VELOCITY: 1 M/S
ECHO MV A VELOCITY: 0.96 M/S
ECHO MV E DECELERATION TIME (DT): 267.2 MS
ECHO MV E VELOCITY: 0.85 M/S
ECHO MV E/A RATIO: 0.89
ECHO PV MAX VELOCITY: 0.9 M/S
ECHO PV PEAK GRADIENT: 3 MMHG
ECHO RIGHT VENTRICULAR SYSTOLIC PRESSURE (RVSP): 31 MMHG
ECHO RV FREE WALL PEAK S': 14 CM/S
ECHO RV INTERNAL DIMENSION: 3.4 CM
ECHO RV TAPSE: 2.7 CM (ref 1.5–2)
ECHO TV REGURGITANT MAX VELOCITY: 2.57 M/S
ECHO TV REGURGITANT PEAK GRADIENT: 26 MMHG

## 2022-03-08 PROCEDURE — 96401 CHEMO ANTI-NEOPL SQ/IM: CPT

## 2022-03-08 PROCEDURE — 93306 TTE W/DOPPLER COMPLETE: CPT | Performed by: INTERNAL MEDICINE

## 2022-03-08 PROCEDURE — 74011250636 HC RX REV CODE- 250/636: Performed by: INTERNAL MEDICINE

## 2022-03-08 PROCEDURE — 93356 MYOCRD STRAIN IMG SPCKL TRCK: CPT | Performed by: INTERNAL MEDICINE

## 2022-03-08 PROCEDURE — 93306 TTE W/DOPPLER COMPLETE: CPT

## 2022-03-08 RX ORDER — SODIUM CHLORIDE 9 MG/ML
10 INJECTION INTRAMUSCULAR; INTRAVENOUS; SUBCUTANEOUS AS NEEDED
Status: ACTIVE | OUTPATIENT
Start: 2022-03-08 | End: 2022-03-08

## 2022-03-08 RX ORDER — SODIUM CHLORIDE 9 MG/ML
25 INJECTION, SOLUTION INTRAVENOUS CONTINUOUS
Status: DISPENSED | OUTPATIENT
Start: 2022-03-08 | End: 2022-03-08

## 2022-03-08 RX ORDER — SODIUM CHLORIDE 0.9 % (FLUSH) 0.9 %
10 SYRINGE (ML) INJECTION AS NEEDED
Status: DISPENSED | OUTPATIENT
Start: 2022-03-08 | End: 2022-03-08

## 2022-03-08 RX ORDER — DIPHENHYDRAMINE HYDROCHLORIDE 50 MG/ML
50 INJECTION, SOLUTION INTRAMUSCULAR; INTRAVENOUS
Status: DISCONTINUED | OUTPATIENT
Start: 2022-03-08 | End: 2022-03-09 | Stop reason: HOSPADM

## 2022-03-08 RX ORDER — ACETAMINOPHEN 325 MG/1
650 TABLET ORAL
Status: DISCONTINUED | OUTPATIENT
Start: 2022-03-08 | End: 2022-03-09 | Stop reason: HOSPADM

## 2022-03-08 RX ORDER — HEPARIN 100 UNIT/ML
300-500 SYRINGE INTRAVENOUS AS NEEDED
Status: ACTIVE | OUTPATIENT
Start: 2022-03-08 | End: 2022-03-08

## 2022-03-08 RX ADMIN — TRASTUZUMAB AND HYALURONIDASE-OYSK 600 MG: 600; 10000 INJECTION, SOLUTION SUBCUTANEOUS at 11:52

## 2022-03-09 NOTE — PROGRESS NOTES
Call to patient, ID verified X 2. Provider message regarding ECHO results relayed. Understanding verbalized.

## 2022-03-18 PROBLEM — R11.0 CHEMOTHERAPY-INDUCED NAUSEA: Status: ACTIVE | Noted: 2021-09-28

## 2022-03-18 PROBLEM — G47.00 INSOMNIA: Status: ACTIVE | Noted: 2021-09-08

## 2022-03-18 PROBLEM — K52.1 CHEMOTHERAPY INDUCED DIARRHEA: Status: ACTIVE | Noted: 2021-09-08

## 2022-03-18 PROBLEM — Z87.448 HISTORY OF PYELONEPHRITIS: Status: ACTIVE | Noted: 2020-02-15

## 2022-03-18 PROBLEM — T45.1X5A CHEMOTHERAPY-INDUCED NAUSEA: Status: ACTIVE | Noted: 2021-09-28

## 2022-03-18 PROBLEM — M19.90 ARTHRITIS: Status: ACTIVE | Noted: 2021-08-31

## 2022-03-18 PROBLEM — Z51.11 ENCOUNTER FOR CHEMOTHERAPY MANAGEMENT: Status: ACTIVE | Noted: 2021-08-31

## 2022-03-18 PROBLEM — T45.1X5A CHEMOTHERAPY INDUCED DIARRHEA: Status: ACTIVE | Noted: 2021-09-08

## 2022-03-18 PROBLEM — Z98.890 HISTORY OF LUMPECTOMY OF RIGHT BREAST: Status: ACTIVE | Noted: 2021-08-31

## 2022-03-19 PROBLEM — Z09 CHEMOTHERAPY FOLLOW-UP EXAMINATION: Status: ACTIVE | Noted: 2021-09-08

## 2022-03-19 PROBLEM — Z87.442 HISTORY OF KIDNEY STONES: Status: ACTIVE | Noted: 2021-08-31

## 2022-03-19 PROBLEM — R45.86 EMOTIONAL LABILITY: Status: ACTIVE | Noted: 2021-09-28

## 2022-03-19 PROBLEM — M25.572 CHRONIC PAIN OF LEFT ANKLE: Status: ACTIVE | Noted: 2017-02-01

## 2022-03-19 PROBLEM — G89.29 CHRONIC PAIN OF LEFT ANKLE: Status: ACTIVE | Noted: 2017-02-01

## 2022-03-19 PROBLEM — F41.8 ANXIETY ABOUT HEALTH: Status: ACTIVE | Noted: 2021-11-23

## 2022-03-19 PROBLEM — R45.89 ANXIETY ABOUT HEALTH: Status: ACTIVE | Noted: 2021-11-23

## 2022-03-20 PROBLEM — Z79.899 ENCOUNTER FOR MONITORING CARDIOTOXIC DRUG THERAPY: Status: ACTIVE | Noted: 2021-09-08

## 2022-03-20 PROBLEM — C50.911 INFILTRATING DUCTAL CARCINOMA OF RIGHT BREAST (HCC): Status: ACTIVE | Noted: 2021-06-21

## 2022-03-20 PROBLEM — M85.80 OSTEOPENIA: Status: ACTIVE | Noted: 2017-02-01

## 2022-03-20 PROBLEM — Z51.81 ENCOUNTER FOR MONITORING CARDIOTOXIC DRUG THERAPY: Status: ACTIVE | Noted: 2021-09-08

## 2022-03-22 RX ORDER — ALBUTEROL SULFATE 0.83 MG/ML
2.5 SOLUTION RESPIRATORY (INHALATION) AS NEEDED
Status: CANCELLED
Start: 2022-03-29

## 2022-03-22 RX ORDER — ONDANSETRON 2 MG/ML
8 INJECTION INTRAMUSCULAR; INTRAVENOUS AS NEEDED
Status: CANCELLED | OUTPATIENT
Start: 2022-03-29

## 2022-03-22 RX ORDER — ACETAMINOPHEN 325 MG/1
650 TABLET ORAL AS NEEDED
Status: CANCELLED
Start: 2022-03-29

## 2022-03-22 RX ORDER — EPINEPHRINE 1 MG/ML
0.3 INJECTION, SOLUTION, CONCENTRATE INTRAVENOUS AS NEEDED
Status: CANCELLED | OUTPATIENT
Start: 2022-03-29

## 2022-03-22 RX ORDER — DIPHENHYDRAMINE HYDROCHLORIDE 50 MG/ML
25 INJECTION, SOLUTION INTRAMUSCULAR; INTRAVENOUS AS NEEDED
Status: CANCELLED
Start: 2022-03-29

## 2022-03-22 RX ORDER — DIPHENHYDRAMINE HYDROCHLORIDE 50 MG/ML
50 INJECTION, SOLUTION INTRAMUSCULAR; INTRAVENOUS AS NEEDED
Status: CANCELLED
Start: 2022-03-29

## 2022-03-22 RX ORDER — DIPHENHYDRAMINE HYDROCHLORIDE 50 MG/ML
50 INJECTION, SOLUTION INTRAMUSCULAR; INTRAVENOUS
Status: CANCELLED | OUTPATIENT
Start: 2022-03-29

## 2022-03-22 RX ORDER — ACETAMINOPHEN 325 MG/1
650 TABLET ORAL
Status: CANCELLED | OUTPATIENT
Start: 2022-03-29

## 2022-03-22 RX ORDER — HYDROCORTISONE SODIUM SUCCINATE 100 MG/2ML
100 INJECTION, POWDER, FOR SOLUTION INTRAMUSCULAR; INTRAVENOUS AS NEEDED
Status: CANCELLED | OUTPATIENT
Start: 2022-03-29

## 2022-03-22 RX ORDER — SODIUM CHLORIDE 9 MG/ML
25 INJECTION, SOLUTION INTRAVENOUS CONTINUOUS
Status: CANCELLED | OUTPATIENT
Start: 2022-03-29

## 2022-03-29 ENCOUNTER — HOSPITAL ENCOUNTER (OUTPATIENT)
Dept: INFUSION THERAPY | Age: 72
Discharge: HOME | End: 2022-03-29
Payer: MEDICARE

## 2022-03-29 ENCOUNTER — OFFICE VISIT (OUTPATIENT)
Dept: ONCOLOGY | Age: 72
End: 2022-03-29
Payer: MEDICARE

## 2022-03-29 VITALS
OXYGEN SATURATION: 97 % | HEIGHT: 64 IN | HEART RATE: 79 BPM | BODY MASS INDEX: 27.06 KG/M2 | SYSTOLIC BLOOD PRESSURE: 152 MMHG | TEMPERATURE: 97.6 F | WEIGHT: 158.5 LBS | DIASTOLIC BLOOD PRESSURE: 87 MMHG

## 2022-03-29 VITALS
HEART RATE: 79 BPM | RESPIRATION RATE: 16 BRPM | DIASTOLIC BLOOD PRESSURE: 87 MMHG | SYSTOLIC BLOOD PRESSURE: 152 MMHG | TEMPERATURE: 97.6 F

## 2022-03-29 DIAGNOSIS — F41.8 ANXIETY ABOUT HEALTH: ICD-10-CM

## 2022-03-29 DIAGNOSIS — C50.911 INFILTRATING DUCTAL CARCINOMA OF RIGHT BREAST (HCC): Primary | ICD-10-CM

## 2022-03-29 DIAGNOSIS — Z87.442 HISTORY OF KIDNEY STONES: ICD-10-CM

## 2022-03-29 DIAGNOSIS — M85.89 OSTEOPENIA OF MULTIPLE SITES: ICD-10-CM

## 2022-03-29 DIAGNOSIS — Z51.81 ENCOUNTER FOR MONITORING CARDIOTOXIC DRUG THERAPY: ICD-10-CM

## 2022-03-29 DIAGNOSIS — Z79.899 ENCOUNTER FOR MONITORING CARDIOTOXIC DRUG THERAPY: ICD-10-CM

## 2022-03-29 DIAGNOSIS — M19.90 ARTHRITIS: ICD-10-CM

## 2022-03-29 DIAGNOSIS — Z98.890 HISTORY OF LUMPECTOMY OF RIGHT BREAST: ICD-10-CM

## 2022-03-29 PROBLEM — Z09 CHEMOTHERAPY FOLLOW-UP EXAMINATION: Status: RESOLVED | Noted: 2021-09-08 | Resolved: 2022-03-29

## 2022-03-29 LAB
ALBUMIN SERPL-MCNC: 3.8 G/DL (ref 3.5–5)
ALBUMIN/GLOB SERPL: 1.4 {RATIO} (ref 1.1–2.2)
ALP SERPL-CCNC: 83 U/L (ref 45–117)
ALT SERPL-CCNC: 34 U/L (ref 12–78)
ANION GAP SERPL CALC-SCNC: 5 MMOL/L (ref 5–15)
AST SERPL-CCNC: 19 U/L (ref 15–37)
BASOPHILS # BLD: 0 K/UL (ref 0–0.1)
BASOPHILS NFR BLD: 1 % (ref 0–1)
BILIRUB SERPL-MCNC: 0.4 MG/DL (ref 0.2–1)
BUN SERPL-MCNC: 16 MG/DL (ref 6–20)
BUN/CREAT SERPL: 22 (ref 12–20)
CALCIUM SERPL-MCNC: 9 MG/DL (ref 8.5–10.1)
CHLORIDE SERPL-SCNC: 109 MMOL/L (ref 97–108)
CO2 SERPL-SCNC: 26 MMOL/L (ref 21–32)
CREAT SERPL-MCNC: 0.74 MG/DL (ref 0.55–1.02)
DIFFERENTIAL METHOD BLD: ABNORMAL
EOSINOPHIL # BLD: 0.1 K/UL (ref 0–0.4)
EOSINOPHIL NFR BLD: 2 % (ref 0–7)
ERYTHROCYTE [DISTWIDTH] IN BLOOD BY AUTOMATED COUNT: 12.9 % (ref 11.5–14.5)
GLOBULIN SER CALC-MCNC: 2.7 G/DL (ref 2–4)
GLUCOSE SERPL-MCNC: 97 MG/DL (ref 65–100)
HCT VFR BLD AUTO: 37.3 % (ref 35–47)
HGB BLD-MCNC: 12.5 G/DL (ref 11.5–16)
IMM GRANULOCYTES # BLD AUTO: 0 K/UL (ref 0–0.04)
IMM GRANULOCYTES NFR BLD AUTO: 0 % (ref 0–0.5)
LYMPHOCYTES # BLD: 0.9 K/UL (ref 0.8–3.5)
LYMPHOCYTES NFR BLD: 29 % (ref 12–49)
MCH RBC QN AUTO: 33.6 PG (ref 26–34)
MCHC RBC AUTO-ENTMCNC: 33.5 G/DL (ref 30–36.5)
MCV RBC AUTO: 100.3 FL (ref 80–99)
MONOCYTES # BLD: 0.3 K/UL (ref 0–1)
MONOCYTES NFR BLD: 10 % (ref 5–13)
NEUTS SEG # BLD: 1.9 K/UL (ref 1.8–8)
NEUTS SEG NFR BLD: 58 % (ref 32–75)
NRBC # BLD: 0 K/UL (ref 0–0.01)
NRBC BLD-RTO: 0 PER 100 WBC
PLATELET # BLD AUTO: 168 K/UL (ref 150–400)
PMV BLD AUTO: 10.7 FL (ref 8.9–12.9)
POTASSIUM SERPL-SCNC: 4.2 MMOL/L (ref 3.5–5.1)
PROT SERPL-MCNC: 6.5 G/DL (ref 6.4–8.2)
RBC # BLD AUTO: 3.72 M/UL (ref 3.8–5.2)
SODIUM SERPL-SCNC: 140 MMOL/L (ref 136–145)
WBC # BLD AUTO: 3.2 K/UL (ref 3.6–11)

## 2022-03-29 PROCEDURE — G8419 CALC BMI OUT NRM PARAM NOF/U: HCPCS | Performed by: NURSE PRACTITIONER

## 2022-03-29 PROCEDURE — 85025 COMPLETE CBC W/AUTO DIFF WBC: CPT

## 2022-03-29 PROCEDURE — 74011250636 HC RX REV CODE- 250/636: Performed by: INTERNAL MEDICINE

## 2022-03-29 PROCEDURE — G9899 SCRN MAM PERF RSLTS DOC: HCPCS | Performed by: NURSE PRACTITIONER

## 2022-03-29 PROCEDURE — G8536 NO DOC ELDER MAL SCRN: HCPCS | Performed by: NURSE PRACTITIONER

## 2022-03-29 PROCEDURE — G8432 DEP SCR NOT DOC, RNG: HCPCS | Performed by: NURSE PRACTITIONER

## 2022-03-29 PROCEDURE — 3017F COLORECTAL CA SCREEN DOC REV: CPT | Performed by: NURSE PRACTITIONER

## 2022-03-29 PROCEDURE — 80053 COMPREHEN METABOLIC PANEL: CPT

## 2022-03-29 PROCEDURE — 99212 OFFICE O/P EST SF 10 MIN: CPT | Performed by: NURSE PRACTITIONER

## 2022-03-29 PROCEDURE — 96401 CHEMO ANTI-NEOPL SQ/IM: CPT

## 2022-03-29 PROCEDURE — G8427 DOCREV CUR MEDS BY ELIG CLIN: HCPCS | Performed by: NURSE PRACTITIONER

## 2022-03-29 PROCEDURE — 1090F PRES/ABSN URINE INCON ASSESS: CPT | Performed by: NURSE PRACTITIONER

## 2022-03-29 PROCEDURE — 36415 COLL VENOUS BLD VENIPUNCTURE: CPT

## 2022-03-29 PROCEDURE — G8399 PT W/DXA RESULTS DOCUMENT: HCPCS | Performed by: NURSE PRACTITIONER

## 2022-03-29 PROCEDURE — 74011000250 HC RX REV CODE- 250: Performed by: INTERNAL MEDICINE

## 2022-03-29 PROCEDURE — 1101F PT FALLS ASSESS-DOCD LE1/YR: CPT | Performed by: NURSE PRACTITIONER

## 2022-03-29 RX ORDER — SODIUM CHLORIDE 0.9 % (FLUSH) 0.9 %
10 SYRINGE (ML) INJECTION AS NEEDED
Status: DISPENSED | OUTPATIENT
Start: 2022-03-29 | End: 2022-03-29

## 2022-03-29 RX ORDER — SODIUM CHLORIDE 9 MG/ML
10 INJECTION INTRAMUSCULAR; INTRAVENOUS; SUBCUTANEOUS AS NEEDED
Status: ACTIVE | OUTPATIENT
Start: 2022-03-29 | End: 2022-03-29

## 2022-03-29 RX ORDER — HEPARIN 100 UNIT/ML
300-500 SYRINGE INTRAVENOUS AS NEEDED
Status: ACTIVE | OUTPATIENT
Start: 2022-03-29 | End: 2022-03-29

## 2022-03-29 RX ADMIN — SODIUM CHLORIDE, PRESERVATIVE FREE 10 ML: 5 INJECTION INTRAVENOUS at 11:55

## 2022-03-29 RX ADMIN — HEPARIN 500 UNITS: 100 SYRINGE at 11:55

## 2022-03-29 RX ADMIN — TRASTUZUMAB AND HYALURONIDASE-OYSK 600 MG: 600; 10000 INJECTION, SOLUTION SUBCUTANEOUS at 12:05

## 2022-03-29 NOTE — PROGRESS NOTES
[facility] at [address] [place] [address] April Gomez, [facility], [place]: [phone]  F: [phone]    Reason for Visit:   Codey Duff is a 73 y.o. female seen today in office for follow up of Right Breast Cancer. Treatment History:   · Abnormal screening mammo 6/3/21: Bilateral digital screening mammography was performed and is interpreted in conjunction with a computer assisted detection (CAD) system. In the left breast, no suspicious masses or calcifications are identified. The right breast upper outer quadrant at 10:00 posterior depth 9 cm from the nipple there is a focal asymmetry  · Right Dx Mammo/US 6/7/21: Further evaluation was performed for a right breast focal asymmetry. In the right breast upper outer quadrant at 10:00 posterior depth there is an equal density irregular mass with indistinct margins measuring up to 1.7 cm. In the right breast at 10:00 10 cm from the nipple there is a 1.6 x 0.9 x 1.0 cm hypoechoic oval mass with indistinct margins, internal vascularity, and posterior acoustic enhancement  · Right Breast Biopsy 6/18/21: PATH - Invasive ductal carcinoma, [place] histologic grade 3. Largest glass slide measurement of invasive carcinoma: 6 mm   · ER/FL negative, HER2 FISH +  · Ki67 90%  · Right Lumpectomy 8/3/2: PATH - 20 mm IDC with negative LNs. Margins negative. · Adjuvant weekly Taxol + Herceptin 8/31/21 - 11/16/21  · Maintenance subQ Herceptin 11/23/21 - Current  · XRT with Dr. Yahaira Correia 12/31/21 - 2/9/22    STAGE: Pathologic T1cN0, ER/FL negative, HER2+     History of Present Illness:   Codey uDff is a 73 y.o. female seen today in office for follow up of right breast cancer post lumpectomy 8/3/21. She started adjuvant weekly Taxol + Herceptin on 8/31/21 and completed 12 weeks on 11/16/21. She started XRT with Dr. Yahaira Correia on 12/31/21 and finished on 2/9/22. She is here today for Cycle 11 (seventh cycle of maintenance subQ Herceptin).  She reports that she feels well overall today. She is tolerating maintenance Herceptin well overall without side effects. Her appetite is \"too much\" and energy levels are good overall. She denies fever, chills, mouth sores, cough, SOB, CP, nausea, vomiting, diarrhea, and constipation. She denies pain today. CBC and CMP are still pending today. She wants treatment today. Her supportive  is here today. Past Medical History:   Diagnosis Date    Arthritis     left ankle    Breast cancer ([address]     RIGHT    Chronic kidney disease     kidney stone    Chronic pain of left ankle 2017    Seeing Dr Monica West    Fracture     left ankle    Ill-defined condition     PICC line for UTI - borderline sepsis    Osteopenia 2017      Past Surgical History:   Procedure Laterality Date    COLONOSCOPY N/A 2019    tubular adenoma, repeat 1 yr d/t poor prep - performed by Africa De La Torre MD at [address] [place] [address];     COLONOSCOPY N/A 2020    normal - performed by Africa De La Torre MD at [address] [place] [address] ENDOSCOPY    HX [address] [facility] [address] Left     surgery - has a plate a screws    HX BREAST LUMPECTOMY Right 8/3/2021    RIGHT BREAST LUMPECTOMY WITH ULTRASOUND AND RIGHT BREAST SENTINEL NODE BIOPSY AND PORTACATH INSERTION performed by Sadaf López MD at [address] [place] [address] AMBULATORY OR    HX [address]      HX GI      COLONOSCOPY    HX OTHER SURGICAL Right 2020    growth removed from right eyelid    HX OTHER SURGICAL Right 2020    PICC line placed for IV abx, removed after completing course for pyelonephritis    HX POLYPECTOMY      HX TONSILLECTOMY      [place] BIOPSY OF BREAST, NEEDLE CORE Right       Social History     Tobacco Use    Smoking status: Former Smoker     Quit date:      Years since quittin.2    Smokeless tobacco: Never Used    Tobacco comment: was a causal smoker when smoker in college   Substance Use Topics    Alcohol use:  Yes Alcohol/week: 5.0 standard drinks     Types: 5 Glasses of wine per week      Family History   Problem Relation Age of Onset    Cancer Mother         Brain tumor    Other Father         ? lung problem    Cancer Sister         tumors of spine    Other Sister         kidney stones    Other Daughter         kidney stone    Other Maternal Aunt         kidney stones    No Known Problems Sister     No Known Problems Sister     Anesth Problems Neg Hx      Current Outpatient Medications   Medication Sig    zolpidem (AMBIEN) 5 mg tablet TAKE ONE TABLET BY MOUTH  AT BEDTIME AS NEEDED FOR SLEEP    alendronate (FOSAMAX) 70 mg tablet TAKE ONE TABLET BY MOUTH ONCE WEEKLY ON AN EMPTY STOMACH BEFORE BREAKFAST. REMAIN UPRIGHT FOR 30 MINUTES AND TAKE WITH 8 OUNCES OF WATER.  LORazepam (ATIVAN) 0.5 mg tablet Take 1 Tablet by mouth three (3) times daily as needed for Anxiety. Max Daily Amount: 1.5 mg.    lidocaine-prilocaine (EMLA) topical cream Apply  to affected area as needed for Pain.  CRANIAL PROSTHESIS misc Breast cancer for chemo/ alopecia needs wig    ondansetron (ZOFRAN ODT) 4 mg disintegrating tablet Take 1 Tablet by mouth every eight (8) hours as needed for Nausea or Vomiting.  prochlorperazine (Compazine) 5 mg tablet Take 1-2 Tablets by mouth every six (6) hours as needed for Nausea or Vomiting.  cortisone acetate (CORTISONE PO) Take  by mouth. Injections every 3 months in her foot    DULoxetine (CYMBALTA) 20 mg capsule TAKE ONE CAPSULE BY MOUTH EVERY EVENING     No current facility-administered medications for this visit. Facility-Administered Medications Ordered in Other Visits   Medication Dose Route Frequency    trastuzumab-hyaluronidase-oysk (HERCEPTIN HYLECTA) injection 600 mg  600 mg SubCUTAneous ONCE      Allergies   Allergen Reactions    Adhesive Tape-Silicones Rash    Other Medication Hives     Cranberry or Probiotic, had severe itching and hives, not sure to which.  Has taken a probiotic recently without a reaction.  Oxycodone Itching      Review of Systems:  A complete review of systems was obtained, negative except as described above and as reported on ROS sheet scanned into system. Physical Exam:     Visit Vitals  BP (!) 152/87 (BP 1 Location: Left upper arm, BP Patient Position: Sitting)   Pulse 79   Temp 97.6 °F (36.4 °C) (Temporal)   Ht 5' 4\" (1.626 m)   Wt 158 lb 8 oz (71.9 kg)   SpO2 97%   BMI 27.21 kg/m²     ECOG PS: 0  General: No distress  Eyes: Anicteric sclerae  HENT: Atraumatic, wearing a mask  Neck: Supple  Respiratory:  normal respiratory effort, lungs clear  CV: Regular  GI: Soft, non tender   Ext: No edema  MS: Normal gait and station. Digits without clubbing or cyanosis. Skin: No rashes, ecchymoses, or petechiae. Normal temperature, turgor, and texture. Psych: Alert, oriented, appropriate affect, normal judgment/insight  Neuro: Non focal    Results:     Lab Results   Component Value Date/Time    WBC 3.8 02/15/2022 11:43 AM    HGB 12.9 02/15/2022 11:43 AM    HCT 38.3 02/15/2022 11:43 AM    PLATELET 531 05/28/9652 11:43 AM    MCV 99.7 (H) 02/15/2022 11:43 AM    ABS. NEUTROPHILS 2.5 02/15/2022 11:43 AM     Lab Results   Component Value Date/Time    Sodium 136 02/15/2022 11:43 AM    Potassium 4.6 02/15/2022 11:43 AM    Chloride 105 02/15/2022 11:43 AM    CO2 27 02/15/2022 11:43 AM    Glucose 136 (H) 02/15/2022 11:43 AM    BUN 23 (H) 02/15/2022 11:43 AM    Creatinine 0.86 02/15/2022 11:43 AM    GFR est AA >60 02/15/2022 11:43 AM    GFR est non-AA >60 02/15/2022 11:43 AM    Calcium 9.8 02/15/2022 11:43 AM     Lab Results   Component Value Date/Time    Bilirubin, total 0.4 02/15/2022 11:43 AM    ALT (SGPT) 25 02/15/2022 11:43 AM    Alk.  phosphatase 74 02/15/2022 11:43 AM    Protein, total 6.6 02/15/2022 11:43 AM    Albumin 3.7 02/15/2022 11:43 AM    Globulin 2.9 02/15/2022 11:43 AM     6/18/21  FINAL PATHOLOGIC DIAGNOSIS   Breast, right, needle core biopsy:   Invasive ductal carcinoma, [place] histologic grade 3   Largest glass slide measurement of invasive carcinoma: 6 mm   ER/WA negative, HER2 FISH +    8/3/21  FINAL PATHOLOGIC DIAGNOSIS   1. Lymph node, right axillary sentinel lymph node #1, excision:   One lymph node negative for metastatic carcinoma. 2. Lymph node, right axillary sentinel lymph node #2, excision:   One lymph node negative for metastatic carcinoma. 3. Right breast, lumpectomy, excision:   Procedure: Excision (less than total mastectomy)   Specimen Laterality: Right   TUMOR   Histologic Type: Invasive carcinoma of no special type (ductal)   Glandular (Acinar) / Tubular Differentiation: Score 3   Nuclear Pleomorphism: Score 3   Mitotic Rate: Score 3   Overall Grade: Grade 3   Tumor Size: 20 mm   Tumor Focality: Single focus of invasive carcinoma   Ductal Carcinoma in Situ (DCIS): Not identified   Tumor Extent   Lymphovascular Invasion: Not identified   Treatment Effect in the Breast: No known presurgical therapy   MARGINS   Invasive Carcinoma Margins: Uninvolved by invasive carcinoma   Distance from Closest Margin (Millimeters): Less than: 1 mm   Closest Margin(s): Anterior   LYMPH NODES   Regional Lymph Nodes: Uninvolved by tumor cells   Total Number of Lymph Nodes Examined: 2   Number of [place] Nodes Examined: 2   PATHOLOGIC STAGE CLASSIFICATION (pTNM, AJCC 8th Edition)   Primary Tumor (pT): pT1c N0(sn)   Breast biomarkers previously reported (F921127): ER negative, WA negative, HER-2/mili positive by FISH. Tumor blocks for potential additional studies: 3-B-D.   4. Right breast, lateral margin right breast, excision:   Benign fibroadipose tissue with minimal benign fibroglandular breast tissue. 5. Right breast, medial margin right breast, excision:   Benign fibroglandular breast tissue with epithelial cyst and usual ductal hyperplasia. 6. Right breast, inferior margin right breast, excision:   Benign fibroglandular breast tissue. 08/23/21    ECHO ADULT COMPLETE 08/23/2021 8/23/2021    Interpretation Summary  · LV: Estimated LVEF is 60 - 65%. Normal cavity size, wall thickness and systolic function (ejection fraction normal). Normal left ventricular strain. Global longitudinal strain is -15.9%. Mild (grade 1) left ventricular diastolic dysfunction. Signed by: Michael Justin MD on 8/23/2021  1:01 PM    12/03/21    ECHO ADULT COMPLETE 12/06/2021 12/6/2021    Interpretation Summary  · LV: Estimated LVEF is 60 - 65%. Normal cavity size, wall thickness, systolic function (ejection fraction normal) and diastolic function. Wall motion: normal.  · LV Strain: -16% unchanged from prior study. Signed by: Ramesh Correa MD on 12/6/2021  2:36 PM    03/08/22    ECHO ADULT COMPLETE 03/08/2022 3/8/2022    Interpretation Summary    Left Ventricle: Left ventricle size is normal. Normal wall thickness. Normal wall motion. Normal left ventricular systolic function with a visually estimated EF of 60 - 65%. Normal diastolic function. Signed by: Dharmesh Boo MD on 3/8/2022  5:10 PM    Records reviewed and summarized above. Pathology report(s) reviewed above. Radiology report(s) reviewed above. Assessment:/PLAN     1) Stage 1 Breast Cancer ER- Her2+ post Lumpectomy 8/3/21  Post lumpectomy in August. Reviewed path in detail. Discussed no hormonal therapy options due to ER/MT negative. Port placed on 8/3/21. Pre chemo ECHO 8/23/21 stable with EF 60-65%. Patient started weekly Taxol/Herceptin on 8/31/21. She completed 12 weeks of Taxol/Herceptin on 11/16/21. Follow up ECHO 12/3/21 stable with EF 60-65%. She started XRT with Dr. Marlo Kovacs on 12/31/21 and finished on 2/9/22. Follow up ECHO 3/8/22 good with EF 60-65%. She is here today for Cycle 11 (seventh dose of maintenance subQ Herceptin). She is tolerating treatment well overall without significant side effects. Will do bilateral mammogram in 8/22 - 6 months post XRT. ER/MD negative so no hormonal therapy. She is scheduled to have port removal on 4/12/22. She is clinically stable and doing well overall. Labs (CBC and CMP) reviewed today. Patient is ready for treatment today. Follow up in 3 weeks in [address] [place] [address]. Follow up in 6 weeks in [facility]/office. Patient agrees with plan. 2) Arthritis/Hx of Kidney Stone/Osteopenia  Management per PCP. 3) Management of High Risk Medications - Herceptin  No significant toxicities noted. ECHO from 12/3/21 reviewed. ECHO from 3/8/22 reviewed. Labs (CBC and CMP) reviewed. No dose adjustments needed. Will monitor for side effects. 4) Anxiety about Health  Ativan PRN while on chemo. 5) Psychosocial  Mood good, coping well. Her family is very supportive. SW/NN support as needed. Her  is here today. Call if questions. Follow up in 3 weeks in [facility] and 6 weeks in [facility]/office. I appreciate the opportunity to participate in Ms. Gurney Denver Brannan's care.     Signed By: Cleveland Fuller NP

## 2022-03-29 NOTE — PROGRESS NOTES
Rafal Wilkinson is a 73 y.o. female  Chief Complaint   Patient presents with    Chemotherapy    Breast Cancer     1. Have you been to the ER, urgent care clinic since your last visit? Hospitalized since your last visit? No.    2. Have you seen or consulted any other health care providers outside of the [address] [place] [address] since your last visit? Include any pap smears or colon screening.  No.

## 2022-03-29 NOTE — PROGRESS NOTES
[facility] - Chemotherapy Progress Note    3313 Pt admit to [facility] for Herceptin Hylecta/C11 ambulatory in stable condition accompanied by spouse. Assessment completed. No new concerns voiced. Port accessed with positive blood return. Labs drawn per order and sent. Line flushed, heparinized and de-accessed. Patient denies SOB, fever, cough, general not feeling well. Patient denies recent exposure to someone who has tested positive for COVID-19. Patient  denies having contact with anyone who has a pending COVID test.     Visit Vitals  BP (!) 152/87   Pulse 79   Temp 97.6 °F (36.4 °C)   Resp 16     Medications Administered     0.9% sodium chloride injection 10 mL     Admin Date  03/29/2022 Action  Given Dose  10 mL Route  IntraVENous Administered By  Andreas Mendes RN          heparin (porcine) pf 300-500 Units     Admin Date  03/29/2022 Action  Given Dose  500 Units Route  InterCATHeter Administered By  Andreas Mendes RN          sodium chloride (NS) flush 10 mL     Admin Date  03/29/2022 Action  Given Dose  10 mL Route  IntraVENous Administered By  Andreas Mendes RN          trastuzumab-hyaluronidase-oysk (HERCEPTIN HYLECTA) injection 600 mg     Admin Date  03/29/2022 Action  Given Dose  600 mg Route  SubCUTAneous Administered By  Andreas Mendes RN              (SC L leg)    1215 Pt tolerated treatment well. D/c home ambulatory in no distress. Pt aware of next OPI appointment scheduled for 04/19/2022.     Recent Results (from the past 12 hour(s))   CBC WITH AUTOMATED DIFF    Collection Time: 03/29/22 11:45 AM   Result Value Ref Range    WBC 3.2 (L) 3.6 - 11.0 K/uL    RBC 3.72 (L) 3.80 - 5.20 M/uL    HGB 12.5 11.5 - 16.0 g/dL    HCT 37.3 35.0 - 47.0 %    .3 (H) 80.0 - 99.0 FL    MCH 33.6 26.0 - 34.0 PG    MCHC 33.5 30.0 - 36.5 g/dL    RDW 12.9 11.5 - 14.5 %    PLATELET 874 948 - 614 K/uL    MPV 10.7 8.9 - 12.9 FL    NRBC 0.0 0  WBC    ABSOLUTE NRBC 0.00 0.00 - 0.01 K/uL    NEUTROPHILS 58 32 - 75 %    LYMPHOCYTES 29 12 - 49 %    MONOCYTES 10 5 - 13 %    EOSINOPHILS 2 0 - 7 %    BASOPHILS 1 0 - 1 %    IMMATURE GRANULOCYTES 0 0.0 - 0.5 %    ABS. NEUTROPHILS 1.9 1.8 - 8.0 K/UL    ABS. LYMPHOCYTES 0.9 0.8 - 3.5 K/UL    ABS. MONOCYTES 0.3 0.0 - 1.0 K/UL    ABS. EOSINOPHILS 0.1 0.0 - 0.4 K/UL    ABS. BASOPHILS 0.0 0.0 - 0.1 K/UL    ABS. IMM.  GRANS. 0.0 0.00 - 0.04 K/UL    DF AUTOMATED       Please review remaining labs in CC once resulted

## 2022-04-04 DIAGNOSIS — C50.411 MALIGNANT NEOPLASM OF UPPER-OUTER QUADRANT OF RIGHT BREAST IN FEMALE, ESTROGEN RECEPTOR NEGATIVE (HCC): Primary | ICD-10-CM

## 2022-04-04 DIAGNOSIS — Z17.1 MALIGNANT NEOPLASM OF UPPER-OUTER QUADRANT OF RIGHT BREAST IN FEMALE, ESTROGEN RECEPTOR NEGATIVE (HCC): Primary | ICD-10-CM

## 2022-04-10 DIAGNOSIS — M85.89 OSTEOPENIA OF MULTIPLE SITES: ICD-10-CM

## 2022-04-11 ENCOUNTER — ANESTHESIA EVENT (OUTPATIENT)
Dept: MEDSURG UNIT | Age: 72
End: 2022-04-11
Payer: MEDICARE

## 2022-04-11 RX ORDER — DIPHENHYDRAMINE HYDROCHLORIDE 50 MG/ML
12.5 INJECTION, SOLUTION INTRAMUSCULAR; INTRAVENOUS AS NEEDED
Status: CANCELLED | OUTPATIENT
Start: 2022-04-11 | End: 2022-04-11

## 2022-04-11 RX ORDER — HYDROMORPHONE HYDROCHLORIDE 1 MG/ML
0.2 INJECTION, SOLUTION INTRAMUSCULAR; INTRAVENOUS; SUBCUTANEOUS
Status: CANCELLED | OUTPATIENT
Start: 2022-04-11

## 2022-04-11 RX ORDER — MORPHINE SULFATE 2 MG/ML
2 INJECTION, SOLUTION INTRAMUSCULAR; INTRAVENOUS
Status: CANCELLED | OUTPATIENT
Start: 2022-04-11

## 2022-04-11 RX ORDER — MIDAZOLAM HYDROCHLORIDE 1 MG/ML
0.5 INJECTION, SOLUTION INTRAMUSCULAR; INTRAVENOUS
Status: CANCELLED | OUTPATIENT
Start: 2022-04-11

## 2022-04-11 RX ORDER — SODIUM CHLORIDE 0.9 % (FLUSH) 0.9 %
5-40 SYRINGE (ML) INJECTION EVERY 8 HOURS
Status: CANCELLED | OUTPATIENT
Start: 2022-04-11

## 2022-04-11 RX ORDER — ONDANSETRON 2 MG/ML
4 INJECTION INTRAMUSCULAR; INTRAVENOUS AS NEEDED
Status: CANCELLED | OUTPATIENT
Start: 2022-04-11

## 2022-04-11 RX ORDER — SODIUM CHLORIDE 0.9 % (FLUSH) 0.9 %
5-40 SYRINGE (ML) INJECTION AS NEEDED
Status: CANCELLED | OUTPATIENT
Start: 2022-04-11

## 2022-04-11 RX ORDER — FENTANYL CITRATE 50 UG/ML
25 INJECTION, SOLUTION INTRAMUSCULAR; INTRAVENOUS
Status: CANCELLED | OUTPATIENT
Start: 2022-04-11

## 2022-04-11 RX ORDER — SODIUM CHLORIDE, SODIUM LACTATE, POTASSIUM CHLORIDE, CALCIUM CHLORIDE 600; 310; 30; 20 MG/100ML; MG/100ML; MG/100ML; MG/100ML
125 INJECTION, SOLUTION INTRAVENOUS CONTINUOUS
Status: CANCELLED | OUTPATIENT
Start: 2022-04-11

## 2022-04-11 RX ORDER — OXYCODONE AND ACETAMINOPHEN 5; 325 MG/1; MG/1
1 TABLET ORAL AS NEEDED
Status: CANCELLED | OUTPATIENT
Start: 2022-04-11

## 2022-04-12 ENCOUNTER — ANESTHESIA (OUTPATIENT)
Dept: MEDSURG UNIT | Age: 72
End: 2022-04-12
Payer: MEDICARE

## 2022-04-12 ENCOUNTER — HOSPITAL ENCOUNTER (OUTPATIENT)
Age: 72
Setting detail: OUTPATIENT SURGERY
Discharge: HOME | End: 2022-04-12
Attending: SURGERY | Admitting: SURGERY
Payer: MEDICARE

## 2022-04-12 VITALS
OXYGEN SATURATION: 97 % | TEMPERATURE: 98.8 F | BODY MASS INDEX: 26.38 KG/M2 | HEART RATE: 74 BPM | SYSTOLIC BLOOD PRESSURE: 123 MMHG | DIASTOLIC BLOOD PRESSURE: 75 MMHG | HEIGHT: 64 IN | RESPIRATION RATE: 12 BRPM | WEIGHT: 154.54 LBS

## 2022-04-12 DIAGNOSIS — C50.911 INFILTRATING DUCTAL CARCINOMA OF RIGHT BREAST (HCC): ICD-10-CM

## 2022-04-12 PROCEDURE — 76060000061 HC AMB SURG ANES 0.5 TO 1 HR: Performed by: SURGERY

## 2022-04-12 PROCEDURE — 77030031139 HC SUT VCRL2 J&J -A: Performed by: SURGERY

## 2022-04-12 PROCEDURE — 77030002933 HC SUT MCRYL J&J -A: Performed by: SURGERY

## 2022-04-12 PROCEDURE — 76030000000 HC AMB SURG OR TIME 0.5 TO 1: Performed by: SURGERY

## 2022-04-12 PROCEDURE — 76210000034 HC AMBSU PH I REC 0.5 TO 1 HR: Performed by: SURGERY

## 2022-04-12 PROCEDURE — 77030040361 HC SLV COMPR DVT MDII -B: Performed by: SURGERY

## 2022-04-12 PROCEDURE — 36590 REMOVAL TUNNELED CV CATH: CPT | Performed by: SURGERY

## 2022-04-12 PROCEDURE — 2709999900 HC NON-CHARGEABLE SUPPLY: Performed by: SURGERY

## 2022-04-12 PROCEDURE — 77030010507 HC ADH SKN DERMBND J&J -B: Performed by: SURGERY

## 2022-04-12 PROCEDURE — 74011250636 HC RX REV CODE- 250/636: Performed by: NURSE ANESTHETIST, CERTIFIED REGISTERED

## 2022-04-12 PROCEDURE — 74011250637 HC RX REV CODE- 250/637: Performed by: ANESTHESIOLOGY

## 2022-04-12 PROCEDURE — 74011000250 HC RX REV CODE- 250: Performed by: SURGERY

## 2022-04-12 RX ORDER — DICLOFENAC SODIUM 75 MG/1
TABLET, DELAYED RELEASE ORAL
Qty: 60 TABLET | Refills: 2 | OUTPATIENT
Start: 2022-04-12

## 2022-04-12 RX ORDER — FENTANYL CITRATE 50 UG/ML
INJECTION, SOLUTION INTRAMUSCULAR; INTRAVENOUS AS NEEDED
Status: DISCONTINUED | OUTPATIENT
Start: 2022-04-12 | End: 2022-04-12 | Stop reason: HOSPADM

## 2022-04-12 RX ORDER — MIDAZOLAM HYDROCHLORIDE 1 MG/ML
INJECTION, SOLUTION INTRAMUSCULAR; INTRAVENOUS AS NEEDED
Status: DISCONTINUED | OUTPATIENT
Start: 2022-04-12 | End: 2022-04-12 | Stop reason: HOSPADM

## 2022-04-12 RX ORDER — SODIUM CHLORIDE 9 MG/ML
10 INJECTION INTRAMUSCULAR; INTRAVENOUS; SUBCUTANEOUS AS NEEDED
Status: CANCELLED | OUTPATIENT
Start: 2022-04-19

## 2022-04-12 RX ORDER — SODIUM CHLORIDE 0.9 % (FLUSH) 0.9 %
10 SYRINGE (ML) INJECTION AS NEEDED
Status: CANCELLED | OUTPATIENT
Start: 2022-04-19

## 2022-04-12 RX ORDER — MIDAZOLAM HYDROCHLORIDE 1 MG/ML
1 INJECTION, SOLUTION INTRAMUSCULAR; INTRAVENOUS AS NEEDED
Status: DISCONTINUED | OUTPATIENT
Start: 2022-04-12 | End: 2022-04-12 | Stop reason: HOSPADM

## 2022-04-12 RX ORDER — HEPARIN 100 UNIT/ML
300-500 SYRINGE INTRAVENOUS AS NEEDED
Status: CANCELLED
Start: 2022-04-19

## 2022-04-12 RX ORDER — SODIUM CHLORIDE, SODIUM LACTATE, POTASSIUM CHLORIDE, CALCIUM CHLORIDE 600; 310; 30; 20 MG/100ML; MG/100ML; MG/100ML; MG/100ML
125 INJECTION, SOLUTION INTRAVENOUS CONTINUOUS
Status: DISCONTINUED | OUTPATIENT
Start: 2022-04-12 | End: 2022-04-12 | Stop reason: HOSPADM

## 2022-04-12 RX ORDER — ALENDRONATE SODIUM 70 MG/1
TABLET ORAL
Qty: 12 TABLET | Refills: 1 | Status: SHIPPED | OUTPATIENT
Start: 2022-04-12 | End: 2022-05-27 | Stop reason: ALTCHOICE

## 2022-04-12 RX ORDER — SODIUM CHLORIDE 9 MG/ML
25 INJECTION, SOLUTION INTRAVENOUS CONTINUOUS
Status: DISCONTINUED | OUTPATIENT
Start: 2022-04-12 | End: 2022-04-12 | Stop reason: HOSPADM

## 2022-04-12 RX ORDER — SODIUM CHLORIDE, SODIUM LACTATE, POTASSIUM CHLORIDE, CALCIUM CHLORIDE 600; 310; 30; 20 MG/100ML; MG/100ML; MG/100ML; MG/100ML
INJECTION, SOLUTION INTRAVENOUS
Status: DISCONTINUED | OUTPATIENT
Start: 2022-04-12 | End: 2022-04-12 | Stop reason: HOSPADM

## 2022-04-12 RX ORDER — ACETAMINOPHEN 325 MG/1
650 TABLET ORAL ONCE
Status: COMPLETED | OUTPATIENT
Start: 2022-04-12 | End: 2022-04-12

## 2022-04-12 RX ORDER — LIDOCAINE HYDROCHLORIDE 10 MG/ML
0.1 INJECTION, SOLUTION EPIDURAL; INFILTRATION; INTRACAUDAL; PERINEURAL AS NEEDED
Status: DISCONTINUED | OUTPATIENT
Start: 2022-04-12 | End: 2022-04-12 | Stop reason: HOSPADM

## 2022-04-12 RX ORDER — PROPOFOL 10 MG/ML
INJECTION, EMULSION INTRAVENOUS
Status: DISCONTINUED | OUTPATIENT
Start: 2022-04-12 | End: 2022-04-12 | Stop reason: HOSPADM

## 2022-04-12 RX ORDER — FENTANYL CITRATE 50 UG/ML
50 INJECTION, SOLUTION INTRAMUSCULAR; INTRAVENOUS AS NEEDED
Status: DISCONTINUED | OUTPATIENT
Start: 2022-04-12 | End: 2022-04-12 | Stop reason: HOSPADM

## 2022-04-12 RX ORDER — SODIUM CHLORIDE 0.9 % (FLUSH) 0.9 %
5-40 SYRINGE (ML) INJECTION AS NEEDED
Status: DISCONTINUED | OUTPATIENT
Start: 2022-04-12 | End: 2022-04-12 | Stop reason: HOSPADM

## 2022-04-12 RX ORDER — SODIUM CHLORIDE 0.9 % (FLUSH) 0.9 %
5-40 SYRINGE (ML) INJECTION EVERY 8 HOURS
Status: DISCONTINUED | OUTPATIENT
Start: 2022-04-12 | End: 2022-04-12 | Stop reason: HOSPADM

## 2022-04-12 RX ADMIN — PROPOFOL 100 MCG/KG/MIN: 10 INJECTION, EMULSION INTRAVENOUS at 09:14

## 2022-04-12 RX ADMIN — FENTANYL CITRATE 100 MCG: 50 INJECTION, SOLUTION INTRAMUSCULAR; INTRAVENOUS at 09:14

## 2022-04-12 RX ADMIN — SODIUM CHLORIDE, POTASSIUM CHLORIDE, SODIUM LACTATE AND CALCIUM CHLORIDE: 600; 310; 30; 20 INJECTION, SOLUTION INTRAVENOUS at 09:11

## 2022-04-12 RX ADMIN — MIDAZOLAM 2 MG: 1 INJECTION INTRAMUSCULAR; INTRAVENOUS at 09:11

## 2022-04-12 RX ADMIN — ACETAMINOPHEN 650 MG: 325 TABLET ORAL at 08:31

## 2022-04-12 NOTE — OP NOTES
[address] [place]   OPERATIVE REPORT    Name:  Isaiah Sena  MR#:  [id number]  :  1950  ACCOUNT #:  [de-identified]  DATE OF SERVICE:  2022      PREOPERATIVE DIAGNOSIS:  Right breast cancer. POSTOPERATIVE DIAGNOSIS:  Right breast cancer. PROCEDURE PERFORMED:  Left subclavian port removal.    SURGEON:  Rell Menjivar MD    ASSISTANT:  None. ANESTHESIA:  MAC.    COMPLICATIONS:  None. SPECIMENS REMOVED:  None. IMPLANTS:  None. ESTIMATED BLOOD LOSS:  Minimal.    DRAINS:  None. FINDINGS:  Port removed intact. INDICATIONS FOR PROCEDURE:  A 74-year-old female who had undergone chemotherapy for her right breast cancer and needed her port removed. PROCEDURE IN DETAIL:  The patient was seen in the preop holding area where surgical site was marked by surgeon. Informed consent was obtained. She was taken to the operating room and laid in supine position where MAC anesthesia was induced. Left chest wall port site was prepped and draped in usual fashion. A time-out was performed. A 20 mL of local anesthetic was injected in the old scar and around the port site. A 15-blade was used to make an incision in to the old scar. Bovie cautery was used to dissect through the port capsule and the Prolene suture was cut on either side with heavy scissors. The port was removed intact. The port tract was oversewn with interrupted 3-0 Vicryl. Another 10 mL of local anesthetic was injected in the soft tissue and skin. The skin was closed with interrupted 3-0 Vicryl and 4-0 subcuticular Monocryl. Skin glue was placed on the incision. All sponge and instrument counts were correct. The patient went to Recovery in stable condition.       Agueda Dainels MD      [id number]  D:  2022 9:45  T:  2022 10:31  JOB #:  [id number]

## 2022-04-12 NOTE — H&P
History and Physical      Warden Eisenmenger, MD   Physician   Specialty:  Breast Surgery   Progress Notes      Signed   Encounter Date:  2/28/2022                         []Hide copied text    []Satya for details      HISTORY OF PRESENT ILLNESS  Shady Pak is a 73 y.o. female. HPI ESTABLISHED Patient here for follow up RIGHT breast cancer. Olya pain or changes to the breast since ending XRT.      6/18/21 - RIGHT IDC, ER 0, [place] 0, HER2 equiv, FISH positive, ki-67 90%     Patient started weekly Taxol/Herceptin on 8/31/21. She completed 12 weeks of Taxol/Herceptin on 11/16/21. She was seen 2/15/21  for Cycle 9 (fifth dose of maintenance subQ Herceptin). She started XRT with Dr. Ana Mott on 12/31/21 and finished on 2/9/22. She is tolerating treatment well overall without significant side effects. Will do bilateral mammogram in 8/22 - 6 months post XRT.       Family History: Mother, brain cancer  Sister - spinal cancer     Breast imaging-   MRI Results (most recent):  Results from Hospital Encounter encounter on 07/06/21     MRI BREAST BI W WO CONT     Narrative  EXAM:  MRI BREAST BI W WO CONT     INDICATION: New diagnosis of right breast carcinoma, evaluate extent of disease.     COMPARISON: Breast imaging dating back to 5/16/2019. No comparison MRI.     EXAM: MRI of right and left breast without and with IV contrast Gadavist .     TECHNIQUE: MRI breast without and with IV contrast. Bilateral breast MRI was  performed using a dedicated breast coil without compression with the patient in  the prone position. Precontrast T1-weighted images with fat suppression were  obtained followed by bolus injection of 7 mL of Gadavist . Postcontrast dynamic  and high-resolution images were acquired. Axial inversion recovery imaging was  also performed.  The images were analyzed using CAD analysis, enhancement curves,  digital subtraction, and 2 and 3 dimensional reconstructions.     FINDINGS:     Background parenchymal enhancement: Mild.     Lymph nodes: No lymphadenopathy.     Right breast: Irregular enhancing mass with washout kinetics is in the posterior  third at 9:00 and measures 1.8 x 1.5 x 1.1 cm. Biopsy clip is at the medial  margin of the mass. No extension to skin or chest wall. Normal skin and nipple. There are foci in the remainder of the right breast. No other suspicious  morphology or enhancement to suggest multicentric disease.     Left breast: There is no suspicious focus of morphology or temporal enhancement. There are foci. Normal skin and nipple.     Miscellaneous: None.     Impression  1. 1.8 cm biopsy-proven carcinoma in the right breast at 9:00. No multicentric  disease. 2. No lymphadenopathy. 3. No MRI evidence of malignancy in the left breast.     Assessment: ACR BI-RADS category  Right breast: BI-RADS Assessment Category 6: Known biopsy proven malignancy-  Appropriate action should be taken. Left breast: BI-RADS Assessment Category 2: Benign finding.     Recommendation: Surgical and oncologic management. Based on imaging, patient is  a candidate for breast conservation therapy.     A negative breast MRI examination speaks strongly against invasive cancer down  to a detection threshold of 3 to 5 mm but may not detect some lower grade or in  situ carcinomas. Therefore, routine clinical and mammographic followup are  recommended. A summary portfolio has been created in PACS.          Review of Systems   All other systems reviewed and are negative.        Physical Exam  Vitals and nursing note reviewed. Chest:   Breasts: Breasts are symmetrical.      Right: No inverted nipple, mass, nipple discharge, skin change, tenderness, axillary adenopathy or supraclavicular adenopathy.       Left: No inverted nipple, mass, nipple discharge, skin change, tenderness, axillary adenopathy or supraclavicular adenopathy.         Comments: Skin darkening right breast   Lymphadenopathy:      Cervical: No cervical adenopathy. Upper Body:      Right upper body: No supraclavicular, axillary or pectoral adenopathy. Left upper body: No supraclavicular, axillary or pectoral adenopathy.          ASSESSMENT and PLAN      ICD-10-CM ICD-9-CM     1. Malignant neoplasm of upper-outer quadrant of right female breast, unspecified estrogen receptor status ([facility]  C50.411 174. 4        - no evidence local recurrence  - mammograms august 2022  - port removal  30 minutes was spent with patient on counseling and coordination of care.           Electronically signed by Elham Lee MD at 02/28/22 9942

## 2022-04-12 NOTE — BRIEF OP NOTE
Brief Postoperative Note    Patient: Jose Alexandra  YOB: 1950  MRN: [id number]    Date of Procedure: 4/12/2022     Pre-Op Diagnosis: RIGHT BREAST CANCER    Post-Op Diagnosis: Same as preoperative diagnosis. Procedure(s):  PORTACATH REMOVAL    Surgeon(s):   Brooke Downey MD    Surgical Assistant: None    Anesthesia: MAC     Estimated Blood Loss (mL): Minimal    Complications: None    Specimens: * No specimens in log *     Implants: * No implants in log *    Drains: * No LDAs found *    Findings: port removed intact     Electronically Signed by Jessica Craft MD on 4/12/2022 at 9:41 AM  Dictated  Stat

## 2022-04-12 NOTE — ANESTHESIA PREPROCEDURE EVALUATION
Relevant Problems   RESPIRATORY SYSTEM   (+) History of pyelonephritis      ENDOCRINE   (+) Arthritis      PERSONAL HX & FAMILY HX OF CANCER   (+) Infiltrating ductal carcinoma of right breast (HCC)       Anesthetic History   No history of anesthetic complications            Review of Systems / Medical History  Patient summary reviewed, nursing notes reviewed and pertinent labs reviewed    Pulmonary  Within defined limits                 Neuro/Psych   Within defined limits           Cardiovascular  Within defined limits                Exercise tolerance: >4 METS     GI/Hepatic/Renal  Within defined limits              Endo/Other  Within defined limits      Arthritis     Other Findings   Comments: H/o R breast ca           Physical Exam    Airway  Mallampati: II  TM Distance: > 6 cm  Neck ROM: normal range of motion   Mouth opening: Normal     Cardiovascular  Regular rate and rhythm,  S1 and S2 normal,  no murmur, click, rub, or gallop             Dental  No notable dental hx       Pulmonary  Breath sounds clear to auscultation               Abdominal  GI exam deferred       Other Findings            Anesthetic Plan    ASA: 2  Anesthesia type: MAC          Induction: Intravenous  Anesthetic plan and risks discussed with: Patient

## 2022-04-12 NOTE — ANESTHESIA POSTPROCEDURE EVALUATION
Post-Anesthesia Evaluation and Assessment    Patient: Nathen Meigs MRN: [id number]  SSN: [id number]    YOB: 1950  Age: 73 y.o. Sex: female      I have evaluated the patient and they are stable and ready for discharge from the PACU. Cardiovascular Function/Vital Signs  Visit Vitals  /77   Pulse 77   Temp 37.1 °C (98.8 °F)   Resp 11   Ht 5' 4\" (1.626 m)   Wt 70.1 kg (154 lb 8.7 oz)   SpO2 95%   BMI 26.53 kg/m²       Patient is status post MAC anesthesia for Procedure(s):  PORTACATH REMOVAL. Nausea/Vomiting: None    Postoperative hydration reviewed and adequate. Pain:  Pain Scale 1: Numeric (0 - 10) (04/12/22 1005)  Pain Intensity 1: 0 (04/12/22 1005)   Managed    Neurological Status:   Neuro (WDL): Within Defined Limits (04/12/22 1005)  Neuro  LUE Motor Response: Purposeful (04/12/22 0946)  LLE Motor Response: Purposeful (04/12/22 0946)  RUE Motor Response: Purposeful (04/12/22 0946)  RLE Motor Response: Purposeful (04/12/22 0946)   At baseline    Mental Status, Level of Consciousness: Alert and  oriented to person, place, and time    Pulmonary Status:   O2 Device: None (Room air) (04/12/22 1005)   Adequate oxygenation and airway patent    Complications related to anesthesia: None    Post-anesthesia assessment completed. No concerns    Signed By: Mauricio Mccormick MD     April 12, 2022              Procedure(s):  PORTACATH REMOVAL. MAC    <BSHSIANPOST>    INITIAL Post-op Vital signs:   Vitals Value Taken Time   /75 04/12/22 1015   Temp 37.1 °C (98.8 °F) 04/12/22 0946   Pulse 75 04/12/22 1019   Resp 12 04/12/22 1019   SpO2 96 % 04/12/22 1019   Vitals shown include unvalidated device data.

## 2022-04-12 NOTE — ROUTINE PROCESS
Patient: Agustin Jiménez MRN: [id number]  SSN: [id number]   YOB: 1950  Age: 73 y.o. Sex: female     Patient is status post Procedure(s):  PORTACATH REMOVAL. Surgeon(s) and Role:     Driss Conley MD - Primary    Local/Dose/Irrigation:  SEE MAR            PICC Single Lumen 87/07/05 Right;Basilic (Active)        Venous Access Device 08/31/21 (Active)      Peripheral IV 04/12/22 Left Antecubital (Active)   Site Assessment Clean, dry, & intact 04/12/22 0834   Phlebitis Assessment 0 04/12/22 0834   Infiltration Assessment 0 04/12/22 0834   Dressing Status Clean, dry, & intact 04/12/22 0834   Dressing Type Transparent 04/12/22 0834   Hub Color/Line Status Blue; Infusing 04/12/22 0834                           Dressing/Packing:  Incision 04/12/22 Chest Left-Dressing/Treatment: Surgical glue (04/12/22 0931)    Splint/Cast:  ]    Other:

## 2022-04-12 NOTE — DISCHARGE INSTRUCTIONS
Discharge Instructions from Dr. Ruiz    ·   · You may shower, but no hot tubs, swimming pools, or baths until your incision is healed. · No heavy lifting with the affected extremity (nothing greater than 5 pounds), and limit its use for the next 4-5 days. · You may use an ice pack for comfort for the next couple of days, but do not place ice directly on the skin. Rather, use a towel or clothing to serve as a barrier between skin and ice to prevent injury. · If I placed a drain, follow the drain instructions provided, especially as you keep a record of the drain output. · Follow medication instructions carefully. No narcotics for port removal  will be given may take tylenol or ibuprofen. ·   · You will have bruising and swelling  · Watch for signs of infection as listed below. · Redness  · Swelling  · Drainage from the incision or from your nipple that appears infected  · Fever over 101.5 degrees for consecutive readings, or over 99.5 if you are currently undergoing chemotherapy. · Call our office (number is below) for a follow-up appointment. · If you have any problems, our phone number is [phone]        Anesthesia Discharge Guidelines      After general anesthesia or intravenous sedation, for 24 hours or while taking prescription Narcotics:  · Limit your activities  · Do not drive and operate hazardous machinery  · Do not make important personal or business decisions  · Do  not drink alcoholic beverages  · If you have not urinated within 8 hours after discharge, please contact your surgeon on call.      ___________________________________________________________________________________________________________________________________        Diet:     Start with ice chips, Jell-O, clear sports drinks (Gatorade) or clear carbonated beverages (7Up, Ginger ale or Sprite). If these liquids do not make you sick slowly progress back to your normal diet.   Some good examples to start are foods like rice, pasta, cereal, banana, and toast. Avoid spicy, greasy, and fatty foods as they may cause nausea and vomiting. Make sure you are drinking plenty of fluids to rehydrate yourself. If you happen to experience a sore throat, cold foods may help your throat feel better.

## 2022-04-12 NOTE — PERIOP NOTES
I have reviewed discharge instructions with the patient. The patient verbalized understanding. All questions addressed at this time. A paper copy of these instructions have been given to the patient to take home.

## 2022-04-15 RX ORDER — DICLOFENAC SODIUM 75 MG/1
75 TABLET, DELAYED RELEASE ORAL
Qty: 60 TABLET | Refills: 0 | Status: SHIPPED | OUTPATIENT
Start: 2022-04-15 | End: 2022-05-10

## 2022-04-19 ENCOUNTER — HOSPITAL ENCOUNTER (OUTPATIENT)
Dept: INFUSION THERAPY | Age: 72
Discharge: HOME | End: 2022-04-19
Payer: MEDICARE

## 2022-04-19 VITALS
HEART RATE: 78 BPM | SYSTOLIC BLOOD PRESSURE: 152 MMHG | DIASTOLIC BLOOD PRESSURE: 88 MMHG | HEIGHT: 64 IN | WEIGHT: 156.8 LBS | OXYGEN SATURATION: 98 % | RESPIRATION RATE: 16 BRPM | BODY MASS INDEX: 26.77 KG/M2 | TEMPERATURE: 97.1 F

## 2022-04-19 DIAGNOSIS — C50.911 INFILTRATING DUCTAL CARCINOMA OF RIGHT BREAST (HCC): Primary | ICD-10-CM

## 2022-04-19 PROCEDURE — 74011250636 HC RX REV CODE- 250/636: Performed by: INTERNAL MEDICINE

## 2022-04-19 PROCEDURE — 96402 CHEMO HORMON ANTINEOPL SQ/IM: CPT

## 2022-04-19 RX ORDER — DIPHENHYDRAMINE HYDROCHLORIDE 50 MG/ML
25 INJECTION, SOLUTION INTRAMUSCULAR; INTRAVENOUS AS NEEDED
Status: ACTIVE | OUTPATIENT
Start: 2022-04-19 | End: 2022-04-19

## 2022-04-19 RX ORDER — HYDROCORTISONE SODIUM SUCCINATE 100 MG/2ML
100 INJECTION, POWDER, FOR SOLUTION INTRAMUSCULAR; INTRAVENOUS AS NEEDED
Status: ACTIVE | OUTPATIENT
Start: 2022-04-19 | End: 2022-04-19

## 2022-04-19 RX ORDER — SODIUM CHLORIDE 9 MG/ML
25 INJECTION, SOLUTION INTRAVENOUS CONTINUOUS
Status: DISPENSED | OUTPATIENT
Start: 2022-04-19 | End: 2022-04-19

## 2022-04-19 RX ORDER — ALBUTEROL SULFATE 0.83 MG/ML
2.5 SOLUTION RESPIRATORY (INHALATION) AS NEEDED
Status: ACTIVE | OUTPATIENT
Start: 2022-04-19 | End: 2022-04-19

## 2022-04-19 RX ORDER — DIPHENHYDRAMINE HYDROCHLORIDE 50 MG/ML
50 INJECTION, SOLUTION INTRAMUSCULAR; INTRAVENOUS AS NEEDED
Status: ACTIVE | OUTPATIENT
Start: 2022-04-19 | End: 2022-04-19

## 2022-04-19 RX ORDER — EPINEPHRINE 1 MG/ML
0.3 INJECTION, SOLUTION, CONCENTRATE INTRAVENOUS AS NEEDED
Status: ACTIVE | OUTPATIENT
Start: 2022-04-19 | End: 2022-04-19

## 2022-04-19 RX ORDER — ACETAMINOPHEN 325 MG/1
650 TABLET ORAL
Status: DISCONTINUED | OUTPATIENT
Start: 2022-04-19 | End: 2022-04-20 | Stop reason: HOSPADM

## 2022-04-19 RX ORDER — ONDANSETRON 2 MG/ML
8 INJECTION INTRAMUSCULAR; INTRAVENOUS AS NEEDED
Status: ACTIVE | OUTPATIENT
Start: 2022-04-19 | End: 2022-04-19

## 2022-04-19 RX ORDER — ACETAMINOPHEN 325 MG/1
650 TABLET ORAL AS NEEDED
Status: ACTIVE | OUTPATIENT
Start: 2022-04-19 | End: 2022-04-19

## 2022-04-19 RX ORDER — DIPHENHYDRAMINE HYDROCHLORIDE 50 MG/ML
50 INJECTION, SOLUTION INTRAMUSCULAR; INTRAVENOUS
Status: DISCONTINUED | OUTPATIENT
Start: 2022-04-19 | End: 2022-04-20 | Stop reason: HOSPADM

## 2022-04-19 RX ADMIN — TRASTUZUMAB AND HYALURONIDASE-OYSK 600 MG: 600; 10000 INJECTION, SOLUTION SUBCUTANEOUS at 12:11

## 2022-04-19 NOTE — PROGRESS NOTES
[facility] - Chemotherapy Progress Note    1110 Pt admit to [facility] for Herceptin Hylecta/C12 ambulatory in stable condition accompanied by spouse. Assessment completed. No new concerns voiced. No labs needed this visit. Med ordered. Visit Vitals  BP (!) 152/88   Pulse 78   Temp 97.1 °F (36.2 °C)   Resp 16   Ht 5' 4\" (1.626 m)   Wt 71.1 kg (156 lb 12.8 oz)   SpO2 98%   Breastfeeding No   BMI 26.91 kg/m²     Medications Administered     trastuzumab-hyaluronidase-oysk (HERCEPTIN HYLECTA) injection 600 mg     Admin Date  04/19/2022 Action  Given Dose  600 mg Route  SubCUTAneous Administered By  Jung Souza RN            (SC right leg)    1225 Pt tolerated treatment well. D/c home ambulatory in no distress.  Pt aware of next [facility] appointment scheduled for   Future Appointments   Date Time Provider Wendie Escobar   5/3/2022 11:30 AM Sandoval Wiley NP [facility] BS AMB   5/4/2022 10:30 AM MD FRED Damon Junior BS AMB   5/10/2022 11:30 AM H2 NAT FASTRACK [facility]   5/10/2022 11:30 AM Brian Box  [address] BS AMB   5/31/2022 11:30 AM H2 NAT [facility]STRACK [facility]   6/21/2022 11:30 AM H2 NAT [facility]STRACK [facility]   7/12/2022 11:00 AM G1 NAT [facility]STRACK [facility]   8/2/2022 11:00 AM H1 NAT FASTRACK [facility]   8/30/2022  9:45 AM Sandoval Wiley NP [facility] BS AMB

## 2022-05-02 RX ORDER — ALBUTEROL SULFATE 0.83 MG/ML
2.5 SOLUTION RESPIRATORY (INHALATION) AS NEEDED
Status: CANCELLED
Start: 2022-05-10

## 2022-05-02 RX ORDER — ACETAMINOPHEN 325 MG/1
650 TABLET ORAL
Status: CANCELLED | OUTPATIENT
Start: 2022-05-10

## 2022-05-02 RX ORDER — DIPHENHYDRAMINE HYDROCHLORIDE 50 MG/ML
25 INJECTION, SOLUTION INTRAMUSCULAR; INTRAVENOUS AS NEEDED
Status: CANCELLED
Start: 2022-05-10

## 2022-05-02 RX ORDER — ACETAMINOPHEN 325 MG/1
650 TABLET ORAL AS NEEDED
Status: CANCELLED
Start: 2022-05-10

## 2022-05-02 RX ORDER — EPINEPHRINE 1 MG/ML
0.3 INJECTION, SOLUTION, CONCENTRATE INTRAVENOUS AS NEEDED
Status: CANCELLED | OUTPATIENT
Start: 2022-05-10

## 2022-05-02 RX ORDER — DIPHENHYDRAMINE HYDROCHLORIDE 50 MG/ML
50 INJECTION, SOLUTION INTRAMUSCULAR; INTRAVENOUS AS NEEDED
Status: CANCELLED
Start: 2022-05-10

## 2022-05-02 RX ORDER — HYDROCORTISONE SODIUM SUCCINATE 100 MG/2ML
100 INJECTION, POWDER, FOR SOLUTION INTRAMUSCULAR; INTRAVENOUS AS NEEDED
Status: CANCELLED | OUTPATIENT
Start: 2022-05-10

## 2022-05-02 RX ORDER — DIPHENHYDRAMINE HYDROCHLORIDE 50 MG/ML
50 INJECTION, SOLUTION INTRAMUSCULAR; INTRAVENOUS
Status: CANCELLED | OUTPATIENT
Start: 2022-05-10

## 2022-05-02 RX ORDER — ONDANSETRON 2 MG/ML
8 INJECTION INTRAMUSCULAR; INTRAVENOUS AS NEEDED
Status: CANCELLED | OUTPATIENT
Start: 2022-05-10

## 2022-05-03 ENCOUNTER — OFFICE VISIT (OUTPATIENT)
Dept: SURGERY | Age: 72
End: 2022-05-03
Payer: MEDICARE

## 2022-05-03 DIAGNOSIS — Z92.3 S/P RADIATION THERAPY: ICD-10-CM

## 2022-05-03 DIAGNOSIS — Z85.3 HISTORY OF BREAST CANCER IN FEMALE: ICD-10-CM

## 2022-05-03 DIAGNOSIS — C50.411 MALIGNANT NEOPLASM OF UPPER-OUTER QUADRANT OF RIGHT FEMALE BREAST, UNSPECIFIED ESTROGEN RECEPTOR STATUS (HCC): Primary | ICD-10-CM

## 2022-05-03 DIAGNOSIS — Z98.890 S/P LUMPECTOMY, RIGHT BREAST: ICD-10-CM

## 2022-05-03 PROCEDURE — 99024 POSTOP FOLLOW-UP VISIT: CPT | Performed by: NURSE PRACTITIONER

## 2022-05-03 NOTE — PROGRESS NOTES
HISTORY OF PRESENT ILLNESS  Tenna Curling is a 73 y.o. female. HPI Established patient presents for a post-op visit. Breast history -   Referring - Dr. Scot Burton  21 - Mammogram/US - BIRADS 4, RIGHT breast @10:00, 1.6 x 0.9 x 1.0 cm  21 - RIGHT breast biopsy - IDC, ER 0, VA 0, HER2 equiv, FISH positive, ki-67 90%  8/3/21 - RIGHT breast lumpectomy and SLNB, port placement - Dr. Nafisa Byers   2.0 cm IDC, node negative - T1cN0  Adjuvant weekly Taxol + Herceptin 21 - 21 - Dr. Rell Stern  Maintenance subQ Herceptin started 21  XRT - 21 - 22 - Dr. Harsh Bee       Family History -   Mother - brain cancer  Sister - spinal cancer  Denies FH of breast or ovarian cancer. Unsure what type of cancer her maternal grandmother had. She  in her 42's. ROS    Physical Exam  Chest:           ASSESSMENT and PLAN    ICD-10-CM ICD-9-CM    1. Malignant neoplasm of upper-outer quadrant of right female breast, unspecified estrogen receptor status (HCC)  C50.411 174.4    2. S/P lumpectomy, right breast  Z98.890 V45.89    3. S/P radiation therapy  Z92.3 V66.1    4. History of breast cancer in female  Z85.3 V10.3          LEFT chest wall port incision - CDI and well healed. Continues on subQ maintenance Herceptin. Has routine follow-up in 2022. RTC sooner PRN. She is comfortable with this plan. All questions answered and she stated understanding.

## 2022-05-04 ENCOUNTER — OFFICE VISIT (OUTPATIENT)
Dept: INTERNAL MEDICINE CLINIC | Age: 72
End: 2022-05-04
Payer: MEDICARE

## 2022-05-04 ENCOUNTER — HOSPITAL ENCOUNTER (OUTPATIENT)
Dept: RADIATION THERAPY | Age: 72
Discharge: HOME | End: 2022-05-04

## 2022-05-04 VITALS
WEIGHT: 158 LBS | RESPIRATION RATE: 16 BRPM | TEMPERATURE: 97.5 F | HEART RATE: 76 BPM | HEIGHT: 63 IN | OXYGEN SATURATION: 100 % | BODY MASS INDEX: 28 KG/M2 | DIASTOLIC BLOOD PRESSURE: 84 MMHG | SYSTOLIC BLOOD PRESSURE: 136 MMHG

## 2022-05-04 DIAGNOSIS — H04.129 DRY EYE: ICD-10-CM

## 2022-05-04 DIAGNOSIS — C50.911 INFILTRATING DUCTAL CARCINOMA OF RIGHT BREAST (HCC): ICD-10-CM

## 2022-05-04 DIAGNOSIS — F41.8 ANXIETY ABOUT HEALTH: ICD-10-CM

## 2022-05-04 DIAGNOSIS — Z23 ENCOUNTER FOR IMMUNIZATION: ICD-10-CM

## 2022-05-04 DIAGNOSIS — M25.572 CHRONIC PAIN OF LEFT ANKLE: ICD-10-CM

## 2022-05-04 DIAGNOSIS — E66.3 OVERWEIGHT: ICD-10-CM

## 2022-05-04 DIAGNOSIS — Z00.00 MEDICARE ANNUAL WELLNESS VISIT, SUBSEQUENT: Primary | ICD-10-CM

## 2022-05-04 DIAGNOSIS — Z71.89 ADVANCED CARE PLANNING/COUNSELING DISCUSSION: ICD-10-CM

## 2022-05-04 DIAGNOSIS — M85.89 OSTEOPENIA OF MULTIPLE SITES: ICD-10-CM

## 2022-05-04 DIAGNOSIS — G89.29 CHRONIC PAIN OF LEFT ANKLE: ICD-10-CM

## 2022-05-04 PROBLEM — G47.00 INSOMNIA: Status: RESOLVED | Noted: 2021-09-08 | Resolved: 2022-05-04

## 2022-05-04 PROBLEM — Z79.899 ENCOUNTER FOR MONITORING CARDIOTOXIC DRUG THERAPY: Status: RESOLVED | Noted: 2021-09-08 | Resolved: 2022-05-04

## 2022-05-04 PROBLEM — K52.1 CHEMOTHERAPY INDUCED DIARRHEA: Status: RESOLVED | Noted: 2021-09-08 | Resolved: 2022-05-04

## 2022-05-04 PROBLEM — R45.86 EMOTIONAL LABILITY: Status: RESOLVED | Noted: 2021-09-28 | Resolved: 2022-05-04

## 2022-05-04 PROBLEM — T45.1X5A CHEMOTHERAPY INDUCED DIARRHEA: Status: RESOLVED | Noted: 2021-09-08 | Resolved: 2022-05-04

## 2022-05-04 PROBLEM — T45.1X5A CHEMOTHERAPY-INDUCED NAUSEA: Status: RESOLVED | Noted: 2021-09-28 | Resolved: 2022-05-04

## 2022-05-04 PROBLEM — Z51.11 ENCOUNTER FOR CHEMOTHERAPY MANAGEMENT: Status: RESOLVED | Noted: 2021-08-31 | Resolved: 2022-05-04

## 2022-05-04 PROBLEM — R11.0 CHEMOTHERAPY-INDUCED NAUSEA: Status: RESOLVED | Noted: 2021-09-28 | Resolved: 2022-05-04

## 2022-05-04 PROBLEM — Z51.81 ENCOUNTER FOR MONITORING CARDIOTOXIC DRUG THERAPY: Status: RESOLVED | Noted: 2021-09-08 | Resolved: 2022-05-04

## 2022-05-04 PROCEDURE — G8399 PT W/DXA RESULTS DOCUMENT: HCPCS | Performed by: INTERNAL MEDICINE

## 2022-05-04 PROCEDURE — 3017F COLORECTAL CA SCREEN DOC REV: CPT | Performed by: INTERNAL MEDICINE

## 2022-05-04 PROCEDURE — G9899 SCRN MAM PERF RSLTS DOC: HCPCS | Performed by: INTERNAL MEDICINE

## 2022-05-04 PROCEDURE — G8510 SCR DEP NEG, NO PLAN REQD: HCPCS | Performed by: INTERNAL MEDICINE

## 2022-05-04 PROCEDURE — G8536 NO DOC ELDER MAL SCRN: HCPCS | Performed by: INTERNAL MEDICINE

## 2022-05-04 PROCEDURE — G8427 DOCREV CUR MEDS BY ELIG CLIN: HCPCS | Performed by: INTERNAL MEDICINE

## 2022-05-04 PROCEDURE — 1101F PT FALLS ASSESS-DOCD LE1/YR: CPT | Performed by: INTERNAL MEDICINE

## 2022-05-04 PROCEDURE — G0439 PPPS, SUBSEQ VISIT: HCPCS | Performed by: INTERNAL MEDICINE

## 2022-05-04 PROCEDURE — G8419 CALC BMI OUT NRM PARAM NOF/U: HCPCS | Performed by: INTERNAL MEDICINE

## 2022-05-04 NOTE — ACP (ADVANCE CARE PLANNING)
Advance Care Planning   Advance Care Planning (ACP) Physician/NP/PA (Provider) Conversation    Date of ACP Conversation: 05/04/22  Persons included in Conversation:  patient  Length of ACP Conversation in minutes: <16 minutes (Non-Billable)    Authorized Decision Maker (if patient is incapable of making informed decisions):   Named in Advance Directive or Healthcare Power of       Primary Decision Maker: Roney [facility] Beckie De [address] 33 - [phone]    Secondary Decision Maker: Petrona Duff - Daughter - [phone]    She has an advanced directive - a copy has been provided & still reflects her wishes. Reviewed DNR/DNI and patient is not interested- elects Full Code (attempt resuscitation).        Kayleen Wilder MD

## 2022-05-04 NOTE — PROGRESS NOTES
Jorge Alberto Keller is a 73 y.o. female who was seen in clinic today (5/4/2022) for a full physical.             Assessment & Plan:   Below is the assessment and plan developed based on review of pertinent history, physical exam, labs, studies, and medications. 1. Medicare annual wellness visit, subsequent  Comments:  reviewed checking lipids, declined, wants to work on weight/diet, will plan on checking next year  2. Advanced care planning/counseling discussion  3. Encounter for immunization  -     pneumococcal 20-cm conj-dip, PF, (PREVNAR 20) 0.5 mL syrg injection; 0.5 mL by IntraMUSCular route once for 1 dose., Normal, Disp-0.5 mL, R-0  4. Osteopenia of multiple sites  Assessment & Plan:  Asymptomatic, will repeat DEXA and then stop meds as it has been 5 yrs. Orders:  -     DEXA BONE DENSITY STUDY AXIAL; Future  5. Anxiety about health  Assessment & Plan:  Well controlled, on medications mostly for stress/ankle pain, doing well, reviewed pros/cons to continuing vs stopping, no changes at this time as it is likely helping   6. Chronic pain of left ankle  Assessment & Plan:  stable, continue current treatment    7. Overweight  Comments:  worsening, multi-factorial, (steroids/chemo & dec activity), reviewed life style changes, reviewed this is likely driving BP, will continue to monitor  8. Dry eye  Comments:  new dx, improving, differential reviewed, favor contact dermatitis, is improving, reviewed expectations & red flags  9. Infiltrating ductal carcinoma of right breast [facility])  Assessment & Plan:  New dx since last visit, monitored by specialist. No acute findings meriting change in the plan    Follow-up and Dispositions    · Return in about 1 year (around 5/4/2023) for FULL PHYSICAL - 30 minutes.          Subjective:   Monty Oro is here today for a full physical.      Annual Wellness Visit- Subsequent Visit    Since last visit screening mammogram was abnormal, found to have IDC of the R breast.    The following acute and/or chronic problems were addressed today:    Mental Health Review  Patient is seen for anxiety. She was started on this for kristy pain and some mild stress/anxiety. She feels good. Is not interested in making any changes. Available GAD7 reviewed. Reports experiences the following side effects from the treatment: none. Endocrine Review  She is seen for h/o osteoporosis. Since last visit: no changes. She is on the following treatment: fosamax 70 mg weekly. She reports compliance with all meds, and denies any med side effects. Last DEXA was on 5/16/19 - osteopenia        End of Life Planning: This was discussed with her today and she has an advanced directive - a copy has been provided. Reviewed DNR/DNI and patient is not interested. Depression Screen:  3 most recent PHQ Screens 5/4/2022   Little interest or pleasure in doing things Not at all   Feeling down, depressed, irritable, or hopeless Not at all   Total Score PHQ 2 0         Fall Risk:   Fall Risk Assessment, last 12 mths 5/4/2022   Able to walk? Yes   Fall in past 12 months? 0   Do you feel unsteady? 0   Are you worried about falling 0   Is TUG test greater than 12 seconds? -   Is the gait abnormal? -   Number of falls in past 12 months -   Fall with injury? -       Abuse Screen:  Abuse Screening Questionnaire 5/4/2022   Do you ever feel afraid of your partner? N   Are you in a relationship with someone who physically or mentally threatens you? N   Is it safe for you to go home?  Y       Do you average more than 1 drink per night or more than 7 drinks a week:  No    On any one occasion in the past three months have you have had more than 3 drinks containing alcohol:  No          Health Maintenance:   Daily Aspirin: no  Bone Density: 5/16/19 - osteopenia    Immunizations:   Covid: up to date  Influenza: up to date  Tetanus: up to date  Shingles: up to date  Pneumonia: due for Prevnar & script provided  Cancer screening:   Cervical: reviewed guidelines, declined  Breast: reviewed guidelines, will defer to specialists  Colon: reviewed guidelines, up to date       Functional Ability and Level of Safety:    Hearing: Hearing is good. Cognition Screen:   Has your family/caregiver stated any concerns about your memory: no     Ambulation: with no difficulty     Activities of Daily Living: The home contains: handrails and grab bars  Patient does total self care  Exercise: not active    Adult Nutrition Screen:  No risk factors noted. Patient Care Team:  Clover Roberson MD as PCP - General (Internal Medicine)  Clover Roberson MD as PCP [facility]  Sadiq Casiano MD as Physician (Physical Medicine and Rehabilitation)  Miranda Espinoza MD as Physician (Ophthalmology)  Jonathon Starks MD (Gastroenterology)  Warden Eisenmenger, MD as Physician (Surgery)  Mauricio Thompson DO as Physician (Hematology and Oncology)  Mauricio Thompson DO (Hematology and Oncology)  Karissa Medrano MD as Physician (Radiation Oncology)       The following sections were reviewed & updated as appropriate: Problem List, Allergies, PMH, PSH, FH, and SH. Prior to Admission medications    Medication Sig Start Date End Date Taking? Authorizing Provider   alendronate (FOSAMAX) 70 mg tablet TAKE 1 TABLET BY MOUTH ONCE WEEKLY ON AN EMPTY STOMACH BEFORE BREAKFAST. REMAIN UPRIGHT FOR 30 MINUTES & TAKE WITH 8 OUNCES OF WATER 4/12/22  Yes Clover Roberson MD   DULoxetine (CYMBALTA) 20 mg capsule TAKE ONE CAPSULE BY MOUTH EVERY EVENING 7/5/21  Yes Clover Roberson MD   diclofenac EC (VOLTAREN) 75 mg EC tablet Take 1 Tablet by mouth two (2) times daily as needed for Pain.   Patient not taking: Reported on 5/4/2022 4/15/22   Clover Roberson MD   zolpidem (AMBIEN) 5 mg tablet TAKE ONE TABLET BY MOUTH  AT BEDTIME AS NEEDED FOR SLEEP 11/12/21 5/4/22  Ginger Lieberman NP   LORazepam (ATIVAN) 0.5 mg tablet Take 1 Tablet by mouth three (3) times daily as needed for Anxiety. Max Daily Amount: 1.5 mg. 10/11/21 5/4/22  Eliezer Raza NP   cortisone acetate (CORTISONE PO) Take  by mouth. Injections every 3 months in her foot  Patient not taking: Reported on 5/4/2022    Provider, Historical          Review of Systems   Constitutional: Negative for chills and fever. HENT:        Itching & tearing of both eyes, started a few days ago at night, felt like her eye lids were swelling. There was a rash initialy  She did try ice and heat and benadryl or visine. No h/o trauma. Respiratory: Negative for cough and shortness of breath. Cardiovascular: Negative for chest pain and palpitations. Gastrointestinal: Positive for nausea (on/off). Negative for abdominal pain, blood in stool, constipation, diarrhea, heartburn and vomiting. Musculoskeletal: Positive for joint pain (L ankle). Negative for myalgias. Neurological: Negative for tingling, focal weakness and headaches. Endo/Heme/Allergies: Does not bruise/bleed easily. Psychiatric/Behavioral: Negative for depression. The patient is not nervous/anxious and does not have insomnia. Objective:   Physical Exam  Constitutional:       General: She is not in acute distress. Appearance: Normal appearance. Eyes:      General: Lids are normal.      Conjunctiva/sclera:      Right eye: Right conjunctiva is not injected. No chemosis. Left eye: Left conjunctiva is not injected. No chemosis. Cardiovascular:      Rate and Rhythm: Regular rhythm. Heart sounds: No murmur heard. Pulmonary:      Effort: Pulmonary effort is normal.      Breath sounds: Normal breath sounds. No decreased breath sounds or wheezing. Abdominal:      General: Bowel sounds are normal.      Palpations: Abdomen is soft. Tenderness: There is no abdominal tenderness. Musculoskeletal:      Right lower leg: No edema. Left lower leg: No edema.    Psychiatric:         Mood and Affect: Mood and affect normal.          Visit Vitals  /84   Pulse 76   Temp 97.5 °F (36.4 °C) (Temporal)   Resp 16   Ht 5' 3\" (1.6 m)   Wt 158 lb (71.7 kg)   SpO2 100%   BMI 27.99 kg/m²          Erasmo Sue MD

## 2022-05-04 NOTE — ASSESSMENT & PLAN NOTE
Well controlled, on medications mostly for stress/ankle pain, doing well, reviewed pros/cons to continuing vs stopping, no changes at this time as it is likely helping

## 2022-05-04 NOTE — PATIENT INSTRUCTIONS

## 2022-05-10 ENCOUNTER — OFFICE VISIT (OUTPATIENT)
Dept: ONCOLOGY | Age: 72
End: 2022-05-10
Payer: MEDICARE

## 2022-05-10 ENCOUNTER — HOSPITAL ENCOUNTER (OUTPATIENT)
Dept: INFUSION THERAPY | Age: 72
Discharge: HOME | End: 2022-05-10
Payer: MEDICARE

## 2022-05-10 VITALS
DIASTOLIC BLOOD PRESSURE: 86 MMHG | SYSTOLIC BLOOD PRESSURE: 138 MMHG | TEMPERATURE: 97 F | RESPIRATION RATE: 18 BRPM | HEART RATE: 82 BPM

## 2022-05-10 VITALS
TEMPERATURE: 97 F | HEART RATE: 87 BPM | HEIGHT: 63 IN | OXYGEN SATURATION: 98 % | SYSTOLIC BLOOD PRESSURE: 111 MMHG | WEIGHT: 159.6 LBS | DIASTOLIC BLOOD PRESSURE: 73 MMHG | BODY MASS INDEX: 28.28 KG/M2

## 2022-05-10 DIAGNOSIS — Z79.899 ENCOUNTER FOR MONITORING CARDIOTOXIC DRUG THERAPY: ICD-10-CM

## 2022-05-10 DIAGNOSIS — C50.911 INFILTRATING DUCTAL CARCINOMA OF RIGHT BREAST (HCC): Primary | ICD-10-CM

## 2022-05-10 DIAGNOSIS — Z51.81 ENCOUNTER FOR MONITORING CARDIOTOXIC DRUG THERAPY: ICD-10-CM

## 2022-05-10 DIAGNOSIS — Z98.890 HISTORY OF LUMPECTOMY OF RIGHT BREAST: ICD-10-CM

## 2022-05-10 LAB
ALBUMIN SERPL-MCNC: 3.8 G/DL (ref 3.5–5)
ALBUMIN/GLOB SERPL: 1.3 {RATIO} (ref 1.1–2.2)
ALP SERPL-CCNC: 104 U/L (ref 45–117)
ALT SERPL-CCNC: 25 U/L (ref 12–78)
ANION GAP SERPL CALC-SCNC: 8 MMOL/L (ref 5–15)
AST SERPL-CCNC: 16 U/L (ref 15–37)
BASOPHILS # BLD: 0 K/UL (ref 0–0.1)
BASOPHILS NFR BLD: 0 % (ref 0–1)
BILIRUB SERPL-MCNC: 0.4 MG/DL (ref 0.2–1)
BUN SERPL-MCNC: 24 MG/DL (ref 6–20)
BUN/CREAT SERPL: 26 (ref 12–20)
CALCIUM SERPL-MCNC: 8.8 MG/DL (ref 8.5–10.1)
CHLORIDE SERPL-SCNC: 105 MMOL/L (ref 97–108)
CO2 SERPL-SCNC: 25 MMOL/L (ref 21–32)
CREAT SERPL-MCNC: 0.92 MG/DL (ref 0.55–1.02)
DIFFERENTIAL METHOD BLD: ABNORMAL
EOSINOPHIL # BLD: 0.1 K/UL (ref 0–0.4)
EOSINOPHIL NFR BLD: 1 % (ref 0–7)
ERYTHROCYTE [DISTWIDTH] IN BLOOD BY AUTOMATED COUNT: 12.5 % (ref 11.5–14.5)
GLOBULIN SER CALC-MCNC: 3 G/DL (ref 2–4)
GLUCOSE SERPL-MCNC: 86 MG/DL (ref 65–100)
HCT VFR BLD AUTO: 39.6 % (ref 35–47)
HGB BLD-MCNC: 13 G/DL (ref 11.5–16)
IMM GRANULOCYTES # BLD AUTO: 0 K/UL (ref 0–0.04)
IMM GRANULOCYTES NFR BLD AUTO: 0 % (ref 0–0.5)
LYMPHOCYTES # BLD: 0.9 K/UL (ref 0.8–3.5)
LYMPHOCYTES NFR BLD: 20 % (ref 12–49)
MCH RBC QN AUTO: 34.2 PG (ref 26–34)
MCHC RBC AUTO-ENTMCNC: 32.8 G/DL (ref 30–36.5)
MCV RBC AUTO: 104.2 FL (ref 80–99)
MONOCYTES # BLD: 0.3 K/UL (ref 0–1)
MONOCYTES NFR BLD: 7 % (ref 5–13)
NEUTS SEG # BLD: 3.3 K/UL (ref 1.8–8)
NEUTS SEG NFR BLD: 72 % (ref 32–75)
NRBC # BLD: 0 K/UL (ref 0–0.01)
NRBC BLD-RTO: 0 PER 100 WBC
PLATELET # BLD AUTO: 185 K/UL (ref 150–400)
PMV BLD AUTO: 10.8 FL (ref 8.9–12.9)
POTASSIUM SERPL-SCNC: 4.1 MMOL/L (ref 3.5–5.1)
PROT SERPL-MCNC: 6.8 G/DL (ref 6.4–8.2)
RBC # BLD AUTO: 3.8 M/UL (ref 3.8–5.2)
SODIUM SERPL-SCNC: 138 MMOL/L (ref 136–145)
WBC # BLD AUTO: 4.6 K/UL (ref 3.6–11)

## 2022-05-10 PROCEDURE — G9899 SCRN MAM PERF RSLTS DOC: HCPCS | Performed by: NURSE PRACTITIONER

## 2022-05-10 PROCEDURE — G8427 DOCREV CUR MEDS BY ELIG CLIN: HCPCS | Performed by: NURSE PRACTITIONER

## 2022-05-10 PROCEDURE — G8399 PT W/DXA RESULTS DOCUMENT: HCPCS | Performed by: NURSE PRACTITIONER

## 2022-05-10 PROCEDURE — G8432 DEP SCR NOT DOC, RNG: HCPCS | Performed by: NURSE PRACTITIONER

## 2022-05-10 PROCEDURE — 74011250636 HC RX REV CODE- 250/636: Performed by: INTERNAL MEDICINE

## 2022-05-10 PROCEDURE — 1101F PT FALLS ASSESS-DOCD LE1/YR: CPT | Performed by: NURSE PRACTITIONER

## 2022-05-10 PROCEDURE — 3017F COLORECTAL CA SCREEN DOC REV: CPT | Performed by: NURSE PRACTITIONER

## 2022-05-10 PROCEDURE — 99212 OFFICE O/P EST SF 10 MIN: CPT | Performed by: NURSE PRACTITIONER

## 2022-05-10 PROCEDURE — 85025 COMPLETE CBC W/AUTO DIFF WBC: CPT

## 2022-05-10 PROCEDURE — 96402 CHEMO HORMON ANTINEOPL SQ/IM: CPT

## 2022-05-10 PROCEDURE — 1090F PRES/ABSN URINE INCON ASSESS: CPT | Performed by: NURSE PRACTITIONER

## 2022-05-10 PROCEDURE — G8419 CALC BMI OUT NRM PARAM NOF/U: HCPCS | Performed by: NURSE PRACTITIONER

## 2022-05-10 PROCEDURE — G8536 NO DOC ELDER MAL SCRN: HCPCS | Performed by: NURSE PRACTITIONER

## 2022-05-10 PROCEDURE — 80053 COMPREHEN METABOLIC PANEL: CPT

## 2022-05-10 PROCEDURE — 36415 COLL VENOUS BLD VENIPUNCTURE: CPT

## 2022-05-10 RX ORDER — SODIUM CHLORIDE 9 MG/ML
25 INJECTION, SOLUTION INTRAVENOUS CONTINUOUS
Status: DISPENSED | OUTPATIENT
Start: 2022-05-10 | End: 2022-05-10

## 2022-05-10 RX ORDER — SODIUM CHLORIDE 9 MG/ML
10 INJECTION INTRAMUSCULAR; INTRAVENOUS; SUBCUTANEOUS AS NEEDED
Status: ACTIVE | OUTPATIENT
Start: 2022-05-10 | End: 2022-05-10

## 2022-05-10 RX ORDER — HEPARIN 100 UNIT/ML
300-500 SYRINGE INTRAVENOUS AS NEEDED
Status: ACTIVE | OUTPATIENT
Start: 2022-05-10 | End: 2022-05-10

## 2022-05-10 RX ORDER — SODIUM CHLORIDE 0.9 % (FLUSH) 0.9 %
10 SYRINGE (ML) INJECTION AS NEEDED
Status: DISPENSED | OUTPATIENT
Start: 2022-05-10 | End: 2022-05-10

## 2022-05-10 RX ADMIN — TRASTUZUMAB AND HYALURONIDASE-OYSK 600 MG: 600; 10000 INJECTION, SOLUTION SUBCUTANEOUS at 12:23

## 2022-05-10 NOTE — PROGRESS NOTES
[facility] [place] [facility] [place] [address], [address], [place]: [phone]  F: [phone]    Reason for Visit:   Maryann Sharif is a 73 y.o. female seen today in office for follow up of Right Breast Cancer. Treatment History:   · Abnormal screening mammo 6/3/21: Bilateral digital screening mammography was performed and is interpreted in conjunction with a computer assisted detection (CAD) system. In the left breast, no suspicious masses or calcifications are identified. The right breast upper outer quadrant at 10:00 posterior depth 9 cm from the nipple there is a focal asymmetry  · Right Dx Mammo/US 6/7/21: Further evaluation was performed for a right breast focal asymmetry. In the right breast upper outer quadrant at 10:00 posterior depth there is an equal density irregular mass with indistinct margins measuring up to 1.7 cm. In the right breast at 10:00 10 cm from the nipple there is a 1.6 x 0.9 x 1.0 cm hypoechoic oval mass with indistinct margins, internal vascularity, and posterior acoustic enhancement  · Right Breast Biopsy 6/18/21: PATH - Invasive ductal carcinoma, Ish histologic grade 3. Largest glass slide measurement of invasive carcinoma: 6 mm   · ER/[place] negative, HER2 FISH +  · Ki67 90%  · Right Lumpectomy 8/3/2: PATH - 20 mm IDC with negative LNs. Margins negative. · Adjuvant weekly Taxol + Herceptin 8/31/21 - 11/16/21  · Maintenance subQ Herceptin 11/23/21 - Current  · XRT with Dr. Chrissie Mcardle 12/31/21 - 2/9/22    STAGE: Pathologic T1cN0, ER/[place] negative, HER2+     History of Present Illness:   Maryann Sharif is a 73 y.o. female seen today in office for follow up of right breast cancer post lumpectomy 8/3/21. She started adjuvant weekly Taxol + Herceptin on 8/31/21 and completed 12 weeks on 11/16/21. She started XRT with Dr. Chrissie Mcardle on 12/31/21 and finished on 2/9/22. She is here today for Cycle 13 (ninth cycle of maintenance subQ Herceptin).  She reports that she feels well overall today. She is tolerating maintenance Herceptin well overall without side effects. Her appetite and energy levels are good. She denies fever, chills, mouth sores, cough, SOB, CP, nausea, vomiting, diarrhea, and constipation. She denies pain today. CBC and CMP are still pending today. She wants treatment today. Her supportive  is here today.      Past Medical History:   Diagnosis Date    Arthritis     left ankle    Breast cancer ([address] 2021    RIGHT    Calculus of kidney 09/14/2018    Passed    Chemotherapy induced diarrhea 9/8/2021    Chemotherapy-induced nausea 9/28/2021    Chronic kidney disease     kidney stone    Chronic pain of left ankle 2/1/2017    Seeing Dr Vinay Caballero Fracture     left ankle    Ill-defined condition     PICC line for UTI - borderline sepsis    Osteopenia 2/1/2017      Past Surgical History:   Procedure Laterality Date    COLONOSCOPY N/A 2/8/2019    tubular adenoma, repeat 1 yr d/t poor prep - performed by Florin Germain MD at [address] [facility];     COLONOSCOPY N/A 5/14/2020    normal - performed by Florin Germain MD at [address] [facility] Left 2005    surgery - has a plate a screws    HX BREAST LUMPECTOMY Right 8/3/2021    RIGHT BREAST LUMPECTOMY WITH ULTRASOUND AND RIGHT BREAST SENTINEL NODE BIOPSY AND PORTACATH INSERTION performed by Esme Morrow MD at [address] HX COLONOSCOPY  06/10/2020    Clear    HX DILATION AND CURETTAGE  2015    HX GI      COLONOSCOPY    HX OTHER SURGICAL Right 01/2020    growth removed from right eyelid    HX OTHER SURGICAL Right 02/18/2020    PICC line placed for IV abx, removed after completing course for pyelonephritis    HX OTHER SURGICAL  04/12/2022    port removed    HX POLYPECTOMY      HX TONSILLECTOMY  1956    [place] BIOPSY OF BREAST, NEEDLE CORE Right 06/18/2021      Social History     Tobacco Use    Smoking status: Former Smoker Packs/day: 0.50     Years: 8.00     Pack years: 4.00     Types: Cigarettes     Start date: 9/15/1971     Quit date:      Years since quittin.3    Smokeless tobacco: Never Used    Tobacco comment: Light use   Substance Use Topics    Alcohol use: Yes     Alcohol/week: 5.0 standard drinks     Types: 5 Glasses of wine per week     Comment: Less than one drink per day      Family History   Problem Relation Age of Onset    Cancer Mother         Brain tumor    Other Father         ? lung problem    Cancer Sister         tumors of spine    Other Sister         kidney stones    Other Daughter         kidney stone    Other Maternal Aunt         kidney stones    No Known Problems Sister     No Known Problems Sister     Anesth Problems Neg Hx      Current Outpatient Medications   Medication Sig    alendronate (FOSAMAX) 70 mg tablet TAKE 1 TABLET BY MOUTH ONCE WEEKLY ON AN EMPTY STOMACH BEFORE BREAKFAST. REMAIN UPRIGHT FOR 30 MINUTES & TAKE WITH 8 OUNCES OF WATER    DULoxetine (CYMBALTA) 20 mg capsule TAKE ONE CAPSULE BY MOUTH EVERY EVENING     No current facility-administered medications for this visit. Allergies   Allergen Reactions    Adhesive Tape-Silicones Rash    Other Medication Hives     Cranberry or Probiotic, had severe itching and hives, not sure to which. Has taken a probiotic recently without a reaction.  Oxycodone Itching      Review of Systems:  A complete review of systems was obtained, negative except as described above and as reported on ROS sheet scanned into system.      Physical Exam:     Visit Vitals  /73 (BP 1 Location: Left upper arm, BP Patient Position: Sitting)   Pulse 87   Temp 97 °F (36.1 °C) (Temporal)   Ht 5' 3\" (1.6 m)   Wt 159 lb 9.6 oz (72.4 kg)   SpO2 98%   BMI 28.27 kg/m²     ECOG PS: 0  General: No distress  Eyes: Anicteric sclerae  HENT: Atraumatic, wearing a mask  Neck: Supple  Respiratory:  normal respiratory effort, lungs clear  CV: Regular  GI: Soft, non tender   Ext: No edema  MS: Normal gait and station. Digits without clubbing or cyanosis. Skin: No rashes, ecchymoses, or petechiae. Normal temperature, turgor, and texture. Psych: Alert, oriented, appropriate affect, normal judgment/insight  Neuro: Non focal    Results:     Lab Results   Component Value Date/Time    WBC 3.2 (L) 03/29/2022 11:45 AM    HGB 12.5 03/29/2022 11:45 AM    HCT 37.3 03/29/2022 11:45 AM    PLATELET 507 72/88/2602 11:45 AM    .3 (H) 03/29/2022 11:45 AM    ABS. NEUTROPHILS 1.9 03/29/2022 11:45 AM     Lab Results   Component Value Date/Time    Sodium 140 03/29/2022 11:45 AM    Potassium 4.2 03/29/2022 11:45 AM    Chloride 109 (H) 03/29/2022 11:45 AM    CO2 26 03/29/2022 11:45 AM    Glucose 97 03/29/2022 11:45 AM    BUN 16 03/29/2022 11:45 AM    Creatinine 0.74 03/29/2022 11:45 AM    GFR est AA >60 03/29/2022 11:45 AM    GFR est non-AA >60 03/29/2022 11:45 AM    Calcium 9.0 03/29/2022 11:45 AM     Lab Results   Component Value Date/Time    Bilirubin, total 0.4 03/29/2022 11:45 AM    ALT (SGPT) 34 03/29/2022 11:45 AM    Alk. phosphatase 83 03/29/2022 11:45 AM    Protein, total 6.5 03/29/2022 11:45 AM    Albumin 3.8 03/29/2022 11:45 AM    Globulin 2.7 03/29/2022 11:45 AM     6/18/21  FINAL PATHOLOGIC DIAGNOSIS   Breast, right, needle core biopsy:   Invasive ductal carcinoma, Ish histologic grade 3   Largest glass slide measurement of invasive carcinoma: 6 mm   ER/MA negative, HER2 FISH +    8/3/21  FINAL PATHOLOGIC DIAGNOSIS   1. Lymph node, right axillary sentinel lymph node #1, excision:   One lymph node negative for metastatic carcinoma. 2. Lymph node, right axillary sentinel lymph node #2, excision:   One lymph node negative for metastatic carcinoma. 3. Right breast, lumpectomy, excision:   Procedure: Excision (less than total mastectomy)   Specimen Laterality: Right   TUMOR   Histologic Type:  Invasive carcinoma of no special type (ductal)   Glandular (Acinar) / Tubular Differentiation: Score 3   Nuclear Pleomorphism: Score 3   Mitotic Rate: Score 3   Overall Grade: Grade 3   Tumor Size: 20 mm   Tumor Focality: Single focus of invasive carcinoma   Ductal Carcinoma in Situ (DCIS): Not identified   Tumor Extent   Lymphovascular Invasion: Not identified   Treatment Effect in the Breast: No known presurgical therapy   MARGINS   Invasive Carcinoma Margins: Uninvolved by invasive carcinoma   Distance from Closest Margin (Millimeters): Less than: 1 mm   Closest Margin(s): Anterior   LYMPH NODES   Regional Lymph Nodes: Uninvolved by tumor cells   Total Number of Lymph Nodes Examined: 2   Number of [place] Nodes Examined: 2   PATHOLOGIC STAGE CLASSIFICATION (pTNM, AJCC 8th Edition)   Primary Tumor (pT): pT1c N0(sn)   Breast biomarkers previously reported (T164380): ER negative, VT negative, HER-2/mili positive by FISH. Tumor blocks for potential additional studies: 3-B-D.   4. Right breast, lateral margin right breast, excision:   Benign fibroadipose tissue with minimal benign fibroglandular breast tissue. 5. Right breast, medial margin right breast, excision:   Benign fibroglandular breast tissue with epithelial cyst and usual ductal hyperplasia. 6. Right breast, inferior margin right breast, excision:   Benign fibroglandular breast tissue. 08/23/21    ECHO ADULT COMPLETE 08/23/2021 8/23/2021    Interpretation Summary  · LV: Estimated LVEF is 60 - 65%. Normal cavity size, wall thickness and systolic function (ejection fraction normal). Normal left ventricular strain. Global longitudinal strain is -15.9%. Mild (grade 1) left ventricular diastolic dysfunction. Signed by: Rachelle Garcia MD on 8/23/2021  1:01 PM    12/03/21    ECHO ADULT COMPLETE 12/06/2021 12/6/2021    Interpretation Summary  · LV: Estimated LVEF is 60 - 65%. Normal cavity size, wall thickness, systolic function (ejection fraction normal) and diastolic function.  Wall motion: normal.  · LV Strain: -16% unchanged from prior study. Signed by: Dagoberto Sanchez MD on 12/6/2021  2:36 PM    03/08/22    ECHO ADULT COMPLETE 03/08/2022 3/8/2022    Interpretation Summary    Left Ventricle: Left ventricle size is normal. Normal wall thickness. Normal wall motion. Normal left ventricular systolic function with a visually estimated EF of 60 - 65%. Normal diastolic function. Signed by: Rodolfo Blevins MD on 3/8/2022  5:10 PM    Records reviewed and summarized above. Pathology report(s) reviewed above. Radiology report(s) reviewed above. Assessment:/PLAN     1) Stage 1 Breast Cancer ER- Her2+ post Lumpectomy 8/3/21  Post lumpectomy in August. Reviewed path in detail. Discussed no hormonal therapy options due to ER/[place] negative. Port placed on 8/3/21. Pre chemo ECHO 8/23/21 stable with EF 60-65%. Patient started weekly Taxol/Herceptin on 8/31/21. She completed 12 weeks of Taxol/Herceptin on 11/16/21. Follow up ECHO 12/3/21 stable with EF 60-65%. She started XRT with Dr. Emily Tipton on 12/31/21 and finished on 2/9/22. Follow up ECHO 3/8/22 good with EF 60-65%. Port removed on 4/12/22. She is here today for Cycle 13 (ninth dose of maintenance subQ Herceptin). She is tolerating treatment well overall without significant side effects. Will do bilateral mammogram in 8/22 - 6 months post XRT. ER/[place] negative so no hormonal therapy. She is clinically stable and doing well overall. Labs (CBC and CMP) reviewed today. Patient is ready for treatment today. Follow up ECHO ordered today. Follow up in 3 weeks in [facility]. Follow up in 6 weeks in [facility]/office. Patient agrees with plan. 2) Arthritis/Hx of Kidney Stone/Osteopenia  Management per PCP. 3) Management of High Risk Medications - Herceptin  No significant toxicities noted. ECHO from 12/3/21 reviewed. ECHO from 3/8/22 reviewed. Labs (CBC and CMP) reviewed. No dose adjustments needed. Follow up ECHO ordered today.   Will monitor for side effects. 4) Anxiety about Health  She took Ativan PRN while she was on chemo. 5) Psychosocial  Mood good, coping well. Her family is very supportive. SW/NN support as needed. Her  is here today. Call if questions. Follow up in 3 weeks in [facility] and 6 weeks in [facility]/office. I appreciate the opportunity to participate in Ms. Barbi Kowalski's care.     Signed By: Fara Pepper NP

## 2022-05-10 NOTE — PROGRESS NOTES
Shalini Velazquez is a 73 y.o. female  Chief Complaint   Patient presents with    Chemotherapy    Breast Cancer   1. Have you been to the ER, urgent care clinic since your last visit? Hospitalized since your last visit? No.    2. Have you seen or consulted any other health care providers outside of the [address] [place] [address] since your last visit? Include any pap smears or colon screening. No.

## 2022-05-10 NOTE — PROGRESS NOTES
[facility] Progress Note    Date: May 10, 2022    Name: Tia Min    MRN: [id number]         : 1950        1200: Pt arrived ambulatory to [facility] for C13 Herceptin Hylecta Injection in stable condition. Assessment completed. No other complaints voiced at this time. Labs drawn peripherally per order and sent for processing. Patient Vitals for the past 12 hrs:   Temp Pulse Resp BP   05/10/22 1204 97 °F (36.1 °C) 82 18 138/86       Left Leg  Medications Administered     trastuzumab-hyaluronidase-oysk (HERCEPTIN HYLECTA) injection 600 mg     Admin Date  05/10/2022 Action  Given Dose  600 mg Route  SubCUTAneous Administered By  Juliocesar Guardado RN                  3939: Tolerated treatment well, no adverse reactions noted. D/Cd from [facility] ambulatory and in no distress.       Future Appointments   Date Time Provider Wendie Escobar   2022 11:30 AM H2 NAT [facility]   2022 11:30 AM H2 NAT [facility]   2022 11:30 AM Unknown [place],  [address] BS AMB   2022 11:00 AM G1 NAT [facility]   2022 11:00 AM H1 NTA [facility]   2022  9:45 AM Titus Merchant, NP [facility] BS AMB   2023 10:30 AM MD FRED Pires [facility]           Dany Mcghee RN  May 10, 2022

## 2022-05-19 RX ORDER — DICLOFENAC SODIUM 75 MG/1
TABLET, DELAYED RELEASE ORAL
Qty: 60 TABLET | Refills: 2 | Status: SHIPPED | OUTPATIENT
Start: 2022-05-19 | End: 2022-09-19

## 2022-05-23 RX ORDER — SODIUM CHLORIDE 9 MG/ML
25 INJECTION, SOLUTION INTRAVENOUS CONTINUOUS
Status: CANCELLED | OUTPATIENT
Start: 2022-05-31

## 2022-05-23 RX ORDER — DIPHENHYDRAMINE HYDROCHLORIDE 50 MG/ML
50 INJECTION, SOLUTION INTRAMUSCULAR; INTRAVENOUS AS NEEDED
Status: CANCELLED
Start: 2022-05-31

## 2022-05-23 RX ORDER — ACETAMINOPHEN 325 MG/1
650 TABLET ORAL
Status: CANCELLED | OUTPATIENT
Start: 2022-05-31

## 2022-05-23 RX ORDER — ONDANSETRON 2 MG/ML
8 INJECTION INTRAMUSCULAR; INTRAVENOUS AS NEEDED
Status: CANCELLED | OUTPATIENT
Start: 2022-05-31

## 2022-05-23 RX ORDER — EPINEPHRINE 1 MG/ML
0.3 INJECTION, SOLUTION, CONCENTRATE INTRAVENOUS AS NEEDED
Status: CANCELLED | OUTPATIENT
Start: 2022-05-31

## 2022-05-23 RX ORDER — ACETAMINOPHEN 325 MG/1
650 TABLET ORAL AS NEEDED
Status: CANCELLED
Start: 2022-05-31

## 2022-05-23 RX ORDER — HYDROCORTISONE SODIUM SUCCINATE 100 MG/2ML
100 INJECTION, POWDER, FOR SOLUTION INTRAMUSCULAR; INTRAVENOUS AS NEEDED
Status: CANCELLED | OUTPATIENT
Start: 2022-05-31

## 2022-05-23 RX ORDER — HEPARIN 100 UNIT/ML
300-500 SYRINGE INTRAVENOUS AS NEEDED
Status: CANCELLED
Start: 2022-05-31

## 2022-05-23 RX ORDER — DIPHENHYDRAMINE HYDROCHLORIDE 50 MG/ML
50 INJECTION, SOLUTION INTRAMUSCULAR; INTRAVENOUS
Status: CANCELLED | OUTPATIENT
Start: 2022-05-31

## 2022-05-23 RX ORDER — DIPHENHYDRAMINE HYDROCHLORIDE 50 MG/ML
25 INJECTION, SOLUTION INTRAMUSCULAR; INTRAVENOUS AS NEEDED
Status: CANCELLED
Start: 2022-05-31

## 2022-05-23 RX ORDER — ALBUTEROL SULFATE 0.83 MG/ML
2.5 SOLUTION RESPIRATORY (INHALATION) AS NEEDED
Status: CANCELLED
Start: 2022-05-31

## 2022-05-23 RX ORDER — SODIUM CHLORIDE 0.9 % (FLUSH) 0.9 %
10 SYRINGE (ML) INJECTION AS NEEDED
Status: CANCELLED | OUTPATIENT
Start: 2022-05-31

## 2022-05-23 RX ORDER — SODIUM CHLORIDE 9 MG/ML
10 INJECTION INTRAMUSCULAR; INTRAVENOUS; SUBCUTANEOUS AS NEEDED
Status: CANCELLED | OUTPATIENT
Start: 2022-05-31

## 2022-05-24 ENCOUNTER — HOSPITAL ENCOUNTER (OUTPATIENT)
Dept: MAMMOGRAPHY | Age: 72
Discharge: HOME | End: 2022-05-24
Attending: INTERNAL MEDICINE
Payer: MEDICARE

## 2022-05-24 ENCOUNTER — HOSPITAL ENCOUNTER (OUTPATIENT)
Dept: NON INVASIVE DIAGNOSTICS | Age: 72
Discharge: HOME | End: 2022-05-24
Attending: NURSE PRACTITIONER
Payer: MEDICARE

## 2022-05-24 DIAGNOSIS — Z51.81 ENCOUNTER FOR MONITORING CARDIOTOXIC DRUG THERAPY: ICD-10-CM

## 2022-05-24 DIAGNOSIS — Z79.899 ENCOUNTER FOR MONITORING CARDIOTOXIC DRUG THERAPY: ICD-10-CM

## 2022-05-24 DIAGNOSIS — M85.89 OSTEOPENIA OF MULTIPLE SITES: ICD-10-CM

## 2022-05-24 DIAGNOSIS — C50.911 INFILTRATING DUCTAL CARCINOMA OF RIGHT BREAST (HCC): ICD-10-CM

## 2022-05-24 PROCEDURE — 77080 DXA BONE DENSITY AXIAL: CPT

## 2022-05-24 PROCEDURE — 93356 MYOCRD STRAIN IMG SPCKL TRCK: CPT | Performed by: SPECIALIST

## 2022-05-24 PROCEDURE — 93306 TTE W/DOPPLER COMPLETE: CPT | Performed by: SPECIALIST

## 2022-05-24 PROCEDURE — 93306 TTE W/DOPPLER COMPLETE: CPT

## 2022-05-26 LAB
ECHO AO ROOT DIAM: 3.4 CM
ECHO AV PEAK GRADIENT: 12 MMHG
ECHO AV PEAK VELOCITY: 1.7 M/S
ECHO LA DIAMETER: 2.4 CM
ECHO LA TO AORTIC ROOT RATIO: 0.71
ECHO LV FRACTIONAL SHORTENING: 31 % (ref 28–44)
ECHO LV GLOBAL LONGITUDINAL STRAIN (GLS): -16.8 %
ECHO LV GLOBAL LONGITUDINAL STRAIN (GLS): -16.8 %
ECHO LV GLOBAL LONGITUDINAL STRAIN (GLS): -17.8 %
ECHO LV GLOBAL LONGITUDINAL STRAIN (GLS): -20 %
ECHO LV INTERNAL DIMENSION DIASTOLIC: 3.6 CM (ref 3.9–5.3)
ECHO LV INTERNAL DIMENSION SYSTOLIC: 2.5 CM
ECHO LV IVSD: 0.9 CM (ref 0.6–0.9)
ECHO LV MASS 2D: 92.8 G (ref 67–162)
ECHO LV POSTERIOR WALL DIASTOLIC: 0.9 CM (ref 0.6–0.9)
ECHO LV RELATIVE WALL THICKNESS RATIO: 0.5
ECHO LVOT AREA: 2.5 CM2
ECHO LVOT DIAM: 1.8 CM
ECHO MV A VELOCITY: 1.11 M/S
ECHO MV E DECELERATION TIME (DT): 195.9 MS
ECHO MV E VELOCITY: 0.86 M/S
ECHO MV E/A RATIO: 0.77
ECHO PV MAX VELOCITY: 1 M/S
ECHO PV PEAK GRADIENT: 4 MMHG
ECHO RV INTERNAL DIMENSION: 3.4 CM
ECHO TV REGURGITANT MAX VELOCITY: 2.36 M/S
ECHO TV REGURGITANT PEAK GRADIENT: 22 MMHG

## 2022-05-27 NOTE — PROGRESS NOTES
EVIA Verified. Called pt on mobile phone number listed. Patient was made aware of ECHO being normal/good. Patient was very appreciative over call!   Trish Frey

## 2022-05-31 ENCOUNTER — HOSPITAL ENCOUNTER (OUTPATIENT)
Dept: INFUSION THERAPY | Age: 72
Discharge: HOME | End: 2022-05-31
Payer: MEDICARE

## 2022-05-31 VITALS
HEIGHT: 63 IN | TEMPERATURE: 97.6 F | SYSTOLIC BLOOD PRESSURE: 161 MMHG | RESPIRATION RATE: 18 BRPM | WEIGHT: 159 LBS | BODY MASS INDEX: 28.17 KG/M2 | DIASTOLIC BLOOD PRESSURE: 88 MMHG | HEART RATE: 110 BPM

## 2022-05-31 DIAGNOSIS — C50.911 INFILTRATING DUCTAL CARCINOMA OF RIGHT BREAST (HCC): Primary | ICD-10-CM

## 2022-05-31 PROCEDURE — 96402 CHEMO HORMON ANTINEOPL SQ/IM: CPT

## 2022-05-31 PROCEDURE — 74011250636 HC RX REV CODE- 250/636: Performed by: INTERNAL MEDICINE

## 2022-05-31 RX ADMIN — TRASTUZUMAB AND HYALURONIDASE-OYSK 600 MG: 600; 10000 INJECTION, SOLUTION SUBCUTANEOUS at 12:26

## 2022-05-31 NOTE — PROGRESS NOTES
[facility] Progress Note    Date: May 31, 2022    Name: Jagruti Emerson    MRN: [id number]         : 1950        1130: Pt arrived ambulatory to [facility] for C14 Herceptin Hylecta Injection in stable condition. Assessment completed. No other complaints voiced at this time. Labs not due today. Medications given:  Herceptin Hylecta SQ in right thigh    Patient Vitals for the past 12 hrs:   Temp Pulse Resp BP   22 1131 97.6 °F (36.4 °C) (!) 110 18 (!) 161/88     1230: Tolerated treatment well, no adverse reactions noted. D/Cd from [facility] ambulatory and in no distress.       Future Appointments   Date Time Provider Wendie Escobar   2022 11:30 AM H2 [facility]   2022 11:30 AM H2 [facility]   2022 11:30 AM Mariam Rosa  [address] BS AMB   2022 11:00 AM G1 NAT [facility]   2022 11:00 AM H1 NAT [facility]   2022  9:45 AM Mert Noel NP [facility] BS AMB   2023 10:30 AM MD FRED Coronado BS AMB           Malcom Luciano RN  May 31, 2022

## 2022-06-14 RX ORDER — ALBUTEROL SULFATE 0.83 MG/ML
2.5 SOLUTION RESPIRATORY (INHALATION) AS NEEDED
Status: CANCELLED
Start: 2022-06-21

## 2022-06-14 RX ORDER — DIPHENHYDRAMINE HYDROCHLORIDE 50 MG/ML
25 INJECTION, SOLUTION INTRAMUSCULAR; INTRAVENOUS AS NEEDED
Status: CANCELLED
Start: 2022-06-21

## 2022-06-14 RX ORDER — ACETAMINOPHEN 325 MG/1
650 TABLET ORAL AS NEEDED
Status: CANCELLED
Start: 2022-06-21

## 2022-06-14 RX ORDER — ONDANSETRON 2 MG/ML
8 INJECTION INTRAMUSCULAR; INTRAVENOUS AS NEEDED
Status: CANCELLED | OUTPATIENT
Start: 2022-06-21

## 2022-06-14 RX ORDER — EPINEPHRINE 1 MG/ML
0.3 INJECTION, SOLUTION, CONCENTRATE INTRAVENOUS AS NEEDED
Status: CANCELLED | OUTPATIENT
Start: 2022-06-21

## 2022-06-14 RX ORDER — DIPHENHYDRAMINE HYDROCHLORIDE 50 MG/ML
50 INJECTION, SOLUTION INTRAMUSCULAR; INTRAVENOUS AS NEEDED
Status: CANCELLED
Start: 2022-06-21

## 2022-06-14 RX ORDER — HYDROCORTISONE SODIUM SUCCINATE 100 MG/2ML
100 INJECTION, POWDER, FOR SOLUTION INTRAMUSCULAR; INTRAVENOUS AS NEEDED
Status: CANCELLED | OUTPATIENT
Start: 2022-06-21

## 2022-06-21 ENCOUNTER — HOSPITAL ENCOUNTER (OUTPATIENT)
Dept: INFUSION THERAPY | Age: 72
Discharge: HOME | End: 2022-06-21
Payer: MEDICARE

## 2022-06-21 ENCOUNTER — OFFICE VISIT (OUTPATIENT)
Dept: ONCOLOGY | Age: 72
End: 2022-06-21
Payer: MEDICARE

## 2022-06-21 VITALS
SYSTOLIC BLOOD PRESSURE: 123 MMHG | WEIGHT: 161.7 LBS | HEART RATE: 82 BPM | TEMPERATURE: 96.8 F | BODY MASS INDEX: 28.65 KG/M2 | DIASTOLIC BLOOD PRESSURE: 75 MMHG | HEIGHT: 63 IN | OXYGEN SATURATION: 97 %

## 2022-06-21 VITALS
SYSTOLIC BLOOD PRESSURE: 123 MMHG | DIASTOLIC BLOOD PRESSURE: 75 MMHG | TEMPERATURE: 97.3 F | RESPIRATION RATE: 18 BRPM | HEART RATE: 82 BPM

## 2022-06-21 DIAGNOSIS — Z51.81 ENCOUNTER FOR MONITORING CARDIOTOXIC DRUG THERAPY: ICD-10-CM

## 2022-06-21 DIAGNOSIS — Z79.899 ENCOUNTER FOR MONITORING CARDIOTOXIC DRUG THERAPY: ICD-10-CM

## 2022-06-21 DIAGNOSIS — Z87.442 HISTORY OF KIDNEY STONES: ICD-10-CM

## 2022-06-21 DIAGNOSIS — C50.911 INFILTRATING DUCTAL CARCINOMA OF RIGHT BREAST (HCC): Primary | ICD-10-CM

## 2022-06-21 DIAGNOSIS — F41.8 ANXIETY ABOUT HEALTH: ICD-10-CM

## 2022-06-21 DIAGNOSIS — Z85.3 PERSONAL HISTORY OF BREAST CANCER: ICD-10-CM

## 2022-06-21 DIAGNOSIS — Z98.890 HISTORY OF LUMPECTOMY OF RIGHT BREAST: ICD-10-CM

## 2022-06-21 DIAGNOSIS — M19.90 ARTHRITIS: ICD-10-CM

## 2022-06-21 DIAGNOSIS — M85.89 OSTEOPENIA OF MULTIPLE SITES: ICD-10-CM

## 2022-06-21 LAB
ALBUMIN SERPL-MCNC: 3.6 G/DL (ref 3.5–5)
ALBUMIN/GLOB SERPL: 1.1 {RATIO} (ref 1.1–2.2)
ALP SERPL-CCNC: 117 U/L (ref 45–117)
ALT SERPL-CCNC: 24 U/L (ref 12–78)
ANION GAP SERPL CALC-SCNC: 5 MMOL/L (ref 5–15)
AST SERPL-CCNC: 16 U/L (ref 15–37)
BASOPHILS # BLD: 0 K/UL (ref 0–0.1)
BASOPHILS NFR BLD: 1 % (ref 0–1)
BILIRUB SERPL-MCNC: 0.4 MG/DL (ref 0.2–1)
BUN SERPL-MCNC: 21 MG/DL (ref 6–20)
BUN/CREAT SERPL: 23 (ref 12–20)
CALCIUM SERPL-MCNC: 9.8 MG/DL (ref 8.5–10.1)
CHLORIDE SERPL-SCNC: 105 MMOL/L (ref 97–108)
CO2 SERPL-SCNC: 28 MMOL/L (ref 21–32)
CREAT SERPL-MCNC: 0.91 MG/DL (ref 0.55–1.02)
DIFFERENTIAL METHOD BLD: ABNORMAL
EOSINOPHIL # BLD: 0.1 K/UL (ref 0–0.4)
EOSINOPHIL NFR BLD: 2 % (ref 0–7)
ERYTHROCYTE [DISTWIDTH] IN BLOOD BY AUTOMATED COUNT: 12.4 % (ref 11.5–14.5)
GLOBULIN SER CALC-MCNC: 3.4 G/DL (ref 2–4)
GLUCOSE SERPL-MCNC: 87 MG/DL (ref 65–100)
HCT VFR BLD AUTO: 39.4 % (ref 35–47)
HGB BLD-MCNC: 13.3 G/DL (ref 11.5–16)
IMM GRANULOCYTES # BLD AUTO: 0 K/UL (ref 0–0.04)
IMM GRANULOCYTES NFR BLD AUTO: 0 % (ref 0–0.5)
LYMPHOCYTES # BLD: 1 K/UL (ref 0.8–3.5)
LYMPHOCYTES NFR BLD: 23 % (ref 12–49)
MCH RBC QN AUTO: 35.1 PG (ref 26–34)
MCHC RBC AUTO-ENTMCNC: 33.8 G/DL (ref 30–36.5)
MCV RBC AUTO: 104 FL (ref 80–99)
MONOCYTES # BLD: 0.4 K/UL (ref 0–1)
MONOCYTES NFR BLD: 8 % (ref 5–13)
NEUTS SEG # BLD: 3 K/UL (ref 1.8–8)
NEUTS SEG NFR BLD: 66 % (ref 32–75)
NRBC # BLD: 0 K/UL (ref 0–0.01)
NRBC BLD-RTO: 0 PER 100 WBC
PLATELET # BLD AUTO: 189 K/UL (ref 150–400)
PMV BLD AUTO: 10.7 FL (ref 8.9–12.9)
POTASSIUM SERPL-SCNC: 4.1 MMOL/L (ref 3.5–5.1)
PROT SERPL-MCNC: 7 G/DL (ref 6.4–8.2)
RBC # BLD AUTO: 3.79 M/UL (ref 3.8–5.2)
SODIUM SERPL-SCNC: 138 MMOL/L (ref 136–145)
WBC # BLD AUTO: 4.5 K/UL (ref 3.6–11)

## 2022-06-21 PROCEDURE — 85025 COMPLETE CBC W/AUTO DIFF WBC: CPT

## 2022-06-21 PROCEDURE — 99213 OFFICE O/P EST LOW 20 MIN: CPT | Performed by: NURSE PRACTITIONER

## 2022-06-21 PROCEDURE — G8536 NO DOC ELDER MAL SCRN: HCPCS | Performed by: NURSE PRACTITIONER

## 2022-06-21 PROCEDURE — G8432 DEP SCR NOT DOC, RNG: HCPCS | Performed by: NURSE PRACTITIONER

## 2022-06-21 PROCEDURE — 1123F ACP DISCUSS/DSCN MKR DOCD: CPT | Performed by: NURSE PRACTITIONER

## 2022-06-21 PROCEDURE — G9899 SCRN MAM PERF RSLTS DOC: HCPCS | Performed by: NURSE PRACTITIONER

## 2022-06-21 PROCEDURE — 36415 COLL VENOUS BLD VENIPUNCTURE: CPT

## 2022-06-21 PROCEDURE — G8399 PT W/DXA RESULTS DOCUMENT: HCPCS | Performed by: NURSE PRACTITIONER

## 2022-06-21 PROCEDURE — 1090F PRES/ABSN URINE INCON ASSESS: CPT | Performed by: NURSE PRACTITIONER

## 2022-06-21 PROCEDURE — 3017F COLORECTAL CA SCREEN DOC REV: CPT | Performed by: NURSE PRACTITIONER

## 2022-06-21 PROCEDURE — 96402 CHEMO HORMON ANTINEOPL SQ/IM: CPT

## 2022-06-21 PROCEDURE — G8417 CALC BMI ABV UP PARAM F/U: HCPCS | Performed by: NURSE PRACTITIONER

## 2022-06-21 PROCEDURE — 80053 COMPREHEN METABOLIC PANEL: CPT

## 2022-06-21 PROCEDURE — 74011250636 HC RX REV CODE- 250/636: Performed by: INTERNAL MEDICINE

## 2022-06-21 PROCEDURE — 1101F PT FALLS ASSESS-DOCD LE1/YR: CPT | Performed by: NURSE PRACTITIONER

## 2022-06-21 PROCEDURE — G8427 DOCREV CUR MEDS BY ELIG CLIN: HCPCS | Performed by: NURSE PRACTITIONER

## 2022-06-21 RX ORDER — DIPHENHYDRAMINE HYDROCHLORIDE 50 MG/ML
50 INJECTION, SOLUTION INTRAMUSCULAR; INTRAVENOUS
Status: DISCONTINUED | OUTPATIENT
Start: 2022-06-21 | End: 2022-06-22 | Stop reason: HOSPADM

## 2022-06-21 RX ORDER — HEPARIN 100 UNIT/ML
300-500 SYRINGE INTRAVENOUS AS NEEDED
Status: ACTIVE | OUTPATIENT
Start: 2022-06-21 | End: 2022-06-21

## 2022-06-21 RX ORDER — SODIUM CHLORIDE 9 MG/ML
25 INJECTION, SOLUTION INTRAVENOUS CONTINUOUS
Status: DISPENSED | OUTPATIENT
Start: 2022-06-21 | End: 2022-06-21

## 2022-06-21 RX ORDER — ACETAMINOPHEN 325 MG/1
650 TABLET ORAL
Status: DISCONTINUED | OUTPATIENT
Start: 2022-06-21 | End: 2022-06-22 | Stop reason: HOSPADM

## 2022-06-21 RX ORDER — SODIUM CHLORIDE 0.9 % (FLUSH) 0.9 %
10 SYRINGE (ML) INJECTION AS NEEDED
Status: DISPENSED | OUTPATIENT
Start: 2022-06-21 | End: 2022-06-21

## 2022-06-21 RX ORDER — SODIUM CHLORIDE 9 MG/ML
10 INJECTION INTRAMUSCULAR; INTRAVENOUS; SUBCUTANEOUS AS NEEDED
Status: ACTIVE | OUTPATIENT
Start: 2022-06-21 | End: 2022-06-21

## 2022-06-21 RX ADMIN — TRASTUZUMAB AND HYALURONIDASE-OYSK 600 MG: 600; 10000 INJECTION, SOLUTION SUBCUTANEOUS at 12:10

## 2022-06-21 NOTE — PROGRESS NOTES
[facility] Chemo Progress Note    Date: June 21, 2022      1110: Ms. Shanti Garcia Arrived to [facility] for  C15 Herceptin Hylecta ambulatory in stable condition. Went to provider appointment with Medical Oncology. 1200: Returned from provider appointment. Assessment was completed, no acute issues at this time, no new complaints voiced. Labs drawn peripherally from right [facility] and sent for processing. Criteria for treatment was met. Ms. Debbie Andino vitals were reviewed. Patient Vitals for the past 12 hrs:   Temp Pulse Resp BP   06/21/22 1154 97.3 °F (36.3 °C) 82 18 123/75       Pre-medications  were administered as ordered and chemotherapy was initiated. Medications Administered     trastuzumab-hyaluronidase-oysk (HERCEPTIN HYLECTA) injection 600 mg     Admin Date  06/21/2022 Action  Given Dose  600 mg Route  SubCUTAneous Administered By  Corinne Enrique                Given SQ to left thigh     Two nurses verified prior to administering: Drug name, Drug dose, Infusion volume or drug volume when prepared in a syringe, Rate of administration, Route of administration, Expiration dates and/or times, Appearance and physical integrity of the drugs, Rate set on infusion pump, when used, and Sequencing of drug administration. 1220: Patient tolerated treatment well. Patient was discharged in stable condition. Patient is aware of next scheduled [facility] appointment on 7/12/22.     Future Appointments   Date Time Provider Wendie Escobar   6/21/2022 11:30 AM H2 [facility]   6/21/2022 11:30 AM Sharon Soto  [address] BS AMB   7/12/2022 11:00 AM G1 NAT [facility]   8/2/2022 11:00 AM H1 NAT [facility]   8/30/2022  9:45 AM Nohemi Navarrete NP [facility] BS AMB   5/2/2023 10:30 AM MD FRED Dupree BS AMB         Nawaf Cano, ANGEL  June 21, 2022

## 2022-06-21 NOTE — PROGRESS NOTES
Gail Ahuja is a 73 y.o. female  Chief Complaint   Patient presents with    Chemotherapy    Breast Cancer     1. Have you been to the ER, urgent care clinic since your last visit? Hospitalized since your last visit? No.  2. Have you seen or consulted any other health care providers outside of the [address] [place] [address] since your last visit? Include any pap smears or colon screening.  No.

## 2022-06-21 NOTE — PROGRESS NOTES
[facility] [place] at [address], [address] [place] [address] Road, [facility] [phone]  F: [phone]    Reason for Visit:   Malcom Smith is a 73 y.o. female seen today in office for follow up of Right Breast Cancer. Treatment History:   · Abnormal screening mammo 6/3/21: Bilateral digital screening mammography was performed and is interpreted in conjunction with a computer assisted detection (CAD) system. In the left breast, no suspicious masses or calcifications are identified. The right breast upper outer quadrant at 10:00 posterior depth 9 cm from the nipple there is a focal asymmetry  · Right Dx Mammo/US 6/7/21: Further evaluation was performed for a right breast focal asymmetry. In the right breast upper outer quadrant at 10:00 posterior depth there is an equal density irregular mass with indistinct margins measuring up to 1.7 cm. In the right breast at 10:00 10 cm from the nipple there is a 1.6 x 0.9 x 1.0 cm hypoechoic oval mass with indistinct margins, internal vascularity, and posterior acoustic enhancement  · Right Breast Biopsy 6/18/21: PATH - Invasive ductal carcinoma, Ish histologic grade 3. Largest glass slide measurement of invasive carcinoma: 6 mm   · ER/AR negative, HER2 FISH +  · Ki67 90%  · Right Lumpectomy 8/3/2: PATH - 20 mm IDC with negative LNs. Margins negative. · Adjuvant weekly Taxol + Herceptin 8/31/21 - 11/16/21  · Maintenance subQ Herceptin 11/23/21 - Current  · XRT with Dr. Mac Wynn 12/31/21 - 2/9/22    STAGE: Pathologic T1cN0, ER/AR negative, HER2+     History of Present Illness:   Malcom Smith is a 73 y.o. female seen today in office for follow up of right breast cancer post lumpectomy 8/3/21. She started adjuvant weekly Taxol + Herceptin on 8/31/21 and completed 12 weeks on 11/16/21. She started XRT with Dr. Mac Wynn on 12/31/21 and finished on 2/9/22. She is here today for Cycle 15 (eleventh cycle of maintenance subQ Herceptin).  She reports that she feels well overall today. She is tolerating maintenance Herceptin well overall without side effects. Her appetite and energy levels are good. She denies fever, chills, mouth sores, cough, SOB, CP, nausea, vomiting, diarrhea, and constipation. She denies pain today. CBC and CMP from 5/10/22 reviewed today. She wants treatment today. Her supportive  is here today.      Past Medical History:   Diagnosis Date    Arthritis     left ankle    Breast cancer ([address] 2021    RIGHT    Calculus of kidney 09/14/2018    Passed    Chemotherapy induced diarrhea 9/8/2021    Chemotherapy-induced nausea 9/28/2021    Chronic kidney disease     kidney stone    Chronic pain of left ankle 2/1/2017    Seeing Dr Patti Pineda Fracture     left ankle    Ill-defined condition     PICC line for UTI - borderline sepsis    Osteopenia 2/1/2017      Past Surgical History:   Procedure Laterality Date    COLONOSCOPY N/A 2/8/2019    tubular adenoma, repeat 1 yr d/t poor prep - performed by Renea Guerin MD at [facility];     COLONOSCOPY N/A 5/14/2020    normal - performed by Renea Guerin MD at [address] [facility] [address] Left 2005    surgery - has a plate a screws    HX BREAST LUMPECTOMY Right 8/3/2021    RIGHT BREAST LUMPECTOMY WITH ULTRASOUND AND RIGHT BREAST SENTINEL NODE BIOPSY AND PORTACATH INSERTION performed by Zena Lane MD at [address] HX COLONOSCOPY  06/10/2020    Clear    HX DILATION AND CURETTAGE  2015    HX GI      COLONOSCOPY    HX OTHER SURGICAL Right 01/2020    growth removed from right eyelid    HX OTHER SURGICAL Right 02/18/2020    PICC line placed for IV abx, removed after completing course for pyelonephritis    HX OTHER SURGICAL  04/12/2022    port removed    HX POLYPECTOMY      HX TONSILLECTOMY  1956    MS BIOPSY OF BREAST, NEEDLE CORE Right 06/18/2021      Social History     Tobacco Use    Smoking status: Former Smoker Packs/day: 0.50     Years: 8.00     Pack years: 4.00     Types: Cigarettes     Start date: 9/15/1971     Quit date:      Years since quittin.3    Smokeless tobacco: Never Used    Tobacco comment: Light use   Substance Use Topics    Alcohol use: Yes     Alcohol/week: 5.0 standard drinks     Types: 5 Glasses of wine per week     Comment: Less than one drink per day      Family History   Problem Relation Age of Onset    Cancer Mother         Brain tumor    Other Father         ? lung problem    Cancer Sister         tumors of spine    Other Sister         kidney stones    Other Daughter         kidney stone    Other Maternal Aunt         kidney stones    No Known Problems Sister     No Known Problems Sister     Anesth Problems Neg Hx      Current Outpatient Medications   Medication Sig    diclofenac EC (VOLTAREN) 75 mg EC tablet TAKE ONE TABLET BY MOUTH TWICE A DAY AS NEEDED FOR PAIN    DULoxetine (CYMBALTA) 20 mg capsule TAKE ONE CAPSULE BY MOUTH EVERY EVENING     No current facility-administered medications for this visit. Facility-Administered Medications Ordered in Other Visits   Medication Dose Route Frequency    0.9% sodium chloride infusion  25 mL/hr IntraVENous CONTINUOUS    diphenhydrAMINE (BENADRYL) injection 50 mg  50 mg IntraVENous ONCE PRN    acetaminophen (TYLENOL) tablet 650 mg  650 mg Oral ONCE PRN    trastuzumab-hyaluronidase-oysk (HERCEPTIN HYLECTA) injection 600 mg  600 mg SubCUTAneous ONCE    sodium chloride (NS) flush 10 mL  10 mL IntraVENous PRN    0.9% sodium chloride injection 10 mL  10 mL IntraVENous PRN    heparin (porcine) pf 300-500 Units  300-500 Units InterCATHeter PRN      Allergies   Allergen Reactions    Adhesive Tape-Silicones Rash    Other Medication Hives     Cranberry or Probiotic, had severe itching and hives, not sure to which. Has taken a probiotic recently without a reaction.     Oxycodone Itching      Review of Systems:  A complete review of systems was obtained, negative except as described above and as reported on ROS sheet scanned into system. Physical Exam:     Visit Vitals  /75 (BP 1 Location: Left upper arm, BP Patient Position: Sitting)   Pulse 82   Temp 96.8 °F (36 °C) (Temporal)   Ht 5' 3\" (1.6 m)   Wt 161 lb 11.2 oz (73.3 kg)   SpO2 97%   BMI 28.64 kg/m²     ECOG PS: 0  General: No distress  Eyes: Anicteric sclerae  HENT: Atraumatic, wearing a mask  Neck: Supple  Respiratory: Normal respiratory effort, lungs clear  CV: Regular  GI: Soft, non tender   Ext: No edema  MS: Normal gait and station. Digits without clubbing or cyanosis. Skin: No rashes, ecchymoses, or petechiae. Normal temperature, turgor, and texture. Psych: Alert, oriented, appropriate affect, normal judgment/insight  Neuro: Non focal    Results:     Lab Results   Component Value Date/Time    WBC 4.6 05/10/2022 12:12 PM    HGB 13.0 05/10/2022 12:12 PM    HCT 39.6 05/10/2022 12:12 PM    PLATELET 625 99/14/8666 12:12 PM    .2 (H) 05/10/2022 12:12 PM    ABS. NEUTROPHILS 3.3 05/10/2022 12:12 PM     Lab Results   Component Value Date/Time    Sodium 138 05/10/2022 12:12 PM    Potassium 4.1 05/10/2022 12:12 PM    Chloride 105 05/10/2022 12:12 PM    CO2 25 05/10/2022 12:12 PM    Glucose 86 05/10/2022 12:12 PM    BUN 24 (H) 05/10/2022 12:12 PM    Creatinine 0.92 05/10/2022 12:12 PM    GFR est AA >60 05/10/2022 12:12 PM    GFR est non-AA >60 05/10/2022 12:12 PM    Calcium 8.8 05/10/2022 12:12 PM     Lab Results   Component Value Date/Time    Bilirubin, total 0.4 05/10/2022 12:12 PM    ALT (SGPT) 25 05/10/2022 12:12 PM    Alk.  phosphatase 104 05/10/2022 12:12 PM    Protein, total 6.8 05/10/2022 12:12 PM    Albumin 3.8 05/10/2022 12:12 PM    Globulin 3.0 05/10/2022 12:12 PM     6/18/21  FINAL PATHOLOGIC DIAGNOSIS   Breast, right, needle core biopsy:   Invasive ductal carcinoma, Ish histologic grade 3   Largest glass slide measurement of invasive carcinoma: 6 mm ER/MD negative, HER2 FISH +    8/3/21  FINAL PATHOLOGIC DIAGNOSIS   1. Lymph node, right axillary sentinel lymph node #1, excision:   One lymph node negative for metastatic carcinoma. 2. Lymph node, right axillary sentinel lymph node #2, excision:   One lymph node negative for metastatic carcinoma. 3. Right breast, lumpectomy, excision:   Procedure: Excision (less than total mastectomy)   Specimen Laterality: Right   TUMOR   Histologic Type: Invasive carcinoma of no special type (ductal)   Glandular (Acinar) / Tubular Differentiation: Score 3   Nuclear Pleomorphism: Score 3   Mitotic Rate: Score 3   Overall Grade: Grade 3   Tumor Size: 20 mm   Tumor Focality: Single focus of invasive carcinoma   Ductal Carcinoma in Situ (DCIS): Not identified   Tumor Extent   Lymphovascular Invasion: Not identified   Treatment Effect in the Breast: No known presurgical therapy   MARGINS   Invasive Carcinoma Margins: Uninvolved by invasive carcinoma   Distance from Closest Margin (Millimeters): Less than: 1 mm   Closest Margin(s): Anterior   LYMPH NODES   Regional Lymph Nodes: Uninvolved by tumor cells   Total Number of Lymph Nodes Examined: 2   Number of Pearson Nodes Examined: 2   PATHOLOGIC STAGE CLASSIFICATION (pTNM, AJCC 8th Edition)   Primary Tumor (pT): pT1c N0(sn)   Breast biomarkers previously reported ([id number]): ER negative, MD negative, HER-2/mili positive by FISH. Tumor blocks for potential additional studies: 3-B-D.   4. Right breast, lateral margin right breast, excision:   Benign fibroadipose tissue with minimal benign fibroglandular breast tissue. 5. Right breast, medial margin right breast, excision:   Benign fibroglandular breast tissue with epithelial cyst and usual ductal hyperplasia. 6. Right breast, inferior margin right breast, excision:   Benign fibroglandular breast tissue. 08/23/21    ECHO ADULT COMPLETE 08/23/2021 8/23/2021    Interpretation Summary  · LV: Estimated LVEF is 60 - 65%.  Normal cavity size, wall thickness and systolic function (ejection fraction normal). Normal left ventricular strain. Global longitudinal strain is -15.9%. Mild (grade 1) left ventricular diastolic dysfunction. Signed by: Leti He MD on 2021  1:01 PM    21    ECHO ADULT COMPLETE 2021    Interpretation Summary  · LV: Estimated LVEF is 60 - 65%. Normal cavity size, wall thickness, systolic function (ejection fraction normal) and diastolic function. Wall motion: normal.  · LV Strain: -16% unchanged from prior study. Signed by: Cyndi Degroot MD on 2021  2:36 PM    22    ECHO ADULT COMPLETE 2022 3/8/2022    Interpretation Summary    Left Ventricle: Left ventricle size is normal. Normal wall thickness. Normal wall motion. Normal left ventricular systolic function with a visually estimated EF of 60 - 65%. Normal diastolic function. Signed by: Karlos Earl MD on 3/8/2022  5:10 PM    22    ECHO ADULT COMPLETE 2022    Interpretation Summary    Left Ventricle: Normal left ventricular systolic function with a visually estimated EF of 55 - 60%. Left ventricle size is normal. Normal wall thickness. Normal wall motion. Normal diastolic function. Signed by: Maryuri Agosto MD on 2022  4:31 PM    Records reviewed and summarized above. Pathology report(s) reviewed above. Radiology report(s) reviewed above. Assessment:/PLAN     1) Stage 1 Breast Cancer ER- Her2+ post Lumpectomy 8/3/21  Post lumpectomy in August. Reviewed path in detail. Discussed no hormonal therapy options due to ER/MD negative. Port placed on 8/3/21. Pre chemo ECHO 21 stable with EF 60-65%. Patient started weekly Taxol/Herceptin on 21. She completed 12 weeks of Taxol/Herceptin on 21. Follow up ECHO 12/3/21 stable with EF 60-65%. She started XRT with Dr. Mac Wynn on 21 and finished on 22.   Follow up ECHO 3/8/22 good with EF 60-65%. Port removed on 4/12/22. Follow up ECHO 5/24/22 good with EF 55-60%. She is here today for Cycle 15 (eleventh dose of maintenance subQ Herceptin). She is tolerating treatment well overall without significant side effects. Will do bilateral mammogram in 8/22 - 6 months post XRT - ordered today. ER/TN negative so no hormonal therapy. She is clinically stable and doing well overall. Labs (CBC and CMP) from 5/10/22 reviewed today. Patient is ready for treatment today. Follow up in 3 weeks in [facility]. Follow up in 6 weeks in [facility]/office. Patient agrees with plan. 2) Arthritis/Hx of Kidney Stone/Osteopenia  Management per PCP. 3) Management of High Risk Medications - Herceptin  No significant toxicities noted. ECHO from 12/3/21 reviewed. ECHO from 3/8/22 reviewed. ECHO from 5/24/22 reviewed. Labs (CBC and CMP) from 5/10/22 reviewed. No dose adjustments needed. Will monitor for side effects. 4) Anxiety about Health  She took Ativan PRN while she was on chemo. 5) Psychosocial  Mood good, coping well. Her family is very supportive. SW/NN support as needed. Her  is here today. Call if questions. Follow up in 3 weeks in [facility] and 6 weeks in [facility]/office. I appreciate the opportunity to participate in Ms. Hi Kowalski's care.     Signed By: Lizzie Jensen NP

## 2022-07-04 RX ORDER — DULOXETIN HYDROCHLORIDE 20 MG/1
CAPSULE, DELAYED RELEASE ORAL
Qty: 90 CAPSULE | Refills: 3 | Status: SHIPPED | OUTPATIENT
Start: 2022-07-04

## 2022-07-05 RX ORDER — ONDANSETRON 2 MG/ML
8 INJECTION INTRAMUSCULAR; INTRAVENOUS AS NEEDED
Status: CANCELLED | OUTPATIENT
Start: 2022-07-12

## 2022-07-05 RX ORDER — DIPHENHYDRAMINE HYDROCHLORIDE 50 MG/ML
25 INJECTION, SOLUTION INTRAMUSCULAR; INTRAVENOUS AS NEEDED
Status: CANCELLED
Start: 2022-07-12

## 2022-07-05 RX ORDER — HYDROCORTISONE SODIUM SUCCINATE 100 MG/2ML
100 INJECTION, POWDER, FOR SOLUTION INTRAMUSCULAR; INTRAVENOUS AS NEEDED
Status: CANCELLED | OUTPATIENT
Start: 2022-07-12

## 2022-07-05 RX ORDER — ACETAMINOPHEN 325 MG/1
650 TABLET ORAL AS NEEDED
Status: CANCELLED
Start: 2022-07-12

## 2022-07-05 RX ORDER — ALBUTEROL SULFATE 0.83 MG/ML
2.5 SOLUTION RESPIRATORY (INHALATION) AS NEEDED
Status: CANCELLED
Start: 2022-07-12

## 2022-07-05 RX ORDER — ACETAMINOPHEN 325 MG/1
650 TABLET ORAL
Status: CANCELLED | OUTPATIENT
Start: 2022-07-12

## 2022-07-05 RX ORDER — EPINEPHRINE 1 MG/ML
0.3 INJECTION, SOLUTION, CONCENTRATE INTRAVENOUS AS NEEDED
Status: CANCELLED | OUTPATIENT
Start: 2022-07-12

## 2022-07-05 RX ORDER — DIPHENHYDRAMINE HYDROCHLORIDE 50 MG/ML
50 INJECTION, SOLUTION INTRAMUSCULAR; INTRAVENOUS AS NEEDED
Status: CANCELLED
Start: 2022-07-12

## 2022-07-05 RX ORDER — SODIUM CHLORIDE 0.9 % (FLUSH) 0.9 %
10 SYRINGE (ML) INJECTION AS NEEDED
Status: CANCELLED | OUTPATIENT
Start: 2022-07-12

## 2022-07-05 RX ORDER — SODIUM CHLORIDE 9 MG/ML
10 INJECTION INTRAMUSCULAR; INTRAVENOUS; SUBCUTANEOUS AS NEEDED
Status: CANCELLED | OUTPATIENT
Start: 2022-07-12

## 2022-07-05 RX ORDER — SODIUM CHLORIDE 9 MG/ML
25 INJECTION, SOLUTION INTRAVENOUS CONTINUOUS
Status: CANCELLED | OUTPATIENT
Start: 2022-07-12

## 2022-07-05 RX ORDER — DIPHENHYDRAMINE HYDROCHLORIDE 50 MG/ML
50 INJECTION, SOLUTION INTRAMUSCULAR; INTRAVENOUS
Status: CANCELLED | OUTPATIENT
Start: 2022-07-12

## 2022-07-05 RX ORDER — HEPARIN 100 UNIT/ML
300-500 SYRINGE INTRAVENOUS AS NEEDED
Status: CANCELLED
Start: 2022-07-12

## 2022-07-05 NOTE — TELEPHONE ENCOUNTER
Future Appointments   Date Time Provider Wendie Luz   7/12/2022 11:00 AM G1 NAT FASTRACK [facility]   8/2/2022 11:00 AM H1 NAT FASTRACK [facility]   8/2/2022 11:15 AM Ivelisse Rodrigues,  [address] BS AMB   8/8/2022  9:15 AM [address] [place] [address] [address] [facility]   8/30/2022  9:45 AM Camacho Benz NP [facility] BS AMB   5/2/2023 10:30 AM Malou Lane MD [facility] BS AMB

## 2022-07-12 ENCOUNTER — HOSPITAL ENCOUNTER (OUTPATIENT)
Dept: INFUSION THERAPY | Age: 72
Discharge: HOME | End: 2022-07-12
Payer: MEDICARE

## 2022-07-12 VITALS
RESPIRATION RATE: 18 BRPM | TEMPERATURE: 97.6 F | BODY MASS INDEX: 28.67 KG/M2 | DIASTOLIC BLOOD PRESSURE: 92 MMHG | WEIGHT: 161.8 LBS | SYSTOLIC BLOOD PRESSURE: 151 MMHG | HEIGHT: 63 IN | HEART RATE: 86 BPM

## 2022-07-12 DIAGNOSIS — C50.911 INFILTRATING DUCTAL CARCINOMA OF RIGHT BREAST (HCC): Primary | ICD-10-CM

## 2022-07-12 PROCEDURE — 74011250636 HC RX REV CODE- 250/636: Performed by: INTERNAL MEDICINE

## 2022-07-12 PROCEDURE — 96402 CHEMO HORMON ANTINEOPL SQ/IM: CPT

## 2022-07-12 RX ADMIN — TRASTUZUMAB AND HYALURONIDASE-OYSK 600 MG: 600; 10000 INJECTION, SOLUTION SUBCUTANEOUS at 11:18

## 2022-07-12 NOTE — PROGRESS NOTES
[facility] Chemo Progress Note    Date: July 12, 2022      1055: Ms. Andree Sanders Arrived to [place] for  C16 Herceptin Hylecta ambulatory in stable condition. Assessment was completed. Criteria for treatment was met. Ms. Natacha Lopez vitals were reviewed. Visit Vitals  BP (!) 151/92 (BP 1 Location: Left upper arm, BP Patient Position: Sitting)   Pulse 86   Temp 97.6 °F (36.4 °C)   Resp 18   Ht 5' 3\" (1.6 m)   Wt 73.4 kg (161 lb 12.8 oz)   Breastfeeding No   BMI 28.66 kg/m²        Pre-medications  were administered as ordered and chemotherapy was initiated. Medications Administered     trastuzumab-hyaluronidase-oysk (HERCEPTIN HYLECTA) injection 600 mg     Admin Date  07/12/2022 Action  Given Dose  600 mg Route  SubCUTAneous Administered By  Levell Crumb                Given SQ to right thigh     Two nurses verified prior to administering: Drug name, Drug dose, Infusion volume or drug volume when prepared in a syringe, Rate of administration, Route of administration, Expiration dates and/or times, Appearance and physical integrity of the drugs, Rate set on infusion pump, when used, and Sequencing of drug administration. 1125: Patient tolerated treatment well. Patient was discharged in stable condition. Patient is aware of next scheduled [facility] appointment on 8/2/22. Future Appointments   Date Time Provider Wendie Escobar   8/2/2022 11:00 AM [address] H   8/2/2022 11:15 AM Tia Turcios  [facility] BS AMB   8/8/2022  9:15 AM [address] [place] [address] 5 [facility] H   8/30/2022  9:45 AM ALTHEA Herbert BS AMB   5/2/2023 10:30 AM Rosy Gray MD [facility]ON BS AMB         Aurelia Keith RN  July 12, 2022

## 2022-08-01 NOTE — PROGRESS NOTES
[facility] [place] [facility], [address], [place]: [phone]  F: [phone]    Reason for Visit:   Nohemy Naik is a 73 y.o. female seen today in office for follow up of Right Breast Cancer. Treatment History:   Abnormal screening mammo 6/3/21: Bilateral digital screening mammography was performed and is interpreted in conjunction with a computer assisted detection (CAD) system. In the left breast, no suspicious masses or calcifications are identified. The right breast upper outer quadrant at 10:00 posterior depth 9 cm from the nipple there is a focal asymmetry  Right Dx Mammo/US 6/7/21: Further evaluation was performed for a right breast focal asymmetry. In the right breast upper outer quadrant at 10:00 posterior depth there is an equal density irregular mass with indistinct margins measuring up to 1.7 cm. 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 Largest glass slide measurement of invasive carcinoma: 6 mm   ER/NJ negative, HER2 FISH +  Ki67 90%  Right Lumpectomy 8/3/2: PATH - 20 mm IDC with negative LNs. Margins negative. Adjuvant weekly Taxol + Herceptin 8/31/21 - 11/16/21  Maintenance subQ Herceptin 11/23/21 - Current  XRT with Dr. Allen Tipton 12/31/21 - 2/9/22    STAGE: Pathologic T1cN0, ER/NJ negative, HER2+     History of Present Illness:   Nohemy Naik is a 73 y.o. female seen today in office for follow up of right breast cancer on herceptin. Doing well overall. Last tx today. No fevers/ chills/ chest pain/ SOB/ nausea/ vomiting/diarrhea/ pain/fatigue            Last visit:   he started adjuvant weekly Taxol + Herceptin on 8/31/21 and completed 12 weeks on 11/16/21.  She started XRT with Dr. Allen Tipton on 12/31/21 and finished on 2/9/22. She is here today for Cycle 15 (eleventh cycle of maintenance subQ Herceptin). She reports that she feels well overall today. She is tolerating maintenance Herceptin well overall without side effects. Her appetite and energy levels are good. She denies fever, chills, mouth sores, cough, SOB, CP, nausea, vomiting, diarrhea, and constipation. She denies pain today. CBC and CMP from 5/10/22 reviewed today. She wants treatment today. Her supportive  is here today.      Past Medical History:   Diagnosis Date    Arthritis     left ankle    Breast cancer ([facility] [address] 2021    RIGHT    Calculus of kidney 09/14/2018    Passed    Chemotherapy induced diarrhea 9/8/2021    Chemotherapy-induced nausea 9/28/2021    Chronic kidney disease     kidney stone    Chronic pain of left ankle 2/1/2017    Seeing Dr Devon Valladares    Fracture     left ankle    Ill-defined condition     PICC line for UTI - borderline sepsis    Osteopenia 2/1/2017      Past Surgical History:   Procedure Laterality Date    COLONOSCOPY N/A 2/8/2019    tubular adenoma, repeat 1 yr d/t poor prep - [place] [facility]     COLONOSCOPY N/A 5/14/2020    normal - performed by Karin Arredondo MD at [place] [address] Left 2005    surgery - has a plate a screws    HX BREAST LUMPECTOMY Right 8/3/2021    RIGHT BREAST LUMPECTOMY WITH ULTRASOUND AND RIGHT BREAST SENTINEL NODE BIOPSY AND PORTACATH INSERTION performed by Carline Berumen MD at [facility] 11    HX COLONOSCOPY  06/10/2020    Clear    HX DILATION AND CURETTAGE  2015    HX GI      COLONOSCOPY    HX OTHER SURGICAL Right 01/2020    growth removed from right eyelid    HX OTHER SURGICAL Right 02/18/2020    PICC line placed for IV abx, removed after completing course for pyelonephritis    HX OTHER SURGICAL  04/12/2022    port removed    HX POLYPECTOMY      HX TONSILLECTOMY  1956    [place] BIOPSY OF BREAST, NEEDLE CORE Right 2021      Social History     Tobacco Use    Smoking status: Former     Packs/day: 0.50     Years: 8.00     Pack years: 4.00     Types: Cigarettes     Start date: 9/15/1971     Quit date:      Years since quittin.6    Smokeless tobacco: Never    Tobacco comments:     Light use   Substance Use Topics    Alcohol use: Yes     Alcohol/week: 5.0 standard drinks     Types: 5 Glasses of wine per week     Comment: Less than one drink per day      Family History   Problem Relation Age of Onset    Cancer Mother         Brain tumor    Other Father         ? lung problem    Cancer Sister         tumors of spine    Other Sister         kidney stones    Other Daughter         kidney stone    Other Maternal Aunt         kidney stones    No Known Problems Sister     No Known Problems Sister     Anesth Problems Neg Hx      Current Outpatient Medications   Medication Sig    DULoxetine (CYMBALTA) 20 mg capsule TAKE ONE CAPSULE BY MOUTH EVERY EVENING    diclofenac EC (VOLTAREN) 75 mg EC tablet TAKE ONE TABLET BY MOUTH TWICE A DAY AS NEEDED FOR PAIN     No current facility-administered medications for this visit. Allergies   Allergen Reactions    Adhesive Tape-Silicones Rash    Other Medication Hives     Cranberry or Probiotic, had severe itching and hives, not sure to which. Has taken a probiotic recently without a reaction. Oxycodone Itching      Review of Systems:  A complete review of systems was obtained, negative except as described above and as reported on ROS sheet scanned into system.      Physical Exam:     Visit Vitals  /83 (BP 1 Location: Left upper arm, BP Patient Position: Sitting)   Pulse 85   Temp 97.7 °F (36.5 °C) (Temporal)   Ht 5' 3\" (1.6 m)   Wt 161 lb 6.4 oz (73.2 kg)   SpO2 96%   BMI 28.59 kg/m²       ECOG PS: 0  General: No distress  Eyes: Anicteric sclerae  HENT: Atraumatic, wearing a mask  Neck: Supple  Respiratory: Normal respiratory effort, lungs clear  CV: Regular  GI: Soft, non tender   Ext: No edema  MS: Normal gait and station. Digits without clubbing or cyanosis. Skin: No rashes, ecchymoses, or petechiae. Normal temperature, turgor, and texture. Psych: Alert, oriented, appropriate affect, normal judgment/insight  Neuro: Non focal    Results:     Lab Results   Component Value Date/Time    WBC 4.5 06/21/2022 11:55 AM    HGB 13.3 06/21/2022 11:55 AM    HCT 39.4 06/21/2022 11:55 AM    PLATELET 060 46/44/0436 11:55 AM    .0 (H) 06/21/2022 11:55 AM    ABS. NEUTROPHILS 3.0 06/21/2022 11:55 AM     Lab Results   Component Value Date/Time    Sodium 138 06/21/2022 11:55 AM    Potassium 4.1 06/21/2022 11:55 AM    Chloride 105 06/21/2022 11:55 AM    CO2 28 06/21/2022 11:55 AM    Glucose 87 06/21/2022 11:55 AM    BUN 21 (H) 06/21/2022 11:55 AM    Creatinine 0.91 06/21/2022 11:55 AM    GFR est AA >60 06/21/2022 11:55 AM    GFR est non-AA >60 06/21/2022 11:55 AM    Calcium 9.8 06/21/2022 11:55 AM     Lab Results   Component Value Date/Time    Bilirubin, total 0.4 06/21/2022 11:55 AM    ALT (SGPT) 24 06/21/2022 11:55 AM    Alk. phosphatase 117 06/21/2022 11:55 AM    Protein, total 7.0 06/21/2022 11:55 AM    Albumin 3.6 06/21/2022 11:55 AM    Globulin 3.4 06/21/2022 11:55 AM     6/18/21  FINAL PATHOLOGIC DIAGNOSIS   Breast, right, needle core biopsy:   Invasive ductal carcinoma, [place] histologic grade 3   Largest glass slide measurement of invasive carcinoma: 6 mm   ER/MI negative, HER2 FISH +    8/3/21  FINAL PATHOLOGIC DIAGNOSIS   1. Lymph node, right axillary sentinel lymph node #1, excision:   One lymph node negative for metastatic carcinoma. 2. Lymph node, right axillary sentinel lymph node #2, excision:   One lymph node negative for metastatic carcinoma. 3. Right breast, lumpectomy, excision:   Procedure: Excision (less than total mastectomy)   Specimen Laterality: Right   TUMOR   Histologic Type:  Invasive carcinoma of no special type (ductal)   Glandular (Acinar) / Tubular Differentiation: Score 3   Nuclear Pleomorphism: Score 3   Mitotic Rate: Score 3   Overall Grade: Grade 3   Tumor Size: 20 mm   Tumor Focality: Single focus of invasive carcinoma   Ductal Carcinoma in Situ (DCIS): Not identified   Tumor Extent   Lymphovascular Invasion: Not identified   Treatment Effect in the Breast: No known presurgical therapy   MARGINS   Invasive Carcinoma Margins: Uninvolved by invasive carcinoma   Distance from Closest Margin (Millimeters): Less than: 1 mm   Closest Margin(s): Anterior   LYMPH NODES   Regional Lymph Nodes: Uninvolved by tumor cells   Total Number of Lymph Nodes Examined: 2   Number of [place] Nodes Examined: 2   PATHOLOGIC STAGE CLASSIFICATION (pTNM, AJCC 8th Edition)   Primary Tumor (pT): pT1c N0(sn)   Breast biomarkers previously reported ([id number]): ER negative, ND negative, HER-2/mili positive by FISH. Tumor blocks for potential additional studies: 3-B-D.   4. Right breast, lateral margin right breast, excision:   Benign fibroadipose tissue with minimal benign fibroglandular breast tissue. 5. Right breast, medial margin right breast, excision:   Benign fibroglandular breast tissue with epithelial cyst and usual ductal hyperplasia. 6. Right breast, inferior margin right breast, excision:   Benign fibroglandular breast tissue. 08/23/21    ECHO ADULT COMPLETE 08/23/2021 8/23/2021    Interpretation Summary  · LV: Estimated LVEF is 60 - 65%. Normal cavity size, wall thickness and systolic function (ejection fraction normal). Normal left ventricular strain. Global longitudinal strain is -15.9%. Mild (grade 1) left ventricular diastolic dysfunction. Signed by: Tico Young MD on 8/23/2021  1:01 PM    12/03/21    ECHO ADULT COMPLETE 12/06/2021 12/6/2021    Interpretation Summary  · LV: Estimated LVEF is 60 - 65%. Normal cavity size, wall thickness, systolic function (ejection fraction normal) and diastolic function.  Wall motion: normal.  · LV Strain: -16% unchanged from prior study. Signed by: Kev Corado MD on 12/6/2021  2:36 PM    03/08/22    ECHO ADULT COMPLETE 03/08/2022 3/8/2022    Interpretation Summary    Left Ventricle: Left ventricle size is normal. Normal wall thickness. Normal wall motion. Normal left ventricular systolic function with a visually estimated EF of 60 - 65%. Normal diastolic function. Signed by: Nicky Tee MD on 3/8/2022  5:10 PM    05/24/22    ECHO ADULT COMPLETE 05/26/2022 5/26/2022    Interpretation Summary    Left Ventricle: Normal left ventricular systolic function with a visually estimated EF of 55 - 60%. Left ventricle size is normal. Normal wall thickness. Normal wall motion. Normal diastolic function. Signed by: Darlene Gould MD on 5/26/2022  4:31 PM    Records reviewed and summarized above. Pathology report(s) reviewed above. Radiology report(s) reviewed above. Assessment:/PLAN     1) Stage 1 Breast Cancer ER- Her2+ post Lumpectomy 8/3/21  Post lumpectomy in August.    Discussed no hormonal therapy options due to ER/MI negative. Port placed on 8/3/21. Pre chemo ECHO 8/23/21 stable with EF 60-65%. Patient started weekly Taxol/Herceptin on 8/31/21. She completed 12 weeks of Taxol/Herceptin on 11/16/21. Follow up ECHO 12/3/21 stable with EF 60-65%. She started XRT with Dr. Rhoda Guaman on 12/31/21 and finished on 2/9/22. Follow up ECHO 3/8/22 good with EF 60-65%. Port removed on 4/12/22. Follow up ECHO 5/24/22 good with EF 55-60%. She is here today for fu and maintenance subQ Herceptin. Last tx today. She is tolerating treatment well overall without significant side effects. bilateral mammogram in 8/22  ER/MI negative so no hormonal therapy. She is clinically stable and doing well overall. Labs reviewed today. For fu ECHO also. Follow up in 3 months. 2) Arthritis/Hx of Kidney Stone/Osteopenia  Management per PCP.      3) Management of High Risk Medications - Herceptin  No significant toxicities noted. ECHO due this month  No dose adjustments needed. Will monitor for side effects. 4) Anxiety about Health  She took Ativan PRN while she was on chemo. 5) Psychosocial  Mood good, coping well. Her family is very supportive. SW/NN support as needed. Call if questions. Follow up in 3 months  . I appreciate the opportunity to participate in Ms. Ruben Kowalski's care.     Signed By: Kymberly Vieira DO

## 2022-08-02 ENCOUNTER — HOSPITAL ENCOUNTER (OUTPATIENT)
Dept: INFUSION THERAPY | Age: 72
Discharge: HOME | End: 2022-08-02
Payer: MEDICARE

## 2022-08-02 ENCOUNTER — OFFICE VISIT (OUTPATIENT)
Dept: ONCOLOGY | Age: 72
End: 2022-08-02
Payer: MEDICARE

## 2022-08-02 VITALS
SYSTOLIC BLOOD PRESSURE: 134 MMHG | TEMPERATURE: 97.7 F | DIASTOLIC BLOOD PRESSURE: 83 MMHG | HEIGHT: 63 IN | HEART RATE: 85 BPM | OXYGEN SATURATION: 96 % | WEIGHT: 161.4 LBS | BODY MASS INDEX: 28.6 KG/M2

## 2022-08-02 VITALS
HEART RATE: 85 BPM | TEMPERATURE: 97.7 F | RESPIRATION RATE: 16 BRPM | DIASTOLIC BLOOD PRESSURE: 83 MMHG | SYSTOLIC BLOOD PRESSURE: 134 MMHG | OXYGEN SATURATION: 98 %

## 2022-08-02 DIAGNOSIS — C50.911 INFILTRATING DUCTAL CARCINOMA OF RIGHT BREAST (HCC): Primary | ICD-10-CM

## 2022-08-02 DIAGNOSIS — Z51.81 ENCOUNTER FOR MONITORING CARDIOTOXIC DRUG THERAPY: ICD-10-CM

## 2022-08-02 DIAGNOSIS — Z98.890 HISTORY OF LUMPECTOMY OF RIGHT BREAST: ICD-10-CM

## 2022-08-02 DIAGNOSIS — Z79.899 ENCOUNTER FOR MONITORING CARDIOTOXIC DRUG THERAPY: ICD-10-CM

## 2022-08-02 LAB
ALBUMIN SERPL-MCNC: 3.8 G/DL (ref 3.5–5)
ALBUMIN/GLOB SERPL: 1.3 {RATIO} (ref 1.1–2.2)
ALP SERPL-CCNC: 110 U/L (ref 45–117)
ALT SERPL-CCNC: 26 U/L (ref 12–78)
ANION GAP SERPL CALC-SCNC: 7 MMOL/L (ref 5–15)
AST SERPL-CCNC: 15 U/L (ref 15–37)
BASOPHILS # BLD: 0 K/UL (ref 0–0.1)
BASOPHILS NFR BLD: 1 % (ref 0–1)
BILIRUB SERPL-MCNC: 0.4 MG/DL (ref 0.2–1)
BUN SERPL-MCNC: 25 MG/DL (ref 6–20)
BUN/CREAT SERPL: 32 (ref 12–20)
CALCIUM SERPL-MCNC: 9.8 MG/DL (ref 8.5–10.1)
CHLORIDE SERPL-SCNC: 106 MMOL/L (ref 97–108)
CO2 SERPL-SCNC: 27 MMOL/L (ref 21–32)
CREAT SERPL-MCNC: 0.79 MG/DL (ref 0.55–1.02)
DIFFERENTIAL METHOD BLD: ABNORMAL
EOSINOPHIL # BLD: 0.1 K/UL (ref 0–0.4)
EOSINOPHIL NFR BLD: 2 % (ref 0–7)
ERYTHROCYTE [DISTWIDTH] IN BLOOD BY AUTOMATED COUNT: 12.5 % (ref 11.5–14.5)
GLOBULIN SER CALC-MCNC: 2.9 G/DL (ref 2–4)
GLUCOSE SERPL-MCNC: 91 MG/DL (ref 65–100)
HCT VFR BLD AUTO: 39.8 % (ref 35–47)
HGB BLD-MCNC: 13.4 G/DL (ref 11.5–16)
IMM GRANULOCYTES # BLD AUTO: 0 K/UL (ref 0–0.04)
IMM GRANULOCYTES NFR BLD AUTO: 0 % (ref 0–0.5)
LYMPHOCYTES # BLD: 0.9 K/UL (ref 0.8–3.5)
LYMPHOCYTES NFR BLD: 25 % (ref 12–49)
MCH RBC QN AUTO: 34.5 PG (ref 26–34)
MCHC RBC AUTO-ENTMCNC: 33.7 G/DL (ref 30–36.5)
MCV RBC AUTO: 102.6 FL (ref 80–99)
MONOCYTES # BLD: 0.2 K/UL (ref 0–1)
MONOCYTES NFR BLD: 7 % (ref 5–13)
NEUTS SEG # BLD: 2.3 K/UL (ref 1.8–8)
NEUTS SEG NFR BLD: 65 % (ref 32–75)
NRBC # BLD: 0 K/UL (ref 0–0.01)
NRBC BLD-RTO: 0 PER 100 WBC
PLATELET # BLD AUTO: 202 K/UL (ref 150–400)
PMV BLD AUTO: 10.9 FL (ref 8.9–12.9)
POTASSIUM SERPL-SCNC: 4.2 MMOL/L (ref 3.5–5.1)
PROT SERPL-MCNC: 6.7 G/DL (ref 6.4–8.2)
RBC # BLD AUTO: 3.88 M/UL (ref 3.8–5.2)
SODIUM SERPL-SCNC: 140 MMOL/L (ref 136–145)
WBC # BLD AUTO: 3.6 K/UL (ref 3.6–11)

## 2022-08-02 PROCEDURE — 96402 CHEMO HORMON ANTINEOPL SQ/IM: CPT

## 2022-08-02 PROCEDURE — 74011250636 HC RX REV CODE- 250/636: Performed by: INTERNAL MEDICINE

## 2022-08-02 PROCEDURE — 1123F ACP DISCUSS/DSCN MKR DOCD: CPT | Performed by: INTERNAL MEDICINE

## 2022-08-02 PROCEDURE — G8417 CALC BMI ABV UP PARAM F/U: HCPCS | Performed by: INTERNAL MEDICINE

## 2022-08-02 PROCEDURE — 36415 COLL VENOUS BLD VENIPUNCTURE: CPT

## 2022-08-02 PROCEDURE — G8427 DOCREV CUR MEDS BY ELIG CLIN: HCPCS | Performed by: INTERNAL MEDICINE

## 2022-08-02 PROCEDURE — 85025 COMPLETE CBC W/AUTO DIFF WBC: CPT

## 2022-08-02 PROCEDURE — 80053 COMPREHEN METABOLIC PANEL: CPT

## 2022-08-02 PROCEDURE — G8510 SCR DEP NEG, NO PLAN REQD: HCPCS | Performed by: INTERNAL MEDICINE

## 2022-08-02 PROCEDURE — G9899 SCRN MAM PERF RSLTS DOC: HCPCS | Performed by: INTERNAL MEDICINE

## 2022-08-02 PROCEDURE — 1090F PRES/ABSN URINE INCON ASSESS: CPT | Performed by: INTERNAL MEDICINE

## 2022-08-02 PROCEDURE — 1101F PT FALLS ASSESS-DOCD LE1/YR: CPT | Performed by: INTERNAL MEDICINE

## 2022-08-02 PROCEDURE — G8536 NO DOC ELDER MAL SCRN: HCPCS | Performed by: INTERNAL MEDICINE

## 2022-08-02 PROCEDURE — 3017F COLORECTAL CA SCREEN DOC REV: CPT | Performed by: INTERNAL MEDICINE

## 2022-08-02 PROCEDURE — 99214 OFFICE O/P EST MOD 30 MIN: CPT | Performed by: INTERNAL MEDICINE

## 2022-08-02 PROCEDURE — G8399 PT W/DXA RESULTS DOCUMENT: HCPCS | Performed by: INTERNAL MEDICINE

## 2022-08-02 RX ORDER — DIPHENHYDRAMINE HYDROCHLORIDE 50 MG/ML
25 INJECTION, SOLUTION INTRAMUSCULAR; INTRAVENOUS AS NEEDED
Status: ACTIVE | OUTPATIENT
Start: 2022-08-02 | End: 2022-08-02

## 2022-08-02 RX ORDER — SODIUM CHLORIDE 9 MG/ML
25 INJECTION, SOLUTION INTRAVENOUS CONTINUOUS
Status: DISPENSED | OUTPATIENT
Start: 2022-08-02 | End: 2022-08-02

## 2022-08-02 RX ORDER — DIPHENHYDRAMINE HYDROCHLORIDE 50 MG/ML
50 INJECTION, SOLUTION INTRAMUSCULAR; INTRAVENOUS
Status: DISCONTINUED | OUTPATIENT
Start: 2022-08-02 | End: 2022-08-03 | Stop reason: HOSPADM

## 2022-08-02 RX ORDER — HYDROCORTISONE SODIUM SUCCINATE 100 MG/2ML
100 INJECTION, POWDER, FOR SOLUTION INTRAMUSCULAR; INTRAVENOUS AS NEEDED
Status: ACTIVE | OUTPATIENT
Start: 2022-08-02 | End: 2022-08-02

## 2022-08-02 RX ORDER — SODIUM CHLORIDE 9 MG/ML
10 INJECTION INTRAMUSCULAR; INTRAVENOUS; SUBCUTANEOUS AS NEEDED
Status: ACTIVE | OUTPATIENT
Start: 2022-08-02 | End: 2022-08-02

## 2022-08-02 RX ORDER — ONDANSETRON 2 MG/ML
8 INJECTION INTRAMUSCULAR; INTRAVENOUS AS NEEDED
Status: ACTIVE | OUTPATIENT
Start: 2022-08-02 | End: 2022-08-02

## 2022-08-02 RX ORDER — ACETAMINOPHEN 325 MG/1
650 TABLET ORAL AS NEEDED
Status: ACTIVE | OUTPATIENT
Start: 2022-08-02 | End: 2022-08-02

## 2022-08-02 RX ORDER — ACETAMINOPHEN 325 MG/1
650 TABLET ORAL
Status: DISCONTINUED | OUTPATIENT
Start: 2022-08-02 | End: 2022-08-03 | Stop reason: HOSPADM

## 2022-08-02 RX ORDER — EPINEPHRINE 1 MG/ML
0.3 INJECTION, SOLUTION, CONCENTRATE INTRAVENOUS AS NEEDED
Status: ACTIVE | OUTPATIENT
Start: 2022-08-02 | End: 2022-08-02

## 2022-08-02 RX ORDER — HEPARIN 100 UNIT/ML
300-500 SYRINGE INTRAVENOUS AS NEEDED
Status: ACTIVE | OUTPATIENT
Start: 2022-08-02 | End: 2022-08-02

## 2022-08-02 RX ORDER — DIPHENHYDRAMINE HYDROCHLORIDE 50 MG/ML
50 INJECTION, SOLUTION INTRAMUSCULAR; INTRAVENOUS AS NEEDED
Status: ACTIVE | OUTPATIENT
Start: 2022-08-02 | End: 2022-08-02

## 2022-08-02 RX ORDER — ALBUTEROL SULFATE 0.83 MG/ML
2.5 SOLUTION RESPIRATORY (INHALATION) AS NEEDED
Status: ACTIVE | OUTPATIENT
Start: 2022-08-02 | End: 2022-08-02

## 2022-08-02 RX ORDER — SODIUM CHLORIDE 0.9 % (FLUSH) 0.9 %
10 SYRINGE (ML) INJECTION AS NEEDED
Status: DISPENSED | OUTPATIENT
Start: 2022-08-02 | End: 2022-08-02

## 2022-08-02 RX ADMIN — TRASTUZUMAB AND HYALURONIDASE-OYSK 600 MG: 600; 10000 INJECTION, SOLUTION SUBCUTANEOUS at 11:33

## 2022-08-02 NOTE — PROGRESS NOTES
Zoe Hagan is a 73 y.o. female  Chief Complaint   Patient presents with    Chemotherapy    Breast Cancer     1. Have you been to the ER, urgent care clinic since your last visit? Hospitalized since your last visit? No    2. Have you seen or consulted any other health care providers outside of the [address] [place] [address] since your last visit? Include any pap smears or colon screening.  No

## 2022-08-08 ENCOUNTER — HOSPITAL ENCOUNTER (OUTPATIENT)
Dept: MAMMOGRAPHY | Age: 72
Discharge: HOME | End: 2022-08-08
Attending: NURSE PRACTITIONER
Payer: MEDICARE

## 2022-08-08 DIAGNOSIS — C50.911 INFILTRATING DUCTAL CARCINOMA OF RIGHT BREAST (HCC): ICD-10-CM

## 2022-08-08 DIAGNOSIS — Z98.890 HISTORY OF LUMPECTOMY OF RIGHT BREAST: ICD-10-CM

## 2022-08-08 DIAGNOSIS — Z85.3 PERSONAL HISTORY OF BREAST CANCER: ICD-10-CM

## 2022-08-08 PROCEDURE — 77062 BREAST TOMOSYNTHESIS BI: CPT

## 2022-08-08 NOTE — PROGRESS NOTES
HIPPA Verified. Called pt on mobile phone number listed. Patient was made aware of recent mammogram being negative/good per Provider. Patient was appreciative over call!   [facility]

## 2022-08-17 ENCOUNTER — DOCUMENTATION ONLY (OUTPATIENT)
Dept: INFUSION THERAPY | Age: 72
End: 2022-08-17

## 2022-08-17 NOTE — PROGRESS NOTES
Patient is enrolled in free drug program through [facility] for Herceptin. Approved 8/26/21 or until financial status changes.  Financial Navigator, [employer]

## 2022-08-26 ENCOUNTER — HOSPITAL ENCOUNTER (OUTPATIENT)
Dept: NON INVASIVE DIAGNOSTICS | Age: 72
Discharge: HOME | End: 2022-08-26
Attending: INTERNAL MEDICINE
Payer: MEDICARE

## 2022-08-26 VITALS
SYSTOLIC BLOOD PRESSURE: 175 MMHG | BODY MASS INDEX: 25.71 KG/M2 | HEIGHT: 66 IN | WEIGHT: 160 LBS | DIASTOLIC BLOOD PRESSURE: 94 MMHG

## 2022-08-26 DIAGNOSIS — Z51.81 ENCOUNTER FOR MONITORING CARDIOTOXIC DRUG THERAPY: ICD-10-CM

## 2022-08-26 DIAGNOSIS — Z79.899 ENCOUNTER FOR MONITORING CARDIOTOXIC DRUG THERAPY: ICD-10-CM

## 2022-08-26 DIAGNOSIS — C50.911 INFILTRATING DUCTAL CARCINOMA OF RIGHT BREAST (HCC): ICD-10-CM

## 2022-08-26 DIAGNOSIS — Z98.890 HISTORY OF LUMPECTOMY OF RIGHT BREAST: ICD-10-CM

## 2022-08-26 PROCEDURE — 93356 MYOCRD STRAIN IMG SPCKL TRCK: CPT | Performed by: SPECIALIST

## 2022-08-26 PROCEDURE — 93306 TTE W/DOPPLER COMPLETE: CPT | Performed by: SPECIALIST

## 2022-08-26 PROCEDURE — 93306 TTE W/DOPPLER COMPLETE: CPT

## 2022-08-30 ENCOUNTER — OFFICE VISIT (OUTPATIENT)
Dept: SURGERY | Age: 72
End: 2022-08-30
Payer: MEDICARE

## 2022-08-30 VITALS — BODY MASS INDEX: 25.71 KG/M2 | HEIGHT: 66 IN | WEIGHT: 160 LBS

## 2022-08-30 DIAGNOSIS — Z85.3 HISTORY OF BREAST CANCER IN FEMALE: ICD-10-CM

## 2022-08-30 DIAGNOSIS — Z92.3 S/P RADIATION THERAPY: ICD-10-CM

## 2022-08-30 DIAGNOSIS — Z98.890 S/P LUMPECTOMY, RIGHT BREAST: ICD-10-CM

## 2022-08-30 DIAGNOSIS — C50.411 MALIGNANT NEOPLASM OF UPPER-OUTER QUADRANT OF RIGHT FEMALE BREAST, UNSPECIFIED ESTROGEN RECEPTOR STATUS (HCC): Primary | ICD-10-CM

## 2022-08-30 LAB
ECHO AO ROOT DIAM: 2.9 CM
ECHO AO ROOT INDEX: 1.59 CM/M2
ECHO AV AREA PEAK VELOCITY: 1 CM2
ECHO AV AREA/BSA PEAK VELOCITY: 0.5 CM2/M2
ECHO AV PEAK GRADIENT: 11 MMHG
ECHO AV PEAK VELOCITY: 1.7 M/S
ECHO AV VELOCITY RATIO: 0.59
ECHO EST RA PRESSURE: 3 MMHG
ECHO LA DIAMETER INDEX: 1.98 CM/M2
ECHO LA DIAMETER: 3.6 CM
ECHO LA TO AORTIC ROOT RATIO: 1.24
ECHO LV E' LATERAL VELOCITY: 11 CM/S
ECHO LV E' SEPTAL VELOCITY: 11 CM/S
ECHO LV EJECTION FRACTION A2C: 55 %
ECHO LV EJECTION FRACTION A4C: 59 %
ECHO LV FRACTIONAL SHORTENING: 38 % (ref 28–44)
ECHO LV GLOBAL LONGITUDINAL STRAIN (GLS): -16.6 %
ECHO LV GLOBAL LONGITUDINAL STRAIN (GLS): -16.9 %
ECHO LV GLOBAL LONGITUDINAL STRAIN (GLS): -17.4 %
ECHO LV GLOBAL LONGITUDINAL STRAIN (GLS): -18.6 %
ECHO LV INTERNAL DIMENSION DIASTOLE INDEX: 2.31 CM/M2
ECHO LV INTERNAL DIMENSION DIASTOLIC: 4.2 CM (ref 3.9–5.3)
ECHO LV INTERNAL DIMENSION SYSTOLIC INDEX: 1.43 CM/M2
ECHO LV INTERNAL DIMENSION SYSTOLIC: 2.6 CM
ECHO LV IVSD: 0.8 CM (ref 0.6–0.9)
ECHO LV MASS 2D: 101.3 G (ref 67–162)
ECHO LV MASS INDEX 2D: 55.7 G/M2 (ref 43–95)
ECHO LV POSTERIOR WALL DIASTOLIC: 0.8 CM (ref 0.6–0.9)
ECHO LV RELATIVE WALL THICKNESS RATIO: 0.38
ECHO LVOT AREA: 1.8 CM2
ECHO LVOT CARDIAC OUTPUT: 2.8 LITER/MINUTE
ECHO LVOT CARDIAC OUTPUT: 2.8 LITER/MINUTE
ECHO LVOT CARDIAC OUTPUT: 3 LITER/MINUTE
ECHO LVOT CARDIAC OUTPUT: 3 LITER/MINUTE
ECHO LVOT CARDIAC OUTPUT: 3.1 LITER/MINUTE
ECHO LVOT CARDIAC OUTPUT: 3.1 LITER/MINUTE
ECHO LVOT DIAM: 1.5 CM
ECHO LVOT PEAK GRADIENT: 4 MMHG
ECHO LVOT PEAK VELOCITY: 1 M/S
ECHO MV A VELOCITY: 0.91 M/S
ECHO MV E DECELERATION TIME (DT): 221.1 MS
ECHO MV E VELOCITY: 0.91 M/S
ECHO MV E/A RATIO: 1
ECHO MV E/E' LATERAL: 8.27
ECHO MV E/E' RATIO (AVERAGED): 8.27
ECHO MV E/E' SEPTAL: 8.27
ECHO PV MAX VELOCITY: 0.9 M/S
ECHO PV PEAK GRADIENT: 3 MMHG
ECHO RV FREE WALL PEAK S': 13 CM/S
ECHO RV INTERNAL DIMENSION: 3.5 CM
ECHO RV TAPSE: 2.3 CM (ref 1.7–?)

## 2022-08-30 PROCEDURE — G8432 DEP SCR NOT DOC, RNG: HCPCS | Performed by: NURSE PRACTITIONER

## 2022-08-30 PROCEDURE — G8427 DOCREV CUR MEDS BY ELIG CLIN: HCPCS | Performed by: NURSE PRACTITIONER

## 2022-08-30 PROCEDURE — G8417 CALC BMI ABV UP PARAM F/U: HCPCS | Performed by: NURSE PRACTITIONER

## 2022-08-30 PROCEDURE — 1123F ACP DISCUSS/DSCN MKR DOCD: CPT | Performed by: NURSE PRACTITIONER

## 2022-08-30 PROCEDURE — 3017F COLORECTAL CA SCREEN DOC REV: CPT | Performed by: NURSE PRACTITIONER

## 2022-08-30 PROCEDURE — 99213 OFFICE O/P EST LOW 20 MIN: CPT | Performed by: NURSE PRACTITIONER

## 2022-08-30 PROCEDURE — 1090F PRES/ABSN URINE INCON ASSESS: CPT | Performed by: NURSE PRACTITIONER

## 2022-08-30 PROCEDURE — G9899 SCRN MAM PERF RSLTS DOC: HCPCS | Performed by: NURSE PRACTITIONER

## 2022-08-30 PROCEDURE — G8399 PT W/DXA RESULTS DOCUMENT: HCPCS | Performed by: NURSE PRACTITIONER

## 2022-08-30 PROCEDURE — 1101F PT FALLS ASSESS-DOCD LE1/YR: CPT | Performed by: NURSE PRACTITIONER

## 2022-08-30 PROCEDURE — G8536 NO DOC ELDER MAL SCRN: HCPCS | Performed by: NURSE PRACTITIONER

## 2022-08-30 NOTE — PROGRESS NOTES
HISTORY OF PRESENT ILLNESS  Neida Schirmer is a 73 y.o. female. HPI Established patient presents for follow-up for RIGHT breast cancer. Denies breast mass, skin changes, nipple discharge and pain. Breast history -   Referring - Dr. Pj Coy  21 - Mammogram/US - BIRADS 4, RIGHT breast @10:00, 1.6 x 0.9 x 1.0 cm  21 - RIGHT breast biopsy - IDC, ER 0, MO 0, HER2 equiv, FISH positive, ki-67 90%  8/3/21 - RIGHT breast lumpectomy and SLNB, port placement - Dr. Ananya Boudreaux   2.0 cm IDC, node negative - T1cN0  Adjuvant weekly Taxol + Herceptin 21 - 21 - Dr. Tatiana Calles  Maintenance subQ Herceptin started 21  XRT - 21 - 22 - Dr. Kapil Dempsey  Completed maintenance Herceptin        Family History -   Mother - brain cancer  Sister - spinal cancer  Denies FH of breast or ovarian cancer. Unsure what type of cancer her maternal grandmother had. She  in her 42's.         OB History               Para        Term                AB        Living             SAB        IAB        Ectopic        Molar        Multiple        Live Births   1          Obstetric Comments   Menarche 8, LMP , # of children 1, age of 4st delivery 36, Hysterectomy/oophorectomy NO/NO, Breast bx YES, history of breast feeding yes, BCP yes, Hormone therapy no                 Past Surgical History:   Procedure Laterality Date    COLONOSCOPY N/A 2019    tubular adenoma, repeat 1 yr d/t poor prep - [facility];     COLONOSCOPY N/A 2020    normal - performed by Kristen Diaz MD at [facility] Left     surgery - has a plate a screws    HX BREAST LUMPECTOMY Right 8/3/2021    RIGHT BREAST LUMPECTOMY WITH ULTRASOUND AND RIGHT BREAST SENTINEL NODE BIOPSY AND PORTACATH INSERTION performed by Geoff Hill MD at [facility] 11    HX COLONOSCOPY  06/10/2020    Clear    HX DILATION AND CURETTAGE  2015    HX GI      COLONOSCOPY    HX OTHER SURGICAL Right 01/2020    growth removed from right eyelid    HX OTHER SURGICAL Right 02/18/2020    PICC line placed for IV abx, removed after completing course for pyelonephritis    HX OTHER SURGICAL  04/12/2022    port removed    HX POLYPECTOMY      HX TONSILLECTOMY  1956    [place] BIOPSY OF BREAST, NEEDLE CORE Right 06/18/2021         AUDELIA Results (most recent):  Results from Hospital Encounter encounter on 08/08/22    AUDELIA 3D THOMAS W MAMMO BI DX INCL CAD    Narrative  INDICATION: . Malignant neoplasm of unspecified site of right female breast.  COMPARISON: Mammogram(s), most recent, 6/3/2021. Pamalee Bucker COMPOSITION:  There are scattered areas of fibroglandular density. .  TECHNIQUE:  Standard 2D, CC and MLO views of each breast as well as spot magnification views  of the right breast were performed with digital technique and subjected to  computer-aided detection. Tomosynthesis of each breast was performed in CC and  MLO projections. Pamalee Bucker FINDINGS:  Postsurgical changes in the right breast with VeraForm present. No new visualized mass, suspicious microcalcification or architectural  distortion. .    Impression  1. Postsurgical and posttreatment changes in the right breast.  BI-RADS Category 2 - Benign findings. .  RECOMMENDATION:  Routine surveillance with a diagnostic mammogram in one year. ROS    Physical Exam  Constitutional:       Appearance: Normal appearance. Chest:   Breasts:     Right: No mass, nipple discharge, skin change, tenderness, axillary adenopathy or supraclavicular adenopathy. Left: No mass, nipple discharge, skin change, tenderness, axillary adenopathy or supraclavicular adenopathy.       Comments: RIGHT axilla and breast - well healed surgical incisions  LEFT chest wall - well healed port incision  Musculoskeletal:      Comments: FROM - UE x 2   Lymphadenopathy:      Upper Body:      Right upper body: No supraclavicular or axillary adenopathy. Left upper body: No supraclavicular or axillary adenopathy. Neurological:      Mental Status: She is alert. Psychiatric:         Attention and Perception: Attention normal.         Mood and Affect: Mood normal.         Speech: Speech normal.         Behavior: Behavior normal.       Visit Vitals  Ht 5' 6\" (1.676 m)   Wt 160 lb (72.6 kg)   BMI 25.82 kg/m²       ASSESSMENT and PLAN    ICD-10-CM ICD-9-CM    1. Malignant neoplasm of upper-outer quadrant of right female breast, unspecified estrogen receptor status (HCC)  C50.411 174.4       2. S/P lumpectomy, right breast  Z98.890 V45.89       3. S/P radiation therapy  Z92.3 V66.1       4. History of breast cancer in female  Z85.3 V10.3           Normal breast exam with no evidence of local recurrence. Recent mammogram normal.  BDmammogram 3D in 8/2023. Reviewed s/sx of local and distant recurrence. Completed Herceptin and has follow-up with Dr. Tia Holt. Discussed office visit every 3 months between Dr. Tia Holt, Dr. Breanne Bernal and myself. RTC in 6 months or sooner PRN. She is comfortable with this plan. All questions answered and she stated understanding. Total time spent for this patient - 20 minutes.

## 2022-09-19 RX ORDER — DICLOFENAC SODIUM 75 MG/1
TABLET, DELAYED RELEASE ORAL
Qty: 60 TABLET | Refills: 2 | Status: SHIPPED | OUTPATIENT
Start: 2022-09-19

## 2022-10-05 ENCOUNTER — OFFICE VISIT (OUTPATIENT)
Dept: INTERNAL MEDICINE CLINIC | Age: 72
End: 2022-10-05
Payer: MEDICARE

## 2022-10-05 VITALS
TEMPERATURE: 97.1 F | WEIGHT: 165.8 LBS | HEIGHT: 66 IN | SYSTOLIC BLOOD PRESSURE: 160 MMHG | OXYGEN SATURATION: 98 % | BODY MASS INDEX: 26.65 KG/M2 | DIASTOLIC BLOOD PRESSURE: 88 MMHG | HEART RATE: 74 BPM

## 2022-10-05 DIAGNOSIS — H93.12 TINNITUS OF LEFT EAR: Primary | ICD-10-CM

## 2022-10-05 PROCEDURE — 3017F COLORECTAL CA SCREEN DOC REV: CPT | Performed by: INTERNAL MEDICINE

## 2022-10-05 PROCEDURE — G8417 CALC BMI ABV UP PARAM F/U: HCPCS | Performed by: INTERNAL MEDICINE

## 2022-10-05 PROCEDURE — 1101F PT FALLS ASSESS-DOCD LE1/YR: CPT | Performed by: INTERNAL MEDICINE

## 2022-10-05 PROCEDURE — G8432 DEP SCR NOT DOC, RNG: HCPCS | Performed by: INTERNAL MEDICINE

## 2022-10-05 PROCEDURE — G9899 SCRN MAM PERF RSLTS DOC: HCPCS | Performed by: INTERNAL MEDICINE

## 2022-10-05 PROCEDURE — 1090F PRES/ABSN URINE INCON ASSESS: CPT | Performed by: INTERNAL MEDICINE

## 2022-10-05 PROCEDURE — G8536 NO DOC ELDER MAL SCRN: HCPCS | Performed by: INTERNAL MEDICINE

## 2022-10-05 PROCEDURE — G8399 PT W/DXA RESULTS DOCUMENT: HCPCS | Performed by: INTERNAL MEDICINE

## 2022-10-05 PROCEDURE — 99214 OFFICE O/P EST MOD 30 MIN: CPT | Performed by: INTERNAL MEDICINE

## 2022-10-05 PROCEDURE — G8427 DOCREV CUR MEDS BY ELIG CLIN: HCPCS | Performed by: INTERNAL MEDICINE

## 2022-10-05 NOTE — PROGRESS NOTES
Chief Complaint   Patient presents with    Ear Fullness     Buzzing in ear     Reviewed record in preparation for visit and have obtained necessary documentation. Identified pt with two pt identifiers(name and ). Health Maintenance Due   Topic    Lipid Screen     COVID-19 Vaccine (4 - Booster for Pfizer series)    Flu Vaccine (1)         Chief Complaint   Patient presents with    Ear Fullness     Buzzing in ear        Wt Readings from Last 3 Encounters:   10/05/22 165 lb 12.8 oz (75.2 kg)   22 160 lb (72.6 kg)   22 160 lb (72.6 kg)     Temp Readings from Last 3 Encounters:   10/05/22 97.1 °F (36.2 °C) (Temporal)   22 97.7 °F (36.5 °C)   22 97.7 °F (36.5 °C) (Temporal)     BP Readings from Last 3 Encounters:   10/05/22 (!) 160/88   22 (!) 175/94   22 134/83     Pulse Readings from Last 3 Encounters:   10/05/22 74   22 85   22 85           Learning Assessment:  :     Learning Assessment 5/3/2022 2021 2017   PRIMARY LEARNER Patient Patient Patient   HIGHEST LEVEL OF EDUCATION - PRIMARY LEARNER  - - SOME COLLEGE   BARRIERS PRIMARY LEARNER - - NONE   PRIMARY LANGUAGE ENGLISH ENGLISH ENGLISH   LEARNER PREFERENCE PRIMARY DEMONSTRATION DEMONSTRATION DEMONSTRATION   ANSWERED BY patient patient patient   RELATIONSHIP SELF SELF SELF       Depression Screening:  :     3 most recent PHQ Screens 2022   Little interest or pleasure in doing things Not at all   Feeling down, depressed, irritable, or hopeless Not at all   Total Score PHQ 2 0       Fall Risk Assessment:  :     Fall Risk Assessment, last 12 mths 10/5/2022   Able to walk? Yes   Fall in past 12 months? 0   Do you feel unsteady? 0   Are you worried about falling 0   Is TUG test greater than 12 seconds? -   Is the gait abnormal? -   Number of falls in past 12 months -   Fall with injury?  -       Abuse Screening:  :     Abuse Screening Questionnaire 10/5/2022 2022 5/3/2021 2020 2/15/2019 2/13/2018   Do you ever feel afraid of your partner? N N N N N N   Are you in a relationship with someone who physically or mentally threatens you? N N N N N N   Is it safe for you to go home? Y Y Y Y Y Y       Coordination of Care Questionnaire:  :     1) Have you been to an emergency room, urgent care clinic since your last visit? no   Hospitalized since your last visit? no             2) Have you seen or consulted any other health care providers outside of [address] [place] [address] since your last visit? no  (Include any pap smears or colon screenings in this section.)    3) Do you have an Advance Directive on file? no    4) Are you interested in receiving information on Advance Directives? NO      Patient is accompanied by self I have received verbal consent from Cassandra Hogan to discuss any/all medical information while they are present in the room. Reviewed record  In preparation for visit and have obtained necessary documentation.

## 2022-10-05 NOTE — PROGRESS NOTES
Acute Care Note    Kaila Dean is 73 y.o. female. she presents for evaluation of Ear Fullness (Buzzing in ear)    The patient reports having a buzzing in the left ear since mid Sept.  She notes that she has not had any trauma to the ears. She has no ear pain and also no hearing deficits. Of note, she has recently completed chemotherapy for breast cancer (Herceptin [place]). She notes that the ringing began shortly after she completed her last chemotherapy. She had made an appointment with ENT but was reluctant to continue to this appointment because she did not want to perform the prerequisite hearing test.      Prior to Admission medications    Medication Sig Start Date End Date Taking? Authorizing Provider   diclofenac EC (VOLTAREN) 75 mg EC tablet TAKE ONE TABLET BY MOUTH TWICE A DAY AS NEEDED FOR PAIN 9/19/22  Yes Ashley Leon MD   DULoxetine (CYMBALTA) 20 mg capsule TAKE ONE CAPSULE BY MOUTH EVERY EVENING 7/4/22  Yes Ashley Leon MD         Patient Active Problem List   Diagnosis Code    Chronic pain of left ankle M25.572, G89.29    Osteopenia M85.80    History of pyelonephritis Z87.448    Infiltrating ductal carcinoma of right breast ([facility] [address] C50.911    History of lumpectomy of right breast Z98.890    Arthritis M19.90    History of kidney stones Z87.442    Anxiety about health F41.8         Review of Systems   Constitutional: Negative. HENT:  Positive for tinnitus. Negative for hearing loss. Respiratory: Negative. Cardiovascular: Negative. Visit Vitals  BP (!) 160/88 (BP 1 Location: Left arm, BP Patient Position: Sitting, BP Cuff Size: Adult)   Pulse 74   Temp 97.1 °F (36.2 °C) (Temporal)   Ht 5' 6\" (1.676 m)   Wt 165 lb 12.8 oz (75.2 kg)   SpO2 98%   BMI 26.76 kg/m²       Physical Exam  Constitutional:       Appearance: Normal appearance.    HENT:      Right Ear: Tympanic membrane normal.      Left Ear: Tympanic membrane normal.   Cardiovascular:      Rate and Rhythm: Normal rate and regular rhythm. Pulses: Normal pulses. Heart sounds: Normal heart sounds. Neurological:      Mental Status: She is alert. ASSESSMENT/PLAN  Diagnoses and all orders for this visit:    1. Tinnitus of left ear - Discussed with the patient that she should complete ENT evaluations. She also should inform her oncologist that she is having this trouble as there is currently an active clinical study involving the link between herceptin and tinnitus. Advised she could try an antihistamine as well in case her tinnitus may be related to mucus production (allergy?)       Advised the patient to call back or return to office if symptoms worsen/change/persist.   Discussed expected course/resolution/complications of diagnosis in detail with patient. Medication risks/benefits/costs/interactions/alternatives discussed with patient. The patient was given an after visit summary which includes diagnoses, current medications, & vitals. They expressed understanding with the diagnosis and plan.

## 2022-11-01 ENCOUNTER — OFFICE VISIT (OUTPATIENT)
Dept: ONCOLOGY | Age: 72
End: 2022-11-01
Payer: MEDICARE

## 2022-11-01 VITALS
TEMPERATURE: 96.7 F | DIASTOLIC BLOOD PRESSURE: 84 MMHG | OXYGEN SATURATION: 98 % | SYSTOLIC BLOOD PRESSURE: 129 MMHG | BODY MASS INDEX: 26.26 KG/M2 | HEIGHT: 66 IN | WEIGHT: 163.4 LBS | HEART RATE: 85 BPM

## 2022-11-01 DIAGNOSIS — M19.90 ARTHRITIS: ICD-10-CM

## 2022-11-01 DIAGNOSIS — M85.89 OSTEOPENIA OF MULTIPLE SITES: ICD-10-CM

## 2022-11-01 DIAGNOSIS — Z51.81 ENCOUNTER FOR MONITORING CARDIOTOXIC DRUG THERAPY: ICD-10-CM

## 2022-11-01 DIAGNOSIS — Z79.899 ENCOUNTER FOR MONITORING CARDIOTOXIC DRUG THERAPY: ICD-10-CM

## 2022-11-01 DIAGNOSIS — Z98.890 HISTORY OF LUMPECTOMY OF RIGHT BREAST: ICD-10-CM

## 2022-11-01 DIAGNOSIS — Z87.442 HISTORY OF KIDNEY STONES: ICD-10-CM

## 2022-11-01 DIAGNOSIS — C50.911 INFILTRATING DUCTAL CARCINOMA OF RIGHT BREAST (HCC): Primary | ICD-10-CM

## 2022-11-01 DIAGNOSIS — Z85.3 PERSONAL HISTORY OF BREAST CANCER: ICD-10-CM

## 2022-11-01 PROCEDURE — G8536 NO DOC ELDER MAL SCRN: HCPCS | Performed by: INTERNAL MEDICINE

## 2022-11-01 PROCEDURE — 1123F ACP DISCUSS/DSCN MKR DOCD: CPT | Performed by: INTERNAL MEDICINE

## 2022-11-01 PROCEDURE — G8427 DOCREV CUR MEDS BY ELIG CLIN: HCPCS | Performed by: INTERNAL MEDICINE

## 2022-11-01 PROCEDURE — 99213 OFFICE O/P EST LOW 20 MIN: CPT | Performed by: INTERNAL MEDICINE

## 2022-11-01 PROCEDURE — G8399 PT W/DXA RESULTS DOCUMENT: HCPCS | Performed by: INTERNAL MEDICINE

## 2022-11-01 PROCEDURE — 1090F PRES/ABSN URINE INCON ASSESS: CPT | Performed by: INTERNAL MEDICINE

## 2022-11-01 PROCEDURE — 1101F PT FALLS ASSESS-DOCD LE1/YR: CPT | Performed by: INTERNAL MEDICINE

## 2022-11-01 PROCEDURE — 3017F COLORECTAL CA SCREEN DOC REV: CPT | Performed by: INTERNAL MEDICINE

## 2022-11-01 PROCEDURE — G8432 DEP SCR NOT DOC, RNG: HCPCS | Performed by: INTERNAL MEDICINE

## 2022-11-01 PROCEDURE — G8417 CALC BMI ABV UP PARAM F/U: HCPCS | Performed by: INTERNAL MEDICINE

## 2022-11-01 PROCEDURE — G9899 SCRN MAM PERF RSLTS DOC: HCPCS | Performed by: INTERNAL MEDICINE

## 2022-11-01 NOTE — PROGRESS NOTES
Codey Duff is a 73 y.o. female  Chief Complaint   Patient presents with    Follow-up    Breast Cancer     1. Have you been to the ER, urgent care clinic since your last visit? Hospitalized since your last visit? No    2. Have you seen or consulted any other health care providers outside of the [address] [place] [address] since your last visit? Include any pap smears or colon screening. No    Patient states she has pain on the right breast and under armpit. Patient states she saw an ENT for ringing in the left ear and wants to fu and speak of that today.    Flu shot: 10/25/22 at [facility]

## 2022-11-01 NOTE — PROGRESS NOTES
Cancer Merlin at [address], [address] [place] [address] Road, [facility] [phone]  F: [phone]    Reason for Visit:   Wetzel Heimlich is a 73 y.o. female seen today in office for follow up of Right Breast Cancer. Treatment History:   Abnormal screening mammo 6/3/21: Bilateral digital screening mammography was performed and is interpreted in conjunction with a computer assisted detection (CAD) system. In the left breast, no suspicious masses or calcifications are identified. The right breast upper outer quadrant at 10:00 posterior depth 9 cm from the nipple there is a focal asymmetry  Right Dx Mammo/US 6/7/21: Further evaluation was performed for a right breast focal asymmetry. In the right breast upper outer quadrant at 10:00 posterior depth there is an equal density irregular mass with indistinct margins measuring up to 1.7 cm. In the right breast at 10:00 10 cm from the nipple there is a 1.6 x 0.9 x 1.0 cm hypoechoic oval mass with indistinct margins, internal vascularity, and posterior acoustic enhancement  Right Breast Biopsy 6/18/21: PATH - Invasive ductal carcinoma, [place] histologic grade 3. Largest glass slide measurement of invasive carcinoma: 6 mm   ER/OK negative, HER2 FISH +  Ki67 90%  Right Lumpectomy 8/3/2: PATH - 20 mm IDC with negative LNs. Margins negative  Adjuvant weekly Taxol + Herceptin 8/31/21 - 11/16/21  Maintenance subQ Herceptin 11/23/21 - 8/2/22  XRT with Dr. Deondre Ketih 12/31/21 - 2/9/22    STAGE: Pathologic T1cN0, ER/OK negative, HER2+     History of Present Illness:   Wetzel Heimlich is a 73 y.o. female seen today in office for follow up of Right Breast Cancer not on any therapy. She finished maintenance Herceptin on 8/2/22. She reports that she feels well overall today. She has noticed some ringing in left ear for at least a month or so. She saw her PCP for this. Her appetite and energy levels are good.  She denies fever, chills, mouth sores, cough, SOB, CP, nausea, vomiting, diarrhea, and constipation. She denies pain today but has some right axilla pain/discomfort with movement. She states that her right breast feels/looks smaller than her left breast. She has routine HM and labs with her PCP. She had a bilateral mammogram on 8/8/22 that was negative/good. She is here alone today.     Past Medical History:   Diagnosis Date    Arthritis     left ankle    Breast cancer ([address] 2021    RIGHT    Calculus of kidney 09/14/2018    Passed    Chemotherapy induced diarrhea 09/08/2021    Chemotherapy-induced nausea 09/28/2021    Chronic kidney disease     kidney stone    Chronic pain of left ankle 02/01/2017    Seeing Dr Noah Srivastava    Fracture     left ankle    History of chemotherapy     completed    Ill-defined condition     PICC line for UTI - borderline sepsis    Osteopenia 02/01/2017    S/P radiation therapy     completed      Past Surgical History:   Procedure Laterality Date    COLONOSCOPY N/A 2/8/2019    tubular adenoma, repeat 1 yr d/t poor prep - performed by Venancio Alfredo MD at [address] [place] [address];     COLONOSCOPY N/A 5/14/2020    normal - performed by Venancio Alfredo MD at [place] [address] Left 2005    surgery - has a plate a screws    HX BREAST LUMPECTOMY Right 8/3/2021    RIGHT BREAST LUMPECTOMY WITH ULTRASOUND AND RIGHT BREAST SENTINEL NODE BIOPSY AND PORTACATH INSERTION performed by Pete Rodgers MD at [facility] 11    HX COLONOSCOPY  06/10/2020    Clear    HX DILATION AND CURETTAGE  2015    HX GI      COLONOSCOPY    HX OTHER SURGICAL Right 01/2020    growth removed from right eyelid    HX OTHER SURGICAL Right 02/18/2020    PICC line placed for IV abx, removed after completing course for pyelonephritis    HX OTHER SURGICAL  04/12/2022    port removed    HX POLYPECTOMY      HX TONSILLECTOMY  1956    [place] BIOPSY OF BREAST, NEEDLE CORE Right 06/18/2021      Social History     Tobacco Use    Smoking status: Former     Packs/day: 0.50     Years: 8.00     Pack years: 4.00     Types: Cigarettes     Start date: 9/15/1971     Quit date:      Years since quittin.8    Smokeless tobacco: Never    Tobacco comments:     Light use   Substance Use Topics    Alcohol use: Yes     Alcohol/week: 5.0 standard drinks     Types: 5 Glasses of wine per week     Comment: Less than one drink per day      Family History   Problem Relation Age of Onset    Cancer Mother         Brain tumor    Other Father         ? lung problem    Cancer Sister         tumors of spine    Other Sister         kidney stones    Other Daughter         kidney stone    Other Maternal Aunt         kidney stones    No Known Problems Sister     No Known Problems Sister     Anesth Problems Neg Hx      Current Outpatient Medications   Medication Sig    diclofenac EC (VOLTAREN) 75 mg EC tablet TAKE ONE TABLET BY MOUTH TWICE A DAY AS NEEDED FOR PAIN    DULoxetine (CYMBALTA) 20 mg capsule TAKE ONE CAPSULE BY MOUTH EVERY EVENING     No current facility-administered medications for this visit. Allergies   Allergen Reactions    Adhesive Tape-Silicones Rash    Other Medication Hives     Cranberry or Probiotic, had severe itching and hives, not sure to which. Has taken a probiotic recently without a reaction. Oxycodone Itching      Review of Systems:  A complete review of systems was obtained, negative except as described above and as reported on ROS sheet scanned into system.      Physical Exam:     Visit Vitals  /84 (BP 1 Location: Left upper arm, BP Patient Position: Sitting)   Pulse 85   Temp (!) 96.7 °F (35.9 °C) (Temporal)   Ht 5' 6\" (1.676 m)   Wt 163 lb 6.4 oz (74.1 kg)   SpO2 98%   BMI 26.37 kg/m²     ECOG PS: 0  General: No distress, tearful at times   Eyes: Anicteric sclerae  HENT: Atraumatic, wearing a mask  Neck: Supple  Respiratory: Normal respiratory effort, lungs clear  CV: Regular  GI: Soft, non tender   Ext: No edema  MS: Normal gait and station. Digits without clubbing or cyanosis. Skin: No rashes, ecchymoses, or petechiae. Normal temperature, turgor, and texture. Psych: Alert, oriented, appropriate affect but tearful at times, normal judgment/insight  Breast: Well healed right lumpectomy scars, no palpable masses on left or right breast   Neuro: Non focal    Results:     Lab Results   Component Value Date/Time    WBC 3.6 08/02/2022 11:27 AM    HGB 13.4 08/02/2022 11:27 AM    HCT 39.8 08/02/2022 11:27 AM    PLATELET 887 94/12/2523 11:27 AM    .6 (H) 08/02/2022 11:27 AM    ABS. NEUTROPHILS 2.3 08/02/2022 11:27 AM     Lab Results   Component Value Date/Time    Sodium 140 08/02/2022 11:27 AM    Potassium 4.2 08/02/2022 11:27 AM    Chloride 106 08/02/2022 11:27 AM    CO2 27 08/02/2022 11:27 AM    Glucose 91 08/02/2022 11:27 AM    BUN 25 (H) 08/02/2022 11:27 AM    Creatinine 0.79 08/02/2022 11:27 AM    GFR est AA >60 08/02/2022 11:27 AM    GFR est non-AA >60 08/02/2022 11:27 AM    Calcium 9.8 08/02/2022 11:27 AM     Lab Results   Component Value Date/Time    Bilirubin, total 0.4 08/02/2022 11:27 AM    ALT (SGPT) 26 08/02/2022 11:27 AM    Alk. phosphatase 110 08/02/2022 11:27 AM    Protein, total 6.7 08/02/2022 11:27 AM    Albumin 3.8 08/02/2022 11:27 AM    Globulin 2.9 08/02/2022 11:27 AM     6/18/21  FINAL PATHOLOGIC DIAGNOSIS   Breast, right, needle core biopsy:   Invasive ductal carcinoma, [place] histologic grade 3   Largest glass slide measurement of invasive carcinoma: 6 mm   ER/IN negative, HER2 FISH +    8/3/21  FINAL PATHOLOGIC DIAGNOSIS   1. Lymph node, right axillary sentinel lymph node #1, excision:   One lymph node negative for metastatic carcinoma. 2. Lymph node, right axillary sentinel lymph node #2, excision:   One lymph node negative for metastatic carcinoma. 3. Right breast, lumpectomy, excision:   Procedure: Excision (less than total mastectomy)   Specimen Laterality: Right   TUMOR   Histologic Type:  Invasive carcinoma of no special type (ductal)   Glandular (Acinar) / Tubular Differentiation: Score 3   Nuclear Pleomorphism: Score 3   Mitotic Rate: Score 3   Overall Grade: Grade 3   Tumor Size: 20 mm   Tumor Focality: Single focus of invasive carcinoma   Ductal Carcinoma in Situ (DCIS): Not identified   Tumor Extent   Lymphovascular Invasion: Not identified   Treatment Effect in the Breast: No known presurgical therapy   MARGINS   Invasive Carcinoma Margins: Uninvolved by invasive carcinoma   Distance from Closest Margin (Millimeters): Less than: 1 mm   Closest Margin(s): Anterior   LYMPH NODES   Regional Lymph Nodes: Uninvolved by tumor cells   Total Number of Lymph Nodes Examined: 2   Number of [place] Nodes Examined: 2   PATHOLOGIC STAGE CLASSIFICATION (pTNM, AJCC 8th Edition)   Primary Tumor (pT): pT1c N0(sn)   Breast biomarkers previously reported ([id number]): ER negative, UT negative, HER-2/mili positive by FISH. Tumor blocks for potential additional studies: 3-B-D.   4. Right breast, lateral margin right breast, excision:   Benign fibroadipose tissue with minimal benign fibroglandular breast tissue. 5. Right breast, medial margin right breast, excision:   Benign fibroglandular breast tissue with epithelial cyst and usual ductal hyperplasia. 6. Right breast, inferior margin right breast, excision:   Benign fibroglandular breast tissue. 08/23/21    ECHO ADULT COMPLETE 08/23/2021 8/23/2021    Interpretation Summary  · LV: Estimated LVEF is 60 - 65%. Normal cavity size, wall thickness and systolic function (ejection fraction normal). Normal left ventricular strain. Global longitudinal strain is -15.9%. Mild (grade 1) left ventricular diastolic dysfunction. Signed by: Claudia House MD on 8/23/2021  1:01 PM    12/03/21    ECHO ADULT COMPLETE 12/06/2021 12/6/2021    Interpretation Summary  · LV: Estimated LVEF is 60 - 65%.  Normal cavity size, wall thickness, systolic function (ejection fraction normal) and diastolic function. Wall motion: normal.  · LV Strain: -16% unchanged from prior study. Signed by: Rose Allen MD on 12/6/2021  2:36 PM    03/08/22    ECHO ADULT COMPLETE 03/08/2022 3/8/2022    Interpretation Summary    Left Ventricle: Left ventricle size is normal. Normal wall thickness. Normal wall motion. Normal left ventricular systolic function with a visually estimated EF of 60 - 65%. Normal diastolic function. Signed by: Bessie Quarles MD on 3/8/2022  5:10 PM    05/24/22    ECHO ADULT COMPLETE 05/26/2022 5/26/2022    Interpretation Summary    Left Ventricle: Normal left ventricular systolic function with a visually estimated EF of 55 - 60%. Left ventricle size is normal. Normal wall thickness. Normal wall motion. Normal diastolic function. Signed by: Jessica Frank MD on 5/26/2022  4:31 PM    08/26/22    ECHO ADULT COMPLETE 08/30/2022 8/30/2022    Interpretation Summary    Left Ventricle: Normal left ventricular systolic function with a visually estimated EF of 55 - 60%. Left ventricle size is normal. Normal wall thickness. Normal wall motion. Global longitudinal strain is normal. GLS -47.5 Normal diastolic function. Signed by: Nilsa Maciel MD on 8/30/2022 10:19 AM    Records reviewed and summarized above. Pathology report(s) reviewed above. Radiology report(s) reviewed above. Assessment:/PLAN     1) Stage 1 Breast Cancer ER- Her2+ post Lumpectomy 8/3/21  Discussed no hormonal therapy options due to ER/ND negative. Pre chemo ECHO 8/23/21 stable with EF 60-65%. Patient started weekly Taxol/Herceptin on 8/31/21. She completed 12 weeks of Taxol/Herceptin on 11/16/21. Follow up ECHO 12/3/21 stable with EF 60-65%. She started XRT with Dr. Miracle Mckenzie on 12/31/21 and finished on 2/9/22. Follow up ECHO 3/8/22 good with EF 60-65%. Port removed on 4/12/22. Follow up ECHO 5/24/22 good with EF 55-60%. She finished maintenance Herceptin on 8/2/22.     Patient is here today for 3 month follow up. She is not on any therapy as ER/SC negative. She is clinically stable and doing well overall. She had a bilateral mammogram on 8/8/22 that was negative/good. Continue ECHOs every 6 months x 2 years post Herceptin - ordered. Yearly 3D bilateral dx mammogram ordered today for 8/23. She has follow up with Breast Surgery in 2/23. She has routine HM and labs with her PCP. Continue course of surveillance. Follow up in 6 months. Patient agrees with plan. 2) Arthritis/Hx of Kidney Stone/Osteopenia  Management per PCP. 3) Anxiety about Health  She took Ativan PRN while she was on chemo. She is tearful at times during visit today. 4) Psychosocial  Mood good, coping well. Her family is very supportive. SW/NN support as needed. She is here alone today. Call if questions. Follow up in 6 months, seeing Breast Surgery in 3 months. I personally saw and evaluated the patient and performed the key components of medical decision making. The history, physical exam, and documentation were performed by Zia Marks NP. I reviewed and verified the above documentation and modified it as needed. Doing well overall. Anxious about recurrence. Advised can fu with breast surgery at any time for continued breast monitoring. Mammo up to date  Labs and routine HM with PCP  Continue course of surveillance. I appreciate the opportunity to participate in Ms. Mau Kowalski's care.     Signed By: Neyda Hwang DO

## 2022-11-08 ENCOUNTER — OFFICE VISIT (OUTPATIENT)
Dept: INTERNAL MEDICINE CLINIC | Age: 72
End: 2022-11-08
Payer: MEDICARE

## 2022-11-08 VITALS
HEIGHT: 66 IN | SYSTOLIC BLOOD PRESSURE: 150 MMHG | TEMPERATURE: 97.1 F | OXYGEN SATURATION: 97 % | HEART RATE: 71 BPM | RESPIRATION RATE: 16 BRPM | WEIGHT: 164 LBS | DIASTOLIC BLOOD PRESSURE: 90 MMHG | BODY MASS INDEX: 26.36 KG/M2

## 2022-11-08 DIAGNOSIS — R10.9 FLANK PAIN: ICD-10-CM

## 2022-11-08 DIAGNOSIS — Z87.440 HISTORY OF UTI: ICD-10-CM

## 2022-11-08 DIAGNOSIS — M54.40 BILATERAL LOW BACK PAIN WITH SCIATICA, SCIATICA LATERALITY UNSPECIFIED, UNSPECIFIED CHRONICITY: Primary | ICD-10-CM

## 2022-11-08 LAB
BILIRUB UR QL STRIP: NEGATIVE
GLUCOSE UR-MCNC: NEGATIVE MG/DL
KETONES P FAST UR STRIP-MCNC: NEGATIVE MG/DL
PH UR STRIP: 8.5 [PH] (ref 4.6–8)
PROT UR QL STRIP: NEGATIVE
SP GR UR STRIP: 1.01 (ref 1–1.03)
UA UROBILINOGEN AMB POC: ABNORMAL (ref 0.2–1)
URINALYSIS CLARITY POC: CLEAR
URINALYSIS COLOR POC: YELLOW
URINE BLOOD POC: NEGATIVE
URINE LEUKOCYTES POC: NEGATIVE
URINE NITRITES POC: NEGATIVE

## 2022-11-08 PROCEDURE — G8510 SCR DEP NEG, NO PLAN REQD: HCPCS | Performed by: INTERNAL MEDICINE

## 2022-11-08 PROCEDURE — G9899 SCRN MAM PERF RSLTS DOC: HCPCS | Performed by: INTERNAL MEDICINE

## 2022-11-08 PROCEDURE — G8417 CALC BMI ABV UP PARAM F/U: HCPCS | Performed by: INTERNAL MEDICINE

## 2022-11-08 PROCEDURE — G8427 DOCREV CUR MEDS BY ELIG CLIN: HCPCS | Performed by: INTERNAL MEDICINE

## 2022-11-08 PROCEDURE — G8536 NO DOC ELDER MAL SCRN: HCPCS | Performed by: INTERNAL MEDICINE

## 2022-11-08 PROCEDURE — 81002 URINALYSIS NONAUTO W/O SCOPE: CPT | Performed by: INTERNAL MEDICINE

## 2022-11-08 PROCEDURE — 1101F PT FALLS ASSESS-DOCD LE1/YR: CPT | Performed by: INTERNAL MEDICINE

## 2022-11-08 PROCEDURE — 1090F PRES/ABSN URINE INCON ASSESS: CPT | Performed by: INTERNAL MEDICINE

## 2022-11-08 PROCEDURE — 3017F COLORECTAL CA SCREEN DOC REV: CPT | Performed by: INTERNAL MEDICINE

## 2022-11-08 PROCEDURE — 99213 OFFICE O/P EST LOW 20 MIN: CPT | Performed by: INTERNAL MEDICINE

## 2022-11-08 PROCEDURE — G8399 PT W/DXA RESULTS DOCUMENT: HCPCS | Performed by: INTERNAL MEDICINE

## 2022-11-10 DIAGNOSIS — B95.2 ENTEROCOCCUS UTI: Primary | ICD-10-CM

## 2022-11-10 DIAGNOSIS — N39.0 ENTEROCOCCUS UTI: Primary | ICD-10-CM

## 2022-11-10 RX ORDER — AMOXICILLIN AND CLAVULANATE POTASSIUM 875; 125 MG/1; MG/1
1 TABLET, FILM COATED ORAL EVERY 12 HOURS
Qty: 14 TABLET | Refills: 0 | Status: SHIPPED | OUTPATIENT
Start: 2022-11-10 | End: 2022-11-17

## 2022-11-11 LAB
BACTERIA SPEC CULT: ABNORMAL
BACTERIA SPEC CULT: ABNORMAL
CC UR VC: ABNORMAL
SERVICE CMNT-IMP: ABNORMAL

## 2022-11-16 ENCOUNTER — TELEPHONE (OUTPATIENT)
Dept: INTERNAL MEDICINE CLINIC | Age: 72
End: 2022-11-16

## 2022-11-16 NOTE — TELEPHONE ENCOUNTER
Reason for call:    Patient was seen on 11/8/22 for an UTI. She said she only has 2 antibiotic pills left and she is not feeling any better. She wants to know if she should come in and give another urine sample.     Is this a new problem: yes     Date of last appointment:  11/8/2022     Can we respond via [place]: no    Best call back number:     Willistine Deem - [phone]  Or [phone]

## 2022-11-21 NOTE — PROGRESS NOTES
Acute Care Note    Alan Michelle is 73 y.o. female. she presents for evaluation of Dizziness (Low back pain, clammy, shakes)      The patient presents with complaints of dizziness. She reports that she also has had some sensations of feeling \"clammy\" she also has had some shaking and low back pain. She is very concerned about the possibility of a urinary tract infection. She denies fever or chills. She has no present dysuria    Prior to Admission medications    Medication Sig Start Date End Date Taking? Authorizing Provider   diclofenac EC (VOLTAREN) 75 mg EC tablet TAKE ONE TABLET BY MOUTH TWICE A DAY AS NEEDED FOR PAIN 9/19/22  Yes Itz Cook MD   DULoxetine (CYMBALTA) 20 mg capsule TAKE ONE CAPSULE BY MOUTH EVERY EVENING 7/4/22  Yes Itz Cook MD         Patient Active Problem List   Diagnosis Code    Chronic pain of left ankle M25.572, G89.29    Osteopenia M85.80    History of pyelonephritis Z87.448    Infiltrating ductal carcinoma of right breast ([facility] C50.911    History of lumpectomy of right breast Z98.890    Arthritis M19.90    History of kidney stones Z87.442    Anxiety about health F41.8         Review of Systems   Constitutional:  Negative for chills and fever. Respiratory: Negative. Cardiovascular: Negative. Neurological:  Positive for dizziness. Visit Vitals  BP (!) 150/90   Pulse 71   Temp 97.1 °F (36.2 °C) (Temporal)   Resp 16   Ht 5' 6\" (1.676 m)   Wt 164 lb (74.4 kg)   SpO2 97%   BMI 26.47 kg/m²       Physical Exam  Constitutional:       Appearance: Normal appearance. Cardiovascular:      Rate and Rhythm: Normal rate and regular rhythm. Pulses: Normal pulses. Heart sounds: Normal heart sounds. Pulmonary:      Effort: Pulmonary effort is normal.      Breath sounds: Normal breath sounds. Neurological:      Mental Status: She is alert. ASSESSMENT/PLAN  Diagnoses and all orders for this visit:    1.  Bilateral low back pain with sciatica, sciatica laterality unspecified, unspecified chronicity  -     AMB POC URINALYSIS DIP STICK MANUAL W/O MICRO  -     CULTURE, URINE; Future    2. History of UTI  -     CULTURE, URINE; Future    3. Flank pain  -     CULTURE, URINE; Future         Advised the patient to call back or return to office if symptoms worsen/change/persist.   Discussed expected course/resolution/complications of diagnosis in detail with patient. Medication risks/benefits/costs/interactions/alternatives discussed with patient. The patient was given an after visit summary which includes diagnoses, current medications, & vitals. They expressed understanding with the diagnosis and plan.

## 2023-01-05 RX ORDER — DICLOFENAC SODIUM 75 MG/1
TABLET, DELAYED RELEASE ORAL
Qty: 60 TABLET | Refills: 2 | Status: SHIPPED | OUTPATIENT
Start: 2023-01-05

## 2023-01-05 NOTE — TELEPHONE ENCOUNTER
Future Appointments   Date Time Provider Wendie Luz   2/1/2023 11:30 AM ECHO LAB 1 [facility] [address] [facility]   2/28/2023 11:00 AM Kiara Moore NP [facility] BS AMB   5/1/2023 10:30 AM Jason Espinoza  [facility] BS AMB   5/2/2023 10:40 AM MD ROYCE Hong BS AMB   8/9/2023 10:45 AM [facility] 5 [facility] .  [facility]

## 2023-01-16 DIAGNOSIS — Z87.440 HISTORY OF UTI: ICD-10-CM

## 2023-01-16 DIAGNOSIS — M54.40 BILATERAL LOW BACK PAIN WITH SCIATICA, SCIATICA LATERALITY UNSPECIFIED, UNSPECIFIED CHRONICITY: ICD-10-CM

## 2023-01-16 DIAGNOSIS — R10.9 FLANK PAIN: ICD-10-CM

## 2023-01-17 LAB
BACTERIA SPEC CULT: NORMAL
SERVICE CMNT-IMP: NORMAL

## 2023-02-01 ENCOUNTER — HOSPITAL ENCOUNTER (OUTPATIENT)
Dept: NON INVASIVE DIAGNOSTICS | Age: 73
Discharge: HOME | End: 2023-02-01
Attending: NURSE PRACTITIONER
Payer: MEDICARE

## 2023-02-01 VITALS
WEIGHT: 163.14 LBS | SYSTOLIC BLOOD PRESSURE: 150 MMHG | HEIGHT: 66 IN | DIASTOLIC BLOOD PRESSURE: 90 MMHG | BODY MASS INDEX: 26.22 KG/M2

## 2023-02-01 DIAGNOSIS — C50.911 INFILTRATING DUCTAL CARCINOMA OF RIGHT BREAST (HCC): ICD-10-CM

## 2023-02-01 DIAGNOSIS — Z51.81 ENCOUNTER FOR MONITORING CARDIOTOXIC DRUG THERAPY: ICD-10-CM

## 2023-02-01 DIAGNOSIS — Z79.899 ENCOUNTER FOR MONITORING CARDIOTOXIC DRUG THERAPY: ICD-10-CM

## 2023-02-01 LAB
ECHO AV AREA PEAK VELOCITY: 1.9 CM2
ECHO AV AREA VTI: 1.8 CM2
ECHO AV AREA/BSA PEAK VELOCITY: 1 CM2/M2
ECHO AV AREA/BSA VTI: 1 CM2/M2
ECHO AV MEAN GRADIENT: 8 MMHG
ECHO AV MEAN VELOCITY: 1.3 M/S
ECHO AV PEAK GRADIENT: 17 MMHG
ECHO AV PEAK VELOCITY: 2.1 M/S
ECHO AV VELOCITY RATIO: 0.67
ECHO AV VTI: 40.2 CM
ECHO LV FRACTIONAL SHORTENING: 32 % (ref 28–44)
ECHO LV GLOBAL LONGITUDINAL STRAIN (GLS): -14 %
ECHO LV GLOBAL LONGITUDINAL STRAIN (GLS): -15.6 %
ECHO LV GLOBAL LONGITUDINAL STRAIN (GLS): -16.2 %
ECHO LV GLOBAL LONGITUDINAL STRAIN (GLS): -16.5 %
ECHO LV INTERNAL DIMENSION DIASTOLE INDEX: 2.08 CM/M2
ECHO LV INTERNAL DIMENSION DIASTOLIC: 3.8 CM (ref 3.9–5.3)
ECHO LV INTERNAL DIMENSION SYSTOLIC INDEX: 1.42 CM/M2
ECHO LV INTERNAL DIMENSION SYSTOLIC: 2.6 CM
ECHO LV IVSD: 0.8 CM (ref 0.6–0.9)
ECHO LV MASS 2D: 101.1 G (ref 67–162)
ECHO LV MASS INDEX 2D: 55.2 G/M2 (ref 43–95)
ECHO LV POSTERIOR WALL DIASTOLIC: 1 CM (ref 0.6–0.9)
ECHO LV RELATIVE WALL THICKNESS RATIO: 0.53
ECHO LVOT AREA: 2.8 CM2
ECHO LVOT AV VTI INDEX: 0.65
ECHO LVOT DIAM: 1.9 CM
ECHO LVOT MEAN GRADIENT: 3 MMHG
ECHO LVOT PEAK GRADIENT: 8 MMHG
ECHO LVOT PEAK VELOCITY: 1.4 M/S
ECHO LVOT STROKE VOLUME INDEX: 40.4 ML/M2
ECHO LVOT SV: 74 ML
ECHO LVOT VTI: 26.1 CM

## 2023-02-01 PROCEDURE — 93321 DOPPLER ECHO F-UP/LMTD STD: CPT | Performed by: INTERNAL MEDICINE

## 2023-02-01 PROCEDURE — 93306 TTE W/DOPPLER COMPLETE: CPT

## 2023-02-01 PROCEDURE — 93325 DOPPLER ECHO COLOR FLOW MAPG: CPT | Performed by: INTERNAL MEDICINE

## 2023-02-01 PROCEDURE — 93308 TTE F-UP OR LMTD: CPT

## 2023-02-01 PROCEDURE — 93308 TTE F-UP OR LMTD: CPT | Performed by: INTERNAL MEDICINE

## 2023-02-28 ENCOUNTER — OFFICE VISIT (OUTPATIENT)
Dept: SURGERY | Age: 73
End: 2023-02-28
Payer: MEDICARE

## 2023-02-28 VITALS — BODY MASS INDEX: 25.71 KG/M2 | HEIGHT: 66 IN | WEIGHT: 160 LBS

## 2023-02-28 DIAGNOSIS — Z85.3 HISTORY OF BREAST CANCER IN FEMALE: ICD-10-CM

## 2023-02-28 DIAGNOSIS — Z92.3 S/P RADIATION THERAPY: ICD-10-CM

## 2023-02-28 DIAGNOSIS — C50.411 MALIGNANT NEOPLASM OF UPPER-OUTER QUADRANT OF RIGHT FEMALE BREAST, UNSPECIFIED ESTROGEN RECEPTOR STATUS (HCC): Primary | ICD-10-CM

## 2023-02-28 DIAGNOSIS — Z98.890 S/P LUMPECTOMY, RIGHT BREAST: ICD-10-CM

## 2023-02-28 PROCEDURE — G8427 DOCREV CUR MEDS BY ELIG CLIN: HCPCS | Performed by: NURSE PRACTITIONER

## 2023-02-28 PROCEDURE — 99213 OFFICE O/P EST LOW 20 MIN: CPT | Performed by: NURSE PRACTITIONER

## 2023-02-28 PROCEDURE — G8432 DEP SCR NOT DOC, RNG: HCPCS | Performed by: NURSE PRACTITIONER

## 2023-02-28 PROCEDURE — 3017F COLORECTAL CA SCREEN DOC REV: CPT | Performed by: NURSE PRACTITIONER

## 2023-02-28 PROCEDURE — 1123F ACP DISCUSS/DSCN MKR DOCD: CPT | Performed by: NURSE PRACTITIONER

## 2023-02-28 PROCEDURE — G8399 PT W/DXA RESULTS DOCUMENT: HCPCS | Performed by: NURSE PRACTITIONER

## 2023-02-28 PROCEDURE — G9899 SCRN MAM PERF RSLTS DOC: HCPCS | Performed by: NURSE PRACTITIONER

## 2023-02-28 PROCEDURE — G8536 NO DOC ELDER MAL SCRN: HCPCS | Performed by: NURSE PRACTITIONER

## 2023-02-28 PROCEDURE — 1101F PT FALLS ASSESS-DOCD LE1/YR: CPT | Performed by: NURSE PRACTITIONER

## 2023-02-28 PROCEDURE — 1090F PRES/ABSN URINE INCON ASSESS: CPT | Performed by: NURSE PRACTITIONER

## 2023-02-28 PROCEDURE — G8417 CALC BMI ABV UP PARAM F/U: HCPCS | Performed by: NURSE PRACTITIONER

## 2023-02-28 NOTE — PROGRESS NOTES
HISTORY OF PRESENT ILLNESS  Kale Sierra is a 81 y.o. female. HPI Established patient presents for follow-up for RIGHT breast cancer. Denies breast mass, skin changes, nipple discharge and pain. Breast history -   Referring - Dr. Lobito Tellez  21 - Mammogram/US - BIRADS 4, RIGHT breast @10:00, 1.6 x 0.9 x 1.0 cm  21 - RIGHT breast biopsy - IDC, ER 0, MI 0, HER2 equiv, FISH positive, ki-67 90%  8/3/21 - RIGHT breast lumpectomy and SLNB, port placement - Dr. Faby Bryan   2.0 cm IDC, node negative - T1cN0  Adjuvant weekly Taxol + Herceptin 21 - 21 - Dr. Katelyn Jeronimo  Maintenance subQ Herceptin started 21  XRT - 21 - 22 - Dr. Elizabeth Spangler  2022 - Completed maintenance Herceptin        Family History -   Mother - brain cancer  Sister - spinal cancer  Denies FH of breast or ovarian cancer. Unsure what type of cancer her maternal grandmother had. She  in her 42's.         OB History               Para        Term                AB        Living             SAB        IAB        Ectopic        Molar        Multiple        Live Births   1          Obstetric Comments   Menarche 8, LMP , # of children 1, age of 4st delivery 36, Hysterectomy/oophorectomy NO/NO, Breast bx YES, history of breast feeding yes, BCP yes, Hormone therapy no                 Past Surgical History:   Procedure Laterality Date    COLONOSCOPY N/A 2019    tubular adenoma, repeat 1 yr d/t poor prep - performed by Arlen Roland MD at [facility];     COLONOSCOPY N/A 2020    normal - performed by Arlen Roland MD at [facility] 24 Left     surgery - has a plate a screws    HX BREAST LUMPECTOMY Right 8/3/2021    RIGHT BREAST LUMPECTOMY WITH ULTRASOUND AND RIGHT BREAST SENTINEL NODE BIOPSY AND PORTACATH INSERTION performed by Emigdio Saleem MD at [facility] 11    HX COLONOSCOPY  06/10/2020    Clear    HX DILATION AND CURETTAGE  2015    HX GI      COLONOSCOPY    HX OTHER SURGICAL Right 01/2020    growth removed from right eyelid    HX OTHER SURGICAL Right 02/18/2020    PICC line placed for IV abx, removed after completing course for pyelonephritis    HX OTHER SURGICAL  04/12/2022    port removed    HX POLYPECTOMY      HX TONSILLECTOMY  1956    DE BX BREAST NEEDLE CORE W/O IMAGING GUIDANCE SPX Right 06/18/2021           AUDELIA Results (most recent):  Results from Hospital Encounter encounter on 08/08/22    AUDELIA 3D THOMAS W MAMMO BI DX INCL CAD    Narrative  INDICATION: . Malignant neoplasm of unspecified site of right female breast.  COMPARISON: Mammogram(s), most recent, 6/3/2021. Fe Luster COMPOSITION:  There are scattered areas of fibroglandular density. .  TECHNIQUE:  Standard 2D, CC and MLO views of each breast as well as spot magnification views  of the right breast were performed with digital technique and subjected to  computer-aided detection. Tomosynthesis of each breast was performed in CC and  MLO projections. Gerlach Luster FINDINGS:  Postsurgical changes in the right breast with VeraForm present. No new visualized mass, suspicious microcalcification or architectural  distortion. .    Impression  1. Postsurgical and posttreatment changes in the right breast.  BI-RADS Category 2 - Benign findings. .  RECOMMENDATION:  Routine surveillance with a diagnostic mammogram in one year. ROS    Physical Exam  Constitutional:       Appearance: Normal appearance. Chest:   Breasts:     Right: No mass, nipple discharge, skin change or tenderness. Left: No mass, nipple discharge, skin change or tenderness. Comments: RIGHT breast - well healed incisions to UOQ and axilla. Some retraction post XRT, but stable. RIGHT breast smaller than LEFT post treatment. Musculoskeletal:      Comments: FROM - UE x 2   Lymphadenopathy:      Upper Body:      Right upper body: No supraclavicular or axillary adenopathy.       Left upper body: No supraclavicular or axillary adenopathy. Neurological:      Mental Status: She is alert. Psychiatric:         Attention and Perception: Attention normal.         Mood and Affect: Mood normal.         Speech: Speech normal.         Behavior: Behavior normal.       Visit Vitals  Ht 5' 6\" (1.676 m)   Wt 160 lb (72.6 kg)   BMI 25.82 kg/m²       ASSESSMENT and PLAN    ICD-10-CM ICD-9-CM    1. Malignant neoplasm of upper-outer quadrant of right female breast, unspecified estrogen receptor status (HCC)  C50.411 174.4       2. S/P lumpectomy, right breast  Z98.890 V45.89       3. S/P radiation therapy  Z92.3 V66.1       4. History of breast cancer in female  Z85.3 V10.3 AMB SUPPLY ORDER      AUDELIA 3D THOMAS W MAMMO BI DX INCL CAD          Normal breast exam with no evidence of local recurrence. Post treatment changes to RIGHT breast stable. Asymmetry post treatment. Rx given for bras/prosthesis. BDmammogram 3D in 8/2023. RTC in 6 months or sooner PRN. She is comfortable with this plan. All questions answered and she stated understanding. Total time spent for this patient - 20 minutes.

## 2023-04-22 DIAGNOSIS — Z98.890 HISTORY OF LUMPECTOMY OF RIGHT BREAST: ICD-10-CM

## 2023-04-22 DIAGNOSIS — Z85.3 HISTORY OF BREAST CANCER IN FEMALE: Primary | ICD-10-CM

## 2023-04-22 DIAGNOSIS — C50.911 INFILTRATING DUCTAL CARCINOMA OF RIGHT BREAST (HCC): Primary | ICD-10-CM

## 2023-04-22 DIAGNOSIS — Z85.3 PERSONAL HISTORY OF BREAST CANCER: ICD-10-CM

## 2023-04-25 ENCOUNTER — TELEPHONE (OUTPATIENT)
Dept: INTERNAL MEDICINE CLINIC | Age: 73
End: 2023-04-25

## 2023-04-25 NOTE — TELEPHONE ENCOUNTER
Called pt, explained to her [facility] is not taking new pt's at this time. Pt verbalized understanding.

## 2023-04-25 NOTE — TELEPHONE ENCOUNTER
----- Message from [facility] sent at 4/25/2023  9:41 AM EDT -----  Subject: Message to Provider    QUESTIONS  Information for Provider? Patient was calling to see if she can switch   providers she would like Milton Rhoades to be her new pcp and would like   her appointment switched that's on 5/2/23 she said to call her home phone   first then mobile.  ---------------------------------------------------------------------------  --------------  [address] [facility]  [phone]; OK to leave message on voicemail  ---------------------------------------------------------------------------  --------------  SCRIPT ANSWERS  Relationship to Patient?  Self

## 2023-05-02 ENCOUNTER — OFFICE VISIT (OUTPATIENT)
Dept: INTERNAL MEDICINE CLINIC | Age: 73
End: 2023-05-02
Payer: MEDICARE

## 2023-05-02 VITALS
TEMPERATURE: 98.3 F | WEIGHT: 159 LBS | HEIGHT: 66 IN | RESPIRATION RATE: 18 BRPM | OXYGEN SATURATION: 98 % | DIASTOLIC BLOOD PRESSURE: 84 MMHG | HEART RATE: 84 BPM | BODY MASS INDEX: 25.55 KG/M2 | SYSTOLIC BLOOD PRESSURE: 136 MMHG

## 2023-05-02 DIAGNOSIS — M25.572 CHRONIC PAIN OF LEFT ANKLE: ICD-10-CM

## 2023-05-02 DIAGNOSIS — C50.911 INFILTRATING DUCTAL CARCINOMA OF RIGHT BREAST (HCC): ICD-10-CM

## 2023-05-02 DIAGNOSIS — H93.12 TINNITUS, LEFT EAR: ICD-10-CM

## 2023-05-02 DIAGNOSIS — L30.8 OTHER ECZEMA: ICD-10-CM

## 2023-05-02 DIAGNOSIS — G89.29 CHRONIC PAIN OF LEFT ANKLE: ICD-10-CM

## 2023-05-02 DIAGNOSIS — M85.89 OSTEOPENIA OF MULTIPLE SITES: ICD-10-CM

## 2023-05-02 DIAGNOSIS — Z71.89 ADVANCED CARE PLANNING/COUNSELING DISCUSSION: ICD-10-CM

## 2023-05-02 DIAGNOSIS — Z23 ENCOUNTER FOR IMMUNIZATION: ICD-10-CM

## 2023-05-02 DIAGNOSIS — Z00.00 MEDICARE ANNUAL WELLNESS VISIT, SUBSEQUENT: Primary | ICD-10-CM

## 2023-05-02 DIAGNOSIS — F41.8 ANXIETY ABOUT HEALTH: ICD-10-CM

## 2023-05-02 DIAGNOSIS — M79.671 RIGHT FOOT PAIN: ICD-10-CM

## 2023-05-02 DIAGNOSIS — R03.0 PREHYPERTENSION: ICD-10-CM

## 2023-05-02 PROCEDURE — G8536 NO DOC ELDER MAL SCRN: HCPCS | Performed by: INTERNAL MEDICINE

## 2023-05-02 PROCEDURE — G8417 CALC BMI ABV UP PARAM F/U: HCPCS | Performed by: INTERNAL MEDICINE

## 2023-05-02 PROCEDURE — 3017F COLORECTAL CA SCREEN DOC REV: CPT | Performed by: INTERNAL MEDICINE

## 2023-05-02 PROCEDURE — G8510 SCR DEP NEG, NO PLAN REQD: HCPCS | Performed by: INTERNAL MEDICINE

## 2023-05-02 PROCEDURE — G8399 PT W/DXA RESULTS DOCUMENT: HCPCS | Performed by: INTERNAL MEDICINE

## 2023-05-02 PROCEDURE — G8427 DOCREV CUR MEDS BY ELIG CLIN: HCPCS | Performed by: INTERNAL MEDICINE

## 2023-05-02 PROCEDURE — G9899 SCRN MAM PERF RSLTS DOC: HCPCS | Performed by: INTERNAL MEDICINE

## 2023-05-02 PROCEDURE — 1101F PT FALLS ASSESS-DOCD LE1/YR: CPT | Performed by: INTERNAL MEDICINE

## 2023-05-02 PROCEDURE — G0439 PPPS, SUBSEQ VISIT: HCPCS | Performed by: INTERNAL MEDICINE

## 2023-05-02 RX ORDER — TRIAMCINOLONE ACETONIDE 1 MG/G
CREAM TOPICAL 2 TIMES DAILY
Qty: 30 G | Refills: 0 | Status: SHIPPED | OUTPATIENT
Start: 2023-05-02

## 2023-05-02 RX ORDER — MULTIVITAMIN
1 TABLET ORAL DAILY
COMMUNITY

## 2023-05-08 ENCOUNTER — HOSPITAL ENCOUNTER (OUTPATIENT)
Facility: HOSPITAL | Age: 73
Discharge: HOME | End: 2023-05-11

## 2023-05-08 RX ORDER — DICLOFENAC SODIUM 75 MG/1
TABLET, DELAYED RELEASE ORAL
Qty: 60 TABLET | Refills: 0 | Status: SHIPPED | OUTPATIENT
Start: 2023-05-08

## 2023-05-16 RX ORDER — TRIAMCINOLONE ACETONIDE 1 MG/G
CREAM TOPICAL 2 TIMES DAILY
COMMUNITY
Start: 2023-05-02

## 2023-05-24 ENCOUNTER — TELEPHONE (OUTPATIENT)
Age: 73
End: 2023-05-24

## 2023-06-19 ENCOUNTER — OFFICE VISIT (OUTPATIENT)
Age: 73
End: 2023-06-19
Payer: MEDICARE

## 2023-06-19 VITALS
RESPIRATION RATE: 16 BRPM | SYSTOLIC BLOOD PRESSURE: 121 MMHG | BODY MASS INDEX: 24.86 KG/M2 | WEIGHT: 154 LBS | TEMPERATURE: 98 F | DIASTOLIC BLOOD PRESSURE: 78 MMHG | HEART RATE: 84 BPM | OXYGEN SATURATION: 93 %

## 2023-06-19 DIAGNOSIS — C50.911 INFILTRATING DUCTAL CARCINOMA OF RIGHT BREAST (HCC): Primary | ICD-10-CM

## 2023-06-19 DIAGNOSIS — Z98.890 HISTORY OF LUMPECTOMY OF RIGHT BREAST: ICD-10-CM

## 2023-06-19 PROCEDURE — G8399 PT W/DXA RESULTS DOCUMENT: HCPCS | Performed by: INTERNAL MEDICINE

## 2023-06-19 PROCEDURE — 1090F PRES/ABSN URINE INCON ASSESS: CPT | Performed by: INTERNAL MEDICINE

## 2023-06-19 PROCEDURE — 99213 OFFICE O/P EST LOW 20 MIN: CPT | Performed by: INTERNAL MEDICINE

## 2023-06-19 PROCEDURE — G8427 DOCREV CUR MEDS BY ELIG CLIN: HCPCS | Performed by: INTERNAL MEDICINE

## 2023-06-19 PROCEDURE — 1123F ACP DISCUSS/DSCN MKR DOCD: CPT | Performed by: INTERNAL MEDICINE

## 2023-06-19 PROCEDURE — G8420 CALC BMI NORM PARAMETERS: HCPCS | Performed by: INTERNAL MEDICINE

## 2023-06-19 PROCEDURE — 3017F COLORECTAL CA SCREEN DOC REV: CPT | Performed by: INTERNAL MEDICINE

## 2023-06-19 PROCEDURE — 4004F PT TOBACCO SCREEN RCVD TLK: CPT | Performed by: INTERNAL MEDICINE

## 2023-06-19 NOTE — PROGRESS NOTES
Qasim Whitaker is a 81 y.o. female    Chief Complaint   Patient presents with    Follow-up     Right Breast Cancer       1. Have you been to the ER, urgent care clinic since your last visit? Hospitalized since your last visit? No    2. Have you seen or consulted any other health care providers outside of the [address] [place] [address] since your last visit? Include any pap smears or colon screening.  No

## 2023-07-05 RX ORDER — DULOXETIN HYDROCHLORIDE 20 MG/1
CAPSULE, DELAYED RELEASE ORAL
Qty: 90 CAPSULE | Refills: 3 | Status: SHIPPED | OUTPATIENT
Start: 2023-07-05

## 2023-07-05 NOTE — TELEPHONE ENCOUNTER
Chief Complaint   Patient presents with    Medication Refill     Last Appointment with Dr. Karlene Kimble: 5/2/23    Future Appointments   Date Time Provider [address]   8/9/2023 10:45 AM [facility] 5 [facility]   8/29/2023 11:15 AM SYLVIA Steinberg - NP [facility] BS AMB   5/7/2024 10:00 AM Gretchen Woody MD [facility] BS AMB   5/13/2024 10:30 AM Beltran Hernandez MD [facility]   6/19/2024 11:00 AM Derick Morgan DO [address] BS AMB     Neyda Pacheco

## 2023-07-16 RX ORDER — DICLOFENAC SODIUM 75 MG/1
TABLET, DELAYED RELEASE ORAL
Qty: 60 TABLET | Refills: 3 | Status: SHIPPED | OUTPATIENT
Start: 2023-07-16

## 2023-08-09 ENCOUNTER — HOSPITAL ENCOUNTER (OUTPATIENT)
Facility: HOSPITAL | Age: 73
Discharge: HOME | End: 2023-08-12
Payer: MEDICARE

## 2023-08-09 VITALS — WEIGHT: 147 LBS | BODY MASS INDEX: 25.1 KG/M2 | HEIGHT: 64 IN

## 2023-08-09 DIAGNOSIS — C50.911 INFILTRATING DUCTAL CARCINOMA OF RIGHT BREAST (HCC): ICD-10-CM

## 2023-08-09 DIAGNOSIS — Z85.3 PERSONAL HISTORY OF BREAST CANCER: ICD-10-CM

## 2023-08-09 DIAGNOSIS — Z98.890 HISTORY OF LUMPECTOMY OF RIGHT BREAST: ICD-10-CM

## 2023-08-09 PROCEDURE — G0279 TOMOSYNTHESIS, MAMMO: HCPCS

## 2023-08-29 ENCOUNTER — OFFICE VISIT (OUTPATIENT)
Age: 73
End: 2023-08-29
Payer: MEDICARE

## 2023-08-29 VITALS — BODY MASS INDEX: 25.1 KG/M2 | HEIGHT: 64 IN | WEIGHT: 147 LBS

## 2023-08-29 DIAGNOSIS — Z98.890 S/P LUMPECTOMY OF BREAST: ICD-10-CM

## 2023-08-29 DIAGNOSIS — C50.411 MALIGNANT NEOPLASM OF UPPER-OUTER QUADRANT OF RIGHT BREAST IN FEMALE, ESTROGEN RECEPTOR NEGATIVE (HCC): Primary | ICD-10-CM

## 2023-08-29 DIAGNOSIS — Z17.1 MALIGNANT NEOPLASM OF UPPER-OUTER QUADRANT OF RIGHT BREAST IN FEMALE, ESTROGEN RECEPTOR NEGATIVE (HCC): Primary | ICD-10-CM

## 2023-08-29 DIAGNOSIS — Z85.3 HISTORY OF BREAST CANCER IN FEMALE: ICD-10-CM

## 2023-08-29 DIAGNOSIS — Z92.3 S/P RADIATION THERAPY: ICD-10-CM

## 2023-08-29 PROCEDURE — 1036F TOBACCO NON-USER: CPT | Performed by: NURSE PRACTITIONER

## 2023-08-29 PROCEDURE — 1090F PRES/ABSN URINE INCON ASSESS: CPT | Performed by: NURSE PRACTITIONER

## 2023-08-29 PROCEDURE — G8399 PT W/DXA RESULTS DOCUMENT: HCPCS | Performed by: NURSE PRACTITIONER

## 2023-08-29 PROCEDURE — 3017F COLORECTAL CA SCREEN DOC REV: CPT | Performed by: NURSE PRACTITIONER

## 2023-08-29 PROCEDURE — 1123F ACP DISCUSS/DSCN MKR DOCD: CPT | Performed by: NURSE PRACTITIONER

## 2023-08-29 PROCEDURE — G8419 CALC BMI OUT NRM PARAM NOF/U: HCPCS | Performed by: NURSE PRACTITIONER

## 2023-08-29 PROCEDURE — G8427 DOCREV CUR MEDS BY ELIG CLIN: HCPCS | Performed by: NURSE PRACTITIONER

## 2023-08-29 PROCEDURE — 99213 OFFICE O/P EST LOW 20 MIN: CPT | Performed by: NURSE PRACTITIONER

## 2023-12-26 RX ORDER — DICLOFENAC SODIUM 75 MG/1
TABLET, DELAYED RELEASE ORAL
Qty: 60 TABLET | Refills: 0 | Status: SHIPPED | OUTPATIENT
Start: 2023-12-26

## 2024-02-05 RX ORDER — DICLOFENAC SODIUM 75 MG/1
75 TABLET, DELAYED RELEASE ORAL 2 TIMES DAILY PRN
Qty: 60 TABLET | Refills: 3 | Status: SHIPPED | OUTPATIENT
Start: 2024-02-05

## 2024-02-05 NOTE — TELEPHONE ENCOUNTER
Medication Refill Request    Niharika Lynn is requesting a refill of the following medication(s):     Diclofenac (Voltaren) 75 MG EC tablet    Please send refill to:     [facility] [id number] - [place] - [address] [facility] [phone] - F [phone]  [address] [facility] [address]  Phone: [phone] Fax: [phone]

## 2024-02-05 NOTE — TELEPHONE ENCOUNTER
Chief Complaint   Patient presents with    Medication Refill     Last Appointment with Dr. Timoteo Muse:  5/2/23    Future Appointments   Date Time Provider Department Center   3/5/2024 11:15 AM Allyson Roberts APRN - NP [facility] BS AMB   5/7/2024 10:00 AM Timoteo Muse MD [facility] BS AMB   5/13/2024 10:30 AM Victor Manuel Corrales MD [facility]   6/19/2024 11:00 AM Rere Hays DO [facility] BS AMB

## 2024-02-06 NOTE — TELEPHONE ENCOUNTER
Future Appointments   Date Time Provider Department Center   3/5/2024 11:15 AM Allyson Roberts APRN - NP [facility] BS AMB   5/7/2024 10:00 AM Timoteo Muse MD [facility] BS AMB   5/13/2024 10:30 AM [facility]   6/19/2024 11:00 AM Rere Hays DO [facility] BS AMB

## 2024-03-05 ENCOUNTER — OFFICE VISIT (OUTPATIENT)
Age: 74
End: 2024-03-05
Payer: MEDICARE

## 2024-03-05 VITALS — WEIGHT: 150 LBS | HEIGHT: 64 IN | BODY MASS INDEX: 25.61 KG/M2

## 2024-03-05 DIAGNOSIS — C50.411 MALIGNANT NEOPLASM OF UPPER-OUTER QUADRANT OF RIGHT BREAST IN FEMALE, ESTROGEN RECEPTOR NEGATIVE (HCC): Primary | ICD-10-CM

## 2024-03-05 DIAGNOSIS — Z17.1 MALIGNANT NEOPLASM OF UPPER-OUTER QUADRANT OF RIGHT BREAST IN FEMALE, ESTROGEN RECEPTOR NEGATIVE (HCC): Primary | ICD-10-CM

## 2024-03-05 DIAGNOSIS — Z98.890 S/P LUMPECTOMY OF BREAST: ICD-10-CM

## 2024-03-05 DIAGNOSIS — Z92.3 S/P RADIATION THERAPY: ICD-10-CM

## 2024-03-05 DIAGNOSIS — R22.31 MASS OF RIGHT AXILLA: ICD-10-CM

## 2024-03-05 DIAGNOSIS — Z85.3 HISTORY OF BREAST CANCER IN FEMALE: ICD-10-CM

## 2024-03-05 PROCEDURE — 1036F TOBACCO NON-USER: CPT | Performed by: NURSE PRACTITIONER

## 2024-03-05 PROCEDURE — 1090F PRES/ABSN URINE INCON ASSESS: CPT | Performed by: NURSE PRACTITIONER

## 2024-03-05 PROCEDURE — 1123F ACP DISCUSS/DSCN MKR DOCD: CPT | Performed by: NURSE PRACTITIONER

## 2024-03-05 PROCEDURE — G8399 PT W/DXA RESULTS DOCUMENT: HCPCS | Performed by: NURSE PRACTITIONER

## 2024-03-05 PROCEDURE — 99213 OFFICE O/P EST LOW 20 MIN: CPT | Performed by: NURSE PRACTITIONER

## 2024-03-05 PROCEDURE — 3017F COLORECTAL CA SCREEN DOC REV: CPT | Performed by: NURSE PRACTITIONER

## 2024-03-05 PROCEDURE — G8419 CALC BMI OUT NRM PARAM NOF/U: HCPCS | Performed by: NURSE PRACTITIONER

## 2024-03-05 PROCEDURE — G8484 FLU IMMUNIZE NO ADMIN: HCPCS | Performed by: NURSE PRACTITIONER

## 2024-03-05 PROCEDURE — G8427 DOCREV CUR MEDS BY ELIG CLIN: HCPCS | Performed by: NURSE PRACTITIONER

## 2024-03-05 NOTE — PROGRESS NOTES
OTHER SURGICAL HISTORY Right 02/18/2020    PICC line placed for IV abx, removed after completing course for pyelonephritis    OTHER SURGICAL HISTORY Right 01/2020    growth removed from right eyelid    POLYPECTOMY      TONSILLECTOMY  1956    US BREAST BIOPSY W LOC DEVICE 1ST LESION RIGHT Right 06/18/2021    Positive for breast cancer    WISDOM TOOTH EXTRACTION             Mammogram Result (most recent):  SWATI TAVO DIGITAL DIAGNOSTIC BILATERAL 08/09/2023    Narrative  STUDY:  Bilateral Diagnostic Digital Mammography including 3-D Tomosynthesis    INDICATION:  Right lumpectomy for carcinoma in 2021    COMPARISON:  Prior studies dating back to 2017    BREAST COMPOSITION: There are scattered areas of fibroglandular density.    FINDINGS: Bilateral digital diagnostic mammography was performed, and is  interpreted in conjunction with a computer assisted detection (CAD) system. In  addition to standard 2-D bilateral CC and MLO views, bilateral 3-D/tomosynthesis  was performed in CC and MLO projections. Additionally, magnification views were  performed of the right breast.    Lumpectomy changes are noted in the right breast. Magnification views of the  lumpectomy bed demonstrate no suspicious microcalcifications or other findings.  No new masses or suspicious calcifications are identified within either breast.    Impression  1. BI-RADS Assessment Category 2: Benign finding. Lumpectomy changes in the  right breast are stable. No evidence of recurrent malignancy.  2. No mammographic evidence of malignancy in either breast.  3. No significant change.    Recommend annual follow-up diagnostic mammography per lumpectomy protocol.    The patient has been notified of these results and recommendations.      Review of Systems      Physical Exam  Constitutional:       Appearance: Normal appearance.   Chest:   Breasts:     Right: Tenderness present. No mass, nipple discharge or skin change.      Left: No mass, nipple discharge, skin

## 2024-03-19 ENCOUNTER — HOSPITAL ENCOUNTER (OUTPATIENT)
Age: 74
Discharge: HOME | End: 2024-03-22
Payer: MEDICARE

## 2024-03-19 VITALS — WEIGHT: 150 LBS | BODY MASS INDEX: 25.75 KG/M2

## 2024-03-19 DIAGNOSIS — R22.31 MASS OF RIGHT AXILLA: ICD-10-CM

## 2024-03-19 PROCEDURE — A9585 GADOBUTROL INJECTION: HCPCS | Performed by: RADIOLOGY

## 2024-03-19 PROCEDURE — 6360000004 HC RX CONTRAST MEDICATION: Performed by: RADIOLOGY

## 2024-03-19 PROCEDURE — C8908 MRI W/O FOL W/CONT, BREAST,: HCPCS

## 2024-03-19 RX ORDER — GADOBUTROL 604.72 MG/ML
7 INJECTION INTRAVENOUS
Status: COMPLETED | OUTPATIENT
Start: 2024-03-19 | End: 2024-03-19

## 2024-03-19 RX ADMIN — GADOBUTROL 7 ML: 604.72 INJECTION INTRAVENOUS at 11:38

## 2024-05-04 SDOH — ECONOMIC STABILITY: HOUSING INSECURITY
IN THE LAST 12 MONTHS, WAS THERE A TIME WHEN YOU DID NOT HAVE A STEADY PLACE TO SLEEP OR SLEPT IN A SHELTER (INCLUDING NOW)?: NO

## 2024-05-04 SDOH — ECONOMIC STABILITY: FOOD INSECURITY: WITHIN THE PAST 12 MONTHS, THE FOOD YOU BOUGHT JUST DIDN'T LAST AND YOU DIDN'T HAVE MONEY TO GET MORE.: NEVER TRUE

## 2024-05-04 SDOH — ECONOMIC STABILITY: FOOD INSECURITY: WITHIN THE PAST 12 MONTHS, YOU WORRIED THAT YOUR FOOD WOULD RUN OUT BEFORE YOU GOT MONEY TO BUY MORE.: NEVER TRUE

## 2024-05-04 SDOH — ECONOMIC STABILITY: INCOME INSECURITY: HOW HARD IS IT FOR YOU TO PAY FOR THE VERY BASICS LIKE FOOD, HOUSING, MEDICAL CARE, AND HEATING?: NOT VERY HARD

## 2024-05-04 SDOH — ECONOMIC STABILITY: TRANSPORTATION INSECURITY
IN THE PAST 12 MONTHS, HAS LACK OF TRANSPORTATION KEPT YOU FROM MEETINGS, WORK, OR FROM GETTING THINGS NEEDED FOR DAILY LIVING?: NO

## 2024-05-04 SDOH — HEALTH STABILITY: PHYSICAL HEALTH: ON AVERAGE, HOW MANY DAYS PER WEEK DO YOU ENGAGE IN MODERATE TO STRENUOUS EXERCISE (LIKE A BRISK WALK)?: 0 DAYS

## 2024-05-04 ASSESSMENT — LIFESTYLE VARIABLES
HOW OFTEN DURING THE LAST YEAR HAVE YOU BEEN UNABLE TO REMEMBER WHAT HAPPENED THE NIGHT BEFORE BECAUSE YOU HAD BEEN DRINKING: LESS THAN MONTHLY
HOW OFTEN DURING THE LAST YEAR HAVE YOU NEEDED AN ALCOHOLIC DRINK FIRST THING IN THE MORNING TO GET YOURSELF GOING AFTER A NIGHT OF HEAVY DRINKING: NEVER
HOW OFTEN DURING THE LAST YEAR HAVE YOU FAILED TO DO WHAT WAS NORMALLY EXPECTED FROM YOU BECAUSE OF DRINKING: NEVER
HAVE YOU OR SOMEONE ELSE BEEN INJURED AS A RESULT OF YOUR DRINKING: NO
HOW OFTEN DO YOU HAVE A DRINK CONTAINING ALCOHOL: 5
HAS A RELATIVE, FRIEND, DOCTOR, OR ANOTHER HEALTH PROFESSIONAL EXPRESSED CONCERN ABOUT YOUR DRINKING OR SUGGESTED YOU CUT DOWN: YES, BUT NOT IN THE PAST YEAR
HAVE YOU OR SOMEONE ELSE BEEN INJURED AS A RESULT OF YOUR DRINKING: NO
HOW OFTEN DO YOU HAVE A DRINK CONTAINING ALCOHOL: 4 OR MORE TIMES A WEEK
HOW OFTEN DURING THE LAST YEAR HAVE YOU FOUND THAT YOU WERE NOT ABLE TO STOP DRINKING ONCE YOU HAD STARTED: NEVER
HOW OFTEN DURING THE LAST YEAR HAVE YOU HAD A FEELING OF GUILT OR REMORSE AFTER DRINKING: NEVER
HOW OFTEN DURING THE LAST YEAR HAVE YOU HAD A FEELING OF GUILT OR REMORSE AFTER DRINKING: NEVER
HOW OFTEN DURING THE LAST YEAR HAVE YOU NEEDED AN ALCOHOLIC DRINK FIRST THING IN THE MORNING TO GET YOURSELF GOING AFTER A NIGHT OF HEAVY DRINKING: NEVER
HOW OFTEN DO YOU HAVE SIX OR MORE DRINKS ON ONE OCCASION: 1
HAS A RELATIVE, FRIEND, DOCTOR, OR ANOTHER HEALTH PROFESSIONAL EXPRESSED CONCERN ABOUT YOUR DRINKING OR SUGGESTED YOU CUT DOWN: YES, BUT NOT IN THE PAST YEAR
HOW MANY STANDARD DRINKS CONTAINING ALCOHOL DO YOU HAVE ON A TYPICAL DAY: 2
HOW MANY STANDARD DRINKS CONTAINING ALCOHOL DO YOU HAVE ON A TYPICAL DAY: 3 OR 4
HOW OFTEN DURING THE LAST YEAR HAVE YOU FAILED TO DO WHAT WAS NORMALLY EXPECTED FROM YOU BECAUSE OF DRINKING: NEVER
HOW OFTEN DURING THE LAST YEAR HAVE YOU FOUND THAT YOU WERE NOT ABLE TO STOP DRINKING ONCE YOU HAD STARTED: NEVER
HOW OFTEN DURING THE LAST YEAR HAVE YOU BEEN UNABLE TO REMEMBER WHAT HAPPENED THE NIGHT BEFORE BECAUSE YOU HAD BEEN DRINKING: LESS THAN MONTHLY

## 2024-05-04 ASSESSMENT — PATIENT HEALTH QUESTIONNAIRE - PHQ9
1. LITTLE INTEREST OR PLEASURE IN DOING THINGS: NOT AT ALL
SUM OF ALL RESPONSES TO PHQ9 QUESTIONS 1 & 2: 0
SUM OF ALL RESPONSES TO PHQ QUESTIONS 1-9: 0
SUM OF ALL RESPONSES TO PHQ QUESTIONS 1-9: 0
2. FEELING DOWN, DEPRESSED OR HOPELESS: NOT AT ALL
SUM OF ALL RESPONSES TO PHQ QUESTIONS 1-9: 0
SUM OF ALL RESPONSES TO PHQ QUESTIONS 1-9: 0

## 2024-05-07 ENCOUNTER — OFFICE VISIT (OUTPATIENT)
Age: 74
End: 2024-05-07

## 2024-05-07 VITALS
WEIGHT: 151.2 LBS | DIASTOLIC BLOOD PRESSURE: 84 MMHG | RESPIRATION RATE: 16 BRPM | HEIGHT: 62 IN | TEMPERATURE: 97.3 F | SYSTOLIC BLOOD PRESSURE: 130 MMHG | HEART RATE: 79 BPM | OXYGEN SATURATION: 100 % | BODY MASS INDEX: 27.82 KG/M2

## 2024-05-07 DIAGNOSIS — Z00.00 MEDICARE ANNUAL WELLNESS VISIT, SUBSEQUENT: Primary | ICD-10-CM

## 2024-05-07 DIAGNOSIS — Z13.6 SCREENING FOR CARDIOVASCULAR CONDITION: ICD-10-CM

## 2024-05-07 DIAGNOSIS — Z71.89 ADVANCED CARE PLANNING/COUNSELING DISCUSSION: ICD-10-CM

## 2024-05-07 DIAGNOSIS — H81.11 BENIGN PAROXYSMAL POSITIONAL VERTIGO OF RIGHT EAR: ICD-10-CM

## 2024-05-07 DIAGNOSIS — M25.511 CHRONIC RIGHT SHOULDER PAIN: ICD-10-CM

## 2024-05-07 DIAGNOSIS — M25.572 CHRONIC PAIN OF LEFT ANKLE: ICD-10-CM

## 2024-05-07 DIAGNOSIS — R45.89 ANXIETY ABOUT HEALTH: ICD-10-CM

## 2024-05-07 DIAGNOSIS — M85.89 OSTEOPENIA OF MULTIPLE SITES: ICD-10-CM

## 2024-05-07 DIAGNOSIS — C50.911 INFILTRATING DUCTAL CARCINOMA OF RIGHT BREAST (HCC): ICD-10-CM

## 2024-05-07 DIAGNOSIS — G89.29 CHRONIC RIGHT SHOULDER PAIN: ICD-10-CM

## 2024-05-07 DIAGNOSIS — G89.29 CHRONIC PAIN OF LEFT ANKLE: ICD-10-CM

## 2024-05-07 DIAGNOSIS — H93.12 TINNITUS, LEFT EAR: ICD-10-CM

## 2024-05-07 ASSESSMENT — ENCOUNTER SYMPTOMS
COUGH: 0
BLOOD IN STOOL: 0
NAUSEA: 0
CONSTIPATION: 0
DIARRHEA: 0
ABDOMINAL PAIN: 0
VOMITING: 0
SHORTNESS OF BREATH: 0

## 2024-05-07 NOTE — PROGRESS NOTES
Medicare Annual Wellness Visit    Niharika Lynn is here for Medicare AWV    Assessment & Plan   Medicare annual wellness visit, subsequent  Advanced care planning/counseling discussion  -     DO NOT RESUSCITATE (DNR)  Anxiety about health  Assessment & Plan:  stable and well controlled, I reviewed treatment options with her, I reviewed life style changes to help improve mood, continue current treatment.    Osteopenia of multiple sites  Assessment & Plan:  Asymptomatic, due for follow up imaging, will continue on medication holiday pending review of DEXA.  We did review restarting Fosamax or trying Prolia.   Orders:  -     DEXA BONE DENSITY AXIAL SKELETON; Future  Chronic pain of left ankle  Assessment & Plan:  stable, continue current treatment    Tinnitus, left ear  Assessment & Plan:  Chronic, stable, nothing abnormal on exam.  Recommended ENT evaluation but did review unlikely any \"cure\".  We did review white noise at night.  Red flags and expectations reviewed.     Benign paroxysmal positional vertigo of right ear  Comments:  new dx, differential reviewed, will start with Epley exercises (see AVS). Red flags & expectations reviewed. To PT if no changes  Orders:  -     Comprehensive Metabolic Panel; Future  -     CBC; Future  Chronic right shoulder pain  Comments:  new dx and unclear etiology. Would favor rotator cuff or muscular. Will start with HEP. To PT if no improvement. Reviewed when to consider imaging.  Screening for cardiovascular condition  -     Lipid Panel; Future     Recommendations for Preventive Services Due: see orders and patient instructions/AVS.  Recommended screening schedule for the next 5-10 years is provided to the patient in written form: see Patient Instructions/AVS.     Return in 1 year (on 5/7/2025) for FULL PHYSICAL.       Subjective   Since last visit: weight is stable.    See ACP Note for Advanced Care Directive Discussion    Health Maintenance  Immunizations:   COVID:

## 2024-05-07 NOTE — ASSESSMENT & PLAN NOTE
stable and well controlled, I reviewed treatment options with her, I reviewed life style changes to help improve mood, continue current treatment.

## 2024-05-07 NOTE — ASSESSMENT & PLAN NOTE
Asymptomatic, due for follow up imaging, will continue on medication holiday pending review of DEXA.  We did review restarting Fosamax or trying Prolia.

## 2024-05-07 NOTE — ASSESSMENT & PLAN NOTE
Chronic, stable, nothing abnormal on exam.  Recommended ENT evaluation but did review unlikely any \"cure\".  We did review white noise at night.  Red flags and expectations reviewed.

## 2024-05-07 NOTE — ACP (ADVANCE CARE PLANNING)
Advance Care Planning   Advance Care Planning (ACP) Physician/NP/PA (Provider) Conversation    Date of ACP Conversation: 05/07/24  Persons included in Conversation:  patient  Length of ACP Conversation in minutes: <16 minutes (Non-Billable)    We discussed the patient’s choices for care and treatment preferences in case of a health event that adversely affects decision-making abilities or is life-limiting. We discusses the differences between FULL CODE and DNR and when to consider a change in code status.  The limitations with CPR were reviewed.    Has ACP document(s) on file - reflects the patient's care preferences.  She confirms current DNR status - completed forms on file (place new order if needed)    The patient has appointed the following active healthcare agents:    Primary Decision Maker: Stevie Lynn - Spouse - [phone]    Secondary Decision Maker: Nadja Fonseca - Child - [phone]     The Patient has the following current code status:    Code Status: DNR    Timoteo Muse MD

## 2024-05-08 LAB
ALBUMIN SERPL-MCNC: 4 G/DL (ref 3.5–5)
ALBUMIN/GLOB SERPL: 1.4 (ref 1.1–2.2)
ALP SERPL-CCNC: 108 U/L (ref 45–117)
ALT SERPL-CCNC: 26 U/L (ref 12–78)
ANION GAP SERPL CALC-SCNC: 4 MMOL/L (ref 5–15)
AST SERPL-CCNC: 15 U/L (ref 15–37)
BILIRUB SERPL-MCNC: 0.6 MG/DL (ref 0.2–1)
BUN SERPL-MCNC: 21 MG/DL (ref 6–20)
BUN/CREAT SERPL: 27 (ref 12–20)
CALCIUM SERPL-MCNC: 9.6 MG/DL (ref 8.5–10.1)
CHLORIDE SERPL-SCNC: 108 MMOL/L (ref 97–108)
CHOLEST SERPL-MCNC: 294 MG/DL
CO2 SERPL-SCNC: 27 MMOL/L (ref 21–32)
CREAT SERPL-MCNC: 0.79 MG/DL (ref 0.55–1.02)
ERYTHROCYTE [DISTWIDTH] IN BLOOD BY AUTOMATED COUNT: 12.5 % (ref 11.5–14.5)
GLOBULIN SER CALC-MCNC: 2.9 G/DL (ref 2–4)
GLUCOSE SERPL-MCNC: 91 MG/DL (ref 65–100)
HCT VFR BLD AUTO: 42.2 % (ref 35–47)
HDLC SERPL-MCNC: 108 MG/DL
HDLC SERPL: 2.7 (ref 0–5)
HGB BLD-MCNC: 14.1 G/DL (ref 11.5–16)
LDLC SERPL CALC-MCNC: 169.8 MG/DL (ref 0–100)
MCH RBC QN AUTO: 33.7 PG (ref 26–34)
MCHC RBC AUTO-ENTMCNC: 33.4 G/DL (ref 30–36.5)
MCV RBC AUTO: 101 FL (ref 80–99)
NRBC # BLD: 0 K/UL (ref 0–0.01)
NRBC BLD-RTO: 0 PER 100 WBC
PLATELET # BLD AUTO: 200 K/UL (ref 150–400)
PMV BLD AUTO: 10.7 FL (ref 8.9–12.9)
POTASSIUM SERPL-SCNC: 4.8 MMOL/L (ref 3.5–5.1)
PROT SERPL-MCNC: 6.9 G/DL (ref 6.4–8.2)
RBC # BLD AUTO: 4.18 M/UL (ref 3.8–5.2)
SODIUM SERPL-SCNC: 139 MMOL/L (ref 136–145)
TRIGL SERPL-MCNC: 81 MG/DL
VLDLC SERPL CALC-MCNC: 16.2 MG/DL
WBC # BLD AUTO: 3.8 K/UL (ref 3.6–11)

## 2024-05-09 NOTE — RESULT ENCOUNTER NOTE
Results released to patient via [employer].  All labs are stable or at goal for her. HDL to high to calculate 10 yr ASCVD.  MCV improving.      2/21/17   Cholesterol, total = 277    Triglyceride = 72  HDL = 112  LDL = 151

## 2024-05-22 ENCOUNTER — HOSPITAL ENCOUNTER (OUTPATIENT)
Age: 74
Discharge: HOME | End: 2024-05-25
Payer: MEDICARE

## 2024-05-22 DIAGNOSIS — M85.89 OSTEOPENIA OF MULTIPLE SITES: ICD-10-CM

## 2024-05-22 PROCEDURE — 77080 DXA BONE DENSITY AXIAL: CPT

## 2024-06-15 NOTE — PROGRESS NOTES
[facility] [place] [facility]  [address] [place] [address], [address] [place] [address]  W: [phone]  F: [phone]    Reason for Visit:   Niharika Lynn is a 73 y.o. female who is seen for fu breast cancer    Treatment History:      Abnormal screening mammo 6/3/21: Bilateral digital screening mammography was performed and is  interpreted in conjunction with a computer assisted detection (CAD) system. In the left  breast, no suspicious masses or calcifications are identified. The right breast upper outer quadrant at 10:00 posterior depth 9 cm from the nipple  there is a focal asymmetry    Right Dx Mammo/US 6/7/21: Further evaluation was performed for a right breast focal asymmetry. In the right breast upper outer quadrant at 10:00 posterior depth there is an equal density irregular mass with indistinct margins measuring up to 1.7 cm. In  the right breast at 10:00 10 cm from the nipple there is a 1.6 x 0.9 x 1.0 cm hypoechoic oval mass with indistinct margins, internal vascularity, and posterior acoustic enhancement    Right Breast Biopsy 6/18/21: PATH - Invasive ductal carcinoma, Reeders histologic grade 3. Largest glass slide measurement of invasive carcinoma:  6 mm     ER/KY negative, HER2 FISH +    Ki67 90%    Right Lumpectomy 8/3/2: PATH - 20 mm IDC with negative LNs. Margins negative    Adjuvant weekly Taxol + Herceptin 8/31/21 - 11/16/21    Maintenance subQ Herceptin 11/23/21 - 8/2/22    XRT with Dr. Corrales 12/31/21 - 2/9/22      STAGE: Pathologic T1cN0, ER/KY negative, HER2+    History of Present Illness:     Pt seen today for office fu breast cancer.   Not on any therapy from here.   No new breast issues  Doing well overall.   No fevers/ chills/ chest pain/ SOB/ nausea/ vomiting/diarrhea/ pain/fatigue        Past Medical History:   Diagnosis Date    Arthritis     left ankle    Breast cancer (HCC) 08/03/2021    Right lumpectomy    Calculus of kidney 09/14/2018    Passed    Chemotherapy

## 2024-06-19 ENCOUNTER — OFFICE VISIT (OUTPATIENT)
Age: 74
End: 2024-06-19
Payer: MEDICARE

## 2024-06-19 VITALS
BODY MASS INDEX: 28.19 KG/M2 | DIASTOLIC BLOOD PRESSURE: 85 MMHG | RESPIRATION RATE: 18 BRPM | WEIGHT: 152 LBS | OXYGEN SATURATION: 98 % | TEMPERATURE: 98 F | HEART RATE: 75 BPM | SYSTOLIC BLOOD PRESSURE: 136 MMHG

## 2024-06-19 DIAGNOSIS — Z98.890 HISTORY OF LUMPECTOMY OF RIGHT BREAST: ICD-10-CM

## 2024-06-19 DIAGNOSIS — C50.911 INFILTRATING DUCTAL CARCINOMA OF RIGHT BREAST (HCC): Primary | ICD-10-CM

## 2024-06-19 PROBLEM — F33.9 MAJOR DEPRESSIVE DISORDER, RECURRENT, UNSPECIFIED (HCC): Status: ACTIVE | Noted: 2024-06-19

## 2024-06-19 PROBLEM — F33.1 MAJOR DEPRESSIVE DISORDER, RECURRENT, MODERATE (HCC): Status: ACTIVE | Noted: 2024-06-19

## 2024-06-19 PROBLEM — F33.0 MAJOR DEPRESSIVE DISORDER, RECURRENT, MILD (HCC): Status: ACTIVE | Noted: 2024-06-19

## 2024-06-19 PROCEDURE — 99212 OFFICE O/P EST SF 10 MIN: CPT | Performed by: INTERNAL MEDICINE

## 2024-06-19 NOTE — PROGRESS NOTES
Niharika Lynn is a 73 y.o. female    Chief Complaint   Patient presents with    Follow-up     breast cancer     1. Have you been to the ER, urgent care clinic since your last visit?  Hospitalized since your last visit?No    2. Have you seen or consulted any other health care providers outside of the [facility] since your last visit?  Include any pap smears or colon screening. No

## 2024-07-12 ENCOUNTER — TELEPHONE (OUTPATIENT)
Age: 74
End: 2024-07-12

## 2024-07-12 RX ORDER — DICLOFENAC SODIUM 75 MG/1
75 TABLET, DELAYED RELEASE ORAL 2 TIMES DAILY PRN
Qty: 60 TABLET | Refills: 3 | Status: SHIPPED | OUTPATIENT
Start: 2024-07-12

## 2024-07-12 NOTE — TELEPHONE ENCOUNTER
Medication Refill Request    Niharika Lynn is requesting a refill of the following medication(s):   diclofenac (VOLTAREN) 75 MG EC tablet               Please send refill to:     [facility] [id number] - [place], [place] - [address] [facility] [phone] - F [phone]  [address] [facility] [address]  Phone: [phone] Fax: [phone]

## 2024-08-14 RX ORDER — DULOXETIN HYDROCHLORIDE 20 MG/1
CAPSULE, DELAYED RELEASE ORAL
Qty: 90 CAPSULE | Refills: 3 | Status: SHIPPED | OUTPATIENT
Start: 2024-08-14

## 2024-08-14 NOTE — TELEPHONE ENCOUNTER
Chief Complaint   Patient presents with    Medication Refill     Last Appointment with Dr. Timoteo Muse: 5/7/24    Future Appointments   Date Time Provider Department Center   8/20/2024 10:05 AM [facility] 5 [facility]   9/5/2024 11:30 AM Allyson Roberts, APRN - NP [facility] BS AMB   5/13/2025 10:20 AM Timoteo Muse MD [facility]   6/19/2025 11:00 AM Rere Hays DO [facility] BS AMB     [facility]

## 2024-08-20 ENCOUNTER — HOSPITAL ENCOUNTER (OUTPATIENT)
Facility: HOSPITAL | Age: 74
Discharge: HOME | End: 2024-08-23
Payer: MEDICARE

## 2024-08-20 VITALS — HEIGHT: 64 IN | WEIGHT: 150 LBS | BODY MASS INDEX: 25.61 KG/M2

## 2024-08-20 DIAGNOSIS — Z85.3 HISTORY OF BREAST CANCER IN FEMALE: ICD-10-CM

## 2024-08-20 PROCEDURE — G0279 TOMOSYNTHESIS, MAMMO: HCPCS

## 2024-09-05 ENCOUNTER — OFFICE VISIT (OUTPATIENT)
Age: 74
End: 2024-09-05
Payer: MEDICARE

## 2024-09-05 VITALS — HEIGHT: 64 IN | BODY MASS INDEX: 25.61 KG/M2 | WEIGHT: 150 LBS

## 2024-09-05 DIAGNOSIS — Z85.3 HISTORY OF BREAST CANCER IN FEMALE: ICD-10-CM

## 2024-09-05 DIAGNOSIS — C50.411 MALIGNANT NEOPLASM OF UPPER-OUTER QUADRANT OF RIGHT BREAST IN FEMALE, ESTROGEN RECEPTOR NEGATIVE (HCC): Primary | ICD-10-CM

## 2024-09-05 DIAGNOSIS — Z98.890 S/P LUMPECTOMY OF BREAST: ICD-10-CM

## 2024-09-05 DIAGNOSIS — Z17.1 MALIGNANT NEOPLASM OF UPPER-OUTER QUADRANT OF RIGHT BREAST IN FEMALE, ESTROGEN RECEPTOR NEGATIVE (HCC): Primary | ICD-10-CM

## 2024-09-05 DIAGNOSIS — Z92.3 S/P RADIATION THERAPY: ICD-10-CM

## 2024-09-05 PROCEDURE — 1123F ACP DISCUSS/DSCN MKR DOCD: CPT | Performed by: NURSE PRACTITIONER

## 2024-09-05 PROCEDURE — 99213 OFFICE O/P EST LOW 20 MIN: CPT | Performed by: NURSE PRACTITIONER

## 2024-09-05 NOTE — PROGRESS NOTES
HISTORY OF PRESENT ILLNESS  Niharika Lynn is a 73 y.o. female     HPI  Established patient presents for follow-up for RIGHT breast cancer.  Denies breast mass, skin changes, nipple discharge and pain.          Breast history -   Referring - Dr. Timoteo Muse  21 - Mammogram/US - BIRADS 4, RIGHT breast @10:00, 1.6 x 0.9 x 1.0 cm  21 - RIGHT breast biopsy - IDC, ER 0, AK 0, HER2 equiv, FISH positive, ki-67 90%  8/3/21 - RIGHT breast lumpectomy and SLNB, port placement - Dr. Gaines   2.0 cm IDC, node negative - T1cN0  Adjuvant weekly Taxol + Herceptin 21 - 21 - Dr. Hays  Maintenance subQ Herceptin started 21  XRT - 21 - 22 - Dr. Corrales  2022 - Completed maintenance Herceptin        Family history -   Mother - brain cancer  Sister - spinal cancer  Denies FH of breast or ovarian cancer.    Unsure what type of cancer her maternal grandmother had.  She  in her 40's.          OB History               Para        Term                AB        Living             SAB        IAB        Ectopic        Molar        Multiple        Live Births   1          Obstetric Comments   Menarche 13, LMP , # of children 1, age of 1st delivery 31, Hysterectomy/oophorectomy NO/NO, Breast bx YES, history of breast feeding yes, BCP yes, Hormone therapy no                   Past Surgical History:   Procedure Laterality Date    ADENOIDECTOMY      ANKLE FRACTURE SURGERY Left     surgery - has a plate a screws    BIOPSY OF BREAST, NEEDLE CORE Right 2021    BREAST LUMPECTOMY Right 2021    COLONOSCOPY N/A 2020    normal - performed by Lora Beasley MD at [facility] ENDOSCOPY    COLONOSCOPY N/A 2019    tubular adenoma, repeat 1 yr d/t poor prep - performed by Lora Beasley MD at [facility] ENDOSCOPY;     DILATION AND CURETTAGE OF UTERUS      FRACTURE SURGERY  2009    Left ankle    GI      COLONOSCOPY    OTHER SURGICAL HISTORY  2022    port

## 2024-11-25 ENCOUNTER — OFFICE VISIT (OUTPATIENT)
Age: 74
End: 2024-11-25
Payer: MEDICARE

## 2024-11-25 VITALS
DIASTOLIC BLOOD PRESSURE: 70 MMHG | BODY MASS INDEX: 25.95 KG/M2 | HEIGHT: 64 IN | TEMPERATURE: 97.1 F | RESPIRATION RATE: 16 BRPM | SYSTOLIC BLOOD PRESSURE: 110 MMHG | WEIGHT: 152 LBS | OXYGEN SATURATION: 100 % | HEART RATE: 88 BPM

## 2024-11-25 DIAGNOSIS — R30.0 DYSURIA: Primary | ICD-10-CM

## 2024-11-25 DIAGNOSIS — M54.50 ACUTE BILATERAL LOW BACK PAIN WITHOUT SCIATICA: ICD-10-CM

## 2024-11-25 DIAGNOSIS — N39.0 URINARY TRACT INFECTION WITHOUT HEMATURIA, SITE UNSPECIFIED: ICD-10-CM

## 2024-11-25 LAB
BILIRUBIN, URINE, POC: POSITIVE
BLOOD URINE, POC: NEGATIVE
GLUCOSE URINE, POC: NEGATIVE
KETONES, URINE, POC: NEGATIVE
LEUKOCYTE ESTERASE, URINE, POC: ABNORMAL
NITRITE, URINE, POC: NEGATIVE
PH, URINE, POC: 6.5 (ref 4.6–8)
PROTEIN,URINE, POC: ABNORMAL
SPECIFIC GRAVITY, URINE, POC: 1 (ref 1–1.03)
URINALYSIS CLARITY, POC: CLEAR
URINALYSIS COLOR, POC: YELLOW
UROBILINOGEN, POC: NORMAL MG/DL

## 2024-11-25 PROCEDURE — PBSHW AMB POC URINALYSIS DIP STICK AUTO W/ MICRO: Performed by: INTERNAL MEDICINE

## 2024-11-25 PROCEDURE — 1159F MED LIST DOCD IN RCRD: CPT | Performed by: INTERNAL MEDICINE

## 2024-11-25 PROCEDURE — 1125F AMNT PAIN NOTED PAIN PRSNT: CPT | Performed by: INTERNAL MEDICINE

## 2024-11-25 PROCEDURE — 1123F ACP DISCUSS/DSCN MKR DOCD: CPT | Performed by: INTERNAL MEDICINE

## 2024-11-25 PROCEDURE — 81001 URINALYSIS AUTO W/SCOPE: CPT | Performed by: INTERNAL MEDICINE

## 2024-11-25 PROCEDURE — 99213 OFFICE O/P EST LOW 20 MIN: CPT | Performed by: INTERNAL MEDICINE

## 2024-11-25 NOTE — PATIENT INSTRUCTIONS
Ice for 20 minutes followed by heat for 20 minutes  Stretch for 10-15 minutes per day for next week  Tylenol arthritis 2 tablet every 8 hours for 5 days

## 2024-11-25 NOTE — PROGRESS NOTES
Assessment/Plan:      Diagnosis Orders   1. Dysuria        2. Urinary tract infection without hematuria, site unspecified  AMB POC URINALYSIS DIP STICK AUTO W/ MICRO    Culture, Urine      3. Acute bilateral low back pain without sciatica  External Referral To Physical Therapy        Plan:  -suspect pain in low back is muscular in nature - recommended use of tylenol arthritis every 8 hours for next 5 days. Recommended physical therapy.  -urinalysis was positive for leukocytes.  Urine sent for culture at this time.      Orders Placed This Encounter    Culture, Urine     Standing Status:   Future     Standing Expiration Date:   11/25/2025     Order Specific Question:   Specify (ex-cath, midstream, cysto, etc)?     Answer:   midstream    External Referral To Physical Therapy     Referral Priority:   Routine     Referral Type:   Eval and Treat     Referral Reason:   Specialty Services Required     Requested Specialty:   Physical Therapy     Number of Visits Requested:   1    AMB POC URINALYSIS DIP STICK AUTO W/ MICRO                Disclaimer:  Return if symptoms worsen or fail to improve.   Advised patient to call back or return to office if symptoms worsen/change/persist.     Discussed expected course/resolution/complications of diagnosis in detail with patient.   Medication risks/benefits/costs/interactions/alternatives discussed with patient.   Patient was given an after visit summary which includes diagnoses, current medications, & vitals.   Patient expressed understanding with the diagnosis and plan.        Subjective:      Niharika Lynn is a 73 y.o. female who presents today for low back pain.    -started about 5 days ago. No trauma.  Pain located in bilateral lower back.  Right side greater than left.  No radiation of pain. No fever or chills. Completed a course of steroid 3 days ago for arthritis in her ankle, given by her podiatrist.  It did not help her back pain.      -had similar pain with kidney stones

## 2024-11-26 LAB
BACTERIA SPEC CULT: NORMAL
CC UR VC: NORMAL
SERVICE CMNT-IMP: NORMAL

## 2024-12-02 ENCOUNTER — TELEPHONE (OUTPATIENT)
Age: 74
End: 2024-12-02

## 2024-12-02 RX ORDER — TIZANIDINE 2 MG/1
2 TABLET ORAL NIGHTLY PRN
Qty: 7 TABLET | Refills: 0 | Status: SHIPPED | OUTPATIENT
Start: 2024-12-02

## 2024-12-02 NOTE — TELEPHONE ENCOUNTER
Patient saw Dr. Shine 1 week ago for back pain.  Has been using ice and heat as directed and taking Extra Strength Tylenol, but the pain is still very bad.  With the holidays, has not scheduled PT, but is planning to. Wonders if Dr. Shine might send in a prescription for Flexeril to help until she can get in to PT.  Please let her know.    [facility].

## 2024-12-30 RX ORDER — DICLOFENAC SODIUM 75 MG/1
75 TABLET, DELAYED RELEASE ORAL 2 TIMES DAILY PRN
Qty: 60 TABLET | Refills: 3 | Status: SHIPPED | OUTPATIENT
Start: 2024-12-30

## 2024-12-30 NOTE — TELEPHONE ENCOUNTER
Chief Complaint   Patient presents with    Medication Refill     Last Appointment with Dr. Timoteo Muse:  5/7/2024   Future Appointments   Date Time Provider Department Center   5/13/2025 10:20 AM Timoteo Muse MD [facility] DEP   6/19/2025 11:00 AM Rere Hays DO [facility] BS AMB   9/9/2025 11:15 AM Allyson Roberts, APRN - NP [facility] BS AMB

## 2024-12-30 NOTE — TELEPHONE ENCOUNTER
Medication Refill Request    Niharika Lynn is requesting a refill of the following medication(s):     Diclofenac (Voltaren) 75 MG EC tablet    Please send refill to:     [facility] [id number] - [place] - [address] [facility] [phone] - F [phone]  [address] [facility] [address]  Phone: [phone] Fax: [phone]

## 2024-12-31 NOTE — TELEPHONE ENCOUNTER
Future Appointments   Date Time Provider Department Center   5/13/2025 10:20 AM Timoteo Muse MD [facility] BS [facility] DEP   6/19/2025 11:00 AM Rere Hays DO [facility] BS AMB   9/9/2025 11:15 AM Allsyon Roberts APRN - NP [facility] BS AMB

## 2025-04-30 ENCOUNTER — TELEPHONE (OUTPATIENT)
Age: 75
End: 2025-04-30

## 2025-04-30 RX ORDER — DICLOFENAC SODIUM 75 MG/1
75 TABLET, DELAYED RELEASE ORAL 2 TIMES DAILY PRN
Qty: 60 TABLET | Refills: 0 | Status: SHIPPED | OUTPATIENT
Start: 2025-04-30

## 2025-04-30 NOTE — TELEPHONE ENCOUNTER
Medication Refill Request    Niharika Lynn is requesting a refill of the following medication(s):     diclofenac (VOLTAREN) 75 MG EC tablet     Please send refill to:     [facility] [id number] - [place] - [address] [facility] [phone] - F [phone]  [address] [facility] [address]  Phone: [phone] Fax: [phone]

## 2025-05-09 SDOH — HEALTH STABILITY: PHYSICAL HEALTH: ON AVERAGE, HOW MANY DAYS PER WEEK DO YOU ENGAGE IN MODERATE TO STRENUOUS EXERCISE (LIKE A BRISK WALK)?: 1 DAY

## 2025-05-09 SDOH — HEALTH STABILITY: PHYSICAL HEALTH: ON AVERAGE, HOW MANY MINUTES DO YOU ENGAGE IN EXERCISE AT THIS LEVEL?: 10 MIN

## 2025-05-09 ASSESSMENT — LIFESTYLE VARIABLES
HOW OFTEN DURING THE LAST YEAR HAVE YOU BEEN UNABLE TO REMEMBER WHAT HAPPENED THE NIGHT BEFORE BECAUSE YOU HAD BEEN DRINKING: LESS THAN MONTHLY
HOW OFTEN DO YOU HAVE A DRINK CONTAINING ALCOHOL: 4 OR MORE TIMES A WEEK
HOW OFTEN DURING THE LAST YEAR HAVE YOU NEEDED AN ALCOHOLIC DRINK FIRST THING IN THE MORNING TO GET YOURSELF GOING AFTER A NIGHT OF HEAVY DRINKING: NEVER
HAS A RELATIVE, FRIEND, DOCTOR, OR ANOTHER HEALTH PROFESSIONAL EXPRESSED CONCERN ABOUT YOUR DRINKING OR SUGGESTED YOU CUT DOWN: NO
HOW MANY STANDARD DRINKS CONTAINING ALCOHOL DO YOU HAVE ON A TYPICAL DAY: 1 OR 2
HOW OFTEN DURING THE LAST YEAR HAVE YOU FAILED TO DO WHAT WAS NORMALLY EXPECTED FROM YOU BECAUSE OF DRINKING: NEVER
HAVE YOU OR SOMEONE ELSE BEEN INJURED AS A RESULT OF YOUR DRINKING: NO
HOW OFTEN DURING THE LAST YEAR HAVE YOU HAD A FEELING OF GUILT OR REMORSE AFTER DRINKING: NEVER
HOW OFTEN DO YOU HAVE SIX OR MORE DRINKS ON ONE OCCASION: 1
HOW OFTEN DURING THE LAST YEAR HAVE YOU FAILED TO DO WHAT WAS NORMALLY EXPECTED FROM YOU BECAUSE OF DRINKING: NEVER
HOW MANY STANDARD DRINKS CONTAINING ALCOHOL DO YOU HAVE ON A TYPICAL DAY: 1
HOW OFTEN DO YOU HAVE A DRINK CONTAINING ALCOHOL: 5
HOW OFTEN DURING THE LAST YEAR HAVE YOU NEEDED AN ALCOHOLIC DRINK FIRST THING IN THE MORNING TO GET YOURSELF GOING AFTER A NIGHT OF HEAVY DRINKING: NEVER
HOW OFTEN DURING THE LAST YEAR HAVE YOU BEEN UNABLE TO REMEMBER WHAT HAPPENED THE NIGHT BEFORE BECAUSE YOU HAD BEEN DRINKING: LESS THAN MONTHLY
HAVE YOU OR SOMEONE ELSE BEEN INJURED AS A RESULT OF YOUR DRINKING: NO
HAS A RELATIVE, FRIEND, DOCTOR, OR ANOTHER HEALTH PROFESSIONAL EXPRESSED CONCERN ABOUT YOUR DRINKING OR SUGGESTED YOU CUT DOWN: NO
HOW OFTEN DURING THE LAST YEAR HAVE YOU HAD A FEELING OF GUILT OR REMORSE AFTER DRINKING: NEVER
HOW OFTEN DURING THE LAST YEAR HAVE YOU FOUND THAT YOU WERE NOT ABLE TO STOP DRINKING ONCE YOU HAD STARTED: NEVER
HOW OFTEN DURING THE LAST YEAR HAVE YOU FOUND THAT YOU WERE NOT ABLE TO STOP DRINKING ONCE YOU HAD STARTED: NEVER

## 2025-05-09 ASSESSMENT — PATIENT HEALTH QUESTIONNAIRE - PHQ9
1. LITTLE INTEREST OR PLEASURE IN DOING THINGS: NOT AT ALL
SUM OF ALL RESPONSES TO PHQ QUESTIONS 1-9: 0
2. FEELING DOWN, DEPRESSED OR HOPELESS: NOT AT ALL

## 2025-05-13 ENCOUNTER — OFFICE VISIT (OUTPATIENT)
Age: 75
End: 2025-05-13
Payer: MEDICARE

## 2025-05-13 VITALS
HEART RATE: 80 BPM | OXYGEN SATURATION: 98 % | HEIGHT: 62 IN | SYSTOLIC BLOOD PRESSURE: 152 MMHG | BODY MASS INDEX: 27.57 KG/M2 | DIASTOLIC BLOOD PRESSURE: 96 MMHG | WEIGHT: 149.8 LBS | RESPIRATION RATE: 16 BRPM | TEMPERATURE: 97.6 F

## 2025-05-13 DIAGNOSIS — M85.89 OSTEOPENIA OF MULTIPLE SITES: ICD-10-CM

## 2025-05-13 DIAGNOSIS — Z12.11 COLON CANCER SCREENING: ICD-10-CM

## 2025-05-13 DIAGNOSIS — Z71.89 ADVANCED CARE PLANNING/COUNSELING DISCUSSION: ICD-10-CM

## 2025-05-13 DIAGNOSIS — F33.42 RECURRENT MAJOR DEPRESSIVE DISORDER, IN FULL REMISSION: ICD-10-CM

## 2025-05-13 DIAGNOSIS — R45.89 ANXIETY ABOUT HEALTH: ICD-10-CM

## 2025-05-13 DIAGNOSIS — M79.604 RIGHT LEG PAIN: ICD-10-CM

## 2025-05-13 DIAGNOSIS — R03.0 ELEVATED BLOOD PRESSURE READING: ICD-10-CM

## 2025-05-13 DIAGNOSIS — C50.911 INFILTRATING DUCTAL CARCINOMA OF RIGHT BREAST (HCC): ICD-10-CM

## 2025-05-13 DIAGNOSIS — Z00.00 MEDICARE ANNUAL WELLNESS VISIT, SUBSEQUENT: Primary | ICD-10-CM

## 2025-05-13 PROBLEM — F33.1 MAJOR DEPRESSIVE DISORDER, RECURRENT, MODERATE (HCC): Status: RESOLVED | Noted: 2024-06-19 | Resolved: 2025-05-13

## 2025-05-13 PROBLEM — F33.9 MAJOR DEPRESSIVE DISORDER, RECURRENT, UNSPECIFIED: Status: RESOLVED | Noted: 2024-06-19 | Resolved: 2025-05-13

## 2025-05-13 PROCEDURE — G0439 PPPS, SUBSEQ VISIT: HCPCS | Performed by: INTERNAL MEDICINE

## 2025-05-13 PROCEDURE — 1160F RVW MEDS BY RX/DR IN RCRD: CPT | Performed by: INTERNAL MEDICINE

## 2025-05-13 PROCEDURE — 99214 OFFICE O/P EST MOD 30 MIN: CPT | Performed by: INTERNAL MEDICINE

## 2025-05-13 PROCEDURE — 1159F MED LIST DOCD IN RCRD: CPT | Performed by: INTERNAL MEDICINE

## 2025-05-13 PROCEDURE — 1125F AMNT PAIN NOTED PAIN PRSNT: CPT | Performed by: INTERNAL MEDICINE

## 2025-05-13 PROCEDURE — G2211 COMPLEX E/M VISIT ADD ON: HCPCS | Performed by: INTERNAL MEDICINE

## 2025-05-13 PROCEDURE — 1123F ACP DISCUSS/DSCN MKR DOCD: CPT | Performed by: INTERNAL MEDICINE

## 2025-05-13 RX ORDER — ALENDRONATE SODIUM 70 MG/1
70 TABLET ORAL
Qty: 13 TABLET | Refills: 3 | Status: SHIPPED | OUTPATIENT
Start: 2025-05-13

## 2025-05-13 SDOH — ECONOMIC STABILITY: FOOD INSECURITY: WITHIN THE PAST 12 MONTHS, YOU WORRIED THAT YOUR FOOD WOULD RUN OUT BEFORE YOU GOT MONEY TO BUY MORE.: NEVER TRUE

## 2025-05-13 SDOH — ECONOMIC STABILITY: FOOD INSECURITY: WITHIN THE PAST 12 MONTHS, THE FOOD YOU BOUGHT JUST DIDN'T LAST AND YOU DIDN'T HAVE MONEY TO GET MORE.: NEVER TRUE

## 2025-05-13 ASSESSMENT — PATIENT HEALTH QUESTIONNAIRE - PHQ9
4. FEELING TIRED OR HAVING LITTLE ENERGY: NOT AT ALL
5. POOR APPETITE OR OVEREATING: NOT AT ALL
SUM OF ALL RESPONSES TO PHQ QUESTIONS 1-9: 0
10. IF YOU CHECKED OFF ANY PROBLEMS, HOW DIFFICULT HAVE THESE PROBLEMS MADE IT FOR YOU TO DO YOUR WORK, TAKE CARE OF THINGS AT HOME, OR GET ALONG WITH OTHER PEOPLE: NOT DIFFICULT AT ALL
6. FEELING BAD ABOUT YOURSELF - OR THAT YOU ARE A FAILURE OR HAVE LET YOURSELF OR YOUR FAMILY DOWN: NOT AT ALL
7. TROUBLE CONCENTRATING ON THINGS, SUCH AS READING THE NEWSPAPER OR WATCHING TELEVISION: NOT AT ALL
8. MOVING OR SPEAKING SO SLOWLY THAT OTHER PEOPLE COULD HAVE NOTICED. OR THE OPPOSITE, BEING SO FIGETY OR RESTLESS THAT YOU HAVE BEEN MOVING AROUND A LOT MORE THAN USUAL: NOT AT ALL
SUM OF ALL RESPONSES TO PHQ QUESTIONS 1-9: 0
9. THOUGHTS THAT YOU WOULD BE BETTER OFF DEAD, OR OF HURTING YOURSELF: NOT AT ALL
1. LITTLE INTEREST OR PLEASURE IN DOING THINGS: NOT AT ALL
SUM OF ALL RESPONSES TO PHQ QUESTIONS 1-9: 0
2. FEELING DOWN, DEPRESSED OR HOPELESS: NOT AT ALL
3. TROUBLE FALLING OR STAYING ASLEEP: NOT AT ALL
SUM OF ALL RESPONSES TO PHQ QUESTIONS 1-9: 0

## 2025-05-13 ASSESSMENT — ANXIETY QUESTIONNAIRES
5. BEING SO RESTLESS THAT IT IS HARD TO SIT STILL: NOT AT ALL
7. FEELING AFRAID AS IF SOMETHING AWFUL MIGHT HAPPEN: NOT AT ALL
2. NOT BEING ABLE TO STOP OR CONTROL WORRYING: NEARLY EVERY DAY
1. FEELING NERVOUS, ANXIOUS, OR ON EDGE: SEVERAL DAYS
3. WORRYING TOO MUCH ABOUT DIFFERENT THINGS: NEARLY EVERY DAY
4. TROUBLE RELAXING: NOT AT ALL
6. BECOMING EASILY ANNOYED OR IRRITABLE: NOT AT ALL
IF YOU CHECKED OFF ANY PROBLEMS ON THIS QUESTIONNAIRE, HOW DIFFICULT HAVE THESE PROBLEMS MADE IT FOR YOU TO DO YOUR WORK, TAKE CARE OF THINGS AT HOME, OR GET ALONG WITH OTHER PEOPLE: SOMEWHAT DIFFICULT
GAD7 TOTAL SCORE: 7

## 2025-05-13 NOTE — PROGRESS NOTES
Medicare Annual Wellness Visit    Niharika Lynn is here for Medicare AWV    Assessment & Plan   Medicare annual wellness visit, subsequent  Advanced care planning/counseling discussion  Infiltrating ductal carcinoma of right breast (HCC)  Assessment & Plan:  Due for yearly MRI, nothing scheduled, she has f/u with oncologist next month. Monitored by specialist- no acute findings meriting change in the plan  Colon cancer screening  -     [facility] - [facility]  Anxiety about health  Assessment & Plan:  stable and well controlled, I reviewed treatment options with her, I reviewed life style changes to help improve mood, continue current treatment.  We did review when to consider increasing dose of Cymbalta but she feels her mood is ideal 90% of the time, so no changes.  Recurrent major depressive disorder, in full remission  Assessment & Plan:  stable and well controlled, I reviewed treatment options with her, I reviewed life style changes to help improve mood, continue current treatment.  We did review when to consider increasing dose of Cymbalta but she feels her mood is ideal 90% of the time, so no changes.  Osteopenia of multiple sites  Assessment & Plan:  Asymptomatic, reviewed imaging, reviewed medication options (Prolia va Fosamax) and she wants to restart Fosamax.   Orders:  -     alendronate (FOSAMAX) 70 MG tablet; Take 1 tablet by mouth every 7 days, Disp-13 tablet, R-3Normal  -     Comprehensive Metabolic Panel; Future  -     CBC with Auto Differential; Future  -     Vitamin D 25 Hydroxy; Future  Right leg pain  Comments:  new dx, differential reviewed, favor hip bursitis +/- L4 radiculitis. Topical treatments & stretching. If no improvement in 3-4 wks will need hip xray. See AVS  Elevated blood pressure reading  Comments:  new diagnosis, elevated today, normally control is better. Will follow up on BP at oncology visit in June, and if still high will need to

## 2025-05-13 NOTE — PATIENT INSTRUCTIONS
muscles.  Slowly slide down until your knees are slightly bent, pressing your lower back into the wall.  Hold for at least 6 seconds, then slide back up the wall.  Repeat 8 to 12 times.  Over time, work up to holding this position for as much as 1 minute.  Follow-up care is a key part of your treatment and safety. Be sure to make and go to all appointments, and call your doctor if you are having problems. It's also a good idea to know your test results and keep a list of the medicines you take.  Current as of: July 31, 2024  Content Version: 14.4  © 2024-2025 [employer].   Care instructions adapted under license by [employer]. If you have questions about a medical condition or this instruction, always ask your healthcare professional. [employer] disclaims any warranty or liability for your use of this information.     Patient Education        Hip Bursitis: Exercises  Introduction  Here are some examples of exercises for you to try. The exercises may be suggested for a condition or for rehabilitation. Start each exercise slowly. Ease off the exercises if you start to have pain.  You will be told when to start these exercises and which ones will work best for you.  How to do the exercises  Hip external rotator stretch    Lie on your back with both knees bent and your feet flat on the floor.  Put the ankle of your affected leg on your opposite thigh near your knee.  Use your opposite hand to gently pull your knee across your body toward your shoulder. For example, to stretch your left hip, use your right hand to pull your left knee toward your right shoulder.  Hold the stretch for 15 to 30 seconds.  Repeat 2 to 4 times.  It's a good idea to repeat these steps with your other leg.  Iliotibial band stretch    Stand a few inches from a wall, with your affected hip toward the wall. If you are not steady on your feet, hold on to a chair or counter.  Stand on the leg with the affected hip. Then

## 2025-05-13 NOTE — ASSESSMENT & PLAN NOTE
Due for yearly MRI, nothing scheduled, she has f/u with oncologist next month. Monitored by specialist- no acute findings meriting change in the plan

## 2025-05-13 NOTE — ASSESSMENT & PLAN NOTE
Asymptomatic, reviewed imaging, reviewed medication options (Prolia va Fosamax) and she wants to restart Fosamax.

## 2025-05-13 NOTE — ASSESSMENT & PLAN NOTE
stable and well controlled, I reviewed treatment options with her, I reviewed life style changes to help improve mood, continue current treatment.  We did review when to consider increasing dose of Cymbalta but she feels her mood is ideal 90% of the time, so no changes.

## 2025-05-13 NOTE — ACP (ADVANCE CARE PLANNING)
Advance Care Planning   Advance Care Planning (ACP) Physician/NP/PA (Provider) Conversation    Date of ACP Conversation: 05/13/25  Persons included in Conversation:  patient  Length of ACP Conversation in minutes: <16 minutes (Non-Billable)    We discussed the patient’s choices for care and treatment preferences in case of a health event that adversely affects decision-making abilities or is life-limiting. We discusses the differences between FULL CODE and DNR and when to consider a change in code status.  The limitations with CPR were reviewed.    Has ACP document(s) on file - reflects the patient's care preferences.  She confirms current DNR status - completed forms on file (place new order if needed)    The patient has appointed the following active healthcare agents:    Primary Decision Maker: Stevie Lynn - Spouse - [phone]    Secondary Decision Maker: Nadja Fonseca - Child - [phone]     Timoteo Muse MD

## 2025-05-14 ENCOUNTER — RESULTS FOLLOW-UP (OUTPATIENT)
Age: 75
End: 2025-05-14

## 2025-05-14 LAB
25(OH)D3 SERPL-MCNC: 45.5 NG/ML (ref 30–100)
ALBUMIN SERPL-MCNC: 4.1 G/DL (ref 3.5–5)
ALBUMIN/GLOB SERPL: 1.3 (ref 1.1–2.2)
ALP SERPL-CCNC: 80 U/L (ref 45–117)
ALT SERPL-CCNC: 42 U/L (ref 12–78)
ANION GAP SERPL CALC-SCNC: 7 MMOL/L (ref 2–12)
AST SERPL-CCNC: 35 U/L (ref 15–37)
BASOPHILS # BLD: 0.02 K/UL (ref 0–0.1)
BASOPHILS NFR BLD: 0.4 % (ref 0–1)
BILIRUB SERPL-MCNC: 0.5 MG/DL (ref 0.2–1)
BUN SERPL-MCNC: 20 MG/DL (ref 6–20)
BUN/CREAT SERPL: 26 (ref 12–20)
CALCIUM SERPL-MCNC: 10.5 MG/DL (ref 8.5–10.1)
CHLORIDE SERPL-SCNC: 104 MMOL/L (ref 97–108)
CO2 SERPL-SCNC: 29 MMOL/L (ref 21–32)
CREAT SERPL-MCNC: 0.77 MG/DL (ref 0.55–1.02)
DIFFERENTIAL METHOD BLD: ABNORMAL
EOSINOPHIL # BLD: 0.08 K/UL (ref 0–0.4)
EOSINOPHIL NFR BLD: 1.7 % (ref 0–7)
ERYTHROCYTE [DISTWIDTH] IN BLOOD BY AUTOMATED COUNT: 12.7 % (ref 11.5–14.5)
GLOBULIN SER CALC-MCNC: 3.1 G/DL (ref 2–4)
GLUCOSE SERPL-MCNC: 85 MG/DL (ref 65–100)
HCT VFR BLD AUTO: 41.1 % (ref 35–47)
HGB BLD-MCNC: 13.8 G/DL (ref 11.5–16)
IMM GRANULOCYTES # BLD AUTO: 0 K/UL (ref 0–0.04)
IMM GRANULOCYTES NFR BLD AUTO: 0 % (ref 0–0.5)
LYMPHOCYTES # BLD: 1.17 K/UL (ref 0.8–3.5)
LYMPHOCYTES NFR BLD: 24.7 % (ref 12–49)
MCH RBC QN AUTO: 33.8 PG (ref 26–34)
MCHC RBC AUTO-ENTMCNC: 33.6 G/DL (ref 30–36.5)
MCV RBC AUTO: 100.7 FL (ref 80–99)
MONOCYTES # BLD: 0.34 K/UL (ref 0–1)
MONOCYTES NFR BLD: 7.2 % (ref 5–13)
NEUTS SEG # BLD: 3.13 K/UL (ref 1.8–8)
NEUTS SEG NFR BLD: 66 % (ref 32–75)
NRBC # BLD: 0 K/UL (ref 0–0.01)
NRBC BLD-RTO: 0 PER 100 WBC
PLATELET # BLD AUTO: 183 K/UL (ref 150–400)
PMV BLD AUTO: 11.4 FL (ref 8.9–12.9)
POTASSIUM SERPL-SCNC: 5.1 MMOL/L (ref 3.5–5.1)
PROT SERPL-MCNC: 7.2 G/DL (ref 6.4–8.2)
RBC # BLD AUTO: 4.08 M/UL (ref 3.8–5.2)
SODIUM SERPL-SCNC: 140 MMOL/L (ref 136–145)
WBC # BLD AUTO: 4.7 K/UL (ref 3.6–11)

## 2025-05-15 NOTE — RESULT ENCOUNTER NOTE
Results released to patient via [employer].  All labs are stable or at goal for her. Calcium is elevated, 1st time, will monitor.

## 2025-06-02 RX ORDER — DICLOFENAC SODIUM 75 MG/1
75 TABLET, DELAYED RELEASE ORAL 2 TIMES DAILY PRN
Qty: 60 TABLET | Refills: 3 | Status: SHIPPED | OUTPATIENT
Start: 2025-06-02

## 2025-06-02 NOTE — TELEPHONE ENCOUNTER
Future Appointments   Date Time Provider Department Center   6/19/2025 11:00 AM [facility]   9/9/2025 11:15 AM Allyson Roberts APRN - NP [facility] BS AMB   5/19/2026 10:20 AM Timoteo Muse MD [facility] BS ECC DEP

## 2025-06-02 NOTE — TELEPHONE ENCOUNTER
Medication Refill Request    Niharika Lynn is requesting a refill of the following medication(s):     diclofenac (VOLTAREN) 75 MG EC tablet     Please send refill to:     [facility] [id number] - [place] - [address] [facility] [phone] - F [phone]  [address] [facility] [address]  Phone: [phone] Fax: [phone]

## 2025-06-19 ENCOUNTER — OFFICE VISIT (OUTPATIENT)
Age: 75
End: 2025-06-19
Payer: MEDICARE

## 2025-06-19 VITALS
TEMPERATURE: 98 F | DIASTOLIC BLOOD PRESSURE: 83 MMHG | WEIGHT: 146 LBS | OXYGEN SATURATION: 93 % | HEART RATE: 77 BPM | SYSTOLIC BLOOD PRESSURE: 128 MMHG | BODY MASS INDEX: 26.73 KG/M2 | RESPIRATION RATE: 18 BRPM

## 2025-06-19 DIAGNOSIS — Z98.890 HISTORY OF LUMPECTOMY OF RIGHT BREAST: ICD-10-CM

## 2025-06-19 DIAGNOSIS — Z85.3 PERSONAL HISTORY OF BREAST CANCER: Primary | ICD-10-CM

## 2025-06-19 DIAGNOSIS — C50.911 INFILTRATING DUCTAL CARCINOMA OF RIGHT BREAST (HCC): ICD-10-CM

## 2025-06-19 PROCEDURE — 99202 OFFICE O/P NEW SF 15 MIN: CPT | Performed by: INTERNAL MEDICINE

## 2025-06-19 NOTE — PROGRESS NOTES
[facility]  [address] [place] [address], [address] [place] [id number]  W: [phone]  F: [phone]    Reason for Visit:   Niharika Lynn is a 74 y.o. female who is seen for fu breast cancer    Treatment History:      Abnormal screening mammo 6/3/21: Bilateral digital screening mammography was performed and is  interpreted in conjunction with a computer assisted detection (CAD) system. In the left  breast, no suspicious masses or calcifications are identified. The right breast upper outer quadrant at 10:00 posterior depth 9 cm from the nipple  there is a focal asymmetry    Right Dx Mammo/US 6/7/21: Further evaluation was performed for a right breast focal asymmetry. In the right breast upper outer quadrant at 10:00 posterior depth there is an equal density irregular mass with indistinct margins measuring up to 1.7 cm. 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 Largest glass slide measurement of invasive carcinoma:  6 mm     ER/SD negative, HER2 FISH +    Ki67 90%    Right Lumpectomy 8/3/2: [facility] - 20 mm IDC with negative LNs. Margins negative    Adjuvant weekly Taxol + Herceptin 8/31/21 - 11/16/21    Maintenance subQ Herceptin 11/23/21 - 8/2/22    XRT with Dr. Corrales 12/31/21 - 2/9/22      STAGE: Pathologic T1cN0, ER/SD negative, HER2+    History of Present Illness:     Pt seen today for office fu breast cancer.   Not on any therapy from here.   No new breast issues  Doing well overall.   No fevers/ chills/ chest pain/ SOB/ nausea/ vomiting/diarrhea/ pain/fatigue        Past Medical History:   Diagnosis Date    Arthritis     left ankle    Breast cancer (HCC) 08/03/2021    Right lumpectomy    Calculus of kidney 09/14/2018    Passed    Chemotherapy

## 2025-06-19 NOTE — PROGRESS NOTES
Niharika Lynn is a 74 y.o. female    Chief Complaint   Patient presents with    Follow-up     breast cancer     1. Have you been to the ER, urgent care clinic since your last visit?  Hospitalized since your last visit?No    2. Have you seen or consulted any other health care providers outside of the [facility] since your last visit?  Include any pap smears or colon screening. No

## 2025-09-04 ENCOUNTER — HOSPITAL ENCOUNTER (OUTPATIENT)
Facility: HOSPITAL | Age: 75
Discharge: HOME | End: 2025-09-04
Attending: INTERNAL MEDICINE
Payer: MEDICARE

## 2025-09-04 VITALS — WEIGHT: 146 LBS | HEIGHT: 61 IN | BODY MASS INDEX: 27.56 KG/M2

## 2025-09-04 DIAGNOSIS — Z85.3 PERSONAL HISTORY OF BREAST CANCER: ICD-10-CM

## 2025-09-04 DIAGNOSIS — Z98.890 HISTORY OF LUMPECTOMY OF RIGHT BREAST: ICD-10-CM

## 2025-09-04 PROCEDURE — G0279 TOMOSYNTHESIS, MAMMO: HCPCS

## (undated) DEVICE — SOLUTION IRRIG 1000ML 0.9% SOD CHL USP POUR PLAS BTL

## (undated) DEVICE — CATH IV AUTOGRD BC BLU 22GA 25 -- INSYTE

## (undated) DEVICE — PENCIL SMK EVAC L10FT DIA95MM TBNG NONSTICK W ADPT TO 22MM

## (undated) DEVICE — SUTURE VCRL SZ 3-0 L27IN ABSRB UD L26MM SH 1/2 CIR J416H

## (undated) DEVICE — SNARE ENDOSCP M L240CM W27MM SHTH DIA2.4MM CHN 2.8MM OVL

## (undated) DEVICE — 1010 S-DRAPE TOWEL DRAPE 10/BX: Brand: STERI-DRAPE™

## (undated) DEVICE — HANDLE LT SNAP ON ULT DURABLE LENS FOR TRUMPF ALC DISPOSABLE

## (undated) DEVICE — STRIP,CLOSURE,WOUND,MEDI-STRIP,1/2X4: Brand: MEDLINE

## (undated) DEVICE — Device

## (undated) DEVICE — SUTURE MCRYL SZ 4-0 L27IN ABSRB UD L19MM PS-2 1/2 CIR PRIM Y426H

## (undated) DEVICE — Z DISCONTINUED USE 2751540 TUBING IRRIG L10IN DISP PMP ENDOGATOR

## (undated) DEVICE — PROBE DETECTION F/LUMPECTOMY -- ORDER IN MULTIPLES OF 5 EA ..MARGINPROBE

## (undated) DEVICE — PACK,BASIC,SIRUS,V: Brand: MEDLINE

## (undated) DEVICE — 1200 GUARD II KIT W/5MM TUBE W/O VAC TUBE: Brand: GUARDIAN

## (undated) DEVICE — PREP SKN CHLRAPRP APL 26ML STR --

## (undated) DEVICE — INTENDED FOR TISSUE SEPARATION, AND OTHER PROCEDURES THAT REQUIRE A SHARP SURGICAL BLADE TO PUNCTURE OR CUT.: Brand: BARD-PARKER ® CARBON RIB-BACK BLADES

## (undated) DEVICE — REM POLYHESIVE ADULT PATIENT RETURN ELECTRODE: Brand: VALLEYLAB

## (undated) DEVICE — GLOVE SURG SZ 6 L12IN FNGR THK79MIL GRN LTX FREE

## (undated) DEVICE — TOWEL SURG W17XL27IN STD BLU COT NONFENESTRATED PREWASHED

## (undated) DEVICE — CONTAINER SPEC 20 ML LID NEUT BUFF FORMALIN 10 % POLYPR STS

## (undated) DEVICE — SUT PROL 3-0 36IN SH DA BLU --

## (undated) DEVICE — SYR 10ML LUER LOK 1/5ML GRAD --

## (undated) DEVICE — MARKER TISS VERAFORM

## (undated) DEVICE — MINOR BASIN -SMH: Brand: MEDLINE INDUSTRIES, INC.

## (undated) DEVICE — BAG SPEC BIOHZD LF 2MIL 6X10IN -- CONVERT TO ITEM 357326

## (undated) DEVICE — NEEDLE HYPO 18GA L1.5IN PNK S STL HUB POLYPR SHLD REG BVL

## (undated) DEVICE — DERMABOND SKIN ADH 0.7ML -- DERMABOND ADVANCED 12/BX

## (undated) DEVICE — GLOVE SURG 6 11.1IN BEAD CUF LIGHT BRN SENSICARE LTX FREE

## (undated) DEVICE — PAD,NON-ADHERENT,W/AD,3X4,ST,LF,1/PK: Brand: MEDLINE

## (undated) DEVICE — SPONGE GZ W4XL4IN COT 12 PLY TYP VII WVN C FLD DSGN

## (undated) DEVICE — GARMENT,MEDLINE,DVT,INT,CALF,MED, GEN2: Brand: MEDLINE

## (undated) DEVICE — Z DISCONTINUED NO SUB IDED SET EXTN W/ 4 W STPCOCK M SPIN LOK 36IN

## (undated) DEVICE — SYRINGE MED 20ML STD CLR PLAS LUERLOCK TIP N CTRL DISP

## (undated) DEVICE — SUT SLK 2-0SH 30IN BLK --

## (undated) DEVICE — SOL INJ SOD CL 0.9% 500ML BG --

## (undated) DEVICE — YANKAUER,TAPERED BULBOUS TIP,W/O VENT: Brand: MEDLINE

## (undated) DEVICE — SOLIDIFIER FLUID 3000 CC ABSORB

## (undated) DEVICE — NEONATAL-ADULT SPO2 SENSOR: Brand: NELLCOR

## (undated) DEVICE — SYR 10ML CTRL LR LCK NSAF LF --

## (undated) DEVICE — BRA SURG POST-OP LG 42IN --

## (undated) DEVICE — TUBING HYDR IRR --

## (undated) DEVICE — Device: Brand: MEDICAL ACTION INDUSTRIES

## (undated) DEVICE — AIRLIFE™ U/CONNECT-IT OXYGEN TUBING 7 FEET (2.1 M) CRUSH-RESISTANT OXYGEN TUBING, VINYL TIPPED: Brand: AIRLIFE™

## (undated) DEVICE — SUTURE VCRL SZ 2-0 L27IN ABSRB UD L26MM SH 1/2 CIR J417H

## (undated) DEVICE — BASIN ST MAJOR-NO CAUTERY: Brand: MEDLINE INDUSTRIES, INC.

## (undated) DEVICE — SET ADMIN 16ML TBNG L100IN 2 Y INJ SITE IV PIGGY BK DISP

## (undated) DEVICE — DECANTER BAG 9": Brand: MEDLINE INDUSTRIES, INC.

## (undated) DEVICE — NEEDLE HYPO 25GA L1.5IN BVL ORIENTED ECLIPSE

## (undated) DEVICE — CANN NASAL O2 CAPNOGRAPHY AD -- FILTERLINE

## (undated) DEVICE — HYPODERMIC SAFETY NEEDLE: Brand: MONOJECT

## (undated) DEVICE — PROVE COVER: Brand: UNBRANDED

## (undated) DEVICE — CONNECTOR TBNG AUX H2O JET DISP FOR OLY 160/180 SER

## (undated) DEVICE — QUILTED PREMIUM COMFORT UNDERPAD,EXTRA HEAVY: Brand: WINGS

## (undated) DEVICE — INSULATED BLADE ELECTRODE: Brand: EDGE

## (undated) DEVICE — KENDALL RADIOLUCENT FOAM MONITORING ELECTRODE -RECTANGULAR SHAPE: Brand: KENDALL

## (undated) DEVICE — C-ARM: Brand: UNBRANDED

## (undated) DEVICE — SPONGE GZ W4XL4IN COT RADPQ HIGHLY ABSRB

## (undated) DEVICE — ROCKER SWITCH PENCIL BLADE ELECTRODE, HOLSTER: Brand: EDGE

## (undated) DEVICE — DRAPE,LAPAROTOMY,T,PEDI,STERILE: Brand: MEDLINE

## (undated) DEVICE — DRAPE,REIN 53X77,STERILE: Brand: MEDLINE

## (undated) DEVICE — 3M™ TEGADERM™ TRANSPARENT FILM DRESSING FRAME STYLE, 1624W, 2-3/8 IN X 2-3/4 IN (6 CM X 7 CM), 100/CT 4CT/CASE: Brand: 3M™ TEGADERM™

## (undated) DEVICE — COVER US PRB W12XL244CM FLD IORT STR TIP

## (undated) DEVICE — TUBING, SUCTION, 1/4" X 12', STRAIGHT: Brand: MEDLINE

## (undated) DEVICE — BW-412T DISP COMBO CLEANING BRUSH: Brand: SINGLE USE COMBINATION CLEANING BRUSH

## (undated) DEVICE — BAG BELONG PT PERS CLEAR HANDL

## (undated) DEVICE — ENDO CARRY-ON PROCEDURE KIT INCLUDES ENZYMATIC SPONGE, GAUZE, BIOHAZARD LABEL, TRAY, LUBRICANT, DIRTY SCOPE LABEL, WATER LABEL, TRAY, DRAWSTRING PAD, AND DEFENDO 4-PIECE KIT.: Brand: ENDO CARRY-ON PROCEDURE KIT

## (undated) DEVICE — TRAP SURG QUAD PARABOLA SLOT DSGN SFTY SCRN TRAPEASE

## (undated) DEVICE — DRAPE,CHEST,FENES,15X10,STERIL: Brand: MEDLINE

## (undated) DEVICE — CURVED, SMALL JAW, OPEN SEALER/DIVIDER: Brand: LIGASURE